# Patient Record
Sex: FEMALE | Race: WHITE
[De-identification: names, ages, dates, MRNs, and addresses within clinical notes are randomized per-mention and may not be internally consistent; named-entity substitution may affect disease eponyms.]

---

## 2017-01-03 ENCOUNTER — HOSPITAL ENCOUNTER (OUTPATIENT)
Dept: HOSPITAL 75 - RAD | Age: 57
End: 2017-01-03
Attending: NURSE PRACTITIONER
Payer: MEDICARE

## 2017-01-03 DIAGNOSIS — K74.60: Primary | ICD-10-CM

## 2017-01-03 PROCEDURE — 76700 US EXAM ABDOM COMPLETE: CPT

## 2017-01-03 NOTE — XMS REPORT
Continuity of Care Document

 Created on: 2017



Wendy Dunaway

External Reference #: [id number]

: 1960

Sex: Female



Demographics







 Address  [address] [place]  [address]

 

 Home Phone  [phone]

 

 Preferred Language  English

 

 Marital Status  Single

 

 Jewish Affiliation  NON

 

 Race  White or 

 

 Ethnic Group  Not  or 





Author







 Author  [facility]

 

 Organization  [facility]

 

 Address  Unknown

 

 Phone  Unavailable







Support







 Name  Relationship  Address  Phone

 

 , Kelly Siddiqui  ECON  [address]

[place]  [address]  [phone]

 

 , Razia Dunaway  EC[employer]  Unknown  [phone]







Care Team Providers







 Care Team Member Name  Role  Phone

 

 Rah Castro  PCP  [phone]







Source Comments

Some departments are not documenting in the electronic medical record.  If you 
do not see the information that you expected, contact Release of Information in 
the Health Information Management department at [phone] for further 
assistance in locating additional records.[facility]



Active Allergies and Adverse Reactions







    



  Allergen   Noted Date   Severity   Reactions   Comments

 

    



  Celebrex   2013    HIVES, SHORTNESS OF   Throat swells.



     BREATH, SEE COMMENTS 

 

    



  Sulfa (Sulfonamide   2012    HIVES 



  Antibiotics)    







Current Medications







      



  Prescription   Sig.   Disp.   Refills   Start   End Date   Status



      Date  

 

      



  gabapentin (NEURONTIN)   Take 1/2 tab every     20    Active



  600 mg tablet   morning, 1 tab every     16  



   evening and 1/2 tab at     



   bedtime.     

 

      



  QUEtiapine (SEROQUEL) 100   Take 1/2 tab every     06/15/20    Active



  mg tablet   morning, 1 tab at 1 pm, 1     16  



   tab at 5 pm and 1/2 tab     



   at bedtime.     

 

      



  dasatinib (SPRYCEL) 20 mg   Take 2 Tabs by mouth   60 Tab   2   10/05/20    
Active



  tablet   daily.     16  







Active Problems







 



  Problem   Noted Date

 

 



  History of hepatitis C   2016

 

 



  Pleural effusion   2015













  Overview:



  Traumatic left hemothorax.



  Confirmed by diagnostic / therapeutic thoracentesis





  Last Assessment & Plan:



  Evaluated by  cardiothoracic surgery, no interventions, deferred to



  pulmonary.









 



  Colon polyps   2015













  Overview:



  Tubular adenomas









 



  Cirrhosis (HCC)   2014

 

 



  Total bilirubin, elevated   2012













  Overview:



  Likely related to imatinib.





  L



  ast Assessment & Plan:



  Likely related to imatinib as it increased with the increased dosing. We



  will decrease imatinib back to 400 mg daily and have her labs checked with



  Dr. Petersen next week. Should her jaundice worsen over the weekend or she



  has diffuse itching she should present to her local ER.









 



  CML (chronic myelocytic leukemia) (HCC)   2012













  Overview:



  Diagnosis: CM chronic phase



  Date of Diagnosis: 2012



  Treatment:



  Imatinib 400-800 mg PO daily  to 2013: Best response not available,



  patient progressed 2013 ( Cytogenetic progression/ relapse)



  Nilotinib 200 MG PO BID: unable to tolerate, exacerbated neuropathy



  Dasatinib 100 mg PO daily: started March 15, 2013



  Response: CCR, CMR



  Dasatinib reduced to 70 mg PO daily: Oct , 2013



  7/30/15-Continue Dasatinib 70 mg po daily.



  May 2016: Dasatinib 50 mg po daily.



  Oct   2016: Dasatinib 40 mg po daily.





  Ms. Dunaway is a 53-year-old woman with a history of hep C infection and



  unexplained neuropathy.  She also has significant dysphagia and odynophagia



  and has significant difficulty in observing oral medication tablets.





  She was diagnosed with chronic phase CML in 2012.  She has been under



  the care of Dr. Petersen.  There has not been any outside records available



  for us to review today and most of the information available has been



  provided by the patient verbally.  Her treatment hostory is as above.





  Repeat the bone marrow biopsy did not show clonal evolution of her CML.



  Remains in CP-CML. No Kinase-domain mutations were detected.





  L



  ast Assessment & Plan:



  Mrs. Dunaway is doing very well with dasatinib.



  She has recurent pleural effusions, but no other significant side effects



  Achieved CMR





  Plan:



  -Reduce dasatinib to 40mg PO due to recurrent pleural effusions



  -Re-check BCR-ABL at next visit in 3 months



  -patient is up to date on mammogram and pap



  -She is due for colonoscopy in 2017











 



  Iron deficiency   2012













  Overview:



  Absent iron stains on bone marrow biopsy.





  L



  ast Assessment & Plan:



  Ferritin is > 500 , rules out iron def.









 



  Hiatal hernia   2012













  Overview:



  Per pt, limits her ability to take po pills.





  L



  ast Assessment & Plan:



  Pt reports that her trouble swallowing is secondary to GERD symptoms and



  that her GERD has resolved. She denies any current trouble with dysphagia



  and is able to eat and drink.









 



  Hepatitis C   2012













  Overview:



  Due to prior IV drug use, untreated





  L



  ast Assessment & Plan:



  Completed HCV therapy.



  Patient has an appointment with Hepatology today.











 



  Normocytic anemia   2012













  Overview:



  Last transfusion 





  L



  ast Assessment & Plan:



  Remains well above transfusion needs. Labs will hopefully continue to



  improve with treatment. She should have a work up for iron deficiency in



  the future however.







Resolved Problems







  



  Problem   Noted Date   Resolved Date

 

  



  Thrombocythemia (HCC)   2012













  Overview:



  CHANEL-2 negative.





  L



  ast Assessment & Plan:



  Improved. Now WNL.  She has stopped hydrea.







Most Recent Encounters







    



  Date   Type   Specialty   Providers   Description

 

    



  2016   Documentation   Oncology   Anam Ribeiro, PHARMD 

 

    



  10/17/2016   Orders Only   Hepatology   Yadira Vilchis   Chronic hepatitis C



      without hepatic coma



      (HCC); Cirrhosis of liver



      without ascites,



      unspecified hepatic



      cirrhosis type (HCC); CML



      (chronic myelocytic



      leukemia) (HCC)

 

    



  10/05/2016   Office Visit   Oncology   Gillian Chand MD   CML (chronic 
myelocytic



      leukemia) (HCC) (Primary



      Dx)

 

    



  10/05/2016   Refill   Oncology   Gillian Chand MD 

 

    



  10/04/2016   Orders Only   Oncology   Vicky Rodriguez, PHARMD 

 

    



  10/04/2016   Orders Only   Hepatology   Olga Polk APRN   Chronic hepatitis 
C



      without hepatic coma



      (HCC) (Primary Dx);



      Cirrhosis of liver



      without ascites,



      unspecified hepatic



      cirrhosis type (HCC); CML



      (chronic myelocytic



      leukemia) (HCC)

 

    



  10/03/2016   Orders Only   Hepatology   Olga Polk APRN   Chronic hepatitis 
C



      without hepatic coma



      (HCC) (Primary Dx);



      Cirrhosis of liver



      without ascites,



      unspecified hepatic



      cirrhosis type (HCC)







Immunizations







  



  Name   Dates Previously Given   Next Due

 

  



  HEP A/HEP B Combined   2015, 2015, 2015 



  Vaccine  







Social History







    



  Tobacco Use   Types   Packs/Day   Years Used   Date

 

    



  Former Smoker   Cigarettes   1   10 









   



  Smokeless Tobacco: Former     Quit:



  User     2003









   



  Alcohol Use   Drinks/Week   oz/Week   Comments

 

   



  No   0 Standard   0.0 



   drinks or  



   equivalent  







Last Filed Vital Signs







  



  Vital Sign   Reading   Time Taken

 

  



  Blood Pressure   123/82   10/05/2016  9:25 AM CDT

 

  



  Pulse   64   10/05/2016  9:25 AM CDT

 

  



  Temperature   36.5   C (97.7   F)   10/05/2016  9:25 AM CDT

 

  



  Respiratory Rate   18   10/05/2016  9:25 AM CDT

 

  



  Height   1.575 m (5' 2")   10/05/2016  9:25 AM CDT

 

  



  Weight   70.035 kg (154 lb 6.4 oz)   10/05/2016  9:25 AM CDT

 

  



  Body Mass Index   28.23   10/05/2016  9:25 AM CDT

 

  



  Oxygen Saturation   100%   10/05/2016  9:25 AM CDT







Plan of Care







    



  Date   Type   Specialty   Providers   Description

 

    



  2017   Appointment   Oncology  

 

    



  2017   Appointment   Oncology   Gillian Chand MD 



     [address] 



     [address] 



     [place] [address] 



     [phone] 



     [phone] (Fax) 

 

    



  2017   Appointment   Hepatology   Olga Polk APRN 



     [address] 



     [address] 



     [place] [address] 



     [phone] 



     [phone] (Fax) 









   



  Health Maintenance   Due Date   Last Done   Comments

 

   



  Physical (Comprehensive)   1966  



  Exam   

 

   



  Pertussis Vaccine   1970  

 

   



  Tetanus Vaccine   1976  

 

   



  Cervical Cancer Screening   1980  

 

   



  Influenza Vaccine   2016  

 

   



  Breast Cancer Screening   2018 

 

   



  Colorectal Cancer   10/21/2024   10/21/2014 



  Screening   







Results from Last 3 Months

CBC AND DIFF (10/05/2016  8:56 AM)





  



  Component   Value   Range

 

  



  White Blood Cells   3.5 (L)   4.5-11.0 K/UL

 

  



  RBC   4.60   4.0-5.0 M/UL

 

  



  Hemoglobin   14.5   12.0-15.0 GM/DL

 

  



  Hematocrit   42.9   36-45 %

 

  



  MCV   93.3    FL

 

  



  MCH   31.4   26-34 PG

 

  



  MCHC   33.7   32.0-36.0 G/DL

 

  



  RDW   15.7 (H)   11-15 %

 

  



  Platelet Count   238   150-400 K/UL

 

  



  MPV   7.7   7-11 FL

 

  



  Neutrophils   52   41-77 %

 

  



  Lymphocytes   32   24-44 %

 

  



  Monocytes   11   4-12 %

 

  



  Eosinophils   4   0-5 %

 

  



  Basophils   1   0-2 %

 

  



  Absolute Neutrophil Count   1.80   1.8-7.0 K/UL

 

  



  Absolute Lymph Count   1.10   1.0-4.8 K/UL

 

  



  Absolute Monocyte Count   0.40   0-0.80 K/UL

 

  



  Absolute Eosinophil Count   0.10   0-0.45 K/UL

 

  



  Absolute Basophil Count   0.00   0-0.20 K/UL









 Specimen

 

 Blood



BCR  PCR BLOOD OR BONE MARROW (10/05/2016  8:56 AM)





  



  Component   Value   Range

 

  



  Specimen   BLOOD 

 

  



  Final Diagnosis,BCR   Patient Name: Wendy Dunaway 



   Physician(s):    Dr. Gillian Chand 



   Ordering Facility: [facility] 



   Medical Record #:    [id number] 



   Date of Birth, Sex:    1960, F 



   Collection Date:    10/5/2016 



   Received Date:    10/5/2016 



   Report Date:    10/17/2016 



   [facility] Reference #:    [id number] 



   Accession #:    [id number] 



   Test Performed: 



   BCR-ABL p210 fusion gene transcript analysis 



   Specimen Type 



   Blood 



   Analytical Results 



   The BCR-ABL Mbcr b2a2 and/or b3a2 fusion gene 



   transcripts were NOT 



   DETECTED in this specimen. 



   Interpretation 



   The BCR-ABL Mbcr b2a2 and b3a2 fusion gene 



   transcripts were NOT 



   DETECTED in this specimen. The limit of detection 



   for this assay is 



   an 



   Mbcr copy number of at least 10 and an NCN of 



   0.001%. Analysis of 



   BCR-ABL transcript levels during and/or after 



   therapy reflects the 



   level of 



   disease suppression in patients with CML and some 



   patients with AML, 



   and is effective for monitoring treatment 



   efficiency or, in some 



   cases, 



   the need to reassess therapy. These results should 



   be interpreted 



   only in the context of total clinical information. 



   Therapeutic action 



   should not 



   be taken based solely on these results. 



   Methodology 



   Total RNA from peripheral blood or bone marrow 



   samples was isolated 



   and reverse transcribed with random primers, and 



   the    cDNA products 



   were quantitated by real time quantitative PCR 



   (RQ-PCR). Primers and 



   probes for bcr exons b2 and b3 and abl exon 2 are 



   used in the RQ- 



   PCR to detect the Mbcr transcripts b2a2 and b3a2. 



   A set of plasmid 



   standards containing 10^1 to 10^6 copies each of 



   BCR-ABL and ABL were 



   used to generate standard curves for the 



   quantitation of BCR-ABL and 



   ABL. ABL copy numbers are used as control for the 



   quality and 



   quantity 



   of sample RNA and to normalize the BCR-ABL copy 



   numbers. The test 



   result is expressed as BCR-ABL/ABL ratio, then 



   converted to an 



   international scale using the IS-Low Calibrator. 



   Intended Use 



   The BCR-ABL Mbcr RQ-PCR assay detects and 



   quantitates the BCR-ABL 



   Mbcr b2a2 and b3a2 (p210) fusion gene transcripts 



   which present in 



   95% of chronic myelogenous leukemia (CML) patients 



   and approximately 



   35% of Ph-positive adult acute lymphocytic 



   leukemia (ALL) patients. 



   This test does not detect other BCR-ABL fusions, 



   including e1a2 



   (p190), so should not be used in the diagnostic 



   setting. Serial 



   analysis of the 



   Mbcr b2a2 & b3a2 transcripts can be used for 



   monitoring patient 



   response to CML treatment including tyrosine 



   kinase inhibitors 



   imatinib and 



   dasatinib. 



   References 



   1. Leni et al.    Desirable performance 



   characteristics for BCR-ABL 



   measurement on an        international reporting 



   scale to allow 



   consistent 



   interpretation of individual patient response and 



   comparison of 



   response rates between clinical trials.    Blood 



   2008 112:6192-1469. 



   2. Katina Rosa et al. Guidelines for the 



   measurement of BCR-ABL 1 



   transscripts in chronic myeloid leukaemia. [place] 



   Journal of 



   Haematology, 153, 179-190. 



   3. Nichole Cooper et al. Establishment of the 1st 



   World Health 



   Organization International     Genetic Reference 



   Panel for 



   quantification of BCR-ABL 



   Mrna. Blood 2010. 



   4. Daniel.    Molecular Monitoring 



   of BCR-ABL as a guide 



   to        clinical management of chronic myeloid 



   leukaemia.    Blood. 2006: 



   20 29-41. 



   5. Sage et al. Monitoring of CML patients 



   responding to treatment 



   with tyrosine kinase inhibitors: review and 



   recommendations for 



   harmonizing current methodology for detecting 



   BCR-aBL transcripts and 



   kinase domain mutations for expressing results. 



   Blood 2006:108 (1) 



   28-37. 



   6. Syed et al.    Quantitative Monitoring 



   of BCR-ABL 



   Transcript-Suggestion of a Simplified Approach 



   Considering Inaccuracy 



   of 



   Measurement and Calibration. Leukemia & Lymphoma 



   2004:45 (1) 



   3608-8057. 



   Disclaimer: 



   This test was performed by the [facility] 



   [facility]. This test was developed and its 



   performance 



   characteristics 



   determined by the [facility] 



   [facility]. It has not 



   been cleared nor approved by the U.S. Food and 



   Drug Administration. 



   The FDA has determined that such clearance is not 



   necessary. This 



   test is used for clinical purposes. It should not 



   be regarded as 



   investigational or for research. This laboratory 



   is certified under 



   the Clinical Laboratory Improvement Amendments of 



   1988 (CLIA-88) as 



   qualified to perform high complexity clinical 



   laboratory testing. 



   [facility], Department 



   of Pathology and 



   Laboratory Medicine, [facility] 



   [place], [address], [address], 



   [place], [address]. 



   Tel: [phone]; Fax: [phone]. 









 Specimen

 

 Blood



COMPREHENSIVE METABOLIC PANEL (10/05/2016  8:56 AM)





  



  Component   Value   Range

 

  



  Sodium   139   137-147 MMOL/L

 

  



  Potassium   4.0   3.5-5.1 MMOL/L

 

  



  Chloride   108    MMOL/L

 

  



  Glucose   95    MG/DL

 

  



  Blood Urea Nitrogen   17   7-25 MG/DL

 

  



  Creatinine   0.87   0.4-1.00 MG/DL

 

  



  Calcium   9.5   8.5-10.6 MG/DL

 

  



  Total Protein   7.9   6.0-8.0 G/DL

 

  



  Total Bilirubin   0.5   0.3-1.2 MG/DL

 

  



  Albumin   4.2   3.5-5.0 G/DL

 

  



  Alk Phosphatase   67    U/L

 

  



  AST (SGOT)   18   7-40 U/L

 

  



  CO2   27   21-30 MMOL/L

 

  



  ALT (SGPT)   13   7-56 U/L

 

  



  Anion Gap   4   3-12

 

  



  eGFR Non    >60Comment:   >60 mL/min



   The eGFR is not validated for use in drug dosing 



   adjustments.    Continue to use 



   estimated creatinine clearance per dosing 



   reference text.    Please contact the 



   Clinical Pharmacist for questions. 

 

  



  eGFR    >60Comment:   >60 mL/min



   The eGFR is not validated for use in drug dosing 



   adjustments.    Continue to use 



   estimated creatinine clearance per dosing 



   reference text.    Please contact the 



   Clinical Pharmacist for questions. 









 Specimen

 

 Blood



LDH-LACTATE DEHYDROGENASE (10/05/2016  8:56 AM)





  



  Component   Value   Range

 

  



  Lactate Dehydrogenase   219 (H)   100-210 U/L









 Specimen

 

 Blood

## 2017-01-03 NOTE — DIAGNOSTIC IMAGING REPORT
PROCEDURE: US abdomen complete.



TECHNIQUE: Multiple real-time grayscale images were obtained

over the abdomen in various projections.



INDICATION: Cirrhosis.



FINDINGS: The liver is normal in size and without focal lesions.

Gallbladder is surgically absent. There is no biliary ductal

dilatation. Visualized portions of the pancreas are

unremarkable. The spleen is normal in size. There is a small

left pleural effusion. Aorta is nonaneurysmal. IVC is patent.

Both kidneys are unremarkable. There is no ascites.



IMPRESSION: Small left pleural effusion, otherwise unremarkable

abdominal ultrasound status post cholecystectomy.



Dictated by:



Dictated on workstation # [id number]

## 2017-01-14 ENCOUNTER — HOSPITAL ENCOUNTER (EMERGENCY)
Dept: HOSPITAL 75 - ER | Age: 57
Discharge: HOME | End: 2017-01-14
Payer: MEDICARE

## 2017-01-14 VITALS — WEIGHT: 158 LBS | HEIGHT: 68 IN | BODY MASS INDEX: 23.95 KG/M2

## 2017-01-14 VITALS — SYSTOLIC BLOOD PRESSURE: 142 MMHG | DIASTOLIC BLOOD PRESSURE: 96 MMHG

## 2017-01-14 DIAGNOSIS — Z79.899: ICD-10-CM

## 2017-01-14 DIAGNOSIS — G62.9: Primary | ICD-10-CM

## 2017-01-14 PROCEDURE — 93005 ELECTROCARDIOGRAM TRACING: CPT

## 2017-01-14 PROCEDURE — 96372 THER/PROPH/DIAG INJ SC/IM: CPT

## 2017-01-14 PROCEDURE — 84484 ASSAY OF TROPONIN QUANT: CPT

## 2017-01-14 PROCEDURE — 36415 COLL VENOUS BLD VENIPUNCTURE: CPT

## 2017-01-14 NOTE — ED GENERAL
General


Chief Complaint:  General Problems/Pain


Stated Complaint:  SHOULDER BLADE/ARM NERVE BURNING


Nursing Triage Note:  


PT TO ED 8 W/ C/O NERVE BURNING ONSET LAST NOC.  REPORTS SHE HAD A STEROID 


INJECTION A FEW DAYS AGO AT URGENT CARE FOR A SINUS INFECTION.


Nursing Sepsis Screen:  No Definite Risk


Source of Information:  Patient


Exam Limitations:  No Limitations





History of Present Illness


Time Seen by Provider:  16:35


Initial Comments


Here with complaint of nerve pain that is from her left scapula down to her 

left elbow.  She has history of chronic nerve pain that is usually treated very 

well with gabapentin.  She did get an injection of steroids for a sinus 

infection a few days ago and has had increasing nerve pain since.  She has had 

2 hydrocodone and that has helped quite a bit.  She has not tried anything else 

aside from that for the pain.  Denies chest pain, breathing problems, nausea, 

vomiting or sweating.


Timing/Duration:  2-3 Days, Intermittent


Severity:  Moderate


Associated Systoms:  No Chest Pain, No Cough, No Fever/Chills, No Nausea/

Vomiting, No Shortness of Air, No Weakness





Allergies and Home Medications


Allergies


Coded Allergies:  


     sulfamethoxazole (Verified  Allergy, Severe, RASH, 10/2/14)


     trimethoprim (Verified  Allergy, Severe, RASH, 10/2/14)


     Sulfa (Sulfonamide Antibiotics) (Verified  Allergy, Mild, 10/2/14)


     zolpidem (Verified  Allergy, Mild, skin sensation, 10/2/14)


     celecoxib (Verified  Adverse Reaction, Intermediate, hives, 10/2/14)





Home Medications


1 TAB TAB #30 1 TAB PO Q4H PRN PRN PAIN 


   Prescribed by: KAELA AVILES on 11/17/14 0824


Dasatinib 70 Mg Tablet 70 MG PO HS (Reported) 


Docusate Sodium 100 Mg Cap #60 100 MG PO BID 


   Prescribed by: KAELA AVILES on 11/17/14 0825


Gabapentin 600 Mg Tab 600 MG PO HS (Reported) 


Gabapentin 600 Mg Tablet 300 MG PO DAILY (Reported) 


   TAKES 1/2 (600MG) TABLET 


Gabapentin 600 Mg Tab 600 MG PO DAILY @1600 (Reported) 


Hydrocodone/Acetaminophen 1 Each Tablet #10 1 EACH PO Q4H 


   Prescribed by: KARELY SALAS on 10/31/16 1956


Prednisone 20 Mg Tab #6 40 MG PO DAILY 


   Prescribed by: KARELY SALAS on 10/31/16 1956


Quetiapine Fumarate 50 Mg Tablet 50 MG PO AM (Reported) 


Quetiapine Fumarate 50 Mg Tablet 100 MG PO DAILY @1300 (Reported) 


   TAKES 2 (50MG) TABLETS 


Quetiapine Fumarate 50 Mg Tablet 100 MG PO DAILY @1700 (Reported) 


   TAKES 2 (50MG) TABLETS 


Quetiapine Fumarate 50 Mg Tablet 50 MG PO HS (Reported) 





Constitutional:   see HPI


EENTM:   no symptoms reported


Respiratory:   no symptoms reported


Cardiovascular:   no symptoms reported


Gastrointestinal:   no symptoms reportedNo diarrhea, No nausea, No vomiting


Genitourinary:   no symptoms reported


Musculoskeletal:   see HPI muscle pain


Skin:   no symptoms reported


Psychiatric/Neurological:   See HPI Other (nerve pain)


All Other Systems Reviewed


Negative Unless Noted:  Yes





Past Medical-Social-Family Hx


Patient Social History


Alcohol Use:  Denies Use


Recreational Drug Use:  No


Smoking Status:  Never a Smoker


Recent Foreign Travel:  No


Contact w/Someone Who Travel:  No


Recent Infectious Disease Expo:  No


Recent Hopitalizations:  Yes (PLEURAL EFFUSION)


Physical Abuse Screen:  No


Sexual Abuse:  No





Immunizations Up To Date


Tetanus Booster (TDap):  Less than 5yrs


Date of Pneumonia Vaccine:  Jul 19, 2012





Seasonal Allergies


Seasonal Allergies:  Yes





Surgeries


HX Surgeries:  Yes (HERNIA REPAIR, HEMORRHOIDECTOMY, CARPAL TUNNEL SURGERIES)





Respiratory


Hx Respiratory Disorders:  No





Cardiovascular


Hx Cardiac Disorders:  No





Neurological


Hx Neurological Disorders:  Yes


Neurological Disorders:  Neuropathy





Reproductive System


Hx Reproductive Disorders:  No


Sexually Transmitted Disease:  No


HIV/AIDS:  No


GYN History:  Hysterectomy





Genitourinary


Hx Genitourinary Disorders:  No





Gastrointestinal


Hx Gastrointestinal Disorders:  Yes


Gastrointestinal Disorders:  Gastroesophageal Reflux, Chronic Constipation





Musculoskeletal


Hx Musculoskeletal Disorders:  No





Endocrine


Hx Endocrine Disorders:  No





HEENT


HX ENT Disorders:  No





Cancer


Hx Cancer:  Yes (CML)


Cancer:  Leukemia





Psychosocial


Hx Psychiatric Problems:  Yes


Behavioral Health Disorders:  Anxiety





Integumentary


HX Skin/Integumentary Disorder:  No





Blood Transfusions


Hx Blood Disorders:  Yes (CML/ hep c)


Adverse Reaction to a Blood Tr:  No





Reviewed Nursing Assessment


Reviewed/Agree w Nursing PMH:  Yes





Family Medical History


Significant Family History:  Heart Disease, Cancer, Diabetes


Family Medial History:  


Diabetes mellitus


  19 MOTHER


Hypertension


  19 MOTHER


Myocardial infarction


  19 FATHER


Neoplasm


Psychosocial problem


  19 MOTHER


Visual disorder


  19 MOTHER





Physical Exam


Vital Signs





 Vital Sign - Last 12Hours








 1/14/17





 16:37


 


Temp 98.0


 


Pulse 72


 


Resp 20


 


B/P 155/109


 


Pulse Ox 99


 


O2 Delivery Room Air





Capillary Refill : Less Than 3 Seconds


General Appearance:   No Apparent Distress WD/WN


HEENT:   PERRL/EOMI Pharynx Normal


Neck:   Non Tender Supple


Respiratory:   Lungs Clear Normal Breath Sounds


Cardiovascular:   Regular Rate, Rhythm No Murmur


Gastrointestinal:   Non Tender Soft


Extremity:   Non Tender No Calf Tenderness


Neurologic/Psychiatric:   Alert Oriented x3


Skin:   Normal Color Warm/Dry





Progress/Results/Core Measures


Results/Orders


Lab Results








 Laboratory Tests








Test


  1/14/17


16:59 Range/Units


 


 


Troponin I < 0.30  <0.30  NG/ML











My Orders





 Orders-AMADA GORDILLO MD


Ketorolac Injection (Toradol Injection) (1/14/17 16:53)


Ekg Tracing (1/14/17 16:53)


Troponin I (1/14/17 16:58)





Vital Signs/I&O





 Vital Sign - Last 12Hours








 1/14/17





 16:37


 


Temp 98.0


 


Pulse 72


 


Resp 20


 


B/P 155/109


 


Pulse Ox 99


 


O2 Delivery Room Air








Blood Pressure Mean:  124


Progress Note :  


Progress Note


Seen and evaluated.  We will check EKG and troponin given mimic nature of pain.

  Toradol 60 mg IM ordered.  Patient has stated allergy to celecoxib but has 

had Toradol in the past and does take ibuprofen and Aleve at home.  Monitor 

patient.  1810: Pain is relieved.  Patient feels much better.  EKG and troponin 

are negative.  Discharged home with return precautions.  Patient verbalize 

understanding instructions and agreement with plan.  No adverse reaction from 

Toradol noted.





ECG


Initial ECG Impression Date:  Jan 14, 2017


Initial ECG Impression Time:  17:11


Initial ECG Rate:  71


Initial ECG Rhythm:  Normal Sinus


Comment


Sinus rhythm with borderline T-wave abnormalities.  Normal axis improved from 

previous right axis deviation.  Overall morphology very similar to previous EKG 

of 2/19/13.  No evidence of ST elevation MI.  Interpreted by me.





Departure


Impression


Impression:  


 Primary Impression:  


 Neuropathy


Disposition:  01 HOME, SELF-CARE


Condition:  Improved





Departure-Patient Inst.


Decision time for Depature:  18:15


Referrals:  


KATHRYN HOLLAND DO (PCP/Family)


Primary Care Physician


Patient Instructions:  Peripheral Neuropathy (DC)





Add. Discharge Instructions:


All discharge instructions reviewed with patient and/or family. Voiced 

understanding.





You may take ibuprofen 800 mg every 8 hours as needed for pain.  If you are 

having persistent pain after Monday, he should follow-up with Dr. Holland on 

Tuesday for recheck and further evaluation and potentially adjusting your 

gabapentin.  Return for worse pain, fever, vomiting, weakness, breathing 

problems or other concerns as needed.





Copy


Copies To 1:   KATHRYN HOLLAND TIMOTHY D MD Jan 14, 2017 17:10

## 2017-01-14 NOTE — XMS REPORT
Continuity of Care Document

 Created on: 2017



Wendy Dunaway

External Reference #: [id number]

: 1960

Sex: Female



Demographics







 Address  [address] [place]  [address]

 

 Home Phone  [phone]

 

 Preferred Language  English

 

 Marital Status  Single

 

 Mormon Affiliation  NON

 

 Race  White or 

 

 Ethnic Group  Not  or 





Author







 Author  [facility]

 

 Organization  [facility]

 

 Address  Unknown

 

 Phone  Unavailable







Support







 Name  Relationship  Address  Phone

 

 , Kelly Siddiqui  [employer]  [address]

[place]  [address]  [phone]

 

 , Razia Dunaway  [employer]  Unknown  [phone]







Care Team Providers







 Care Team Member Name  Role  Phone

 

 Rah Castro  PCP  [phone]







Source Comments

Some departments are not documenting in the electronic medical record.  If you 
do not see the information that you expected, contact Release of Information in 
the Health Information Management department at [phone] for further 
assistance in locating additional records.[facility]



Active Allergies and Adverse Reactions







    



  Allergen   Noted Date   Severity   Reactions   Comments

 

    



  Celebrex   2013    HIVES, SHORTNESS OF   Throat swells.



     BREATH, SEE COMMENTS 

 

    



  Sulfa (Sulfonamide   2012    HIVES 



  Antibiotics)    







Current Medications







      



  Prescription   Sig.   Disp.   Refills   Start   End Date   Status



      Date  

 

      



  gabapentin (NEURONTIN)   Take 1/2 tab every     20    Active



  600 mg tablet   morning, 1 tab every     16  



   evening and 1/2 tab at     



   bedtime.     

 

      



  QUEtiapine (SEROQUEL) 100   Take 1/2 tab every     06/15/20    Active



  mg tablet   morning, 1 tab at 1 pm, 1     16  



   tab at 5 pm and 1/2 tab     



   at bedtime.     

 

      



  dasatinib (SPRYCEL) 20 mg   Take 2 Tabs by mouth   60 Tab   2   10/05/20    
Active



  tablet   daily.     16  

 

      



  amoxicillin (AMOXIL) 875   Take 875 mg by mouth       Active



  mg tablet   every 12 hours.     







Active Problems







 



  Problem   Noted Date

 

 



  History of hepatitis C   2016

 

 



  Pleural effusion   2015













  Overview:



  Traumatic left hemothorax.



  Confirmed by diagnostic / therapeutic thoracentesis





  Last Assessment & Plan:



  Evaluated by  cardiothoracic surgery, no interventions, deferred to



  pulmonary.









 



  Colon polyps   2015













  Overview:



  Tubular adenomas









 



  Cirrhosis (HCC)   2014

 

 



  Total bilirubin, elevated   2012













  Overview:



  Likely related to imatinib.





  L



  ast Assessment & Plan:



  Likely related to imatinib as it increased with the increased dosing. We



  will decrease imatinib back to 400 mg daily and have her labs checked with



  Dr. Petersen next week. Should her jaundice worsen over the weekend or she



  has diffuse itching she should present to her local ER.









 



  CML (chronic myelocytic leukemia) (HCC)   2012













  Overview:



  Diagnosis: CM chronic phase



  Date of Diagnosis: 2012



  Treatment:



  Imatinib 400-800 mg PO daily  to 2013: Best response not available,



  patient progressed 2013 ( Cytogenetic progression/ relapse)



  Nilotinib 200 MG PO BID: unable to tolerate, exacerbated neuropathy



  Dasatinib 100 mg PO daily: started March 15, 2013



  Response: CCR, CMR



  Dasatinib reduced to 70 mg PO daily: Oct , 2013



  7/30/15-Continue Dasatinib 70 mg po daily.



  May 2016: Dasatinib 50 mg po daily.



  Oct   2016: Dasatinib 40 mg po daily.





  Ms. Dunaway is a 53-year-old woman with a history of hep C infection and



  unexplained neuropathy.  She also has significant dysphagia and odynophagia



  and has significant difficulty in observing oral medication tablets.





  She was diagnosed with chronic phase CML in 2012.  She has been under



  the care of Dr. Petersen.  There has not been any outside records available



  for us to review today and most of the information available has been



  provided by the patient verbally.  Her treatment hostory is as above.





  Repeat the bone marrow biopsy did not show clonal evolution of her CML.



  Remains in CP-CML. No Kinase-domain mutations were detected.





  L



  ast Assessment & Plan:



  Mrs. Dunaway is doing very well with dasatinib.



  She has recurent pleural effusions, but no other significant side effects



  Achieved CMR





  Plan:



  -Reduce dasatinib to 40mg PO due to recurrent pleural effusions



  I have reviewed the CXR results with the patient and counseled the patient



  about the results. No pleural effusion



  -Re-check BCR-ABL at next visit in 3 months



  -patient is up to date on mammogram and pap



  -She is due for colonoscopy in 2017









 



  Iron deficiency   2012













  Overview:



  Absent iron stains on bone marrow biopsy.





  L



  ast Assessment & Plan:



  Ferritin is > 500 , rules out iron def.









 



  Hiatal hernia   2012













  Overview:



  Per pt, limits her ability to take po pills.





  L



  ast Assessment & Plan:



  Pt reports that her trouble swallowing is secondary to GERD symptoms and



  that her GERD has resolved. She denies any current trouble with dysphagia



  and is able to eat and drink.









 



  Hepatitis C   2012













  Overview:



  Due to prior IV drug use, untreated





  L



  ast Assessment & Plan:



  Completed HCV therapy.



  Patient has an appointment with Hepatology today.











 



  Normocytic anemia   2012













  Overview:



  Last transfusion 





  L



  ast Assessment & Plan:



  Remains well above transfusion needs. Labs will hopefully continue to



  improve with treatment. She should have a work up for iron deficiency in



  the future however.







Resolved Problems







  



  Problem   Noted Date   Resolved Date

 

  



  Thrombocythemia (HCC)   2012













  Overview:



  CHANEL-2 negative.





  L



  ast Assessment & Plan:



  Improved. Now WNL.  She has stopped hydrea.







Most Recent Encounters







    



  Date   Type   Specialty   Providers   Description

 

    



  2017   Hospital   Radiology   Gillian Chand MD   Arrived



   Encounter   

 

    



  2017   Office Visit   Oncology   Gillian Chand MD   CML (chronic 
myelocytic



      leukemia) (HCC) (Primary



      Dx)

 

    



  2016   Documentation   Oncology   Anam Ribeiro, PHARMD 

 

    



  10/17/2016   Orders Only   Hepatology   Yadira Vilchis   Chronic hepatitis C



      without hepatic coma



      (HCC); Cirrhosis of liver



      without ascites,



      unspecified hepatic



      cirrhosis type (HCC); CML



      (chronic myelocytic



      leukemia) (HCC)







Immunizations







  



  Name   Dates Previously Given   Next Due

 

  



  HEP A/HEP B Combined   2015, 2015, 2015 



  Vaccine  







Social History







    



  Tobacco Use   Types   Packs/Day   Years Used   Date

 

    



  Former Smoker   Cigarettes   1   10 









   



  Smokeless Tobacco: Former     Quit:



  User     2003









   



  Alcohol Use   Drinks/Week   oz/Week   Comments

 

   



  No   0 Standard   0.0 



   drinks or  



   equivalent  







Last Filed Vital Signs







  



  Vital Sign   Reading   Time Taken

 

  



  Blood Pressure   130/83   2017 11:22 AM CST

 

  



  Pulse   76   2017 11:22 AM CST

 

  



  Temperature   36.7   C (98   F)   2017 11:22 AM CST

 

  



  Respiratory Rate   15   2017 11:22 AM CST

 

  



  Height   1.575 m (5' 2.01")   2017 11:22 AM CST

 

  



  Weight   71.033 kg (156 lb 9.6 oz)   2017 11:22 AM CST

 

  



  Body Mass Index   28.64   2017 11:22 AM CST

 

  



  Oxygen Saturation   100%   2017 11:22 AM CST







Plan of Care







    



  Date   Type   Specialty   Providers   Description

 

    



  2017   Appointment   Hepatology   Jam Olga, APRN 



     [address] 



     [address] 



     [place] [address] 



     [phone] 



     [phone] (Fax) 

 

    



  2017   Appointment   Oncology  

 

    



  2017   Appointment   Oncology   Gillian Chand MD 



     [address] 



     [address] 



     [place] [address] 



     [phone] 



     [phone] (Fax) 









   



  Health Maintenance   Due Date   Last Done   Comments

 

   



  Physical (Comprehensive)   1966  



  Exam   

 

   



  Pertussis Vaccine   1970  

 

   



  Tetanus Vaccine   1976  

 

   



  Cervical Cancer Screening   1980  

 

   



  Influenza Vaccine   2016  

 

   



  Breast Cancer Screening   2017 

 

   



  Colorectal Cancer   10/21/2024   10/21/2014 



  Screening   







Results from Last 3 Months

CHEST 2 VIEWS (2017 11:20 AM)





 Impressions

 

 



 



 



Small left pleural effusion with adjacent atelectasis/scarring.



 



 



 Approved by Vasyl Jaramillo M.D. on 2017 12:08 PM



 



By my electronic signature, I attest that I have personally reviewed the images 
for this examination and formulated the interpretations and opinions expressed 
in this report



 



 



 Finalized by Ovidio Nicholson M.D. on 2017 12:36 PM. Dictated by Vasyl Jaramillo M.D. on 2017 11:24 AM.



 









 Narrative

 

 



CHEST 2 VIEWS



 



Clinical Indication:Female, 56 years old. Chronic myelocytic leukemia. 
Fluid on lungs. 



 



Comparison: Chest x-ray 2015



 



Findings:



 



Heart size and pulmonary vasculature are within normal limits. No pneumothorax. 
Small left pleural effusion with adjacent atelectasis/scarring old fracture 
deformity of left fourth posterior rib.



 









 Procedure Note

 

 



Interface, Radiant Results - Thu 2017 12:39 PM CST



CHEST 2 VIEWS



Clinical Indication:  Female, 56 years old. Chronic myelocytic leukemia. Fluid 
on lungs. 



Comparison: Chest x-ray 2015



Findings:



Heart size and pulmonary vasculature are within normal limits. No pneumothorax. 
Small left pleural effusion with adjacent atelectasis/scarring old fracture 
deformity of left fourth posterior rib.



IMPRESSION





Small left pleural effusion with adjacent atelectasis/scarring.





 Approved by Vasyl Jaramillo M.D. on 2017 12:08 PM



By my electronic signature, I attest that I have personally reviewed the images 
for this examination and formulated the interpretations and opinions expressed 
in this report





 Finalized by Ovidio Nicholson M.D. on 2017 12:36 PM. Dictated by Vasyl Jaramillo M.D. on 2017 11:24 AM.





LDH-LACTATE DEHYDROGENASE (2017 10:50 AM)





  



  Component   Value   Range

 

  



  Lactate Dehydrogenase   188   100-210 U/L









 Specimen

 

 Blood



COMPREHENSIVE METABOLIC PANEL (2017 10:50 AM)





  



  Component   Value   Range

 

  



  Sodium   141   137-147 MMOL/L

 

  



  Potassium   3.4 (L)   3.5-5.1 MMOL/L

 

  



  Chloride   107    MMOL/L

 

  



  Glucose   85    MG/DL

 

  



  Blood Urea Nitrogen   18   7-25 MG/DL

 

  



  Creatinine   0.84   0.4-1.00 MG/DL

 

  



  Calcium   9.5   8.5-10.6 MG/DL

 

  



  Total Protein   7.7   6.0-8.0 G/DL

 

  



  Total Bilirubin   0.4   0.3-1.2 MG/DL

 

  



  Albumin   4.5   3.5-5.0 G/DL

 

  



  Alk Phosphatase   62    U/L

 

  



  AST (SGOT)   16   7-40 U/L

 

  



  CO2   25   21-30 MMOL/L

 

  



  ALT (SGPT)   12   7-56 U/L

 

  



  Anion Gap   9   3-12

 

  



  eGFR Non    >60Comment:   >60 mL/min



   The eGFR is not validated for use in drug dosing 



   adjustments.    Continue to use 



   estimated creatinine clearance per dosing 



   reference text.    Please contact the 



   Clinical Pharmacist for questions. 

 

  



  eGFR    >60Comment:   >60 mL/min



   The eGFR is not validated for use in drug dosing 



   adjustments.    Continue to use 



   estimated creatinine clearance per dosing 



   reference text.    Please contact the 



   Clinical Pharmacist for questions. 









 Specimen

 

 Blood



CBC AND DIFF (2017 10:50 AM)





  



  Component   Value   Range

 

  



  White Blood Cells   4.3 (L)   4.5-11.0 K/UL

 

  



  RBC   4.52   4.0-5.0 M/UL

 

  



  Hemoglobin   14.5   12.0-15.0 GM/DL

 

  



  Hematocrit   42.9   36-45 %

 

  



  MCV   94.8    FL

 

  



  MCH   32.0   26-34 PG

 

  



  MCHC   33.7   32.0-36.0 G/DL

 

  



  RDW   15.2 (H)   11-15 %

 

  



  Platelet Count   191   150-400 K/UL

 

  



  MPV   7.5   7-11 FL

 

  



  Neutrophils   67   41-77 %

 

  



  Lymphocytes   20 (L)   24-44 %

 

  



  Monocytes   10   4-12 %

 

  



  Eosinophils   2   0-5 %

 

  



  Basophils   1   0-2 %

 

  



  Absolute Neutrophil Count   2.90   1.8-7.0 K/UL

 

  



  Absolute Lymph Count   0.90 (L)   1.0-4.8 K/UL

 

  



  Absolute Monocyte Count   0.40   0-0.80 K/UL

 

  



  Absolute Eosinophil Count   0.10   0-0.45 K/UL

 

  



  Absolute Basophil Count   0.00   0-0.20 K/UL









 Specimen

 

 Blood

## 2017-06-22 ENCOUNTER — HOSPITAL ENCOUNTER (OUTPATIENT)
Age: 57
End: 2017-06-22
Payer: MEDICARE

## 2017-06-22 DIAGNOSIS — J90: Primary | ICD-10-CM

## 2017-08-23 ENCOUNTER — HOSPITAL ENCOUNTER (OUTPATIENT)
Dept: HOSPITAL 75 - RAD | Age: 57
End: 2017-08-23
Attending: NURSE PRACTITIONER
Payer: MEDICARE

## 2017-08-23 DIAGNOSIS — K74.60: Primary | ICD-10-CM

## 2017-08-23 DIAGNOSIS — Z90.49: ICD-10-CM

## 2017-08-23 PROCEDURE — 76700 US EXAM ABDOM COMPLETE: CPT

## 2017-08-23 NOTE — DIAGNOSTIC IMAGING REPORT
PROCEDURE: US abdomen complete.



TECHNIQUE: Multiple real-time grayscale images were obtained over

the abdomen in various projections.



INDICATION: Cirrhosis of the liver.



The previous abdominal ultrasound exam performed on 1/3/2017,

failed to show any focal abnormality of the liver. The liver did

not appear to be enlarged either. On this exam the liver appears

stable. The liver is not enlarged measuring 16.7 cm in length.

There is no focal mass involving the liver, and the biliary tree

is not dilated.



By history the gallbladder is surgically absent. The common bile

duct is not dilated measuring 4.8 mm.



The kidneys, the spleen, the aorta, and the inferior vena cava

are unremarkable. The pancreas was obscured by bowel gas. The

left pleural effusion identified on the previous exam is not

visualized on this study.



IMPRESSION:



1. The appearance of the abdomen is stable when compared to the

prior exam. There is no acute abnormality identified.

2. The liver is not enlarged, and there is no focal mass

involving the liver.

3. The small left pleural effusion noted previously has resolved.



Dictated by: 



  Dictated on workstation # [id number]

## 2017-09-14 ENCOUNTER — HOSPITAL ENCOUNTER (INPATIENT)
Dept: HOSPITAL 75 - ER | Age: 57
LOS: 1 days | Discharge: HOME | DRG: 309 | End: 2017-09-15
Attending: FAMILY MEDICINE | Admitting: FAMILY MEDICINE
Payer: MEDICARE

## 2017-09-14 VITALS — SYSTOLIC BLOOD PRESSURE: 111 MMHG | DIASTOLIC BLOOD PRESSURE: 63 MMHG

## 2017-09-14 VITALS — SYSTOLIC BLOOD PRESSURE: 89 MMHG | DIASTOLIC BLOOD PRESSURE: 68 MMHG

## 2017-09-14 VITALS — SYSTOLIC BLOOD PRESSURE: 99 MMHG | DIASTOLIC BLOOD PRESSURE: 61 MMHG

## 2017-09-14 VITALS — SYSTOLIC BLOOD PRESSURE: 84 MMHG | DIASTOLIC BLOOD PRESSURE: 45 MMHG

## 2017-09-14 VITALS — DIASTOLIC BLOOD PRESSURE: 66 MMHG | SYSTOLIC BLOOD PRESSURE: 90 MMHG

## 2017-09-14 VITALS — DIASTOLIC BLOOD PRESSURE: 57 MMHG | SYSTOLIC BLOOD PRESSURE: 77 MMHG

## 2017-09-14 VITALS — WEIGHT: 159.13 LBS | BODY MASS INDEX: 24.12 KG/M2 | HEIGHT: 68 IN

## 2017-09-14 VITALS — DIASTOLIC BLOOD PRESSURE: 76 MMHG | SYSTOLIC BLOOD PRESSURE: 96 MMHG

## 2017-09-14 VITALS — SYSTOLIC BLOOD PRESSURE: 99 MMHG | DIASTOLIC BLOOD PRESSURE: 79 MMHG

## 2017-09-14 VITALS — DIASTOLIC BLOOD PRESSURE: 72 MMHG | SYSTOLIC BLOOD PRESSURE: 109 MMHG

## 2017-09-14 VITALS — DIASTOLIC BLOOD PRESSURE: 63 MMHG | SYSTOLIC BLOOD PRESSURE: 94 MMHG

## 2017-09-14 DIAGNOSIS — E87.6: ICD-10-CM

## 2017-09-14 DIAGNOSIS — K59.09: ICD-10-CM

## 2017-09-14 DIAGNOSIS — I48.0: Primary | ICD-10-CM

## 2017-09-14 DIAGNOSIS — Z86.19: ICD-10-CM

## 2017-09-14 DIAGNOSIS — C92.91: ICD-10-CM

## 2017-09-14 DIAGNOSIS — K21.9: ICD-10-CM

## 2017-09-14 LAB
ALBUMIN SERPL-MCNC: 4.3 GM/DL (ref 3.2–4.5)
ALT SERPL-CCNC: 18 U/L (ref 0–55)
ANION GAP SERPL CALC-SCNC: 11 MMOL/L (ref 5–14)
APTT BLD: 32 SEC (ref 24–35)
AST SERPL-CCNC: 23 U/L (ref 5–34)
BASOPHILS # BLD AUTO: 0.1 10^3/UL (ref 0–0.1)
BASOPHILS NFR BLD AUTO: 1 % (ref 0–10)
BILIRUB SERPL-MCNC: 0.8 MG/DL (ref 0.1–1)
BUN SERPL-MCNC: 19 MG/DL (ref 7–18)
BUN/CREAT SERPL: 19
CALCIUM SERPL-MCNC: 9.1 MG/DL (ref 8.5–10.1)
CHLORIDE SERPL-SCNC: 111 MMOL/L (ref 98–107)
CO2 SERPL-SCNC: 20 MMOL/L (ref 21–32)
CREAT SERPL-MCNC: 0.99 MG/DL (ref 0.6–1.3)
EOSINOPHIL # BLD AUTO: 0.1 10^3/UL (ref 0–0.3)
EOSINOPHIL NFR BLD AUTO: 3 % (ref 0–10)
ERYTHROCYTE [DISTWIDTH] IN BLOOD BY AUTOMATED COUNT: 15.5 % (ref 10–14.5)
GFR SERPLBLD BASED ON 1.73 SQ M-ARVRAT: 58 ML/MIN
GLUCOSE SERPL-MCNC: 113 MG/DL (ref 70–105)
INR PPP: 1 (ref 0.8–1.4)
LYMPHOCYTES # BLD AUTO: 1.7 X 10^3 (ref 1–4)
LYMPHOCYTES NFR BLD AUTO: 44 % (ref 12–44)
MAGNESIUM SERPL-MCNC: 2.1 MG/DL (ref 1.8–2.4)
MCH RBC QN AUTO: 32 PG (ref 25–34)
MCHC RBC AUTO-ENTMCNC: 33 G/DL (ref 32–36)
MCV RBC AUTO: 96 FL (ref 80–99)
MONOCYTES # BLD AUTO: 0.4 X 10^3 (ref 0–1)
MONOCYTES NFR BLD AUTO: 10 % (ref 0–12)
MYOGLOBIN SERPL-MCNC: 63 NG/ML (ref 10–92)
NEUTROPHILS # BLD AUTO: 1.6 X 10^3 (ref 1.8–7.8)
NEUTROPHILS NFR BLD AUTO: 42 % (ref 42–75)
PLATELET # BLD: 200 10^3/UL (ref 130–400)
PMV BLD AUTO: 9.7 FL (ref 7.4–10.4)
POTASSIUM SERPL-SCNC: 3.1 MMOL/L (ref 3.6–5)
PROT SERPL-MCNC: 8 GM/DL (ref 6.4–8.2)
PROTHROMBIN TIME: 12.9 SEC (ref 12.2–14.7)
RBC # BLD AUTO: 4.47 10^6/UL (ref 4.35–5.85)
SODIUM SERPL-SCNC: 142 MMOL/L (ref 135–145)
WBC # BLD AUTO: 3.9 10^3/UL (ref 4.3–11)

## 2017-09-14 PROCEDURE — 93005 ELECTROCARDIOGRAM TRACING: CPT

## 2017-09-14 PROCEDURE — 87081 CULTURE SCREEN ONLY: CPT

## 2017-09-14 PROCEDURE — 85025 COMPLETE CBC W/AUTO DIFF WBC: CPT

## 2017-09-14 PROCEDURE — 84484 ASSAY OF TROPONIN QUANT: CPT

## 2017-09-14 PROCEDURE — 80053 COMPREHEN METABOLIC PANEL: CPT

## 2017-09-14 PROCEDURE — 83880 ASSAY OF NATRIURETIC PEPTIDE: CPT

## 2017-09-14 PROCEDURE — 85730 THROMBOPLASTIN TIME PARTIAL: CPT

## 2017-09-14 PROCEDURE — 71010: CPT

## 2017-09-14 PROCEDURE — 85610 PROTHROMBIN TIME: CPT

## 2017-09-14 PROCEDURE — 96365 THER/PROPH/DIAG IV INF INIT: CPT

## 2017-09-14 PROCEDURE — 96366 THER/PROPH/DIAG IV INF ADDON: CPT

## 2017-09-14 PROCEDURE — 93306 TTE W/DOPPLER COMPLETE: CPT

## 2017-09-14 PROCEDURE — 83874 ASSAY OF MYOGLOBIN: CPT

## 2017-09-14 PROCEDURE — 36415 COLL VENOUS BLD VENIPUNCTURE: CPT

## 2017-09-14 PROCEDURE — 84443 ASSAY THYROID STIM HORMONE: CPT

## 2017-09-14 PROCEDURE — 80061 LIPID PANEL: CPT

## 2017-09-14 PROCEDURE — 84100 ASSAY OF PHOSPHORUS: CPT

## 2017-09-14 PROCEDURE — 96361 HYDRATE IV INFUSION ADD-ON: CPT

## 2017-09-14 PROCEDURE — 93041 RHYTHM ECG TRACING: CPT

## 2017-09-14 PROCEDURE — 83735 ASSAY OF MAGNESIUM: CPT

## 2017-09-14 RX ADMIN — GABAPENTIN SCH MG: 600 TABLET, FILM COATED ORAL at 21:18

## 2017-09-14 RX ADMIN — APIXABAN SCH MG: 5 TABLET, FILM COATED ORAL at 21:43

## 2017-09-14 RX ADMIN — QUETIAPINE FUMARATE SCH MG: 100 TABLET, FILM COATED ORAL at 17:53

## 2017-09-14 RX ADMIN — DILTIAZEM HYDROCHLORIDE SCH MLS/HR: 100 INJECTION, POWDER, LYOPHILIZED, FOR SOLUTION INTRAVENOUS at 15:40

## 2017-09-14 RX ADMIN — DILTIAZEM HYDROCHLORIDE SCH MLS/HR: 100 INJECTION, POWDER, LYOPHILIZED, FOR SOLUTION INTRAVENOUS at 21:18

## 2017-09-14 RX ADMIN — SODIUM CHLORIDE SCH MLS/HR: 900 INJECTION, SOLUTION INTRAVENOUS at 14:39

## 2017-09-14 RX ADMIN — QUETIAPINE FUMARATE SCH MG: 100 TABLET, FILM COATED ORAL at 21:18

## 2017-09-14 NOTE — DIAGNOSTIC IMAGING REPORT
INDICATION: Chest pain and shortness of breath



Frontal chest obtained at 1127 AM. Comparison is made to 6/22/17.



Heart is borderline in size. There is mild central vascular

prominence. Infiltrate and/or atelectasis in the left base again

noted with obscuration of the left hemidiaphragm. There is

unchanged left pleural fluid.



IMPRESSION:



Unchanged left basilar infiltrate versus atelectasis with

unchanged left pleural fluid compared with 6/22/17. Mild central

vascular prominence. No other new finding.



Dictated by: 



  Dictated on workstation # [id number]

## 2017-09-14 NOTE — CONSULTATION-CARDIOLOGY
HPI-Cardiology


Cardiology Consultation:


Date of Consultation


17


Time Seen by Provider:  16:30


Date of Admission





Attending Physician


Rah Castro DO


Admitting Physician


Rah Castro DO


Consulting Physician


CHEMA STILL MD, MA, FACP, FACC, FSCAI, CCDS





HPI:


Chief Complaint:


Palpitations


HPI: 56 yo woman with onset of palpitations a couple of month ago lasting only 

a few min at a time. Had same palpitations this am that did not go away and she 

decided to come to the ER. Has been short of breath, mild to mod, for a year or 

more. Significantly more short of breath today. Was also experiencing chest 

discomfort during fast heart beat earlier. That chest pain lasted approx 1-2 

hours. She thinks that chest pain may just have been her rapid heart rate. 

Denies syncope or leg swelling. Denies fever or chills





Has had diarrhea for the last two days





Has leukemia and has had intermittent R pleural eff for which she has had 

thoracentesis by Dr Zavala on a few occasions





Review of Systems-Cardiology


Review of Systems


Constitutional:  lightheadedness, No weight loss, No weight gain


Eyes:  No vision change


Ears/Nose/Throat:  No ear discharge, No nasal drainage, No recent hearing loss


Respiratory:  As described under HPI


Cardiovascular:  As described under HPI


Gastrointestinal:  No constipation, diarrhea, No nausea, No vomiting


Musculoskeletal:  back pain (chronic low back pain), No joint pain


Skin:  No rash, No ulcerations


Psychiatric/Neurological:  No seizure, No focal weakness, No syncope


Hematologic:  No bleeding abnormalities





PMH-Social-Family Hx


Patient Social History


Alcohol Use:  Denies Use


Recreational Drug Use:  No


Smoking Status:  Never a Smoker


Recent Foreign Travel:  No


Recent Infectious Disease Expo:  No


Physical Abuse Screen:  No


Sexual Abuse:  No





Immunizations Up To Date


Tetanus Booster (TDap):  Less than 5yrs


Date of Pneumonia Vaccine:  2012





Past Medical History


PMH


As described under Assessment.





Family Medical History


Family History:  


Diabetes mellitus


  19 MOTHER


Hypertension


  19 MOTHER


Myocardial infarction


  19 FATHER


Neoplasm (grandmother)


Psychosocial problem


  19 MOTHER


Visual disorder


  19 MOTHER





Allergies and Home Medications


Allergies


Coded Allergies:  


     sulfamethoxazole (Verified  Allergy, Severe, RASH, 10/2/14)


     trimethoprim (Verified  Allergy, Severe, RASH, 10/2/14)


     Sulfa (Sulfonamide Antibiotics) (Verified  Allergy, Mild, 10/2/14)


     zolpidem (Verified  Allergy, Mild, skin sensation, 10/2/14)


     celecoxib (Verified  Adverse Reaction, Intermediate, hives, 10/2/14)





Home Medications


Dasatinib 20 Mg Tablet, 40 MG PO HS, (Reported)


Fluticasone Propionate 9.9 Ml McGehee.susp, 1 SPRAY NS DAILY, (Reported)


Gabapentin 600 Mg Tablet, 600 MG PO 0900, (Reported)


Gabapentin 600 Mg Tablet, 1,200 MG PO 1600,2100, (Reported)


   TAKES 2 (600MG) TABLETS 


Quetiapine Fumarate 100 Mg Tablet, 50 MG PO 0900,2100, (Reported)


   TAKES 1/2 (100MG) TABLET 


Quetiapine Fumarate 100 Mg Tablet, 100 MG PO 1300,1700, (Reported)





Physical Exam-Cardiology


Physical Exam


Vital Signs/I&O





Vital Sign - Last 12Hours








 17





 10:59 11:09 11:30 13:40


 


Temp 97.5   97.9


 


Pulse 159 138 127 106


 


Resp 18  18 18


 


B/P (MAP) 120/91 120/91 119/97 90/66


 


Pulse Ox 97 97 97 95


 


O2 Delivery Room Air   Room Air


 


    





 17   





 15:40   


 


Pulse 110   


 


B/P (MAP) 107/66   














Intake and Output 


 


 9/15/17





 00:00


 


Intake Total 240 ml


 


Balance 240 ml





Capillary Refill : Less Than 3 Seconds


Constitutional:  AAO x 3, well-developed, well-nourished


HEENT:  PERRL, EOMI, hearing is well preserved, No xanthelasmas are seen


Neck:  carotid pulses are 2 + bilaterally, with good upstrokes


Respiratory:  No accessory muscle use, lungs clear to percussion, lungs clear 

to auscultation


Cardiovascular:  irregularly irregular, S1 and S2, systolic murmur (faint WILLIAM )


Gastrointestinal:  No tender, soft, No guarding, No rebound, audible bowel 

sounds


Extremities:  No clubbing, No cyanosis, No significant edema


Neurologic/Psychiatric:  oriented x 3, grossly intact, power is 5/5 both on 

sides


Skin:  No rash on exposed areas, No ulcerations on exposed areas





Data Review


Labs


Laboratory Tests


17 11:05: 


White Blood Count 3.9L, Red Blood Count 4.47, Hemoglobin 14.2, Hematocrit 43, 

Mean Corpuscular Volume 96, Mean Corpuscular Hemoglobin 32, Mean Corpuscular 

Hemoglobin Concent 33, Red Cell Distribution Width 15.5H, Platelet Count 200, 

Mean Platelet Volume 9.7, Neutrophils (%) (Auto) 42, Lymphocytes (%) (Auto) 44, 

Monocytes (%) (Auto) 10, Eosinophils (%) (Auto) 3, Basophils (%) (Auto) 1, 

Neutrophils # (Auto) 1.6L, Lymphocytes # (Auto) 1.7, Monocytes # (Auto) 0.4, 

Eosinophils # (Auto) 0.1, Basophils # (Auto) 0.1, Prothrombin Time 12.9, INR 

Comment 1.0, Activated Partial Thromboplast Time 32, Sodium Level 142, 

Potassium Level 3.1L, Chloride Level 111H, Carbon Dioxide Level 20L, Anion Gap 

11, Blood Urea Nitrogen 19H, Creatinine 0.99, Estimat Glomerular Filtration 

Rate 58, BUN/Creatinine Ratio 19, Glucose Level 113H, Calcium Level 9.1, 

Magnesium Level 2.1, Total Bilirubin 0.8, Aspartate Amino Transf (AST/SGOT) 23, 

Alanine Aminotransferase (ALT/SGPT) 18, Alkaline Phosphatase 70, Myoglobin 63.0

, Troponin I < 0.30, B-Type Natriuretic Peptide 106.8H, Total Protein 8.0, 

Albumin 4.3





Laboratory Tests


17 11:05











A/P-Cardiology


Assessment/Admission Diagnosis





PAF with RVR, first documented on 17





Diarrhea of undetermined etiology





Hypokalemia, likely due to diarrhea





CML, treated with Sprycel and managed by Dr Isabel at [facility]





H/o R-sided pleural effusion, likely related to Sprycel, that have been treated 

with intermittent thoracenteses





Discussion and Recomendations





* Control heart rate with dilt and dig


* Stroke prophylaxis with apixaban


* Echo to eval LVEF and valve function


* Med Svce to manage diarrhea


* Correct lytes


* Monitor labs


* Check TSH


* I had a detailed discussion with her and her fam and explained the 

pathophysiology and possible etiologies of a fib. We explained to her our 

treatment plan (rate control and stroke prophylaxis)





Clinical Quality Measures


AMI/AHF:


ASA po Prior to arrival:  No





DVT/VTE Risk/Contraindication:


Risk Factor Score Per Nursin


RFS Level Per Nursing on Admit:  4+=Very High











CHEMA STILL MD FACP FACC CCDS Sep 14, 2017 16:51

## 2017-09-14 NOTE — ED CARDIAC GENERAL
History of Present Illness


General


Stated Complaint:  CP/SOA


Source:  patient


Exam Limitations:  no limitations





History of Present Illness


Time seen by provider:  11:10


Initial Comments


To ER with chest pain shortness of breath that began 30 minutes prior to 

arrival.  Patient states that she cleaned her house yesterday and today she is 

finishing up odds and ends.  She bent over to pick something up and upon 

standing she felt very short of breath and as though her heart was racing.  She 

has no history of atrial fibrillation or heart disease.  She's never had chest 

pain.  She states that occasionally she does get shortness of breath and 

palpitations but this only lasts for a brief period of time before resolving on 

its own.  She's never sought care for it.  She has a history of hepatic 

cirrhosis secondary to hepatitis C she completed her bony treatment for the 

hepatitis C 3-4 years ago.  She follows a hematologist oncologist Dr. Marquez in 

[place] for chronic myelogenous leukemia and takes Sprycel every day.


Timing/Duration:  1/2 hour


Severity:  moderate


Prior CP/Workup:  no prior chest pain


NTG SL PTA:  No


ASA po PTA:  No


Associated Systoms:  No Nausea/Vomiting





Allergies and Home Medications


Allergies


Coded Allergies:  


     sulfamethoxazole (Verified  Allergy, Severe, RASH, 10/2/14)


     trimethoprim (Verified  Allergy, Severe, RASH, 10/2/14)


     Sulfa (Sulfonamide Antibiotics) (Verified  Allergy, Mild, 10/2/14)


     zolpidem (Verified  Allergy, Mild, skin sensation, 10/2/14)


     celecoxib (Verified  Adverse Reaction, Intermediate, hives, 10/2/14)





Home Medications


Dasatinib 70 Mg Tablet, 70 MG PO HS, (Reported)


Docusate Sodium 100 Mg Cap, 100 MG PO BID, #60


   Prescribed by: KAELA AVILES on 14 0825


Gabapentin 600 Mg Tab, 600 MG PO HS, (Reported)


Gabapentin 600 Mg Tablet, 300 MG PO DAILY, (Reported)


   TAKES 1/2 (600MG) TABLET 


Gabapentin 600 Mg Tab, 600 MG PO DAILY @1600, (Reported)


Hydrocodone/Acetaminophen 1 Each Tablet, 1 EACH PO Q4H, #10


   Prescribed by: KARELY SALAS on 10/31/16 1956


Prednisone 20 Mg Tab, 40 MG PO DAILY, #6


   Prescribed by: KARELY SALAS on 10/31/16 1956


Quetiapine Fumarate 50 Mg Tablet, 50 MG PO AM, (Reported)


Quetiapine Fumarate 50 Mg Tablet, 100 MG PO DAILY @1300, (Reported)


   TAKES 2 (50MG) TABLETS 


Quetiapine Fumarate 50 Mg Tablet, 100 MG PO DAILY @1700, (Reported)


   TAKES 2 (50MG) TABLETS 


Quetiapine Fumarate 50 Mg Tablet, 50 MG PO HS, (Reported)


[Hydrocodone Bit/Acetaminophen] 1 TAB TAB, 1 TAB PO Q4H PRN for PAIN, #30


   Prescribed by: KAELA AVILES on 14 0824





Review of Systems


Constitutional:  see HPI


EENTM:  No Symptoms Reported


Respiratory:  No Symptoms Reported


Cardiovascular:  No Symptoms Reported


Gastrointestinal:  No Symptoms Reported


Genitourinary:  No Symptoms Reported


Musculoskeletal:  no symptoms reported


Skin:  see HPI


Psychiatric/Neurological:  No Symptoms Reported


Endocrine:  No Symptoms Reported


Hematologic/Lymphatic:  No Symptoms Reported





Past Medical-Social-Family Hx


Patient Social History


Recent Foreign Travel:  No


Contact w/Someone Who Travel:  No


Recent Hopitalizations:  Yes (PLEURAL EFFUSION)





Immunizations Up To Date


Tetanus Booster (TDap):  Less than 5yrs


Date of Pneumonia Vaccine:  2012





Seasonal Allergies


Seasonal Allergies:  Yes





Neurological


Neurological Disorders:  Neuropathy





Reproductive System


Hx Reproductive Disorders:  No


Sexually Transmitted Disease:  No


HIV/AIDS:  No


GYN History:  Hysterectomy





Gastrointestinal


Gastrointestinal Disorders:  Gastroesophageal Reflux, Chronic Constipation





Cancer


Cancer:  Leukemia





Psychosocial


Behavioral Health Disorders:  Anxiety





Blood Transfusions


Adverse Reaction to a Blood Tr:  No





Family Medical History


Significant Family History:  Heart Disease, Cancer, Diabetes


Family Medial History:  


Diabetes mellitus


  19 MOTHER


Hypertension


  19 MOTHER


Myocardial infarction


  19 FATHER


Neoplasm (grandmother)


Psychosocial problem


  19 MOTHER


Visual disorder


  19 MOTHER





Physical Exam


Vital Signs





Vital Sign - Last 12Hours








 17





 10:59


 


Temp 97.5


 


Pulse 159


 


Resp 18


 


B/P (MAP) 120/91


 


Pulse Ox 97


 


O2 Delivery Room Air





Capillary Refill :


General Appearance:  No Apparent Distress, WD/WN


HEENT:  PERRL/EOMI, TMs Normal


Neck:  Full Range of Motion, Normal Inspection


Respiratory:  Normal Breath Sounds, No Accessory Muscle Use, No Respiratory 

Distress


Cardiovascular:  Irregularly Irregular, Tachycardia


Gastrointestinal:  Normal Bowel Sounds, Non Tender, Soft


Extremity:  Normal Capillary Refill, Normal Inspection


Neurologic/Psychiatric:  Alert, Oriented x3, No Motor/Sensory Deficits


Other comments


Heart rate 150-170 atrial fibrillation upon arrival to ER.  Blood pressure 120/

76.  Oxygen saturation 99 percent on room air.  IV was started.  10 mg bolus of 

Cardizem given, 10 mg/hr drip, normal saline infusing.  Aspirin given.





Progress/Results/Core Measures


Results/Orders


Lab Results





Laboratory Tests








Test


  17


11:05 Range/Units


 


 


White Blood Count


  3.9 L


  4.3-11.0


10^3/uL


 


Red Blood Count


  4.47 


  4.35-5.85


10^6/uL


 


Hemoglobin 14.2  11.5-16.0  G/DL


 


Hematocrit 43  35-52  %


 


Mean Corpuscular Volume 96  80-99  FL


 


Mean Corpuscular Hemoglobin 32  25-34  PG


 


Mean Corpuscular Hemoglobin


Concent 33 


  32-36  G/DL


 


 


Red Cell Distribution Width 15.5 H 10.0-14.5  %


 


Platelet Count


  200 


  130-400


10^3/uL


 


Mean Platelet Volume 9.7  7.4-10.4  FL


 


Neutrophils (%) (Auto) 42  42-75  %


 


Lymphocytes (%) (Auto) 44  12-44  %


 


Monocytes (%) (Auto) 10  0-12  %


 


Eosinophils (%) (Auto) 3  0-10  %


 


Basophils (%) (Auto) 1  0-10  %


 


Neutrophils # (Auto) 1.6 L 1.8-7.8  X 10^3


 


Lymphocytes # (Auto) 1.7  1.0-4.0  X 10^3


 


Monocytes # (Auto) 0.4  0.0-1.0  X 10^3


 


Eosinophils # (Auto)


  0.1 


  0.0-0.3


10^3/uL


 


Basophils # (Auto)


  0.1 


  0.0-0.1


10^3/uL


 


Prothrombin Time 12.9  12.2-14.7  SEC


 


INR Comment 1.0  0.8-1.4  


 


Activated Partial


Thromboplast Time 32 


  24-35  SEC


 


 


Sodium Level 142  135-145  MMOL/L


 


Potassium Level 3.1 L 3.6-5.0  MMOL/L


 


Chloride Level 111 H   MMOL/L


 


Carbon Dioxide Level 20 L 21-32  MMOL/L


 


Anion Gap 11  5-14  MMOL/L


 


Blood Urea Nitrogen 19 H 7-18  MG/DL


 


Creatinine


  0.99 


  0.60-1.30


MG/DL


 


Estimat Glomerular Filtration


Rate 58 


   


 


 


BUN/Creatinine Ratio 19   


 


Glucose Level 113 H   MG/DL


 


Calcium Level 9.1  8.5-10.1  MG/DL


 


Magnesium Level 2.1  1.8-2.4  MG/DL


 


Total Bilirubin 0.8  0.1-1.0  MG/DL


 


Aspartate Amino Transf


(AST/SGOT) 23 


  5-34  U/L


 


 


Alanine Aminotransferase


(ALT/SGPT) 18 


  0-55  U/L


 


 


Alkaline Phosphatase 70    U/L


 


Myoglobin


  63.0 


  10.0-92.0


NG/ML


 


Troponin I < 0.30  <0.30  NG/ML


 


B-Type Natriuretic Peptide 106.8 H <100.0  PG/ML


 


Total Protein 8.0  6.4-8.2  GM/DL


 


Albumin 4.3  3.2-4.5  GM/DL








My Orders





Orders - KARELY SALAS


Cbc With Automated Diff (17 11:09)


Magnesium (17 11:09)


Chest 1 View, Ap/Pa Only (17 11:09)


Ekg Tracing (17 11:09)


Cardiac Profile 1 (17 11:09)


Comprehensive Metabolic Panel (17 11:09)


Myoglobin Serum (17 11:09)


Protime With Inr (17 11:09)


Partial Thromboplastin Time (17 11:09)


O2 (17 11:09)


Monitor-Rhythm Ecg Trace Only (17 11:09)


Lipid Panel (9/15/17 06:00)


Aspirin Chewable Tablet (Baby Aspirin Ch (17 11:15)


Saline Lock/Iv-Start (17 11:09)


BNP (17 11:09)


Sodium Chloride (Ad... W/Diltiazem Drip (17 11:15)


Diltiazem Injection (Cardizem Injection) (17 11:15)


Ns Iv 1000 Ml (Sodium Chloride 0.9%) (17 11:15)


Diltiazem Injection (Cardizem Injection) (17 11:30)


Apixaban Tablet (Eliquis Tablet) (17 12:15)





Medications Given in ED





Current Medications








 Medications  Dose


 Ordered  Sig/Shakir


 Route  Start Time


 Stop Time Status Last Admin


Dose Admin


 


 Aspirin  324 mg  ONCE  ONCE


 PO  17 11:15


 17 11:16 DC 17 11:06


324 MG


 


 Diltiazem HCl  10 mg  ONCE  ONCE


 IVP  17 11:15


 17 11:16 DC 17 11:06


10 MG


 


 Diltiazem HCl  10 mg  ONCE  ONCE


 IVP  17 11:30


 17 11:31 DC 17 11:30


10 MG








Vital Signs/I&O





Vital Sign - Last 12Hours








 17





 10:59 11:09 11:30


 


Temp 97.5  


 


Pulse 159 138 127


 


Resp 18  18


 


B/P (MAP) 120/91 120/91 119/97


 


Pulse Ox 97 97 97


 


O2 Delivery Room Air  











Diagnostic Imaging





   Diagonstic Imaging:  Xray


   Plain Films/CT/US/NM/MRI:  chest


Comments


NAME:   DARIN BEE


MED REC#:   [id number]


ACCOUNT#:   [id number]


PT STATUS:   REG ER


:   1960


PHYSICIAN:   KARELY SALAS


ADMIT DATE:   17/ER


 ***Signed***


Date of Exam:17





CHEST 1 VIEW, AP/PA ONLY








INDICATION: Chest pain and shortness of breath





Frontal chest obtained at 1127 AM. Comparison is made to 17.





Heart is borderline in size. There is mild central vascular


prominence. Infiltrate and/or atelectasis in the left base again


noted with obscuration of the left hemidiaphragm. There is


unchanged left pleural fluid.





IMPRESSION:





Unchanged left basilar infiltrate versus atelectasis with


unchanged left pleural fluid compared with 17. Mild central


vascular prominence. No other new finding.





Dictated by: 





  Dictated on workstation # [id number]








Dict:   17 1138


Trans:   17 1150


MITZI [id number]





Interpreted by:     ENRIQUE ODELL MD


Electronically signed by: ENRIQUE ODELL MD 17 1150





Departure


Communication (Admissions)


Time/Spoke to Admitting Phy:  12:02


Communication


Discussed with Dr. Kvng Camacho who is on-call for Dr. Dr. Holland.  We'll 

admit the patient, consult cardiology.


Time/Spoke to Consulting Phy:  12:16


Communication/Consulting


Dr. Reeves agrees to consult


Progress Notes


1203-we had really no rate change or rhythm changes with 10 mg bolus or 10 mg 

an hour infusion of Cardizem so we gave another 10 mg bolus and increased the 

drip to 20 mg an hour.  Currently her rate is down from 170 upon arrival to a 

current 110-115 still A. fib.  Blood pressure is 108/76.  Her shortness of 

breath and chest pain are gone at this time.  Patient reports that she does 

have some intermittent shortness of breath and palpitations that goes away 

spontaneously so I would assume this to be a paroxysmal atrial fibrillation 

that was finally caught on EKG.





Impression


Impression:  


 Primary Impression:  


 Atrial fibrillation with rapid ventricular response


Disposition:   ADMITTED AS INPATIENT


Condition:  Improved





Admissions


Decision to Admit Reason:  Admit from ER (General)


Decision to Admit/Date:  Sep 14, 2017


Time/Decision to Admit Time:  12:04





Departure-Patient Inst.


Referrals:  


KATHRYN HOLLAND DO (PCP/Family)


Primary Care Physician











KARELY SALAS Sep 14, 2017 11:13

## 2017-09-15 VITALS — DIASTOLIC BLOOD PRESSURE: 64 MMHG | SYSTOLIC BLOOD PRESSURE: 103 MMHG

## 2017-09-15 VITALS — DIASTOLIC BLOOD PRESSURE: 48 MMHG | SYSTOLIC BLOOD PRESSURE: 77 MMHG

## 2017-09-15 VITALS — DIASTOLIC BLOOD PRESSURE: 75 MMHG | SYSTOLIC BLOOD PRESSURE: 115 MMHG

## 2017-09-15 VITALS — DIASTOLIC BLOOD PRESSURE: 75 MMHG | SYSTOLIC BLOOD PRESSURE: 110 MMHG

## 2017-09-15 VITALS — SYSTOLIC BLOOD PRESSURE: 76 MMHG | DIASTOLIC BLOOD PRESSURE: 63 MMHG

## 2017-09-15 VITALS — SYSTOLIC BLOOD PRESSURE: 117 MMHG | DIASTOLIC BLOOD PRESSURE: 48 MMHG

## 2017-09-15 VITALS — DIASTOLIC BLOOD PRESSURE: 63 MMHG | SYSTOLIC BLOOD PRESSURE: 85 MMHG

## 2017-09-15 VITALS — SYSTOLIC BLOOD PRESSURE: 110 MMHG | DIASTOLIC BLOOD PRESSURE: 75 MMHG

## 2017-09-15 VITALS — DIASTOLIC BLOOD PRESSURE: 64 MMHG | SYSTOLIC BLOOD PRESSURE: 99 MMHG

## 2017-09-15 VITALS — SYSTOLIC BLOOD PRESSURE: 75 MMHG | DIASTOLIC BLOOD PRESSURE: 55 MMHG

## 2017-09-15 VITALS — DIASTOLIC BLOOD PRESSURE: 56 MMHG | SYSTOLIC BLOOD PRESSURE: 90 MMHG

## 2017-09-15 VITALS — DIASTOLIC BLOOD PRESSURE: 62 MMHG | SYSTOLIC BLOOD PRESSURE: 95 MMHG

## 2017-09-15 VITALS — SYSTOLIC BLOOD PRESSURE: 82 MMHG | DIASTOLIC BLOOD PRESSURE: 58 MMHG

## 2017-09-15 VITALS — DIASTOLIC BLOOD PRESSURE: 58 MMHG | SYSTOLIC BLOOD PRESSURE: 83 MMHG

## 2017-09-15 VITALS — DIASTOLIC BLOOD PRESSURE: 71 MMHG | SYSTOLIC BLOOD PRESSURE: 112 MMHG

## 2017-09-15 LAB
ALBUMIN SERPL-MCNC: 3.1 GM/DL (ref 3.2–4.5)
ALT SERPL-CCNC: 11 U/L (ref 0–55)
ANION GAP SERPL CALC-SCNC: 8 MMOL/L (ref 5–14)
AST SERPL-CCNC: 13 U/L (ref 5–34)
BASOPHILS # BLD AUTO: 0 10^3/UL (ref 0–0.1)
BASOPHILS NFR BLD AUTO: 1 % (ref 0–10)
BILIRUB SERPL-MCNC: 0.3 MG/DL (ref 0.1–1)
BUN SERPL-MCNC: 15 MG/DL (ref 7–18)
BUN/CREAT SERPL: 21
CALCIUM SERPL-MCNC: 7.5 MG/DL (ref 8.5–10.1)
CHLORIDE SERPL-SCNC: 120 MMOL/L (ref 98–107)
CHOLEST SERPL-MCNC: 111 MG/DL (ref ?–200)
CO2 SERPL-SCNC: 15 MMOL/L (ref 21–32)
CREAT SERPL-MCNC: 0.72 MG/DL (ref 0.6–1.3)
EOSINOPHIL # BLD AUTO: 0.2 10^3/UL (ref 0–0.3)
EOSINOPHIL NFR BLD AUTO: 3 % (ref 0–10)
ERYTHROCYTE [DISTWIDTH] IN BLOOD BY AUTOMATED COUNT: 15.6 % (ref 10–14.5)
GFR SERPLBLD BASED ON 1.73 SQ M-ARVRAT: > 60 ML/MIN
GLUCOSE SERPL-MCNC: 96 MG/DL (ref 70–105)
LDLC SERPL DIRECT ASSAY-MCNC: 66 MG/DL (ref 1–129)
LYMPHOCYTES # BLD AUTO: 2.5 X 10^3 (ref 1–4)
LYMPHOCYTES NFR BLD AUTO: 48 % (ref 12–44)
MAGNESIUM SERPL-MCNC: 2 MG/DL (ref 1.8–2.4)
MCH RBC QN AUTO: 32 PG (ref 25–34)
MCHC RBC AUTO-ENTMCNC: 33 G/DL (ref 32–36)
MCV RBC AUTO: 98 FL (ref 80–99)
MONOCYTES # BLD AUTO: 0.4 X 10^3 (ref 0–1)
MONOCYTES NFR BLD AUTO: 8 % (ref 0–12)
NEUTROPHILS # BLD AUTO: 2.1 X 10^3 (ref 1.8–7.8)
NEUTROPHILS NFR BLD AUTO: 41 % (ref 42–75)
PHOSPHATE SERPL-MCNC: 2.5 MG/DL (ref 2.3–4.7)
PLATELET # BLD: 138 10^3/UL (ref 130–400)
PMV BLD AUTO: 9.9 FL (ref 7.4–10.4)
POTASSIUM SERPL-SCNC: 3.5 MMOL/L (ref 3.6–5)
PROT SERPL-MCNC: 5.7 GM/DL (ref 6.4–8.2)
RBC # BLD AUTO: 3.99 10^6/UL (ref 4.35–5.85)
SODIUM SERPL-SCNC: 143 MMOL/L (ref 135–145)
TRIGL SERPL-MCNC: 72 MG/DL (ref ?–150)
VLDLC SERPL CALC-MCNC: 14 MG/DL (ref 5–40)
WBC # BLD AUTO: 5.2 10^3/UL (ref 4.3–11)

## 2017-09-15 RX ADMIN — QUETIAPINE FUMARATE SCH MG: 100 TABLET, FILM COATED ORAL at 13:09

## 2017-09-15 RX ADMIN — GABAPENTIN SCH MG: 600 TABLET, FILM COATED ORAL at 15:45

## 2017-09-15 RX ADMIN — APIXABAN SCH MG: 5 TABLET, FILM COATED ORAL at 09:45

## 2017-09-15 RX ADMIN — SODIUM CHLORIDE SCH MLS/HR: 900 INJECTION, SOLUTION INTRAVENOUS at 01:28

## 2017-09-15 RX ADMIN — QUETIAPINE FUMARATE SCH MG: 100 TABLET, FILM COATED ORAL at 09:45

## 2017-09-15 RX ADMIN — SODIUM CHLORIDE SCH MLS/HR: 900 INJECTION, SOLUTION INTRAVENOUS at 10:38

## 2017-09-15 NOTE — DIAGNOSTIC IMAGING REPORT
Portable upright radiograph of the chest.



INDICATION: A. fib.



FINDINGS:

The lungs demonstrate prominent interstitial markings. There is

increased left basilar infiltrate or atelectasis. Slight

atelectasis of the right side also seen in the lung base. There

is bilateral small pleural effusion more on the left. When

compared to 9/14/2017, exam, worsening vascular congestion is

suggested. No pneumothorax.



IMPRESSION: Increasing pulmonary vascular congestion and

bibasilar infiltrate or atelectasis more on the left.



Dictated by: 



  Dictated on workstation # [id number]

## 2017-09-15 NOTE — DISCHARGE SUMMARY
Diagnosis/Chief Complaint


Date of Admission


Sep 14, 2017 at 12:07


Date of Discharge


September 15,2017


Admission Diagnosis


Admission Diagnosis


atrial fibrillation with RVR- 


CML-    


hypokalemia from diarrhea- 


h/o hepatitis C-





Discharge Diagnosis


atrial fibrillation with RVR- resolved


neuropathy-


CML-    


hypokalemia- improved 


diarrhea- improved


h/o hepatitis C- 


h/o pleural effusions





Reason Hospital Visit


56 yo F admitted for Afib with RVR


-She reports today she bent over and when she stood back up she felt dizzy and 

that her heart was beating faster.  She was evaluated in the ER and found to be 

in Afib.  She reports she has had these bouts previously but they only last a 

couple minutes at most.  Pt does have CML and is treated with Sprycel.  She 

will sometimes develop right pleural effusion which Dr. Zavala has drained.  

Current xray shows more fluid on left lung than right. 


Pt reports she has had diarrhea for 2 days, denies sick contacts nor does she 

have a fever.  She usually is constipated and takes miralax but this is not the 

case.  Last loose bowel movement was last night.  She does not feel like she 

has been keeping herself hydrated.\


Denies tobacco or etoh use. 


Pt was given asa and diltiazem in the ER.


Dr. Reeves was consulted for cardiology evaluation.


Pt reports she is feeling much better.





Discharge Summary


Hospital Course


Hospital Course


56 yo F with atrial fibrillation with RVR- converted with diltiazem- she also 

received digoxin. Dr. Reeves was consulted--   She will continue diltiazem 

240mg and eliquis 5mg BID, asa 81mg 


          Her [id number] -  1 (female)      


             -TSH normal, ECHO pending at time time of discharge     


CML- continued sprycel,   Oncologist is :  Dr. You at [facility]-    Patient to 

call and update him.


hypokalemia from diarrhea-  replacing potassium with po KCl-  The diarrhea 

improved.  


h/o hepatitis C-  complete harvoni a few years ago.


Pt was able to get  up and walk with resolution of her dizziness and overall 

feel "off".  This was attributed to rehydration via IVF and conversion to NSR. 

  Pt's blood pressure has remained low -attributed to the iv diltiazem- will 

continue to monitor as an outpatient.   





She will follow up in 1 week with Dr. Reeves and Dr. Castro.


Labs


Laboratory Tests


9/14/17 11:05: 


White Blood Count 3.9L, Red Cell Distribution Width 15.5H, Neutrophils # (Auto) 

1.6L, Potassium Level 3.1L, Chloride Level 111H, Carbon Dioxide Level 20L, 

Blood Urea Nitrogen 19H, Glucose Level 113H, B-Type Natriuretic Peptide 106.8H


17 17:13: 


17 17:15: 


9/15/17 00:45: 


9/15/17 04:13: 


Red Blood Count 3.99L, Red Cell Distribution Width 15.6H, Neutrophils (%) (Auto

) 41L, Lymphocytes (%) (Auto) 48H, Potassium Level 3.5L, Chloride Level 120H, 

Carbon Dioxide Level 15L, Calcium Level 7.5L, Total Protein 5.7L, Albumin 3.1L, 

HDL Cholesterol 34L





Procedures


None.


Consultations


Dr. Reeves





Discharge Physical Examination


Allergies:  


Coded Allergies:  


     sulfamethoxazole (Verified  Allergy, Severe, RASH, 10/2/14)


     trimethoprim (Verified  Allergy, Severe, RASH, 10/2/14)


     Sulfa (Sulfonamide Antibiotics) (Verified  Allergy, Mild, 10/2/14)


     zolpidem (Verified  Allergy, Mild, skin sensation, 10/2/14)


     celecoxib (Verified  Adverse Reaction, Intermediate, hives, 10/2/14)


Vitals & I&Os





Vital Signs








  Date Time  Temp Pulse Resp B/P (MAP) Pulse Ox O2 Delivery O2 Flow Rate FiO2


 


9/15/17 14:26      Room Air  


 


9/15/17 12:00  80 15 115/75 97   


 


9/15/17 11:54 98.6       








General Appearance:  Alert, Oriented X3, Cooperative


HEENT:  Atraumatic


Cardiovascular:  Regular Rate


Abdominal:  Normal Bowel Sounds, Soft


Neuro:  Strength at 5/5 X4 Ext


Psych/Mental Status:  Mental Status NL





Discharge


Home Medications


Reviewed and agree with Discharge Medication list on patient's Discharge 

Instruction sheet


Condition at Discharge


stable


Instructions to Patient/Family


Please see electronic discharge instructions given to patient.





Clinical Quality Measures


AMI/AHF:


ASA po Prior to arrival:  No





DVT/VTE Risk/Contraindication:


Risk Factor Score Per Nursin


RFS Level Per Nursing on Admit:  4+=Very High











DUNIA MORRISON MD Sep 15, 2017 15:44

## 2017-09-15 NOTE — PROGRESS NOTE-CARDIOLOGY
Cardiology SOAP Progress Note


Subjective:


Sitting up in bed eating morning meal.  She states she feels much better this 

morning.  No c/o CP, palpitations, syncope or near syncope.





Objective:


I&O/Vital Signs





Vital Sign - Last 12Hours








 9/15/17 9/15/17 9/15/17 9/15/17





 04:00 04:00 05:00 05:02


 


Pulse  87 81 85


 


Resp  16 18 


 


B/P (MAP)  85/63 75/55 75/55


 


Pulse Ox  94 93 


 


O2 Delivery Room Air Room Air Room Air 





 9/15/17 9/15/17 9/15/17 9/15/17





 06:00 07:00 07:00 08:00


 


Temp    97.8


 


Pulse 107 100 115 


 


Resp 20 12  


 


B/P (MAP) 76/63 117/48  


 


Pulse Ox 95 96  


 


O2 Delivery Room Air Room Air  


 


    





 9/15/17 9/15/17 9/15/17 9/15/17





 08:00 08:00 09:00 10:00


 


Pulse 85  85 84


 


Resp 25  25 14


 


B/P (MAP) 99/64  112/71 103/64


 


Pulse Ox 95  96 97


 


O2 Delivery Room Air Room Air Room Air Room Air





 9/15/17 9/15/17 9/15/17 9/15/17





 11:00 11:54 12:00 12:00


 


Temp  98.6  


 


Pulse 90  80 


 


Resp 15  15 


 


B/P (MAP) 95/62  115/75 


 


Pulse Ox 97  97 


 


O2 Delivery Room Air  Room Air Room Air


 


    





 9/15/17   





 14:26   


 


O2 Delivery Room Air   








Weight (Pounds):  159


Weight (Ounces):  2.0


Weight (Calculated Kilograms):  72.333987


Constitutional:  AAO x 3, well-developed, well-nourished


Respiratory:  No accessory muscle use, lungs clear to percussion, lungs clear 

to auscultation


Cardiovascular:  irregularly irregular, S1 and S2, systolic murmur (faint WILLIAM )


Gastrointestional:  No tender, soft, No guarding, No rebound, audible bowel 

sounds


Extremities:  No clubbing, No cyanosis, No significant edema


Neurologic/Psychiatric:  oriented x 3, grossly intact, power is 5/5 both on 

sides


Skin:  No rash on exposed areas, No ulcerations on exposed areas





Results/Procedures:


Labs


Laboratory Tests


9/14/17 17:13: Troponin I < 0.30


9/14/17 17:15: Thyroid Stimulating Hormone (TSH) 2.32


9/15/17 00:45: Troponin I < 0.30


9/15/17 04:13: 


White Blood Count 5.2, Red Blood Count 3.99L, Hemoglobin 12.9, Hematocrit 39, 

Mean Corpuscular Volume 98, Mean Corpuscular Hemoglobin 32, Mean Corpuscular 

Hemoglobin Concent 33, Red Cell Distribution Width 15.6H, Platelet Count 138, 

Mean Platelet Volume 9.9, Neutrophils (%) (Auto) 41L, Lymphocytes (%) (Auto) 48H

, Monocytes (%) (Auto) 8, Eosinophils (%) (Auto) 3, Basophils (%) (Auto) 1, 

Neutrophils # (Auto) 2.1, Lymphocytes # (Auto) 2.5, Monocytes # (Auto) 0.4, 

Eosinophils # (Auto) 0.2, Basophils # (Auto) 0.0, Sodium Level 143, Potassium 

Level 3.5L, Chloride Level 120H, Carbon Dioxide Level 15L, Anion Gap 8, Blood 

Urea Nitrogen 15, Creatinine 0.72, Estimat Glomerular Filtration Rate > 60, BUN/

Creatinine Ratio 21, Glucose Level 96, Calcium Level 7.5L, Phosphorus Level 2.5

, Magnesium Level 2.0, Total Bilirubin 0.3, Aspartate Amino Transf (AST/SGOT) 13

, Alanine Aminotransferase (ALT/SGPT) 11, Alkaline Phosphatase 54, Total 

Protein 5.7L, Albumin 3.1L, Triglycerides Level 72, Cholesterol Level 111, LDL 

Cholesterol Direct 66, VLDL Cholesterol 14, HDL Cholesterol 34L





Laboratory Tests


9/14/17 11:05








9/15/17 04:13











A/P:


Assessment:





PAF with RVR, first documented on 9/14/17 - currently converted to SR





Hypokalemia, likely due to recent diarrhea. Diarrhea has been managed by Dr Doug PEREIRA, treated with Sprycel and managed by Dr Isabel at [facility]





H/o R-sided pleural effusion, likely related to Sprycel, that have been treated 

with intermittent thoracenteses





TSH 2.32 on lab of 9-15-17


Plan:





* Converted to SR  - will stop IV diltiazem and change out to oral


* Not a good candidate for BB tx d/t somewhat low BP (albeit asymptomatic)


* Stroke prophylaxis with apixaban


* Echo to eval LVEF and valve function - pending


* Med Svce to manage diarrhea


* Correct lytes


* Monitor labs


* Dr. Reeves had a detailed discussion with her and her fam and explained the 

pathophysiology and possible etiologies of a fib. We explained to her our 

treatment plan (rate control and stroke prophylaxis)





Physician Assessment


Physician Assessment


Shortness of breath and palp are better


Lungs: good bilat air entry


Cor: reg


Ext: no c/c/e





A&R


* As documented in our note above that I update (italics) and as noted below


* CCB for rate control


* Apixaban for stroke prophylaxis


* Continue K for now to reduce risk of hypokalemia-related a fib


* Outpatient cardiac f/u is advised





Clinical Quality Measures


AMI/AHF:


ASA po Prior to arrival:  BUCKY Huerta ARNMAKENZIE Sep 15, 2017 09:05


CHEMA REEVES MD FACP FAC CCDS Sep 15, 2017 15:45

## 2017-09-15 NOTE — PROGRESS NOTE (SOAP)
Subjective


Subjective


Date Seen by Provider:  Sep 15, 2017


Time Seen by Provider:  07:30


58 yo F with Afib RVR


pt reports much improved from yesterday- denies chest pain-  no nausea or 

vomiting-   low BP overnight, MAP >60.   


Getting IVF-  


Pt reports she would like to go home today as she feels well enough to be 

discharged.





Review of Systems


General:  No Chills, No Night Sweats


HEENT:  No Head Aches, No Visual Changes


Pulmonary:  No Dyspnea, No Cough


Cardiovascular:  Chest Pain, Palpitations


Gastrointestinal:  No: Nausea, Vomiting, Abdominal Pain, Diarrhea


Genitourinary:  No Dysuria, No Frequency


Musculoskeletal:  No: neck pain, shoulder pain


Neurological:  Weakness





Objective


Exam


Vital Signs








Vital Signs








  Date Time  Temp Pulse Resp B/P (MAP) Pulse Ox O2 Delivery O2 Flow Rate FiO2


 


9/15/17 07:00  115      


 


9/15/17 06:00  107 20 76/63 95 Room Air  


 


9/15/17 05:02  85  75/55    


 


9/15/17 05:00  81 18 75/55 93 Room Air  


 


9/15/17 04:00  87 16 85/63 94 Room Air  


 


9/15/17 04:00      Room Air  


 


9/15/17 03:00  92 18 77/48 92 Room Air  


 


9/15/17 02:20  79  78/56    


 


9/15/17 02:00  78 18 82/58 94 Room Air  


 


9/15/17 01:00  101 18 83/58 93 Room Air  


 


9/15/17 01:00  101      


 


9/15/17 00:00      Room Air  


 


9/15/17 00:00  99 18 90/56 92 Room Air  


 


17 23:00  79 12 94/63 95 Room Air  


 


17 22:56  80  77/51    


 


17 22:00  88 5 77/57 94 Room Air  


 


17 21:44  96  87/54    


 


17 21:18  106  98/68    


 


17 21:00  101 9 84/45 96 Room Air  


 


17 20:00      Room Air  


 


17 20:00 96.8 85 15 89/68 96 Room Air  


 


17 19:00  112 11 99/61 97 Room Air  


 


17 19:00  112      


 


17 18:00  104 7 109/72 97 Room Air  


 


17 17:00  109 9 96/76 98 Room Air  


 


17 16:00      Room Air  


 


17 16:00  125 12 99/79 97 Room Air  


 


17 15:40  110  107/66    


 


17 15:00  129 12 111/63 96 Room Air  


 


17 13:40 97.9 106 18 90/66 95 Room Air  


 


17 13:17  110 20  98   


 


17 11:30  127 18 119/97 97   


 


17 11:09  138  120/91 97   


 


17 10:59 97.5 159 18 120/91 97 Room Air  








General Appearance:  No Apparent Distress, WD/WN


HEENT:  PERRL/EOMI, Other (diagonal lobular crease,  anterior tragal crease)


Neck:  Full Range of Motion, Normal Inspection, Non Tender, Supple


Respiratory:  Chest Non Tender, Lungs Clear, Normal Breath Sounds, No Accessory 

Muscle Use, No Respiratory Distress


Cardiovascular:  Irregularly Irregular (pulse 90s)


Gastrointestinal:  Normal Bowel Sounds, Non Tender, Soft


Rectal:  Deferred


Back:  Normal Inspection, No CVA Tenderness, No Vertebral Tenderness


Extremity:  Normal Capillary Refill, Non Tender, No Calf Tenderness, No Pedal 

Edema


Neurologic/Psychiatric:  Alert, Oriented x3, No Motor/Sensory Deficits, Normal 

Mood/Affect


Skin:  Normal Color, Warm/Dry





Results


Lab


Laboratory Tests


17 11:05: 


White Blood Count 3.9L, Red Blood Count 4.47, Hemoglobin 14.2, Hematocrit 43, 

Mean Corpuscular Volume 96, Mean Corpuscular Hemoglobin 32, Mean Corpuscular 

Hemoglobin Concent 33, Red Cell Distribution Width 15.5H, Platelet Count 200, 

Mean Platelet Volume 9.7, Neutrophils (%) (Auto) 42, Lymphocytes (%) (Auto) 44, 

Monocytes (%) (Auto) 10, Eosinophils (%) (Auto) 3, Basophils (%) (Auto) 1, 

Neutrophils # (Auto) 1.6L, Lymphocytes # (Auto) 1.7, Monocytes # (Auto) 0.4, 

Eosinophils # (Auto) 0.1, Basophils # (Auto) 0.1, Prothrombin Time 12.9, INR 

Comment 1.0, Activated Partial Thromboplast Time 32, Sodium Level 142, 

Potassium Level 3.1L, Chloride Level 111H, Carbon Dioxide Level 20L, Anion Gap 

11, Blood Urea Nitrogen 19H, Creatinine 0.99, Estimat Glomerular Filtration 

Rate 58, BUN/Creatinine Ratio 19, Glucose Level 113H, Calcium Level 9.1, 

Magnesium Level 2.1, Total Bilirubin 0.8, Aspartate Amino Transf (AST/SGOT) 23, 

Alanine Aminotransferase (ALT/SGPT) 18, Alkaline Phosphatase 70, Myoglobin 63.0

, Troponin I < 0.30, B-Type Natriuretic Peptide 106.8H, Total Protein 8.0, 

Albumin 4.3


17 17:13: Troponin I < 0.30


17 17:15: Thyroid Stimulating Hormone (TSH) 2.32


9/15/17 00:45: Troponin I < 0.30


9/15/17 04:13: 


White Blood Count 5.2, Red Blood Count 3.99L, Hemoglobin 12.9, Hematocrit 39, 

Mean Corpuscular Volume 98, Mean Corpuscular Hemoglobin 32, Mean Corpuscular 

Hemoglobin Concent 33, Red Cell Distribution Width 15.6H, Platelet Count 138, 

Mean Platelet Volume 9.9, Neutrophils (%) (Auto) 41L, Lymphocytes (%) (Auto) 48H

, Monocytes (%) (Auto) 8, Eosinophils (%) (Auto) 3, Basophils (%) (Auto) 1, 

Neutrophils # (Auto) 2.1, Lymphocytes # (Auto) 2.5, Monocytes # (Auto) 0.4, 

Eosinophils # (Auto) 0.2, Basophils # (Auto) 0.0, Sodium Level 143, Potassium 

Level 3.5L, Chloride Level 120H, Carbon Dioxide Level 15L, Anion Gap 8, Blood 

Urea Nitrogen 15, Creatinine 0.72, Estimat Glomerular Filtration Rate > 60, BUN/

Creatinine Ratio 21, Glucose Level 96, Calcium Level 7.5L, Phosphorus Level 2.5

, Magnesium Level 2.0, Total Bilirubin 0.3, Aspartate Amino Transf (AST/SGOT) 13

, Alanine Aminotransferase (ALT/SGPT) 11, Alkaline Phosphatase 54, Total 

Protein 5.7L, Albumin 3.1L, Triglycerides Level 72, Cholesterol Level 111, LDL 

Cholesterol Direct 66, VLDL Cholesterol 14, HDL Cholesterol 34L





Assessment/Plan


58 yo F





atrial fibrillation with RVR- continue diltiazem-   Dr. Reeves consulted--   

eliquis 5mg BID, asa 81mg 


             [id number] -  1 (female)       


             -TSH normal, ECHO pending     


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 You at [facility]-  


hypokalemia from diarrhea-  replacing.


h/o hepatitis C-  complete harvoni a few years ago.





Dispo: Dr. Reeves to see patient today-  ECHO pending.   Discharge to home today

- with new medications Eliquis, diltiazem, asa


Problems:  





Clinical Quality Measures


AMI/AHF:


ASA po Prior to arrival:  No





DVT/VTE Risk/Contraindication:


Risk Factor Score Per Nursin


RFS Level Per Nursing on Admit:  4+=Very High











DUNIA MORRISON MD Sep 15, 2017 07:41

## 2017-09-18 ENCOUNTER — HOSPITAL ENCOUNTER (OUTPATIENT)
Dept: HOSPITAL 75 - LAB | Age: 57
End: 2017-09-18
Attending: FAMILY MEDICINE
Payer: MEDICARE

## 2017-09-18 DIAGNOSIS — I48.2: Primary | ICD-10-CM

## 2017-09-18 LAB
INR PPP: 1.2 (ref 0.8–1.4)
PROTHROMBIN TIME: 14.9 SEC (ref 12.2–14.7)

## 2017-09-18 PROCEDURE — 36415 COLL VENOUS BLD VENIPUNCTURE: CPT

## 2017-09-18 PROCEDURE — 85610 PROTHROMBIN TIME: CPT

## 2017-09-21 LAB
INR PPP: 1.6 (ref 0.8–1.4)
PROTHROMBIN TIME: 19 SEC (ref 12.2–14.7)

## 2017-09-26 ENCOUNTER — HOSPITAL ENCOUNTER (OUTPATIENT)
Dept: HOSPITAL 75 - LAB | Age: 57
LOS: 4 days | Discharge: HOME | End: 2017-09-30
Attending: FAMILY MEDICINE
Payer: MEDICARE

## 2017-09-26 DIAGNOSIS — I48.0: Primary | ICD-10-CM

## 2017-09-26 LAB
INR PPP: 2 (ref 0.8–1.4)
PROTHROMBIN TIME: 22.7 SEC (ref 12.2–14.7)

## 2017-09-26 PROCEDURE — 85610 PROTHROMBIN TIME: CPT

## 2017-09-26 PROCEDURE — 36415 COLL VENOUS BLD VENIPUNCTURE: CPT

## 2017-10-04 ENCOUNTER — HOSPITAL ENCOUNTER (OUTPATIENT)
Dept: HOSPITAL 75 - LAB | Age: 57
End: 2017-10-04
Attending: INTERNAL MEDICINE
Payer: MEDICARE

## 2017-10-04 LAB
ALBUMIN SERPL-MCNC: 4 GM/DL (ref 3.2–4.5)
ALT SERPL-CCNC: 23 U/L (ref 0–55)
ANION GAP SERPL CALC-SCNC: 9 MMOL/L (ref 5–14)
AST SERPL-CCNC: 19 U/L (ref 5–34)
BILIRUB SERPL-MCNC: 0.6 MG/DL (ref 0.1–1)
BUN SERPL-MCNC: 17 MG/DL (ref 7–18)
BUN/CREAT SERPL: 19
CALCIUM SERPL-MCNC: 8.9 MG/DL (ref 8.5–10.1)
CHLORIDE SERPL-SCNC: 106 MMOL/L (ref 98–107)
CO2 SERPL-SCNC: 23 MMOL/L (ref 21–32)
CREAT SERPL-MCNC: 0.9 MG/DL (ref 0.6–1.3)
GFR SERPLBLD BASED ON 1.73 SQ M-ARVRAT: > 60 ML/MIN
GLUCOSE SERPL-MCNC: 85 MG/DL (ref 70–105)
INR PPP: 2.3 (ref 0.8–1.4)
MAGNESIUM SERPL-MCNC: 2.2 MG/DL (ref 1.8–2.4)
POTASSIUM SERPL-SCNC: 3.6 MMOL/L (ref 3.6–5)
PROT SERPL-MCNC: 7.6 GM/DL (ref 6.4–8.2)
PROTHROMBIN TIME: 24.9 SEC (ref 12.2–14.7)
SODIUM SERPL-SCNC: 138 MMOL/L (ref 135–145)

## 2017-10-04 PROCEDURE — 85610 PROTHROMBIN TIME: CPT

## 2017-10-04 PROCEDURE — 36415 COLL VENOUS BLD VENIPUNCTURE: CPT

## 2017-10-04 PROCEDURE — 80053 COMPREHEN METABOLIC PANEL: CPT

## 2017-10-04 PROCEDURE — 83735 ASSAY OF MAGNESIUM: CPT

## 2017-10-09 ENCOUNTER — HOSPITAL ENCOUNTER (OUTPATIENT)
Dept: HOSPITAL 75 - RAD | Age: 57
End: 2017-10-09
Attending: FAMILY MEDICINE
Payer: MEDICARE

## 2017-10-09 DIAGNOSIS — J90: Primary | ICD-10-CM

## 2017-10-09 DIAGNOSIS — J98.11: ICD-10-CM

## 2017-10-09 PROCEDURE — 71020: CPT

## 2017-10-09 NOTE — DIAGNOSTIC IMAGING REPORT
INDICATION: Left-sided pleural effusion. Short of breath.



FINDINGS: Two views of the chest show normal heart size and

vascularity. There is left basilar atelectasis with left-sided

effusion. There is more opacification of left lung base compared

to the prior study from 09/15/2017. The left upper lobe and the

right lung are clear with minimal fluid in the right costophrenic

angle.



IMPRESSION: Increasing left basilar atelectasis and effusion

since the prior study.



Dictated by: 



  Dictated on workstation # [id number]

## 2017-10-17 LAB
INR PPP: 2.9 (ref 0.8–1.4)
PROTHROMBIN TIME: 30.2 SEC (ref 12.2–14.7)

## 2017-10-23 LAB
INR PPP: 1.1 (ref 0.8–1.4)
PROTHROMBIN TIME: 14.2 SEC (ref 12.2–14.7)

## 2017-10-24 ENCOUNTER — HOSPITAL ENCOUNTER (OUTPATIENT)
Dept: HOSPITAL 75 - RAD | Age: 57
End: 2017-10-24
Attending: NURSE PRACTITIONER
Payer: MEDICARE

## 2017-10-24 VITALS — SYSTOLIC BLOOD PRESSURE: 130 MMHG | DIASTOLIC BLOOD PRESSURE: 81 MMHG

## 2017-10-24 VITALS — BODY MASS INDEX: 24.1 KG/M2 | WEIGHT: 159 LBS | HEIGHT: 68 IN

## 2017-10-24 VITALS — SYSTOLIC BLOOD PRESSURE: 131 MMHG | DIASTOLIC BLOOD PRESSURE: 80 MMHG

## 2017-10-24 DIAGNOSIS — Z79.01: ICD-10-CM

## 2017-10-24 DIAGNOSIS — B19.20: ICD-10-CM

## 2017-10-24 DIAGNOSIS — J90: Primary | ICD-10-CM

## 2017-10-24 DIAGNOSIS — C95.90: ICD-10-CM

## 2017-10-24 PROCEDURE — 32555 ASPIRATE PLEURA W/ IMAGING: CPT

## 2017-10-24 NOTE — DIAGNOSTIC IMAGING REPORT
EXAMINATION: Ultrasound guided thoracentesis.



INDICATION: Pleural effusion.



CONSENT: Informed consent was obtained from the patient. The

risks, benefits, potential complications and alternatives were

reviewed and all questions answered to the patient's

satisfaction.



FINDINGS: Large left pleural effusion with simple appearance by

ultrasound.



PROCEDURE: After sterile preparation and draping, 1% lidocaine

was utilized for local anesthesia.



An appropriate intercostal approach is selected based on

preliminary scanning with ultrasound. Under live visualization

with ultrasound, 6.5  Georgian drainage catheter is introduced with

trocar technique into the left pleural space. Image of proper

location of the needle is documented. The needle is removed and

the sheath is left in the pleural space. A total of one L of

serous maritza-colored fluid is drained. The sheath is removed at

the end of the drainage procedure. The fluid is sent to the lab

for analysis. 

The patient tolerated the procedure well with no immediate

complications. 



IMPRESSION: Successful ultrasound-guided left thoracentesis. 



Dictated by: 



  Dictated on workstation # [id number]

## 2017-10-31 LAB
INR PPP: 1.5 (ref 0.8–1.4)
PROTHROMBIN TIME: 17.9 SEC (ref 12.2–14.7)

## 2017-11-07 LAB
INR PPP: 1.9 (ref 0.8–1.4)
PROTHROMBIN TIME: 21.8 SEC (ref 12.2–14.7)

## 2017-11-27 ENCOUNTER — HOSPITAL ENCOUNTER (OUTPATIENT)
Dept: HOSPITAL 75 - LAB | Age: 57
LOS: 49 days | Discharge: HOME | End: 2018-01-15
Attending: INTERNAL MEDICINE
Payer: MEDICARE

## 2017-11-27 DIAGNOSIS — I48.0: Primary | ICD-10-CM

## 2017-11-27 LAB
INR PPP: 2.3 (ref 0.8–1.4)
PROTHROMBIN TIME: 24.9 SEC (ref 12.2–14.7)

## 2017-11-27 PROCEDURE — 85610 PROTHROMBIN TIME: CPT

## 2017-11-27 PROCEDURE — 36415 COLL VENOUS BLD VENIPUNCTURE: CPT

## 2017-12-04 ENCOUNTER — HOSPITAL ENCOUNTER (EMERGENCY)
Dept: HOSPITAL 75 - ER | Age: 57
Discharge: HOME | End: 2017-12-04
Payer: MEDICARE

## 2017-12-04 VITALS — HEIGHT: 68 IN | BODY MASS INDEX: 24.1 KG/M2 | WEIGHT: 159 LBS

## 2017-12-04 VITALS — DIASTOLIC BLOOD PRESSURE: 73 MMHG | SYSTOLIC BLOOD PRESSURE: 116 MMHG

## 2017-12-04 DIAGNOSIS — Z85.6: ICD-10-CM

## 2017-12-04 DIAGNOSIS — R00.2: Primary | ICD-10-CM

## 2017-12-04 DIAGNOSIS — I48.0: ICD-10-CM

## 2017-12-04 DIAGNOSIS — Z92.21: ICD-10-CM

## 2017-12-04 DIAGNOSIS — F41.9: ICD-10-CM

## 2017-12-04 DIAGNOSIS — Z80.9: ICD-10-CM

## 2017-12-04 DIAGNOSIS — Z79.01: ICD-10-CM

## 2017-12-04 DIAGNOSIS — B19.20: ICD-10-CM

## 2017-12-04 DIAGNOSIS — Z82.49: ICD-10-CM

## 2017-12-04 DIAGNOSIS — Z79.82: ICD-10-CM

## 2017-12-04 DIAGNOSIS — G62.9: ICD-10-CM

## 2017-12-04 DIAGNOSIS — R19.7: ICD-10-CM

## 2017-12-04 LAB
ALBUMIN SERPL-MCNC: 4.2 GM/DL (ref 3.2–4.5)
ALT SERPL-CCNC: 13 U/L (ref 0–55)
ANION GAP SERPL CALC-SCNC: 12 MMOL/L (ref 5–14)
APTT BLD: 47 SEC (ref 24–35)
AST SERPL-CCNC: 19 U/L (ref 5–34)
BASOPHILS # BLD AUTO: 0 10^3/UL (ref 0–0.1)
BASOPHILS NFR BLD AUTO: 0 % (ref 0–10)
BILIRUB SERPL-MCNC: 0.6 MG/DL (ref 0.1–1)
BUN SERPL-MCNC: 18 MG/DL (ref 7–18)
BUN/CREAT SERPL: 19
CALCIUM SERPL-MCNC: 9.3 MG/DL (ref 8.5–10.1)
CHLORIDE SERPL-SCNC: 111 MMOL/L (ref 98–107)
CO2 SERPL-SCNC: 20 MMOL/L (ref 21–32)
CREAT SERPL-MCNC: 0.96 MG/DL (ref 0.6–1.3)
EOSINOPHIL # BLD AUTO: 0.1 10^3/UL (ref 0–0.3)
EOSINOPHIL NFR BLD AUTO: 3 % (ref 0–10)
ERYTHROCYTE [DISTWIDTH] IN BLOOD BY AUTOMATED COUNT: 15.2 % (ref 10–14.5)
GFR SERPLBLD BASED ON 1.73 SQ M-ARVRAT: 60 ML/MIN
GLUCOSE SERPL-MCNC: 130 MG/DL (ref 70–105)
INR PPP: 1.9 (ref 0.8–1.4)
LYMPHOCYTES # BLD AUTO: 1.3 X 10^3 (ref 1–4)
LYMPHOCYTES NFR BLD AUTO: 51 % (ref 12–44)
MAGNESIUM SERPL-MCNC: 2.1 MG/DL (ref 1.8–2.4)
MCH RBC QN AUTO: 32 PG (ref 25–34)
MCHC RBC AUTO-ENTMCNC: 34 G/DL (ref 32–36)
MCV RBC AUTO: 94 FL (ref 80–99)
MONOCYTES # BLD AUTO: 0.2 X 10^3 (ref 0–1)
MONOCYTES NFR BLD AUTO: 9 % (ref 0–12)
MYOGLOBIN SERPL-MCNC: 36.4 NG/ML (ref 10–92)
NEUTROPHILS # BLD AUTO: 1 X 10^3 (ref 1.8–7.8)
NEUTROPHILS NFR BLD AUTO: 37 % (ref 42–75)
PLATELET # BLD: 186 10^3/UL (ref 130–400)
PMV BLD AUTO: 9.4 FL (ref 7.4–10.4)
POTASSIUM SERPL-SCNC: 3.4 MMOL/L (ref 3.6–5)
PROT SERPL-MCNC: 7.9 GM/DL (ref 6.4–8.2)
PROTHROMBIN TIME: 21.8 SEC (ref 12.2–14.7)
RBC # BLD AUTO: 4.38 10^6/UL (ref 4.35–5.85)
SODIUM SERPL-SCNC: 143 MMOL/L (ref 135–145)
WBC # BLD AUTO: 2.6 10^3/UL (ref 4.3–11)

## 2017-12-04 PROCEDURE — 83874 ASSAY OF MYOGLOBIN: CPT

## 2017-12-04 PROCEDURE — 93041 RHYTHM ECG TRACING: CPT

## 2017-12-04 PROCEDURE — 85025 COMPLETE CBC W/AUTO DIFF WBC: CPT

## 2017-12-04 PROCEDURE — 84484 ASSAY OF TROPONIN QUANT: CPT

## 2017-12-04 PROCEDURE — 71010: CPT

## 2017-12-04 PROCEDURE — 36415 COLL VENOUS BLD VENIPUNCTURE: CPT

## 2017-12-04 PROCEDURE — 85730 THROMBOPLASTIN TIME PARTIAL: CPT

## 2017-12-04 PROCEDURE — 83735 ASSAY OF MAGNESIUM: CPT

## 2017-12-04 PROCEDURE — 85610 PROTHROMBIN TIME: CPT

## 2017-12-04 PROCEDURE — 93005 ELECTROCARDIOGRAM TRACING: CPT

## 2017-12-04 PROCEDURE — 80053 COMPREHEN METABOLIC PANEL: CPT

## 2017-12-04 NOTE — XMS REPORT
Encounter Summary

 Created on: 2017



GumeFinay ALE

External Reference #: [id number]

: 1960

Sex: Female



Demographics







 Address  [address] [place]  [address]

 

 Home Phone  [phone]

 

 Preferred Language  English

 

 Marital Status  Unknown

 

 Orthodoxy Affiliation  NON

 

 Race  White

 

 Ethnic Group  Not  or 





Author







 Author  [facility]

 

 Organization  [facility]

 

 Address  Unknown

 

 Phone  Unavailable







Support







 Name  Relationship  Address  Phone

 

 , Kelly Siddiqui  [employer]  [address]

[place]  [address]  [phone]

 

 , Razia Dunaway  [employer]  Unknown  [phone]







Care Team Providers







 Care Team Member Name  Role  Phone

 

  PCP  Unavailable







Reason for Visit

* 





 



  Reason   Comments

 

 



  Other   call to NC









Encounter Details







    



  Date   Type   Department   Care Team   Description

 

    



  10/13/2017   Telephone   Center for   Olga Polk APRN   Other (call to NC)



    Transplantation-Hepatolog   [address] [facility]   [address] 



    [address] [place] [address] 



    [facility]   [phone] 



    TRANSPLANTATION   [phone] (Fax) 



    [place]  



    [address] [phone]  







Social History







    



  Tobacco Use   Types   Packs/Day   Years Used   Date

 

    



  Former Smoker   Cigarettes   1   10 

 

    



  Smokeless Tobacco: Former     Quit:



  User     2003









   



  Alcohol Use   Drinks/Week   oz/Week   Comments

 

   



  No   0 Standard   0.0 



   drinks or  



   equivalent  









 



  Sex Assigned at Birth   Date Recorded

 

 



  Not on file 



as of this encounter



Functional Status







  



  Functional Status   Response   Date of Assessment

 

  



  Does the patient have a hearing impairment:   No   2017

 

  



  Does the patient have a visual impairment:   Yes   2017

 

  



  Does the patient have impaired ambulation:   No   2017

 

  



  Does the patient have an activity of daily living   No   2017



  (ADL) impairment:  

 

  



  Does the patient have an instrumental activity of   No   2017



  daily living (IADL) impairment:  









  



  Cognitive Status   Response   Date of Assessment

 

  



  Does the patient have a cognitive impairment:   No   2017



as of this encounter



Miscellaneous Notes

* Telephone Encounter - Joanne Whitfield LPN - 10/16/2017  9:34 AM CDT



Returned pts phone call. Placed order for EGD/Colonoscopy and tasked to Maura 
to schedule. Placed order for colonoscopy prep to local pharmacy. Call 
concluded.





in this encounter



Plan of Treatment





Not on fileas of this encounter



Visit Diagnoses

Not on filein this encounter

## 2017-12-04 NOTE — DIAGNOSTIC IMAGING REPORT
INDICATION: Tachycardia and chest pain.



COMPARISON: Comparison made with prior examination from

10/09/2017.



FINDINGS: There is a persistent left basilar infiltrate and left

pleural effusion. The heart size is stable. The right lung is

clear. There is no pneumothorax. Mediastinum is unremarkable.



IMPRESSION: Unchanged left basilar infiltrate and left pleural

effusion.



Dictated by: 



  Dictated on workstation # [id number]

## 2017-12-04 NOTE — XMS REPORT
Encounter Summary

 Created on: 2017



Gume Wendy AGUILERA

External Reference #: [id number]

: 1960

Sex: Female



Demographics







 Address  [address] [place]  [address]

 

 Home Phone  [phone]

 

 Preferred Language  English

 

 Marital Status  Unknown

 

 Buddhism Affiliation  NON

 

 Race  White

 

 Ethnic Group  Not  or 





Author







 Author  [facility]

 

 Organization  [facility]

 

 Address  Unknown

 

 Phone  Unavailable







Support







 Name  Relationship  Address  Phone

 

 , Kelly Siddiqui  [employer]  [address]

[place]  [address]  [phone]

 

 , Razia Dunaway  [employer]  Unknown  [phone]







Care Team Providers







 Care Team Member Name  Role  Phone

 

  PCP  Unavailable







Encounter Details







    



  Date   Type   Department   Care Team   Description

 

    



  10/02/2017   Orders Only   Center for   Olga Polk APRN 



    Transplantation-Hepatolog   [address] [facility]   [address] 



    [address] [place] [address] 



    [facility]   [phone] 



    TRANSPLANTATION   [phone] (Fax) 



    [place], [place]  



    [address] [phone]  







Social History







    



  Tobacco Use   Types   Packs/Day   Years Used   Date

 

    



  Former Smoker   Cigarettes   1   10 

 

    



  Smokeless Tobacco: Former     Quit:



  User     2003









   



  Alcohol Use   Drinks/Week   oz/Week   Comments

 

   



  No   0 Standard   0.0 



   drinks or  



   equivalent  









 



  Sex Assigned at Birth   Date Recorded

 

 



  Not on file 



as of this encounter



Functional Status







  



  Functional Status   Response   Date of Assessment

 

  



  Does the patient have a hearing impairment:   No   2017

 

  



  Does the patient have a visual impairment:   Yes   2017

 

  



  Does the patient have impaired ambulation:   No   2017

 

  



  Does the patient have an activity of daily living   No   2017



  (ADL) impairment:  

 

  



  Does the patient have an instrumental activity of   No   2017



  daily living (IADL) impairment:  









  



  Cognitive Status   Response   Date of Assessment

 

  



  Does the patient have a cognitive impairment:   No   2017



as of this encounter



Plan of Treatment





Not on fileas of this encounter



Visit Diagnoses

Not on filein this encounter

## 2017-12-04 NOTE — XMS REPORT
Clinical Summary

 Created on: 2017



GumeWendy

External Reference #: [id number]

: 1960

Sex: Female



Demographics







 Address  [address] [place]  [address]

 

 Home Phone  [phone]

 

 Preferred Language  English

 

 Marital Status  Unknown

 

 Jainism Affiliation  NON

 

 Race  White

 

 Ethnic Group  Not  or 





Author







 Author  [facility]

 

 Organization  [facility]

 

 Address  Unknown

 

 Phone  Unavailable







Support







 Name  Relationship  Address  Phone

 

 , Kelly Siddiqui  ECON  [address]

[place]  [address]  [phone]

 

 , Razia Dunaway  ECON  Unknown  [phone]







Care Team Providers







 Care Team Member Name  Role  Phone

 

  PCP  Unavailable







Source Comments

Some departments are not documenting in the electronic medical record.  If you 
do not see the information that you expected, contact Release of Information in 
the Health Information Management department at [phone] for further 
assistance in locating additional records.[facility]



Allergies







    



  Active Allergy   Reactions   Severity   Noted Date   Comments

 

    



  Celecoxib   HIVES, SHORTNESS OF   Medium   2013   Throat swells.



   BREATH   

 

    



  Sulfa (Sulfonamide   HIVES   Medium   2012 



  Antibiotics)    







Current Medications







      



  Prescription   Sig.   Disp.   Refills   Start   End Date   Status



      Date  

 

      



  gabapentin (NEURONTIN)   Take 1 tab every morning,     20    Active



  600 mg tablet   2 tabs in the afternoon,     16  



   2 tabs in the evening,     



   and 1 tab at bedtime     

 

      



  QUEtiapine (SEROQUEL) 100   Take 1/2 tab every     06/15/20    Active



  mg tablet   morning, 1 tab at 1 pm, 1     16  



   tab at 5 pm and 1/2 tab     



   at bedtime.     

 

      



  dasatinib(+) (SPRYCEL) 20   Take 2 tablets by mouth   60 tablet   3   10/02/
20    Active



  mg tabletIndications: CML   daily.     17  



  (chronic myelocytic      



  leukemia) (HCC)      

 

      



  PEG 3350-Electrolytes   Mix with water as   4000 mL   0   10/16/20    Active



  (GOLYTELY) oral solution   directed on package.     17  



   Drink 240ml (8oz) every     



   10 minutes until gone.     

 

      



  CARTIA  mg capsule   TK 1 C PO D    1   09/15/20    Active



      17  

 

      



  omeprazole DR(+)   TK 1 C PO QAM 30 MINUTES    0   10/16/20    Active



  (PRILOSEC) 40 mg capsule   BEFORE BREAKFAST     17  

 

      



  potassium chloride   TK 1 T PO D    6   10/12/20    Active



  (K-DUR) 10 mEq tablet      17  

 

      



  triamcinolone acetonide   JARRED TID TO LEGS    0   10/09/20    Active



  (KENALOG) 0.1 % topical      17  



  cream      

 

      



  warfarin (COUMADIN) 1 mg   TK 1 T PO  ONCE D    2   10/18/20    Active



  tablet      17  

 

      



  warfarin (COUMADIN) 5 mg   TK 1 T PO D    6   10/12/20    Active



  tablet      17  







Active Problems







 



  Problem   Noted Date

 

 



  History of hepatitis C   2016

 

 



  Pleural effusion   2015

 

 



  Overview:



  Traumatic left hemothorax.



  Confirmed by diagnostic / therapeutic thoracentesis





  Last Assessment & Plan:



  Evaluated by  cardiothoracic surgery, no interventions, deferred to



  pulmonary.

 

 



  Colon polyps   2015

 

 



  Overview:



  Tubular adenomas

 

 



  Cirrhosis (HCC)   2014

 

 



  Total bilirubin, elevated   2012

 

 



  Overview:



  Likely related to imatinib.





  L



  ast Assessment & Plan:



  Likely related to imatinib as it increased with the increased dosing. We



  will decrease imatinib back to 400 mg daily and have her labs checked with



  Dr. Petersen next week. Should her jaundice worsen over the weekend or she



  has diffuse itching she should present to her local ER.

 

 



  CML (chronic myelocytic leukemia) (HCC)   2012

 

 



  Overview:



  Diagnosis: CM chronic phase



  Date of Diagnosis: 2012



  Treatment:



  Imatinib 400-800 mg PO daily  to 2013: Best response not available,



  patient progressed 2013 ( Cytogenetic progression/ relapse)



  Nilotinib 200 MG PO BID: unable to tolerate, exacerbated neuropathy



  Dasatinib 100 mg PO daily: started March 15, 2013



  Response: CCR, CMR



  Dasatinib reduced to 70 mg PO daily: Oct , 2013



  7/30/15-Continue Dasatinib 70 mg po daily.



  May 2016: Dasatinib 50 mg po daily.



  Oct   2016: Dasatinib 40 mg po daily. CMR



  Oct   2017: Dasatinib 20 mg po daily.  Reduction due to recurrent pleural



  effusions





  Ms. Dunaway is a 57-year-old woman with a history of hep C infection and



  unexplained neuropathy.  She also has significant dysphagia and odynophagia



  and has significant difficulty in observing oral medication tablets.





  She was diagnosed with chronic phase CML in 2012.  She has been under



  the care of Dr. Petersen.  There has not been any outside records available



  for us to review today and most of the information available has been



  provided by the patient verbally.  Her treatment hostory is as above.



  Repeat the bone marrow biopsy did not show clonal evolution of her CML.



  Remains in CP-CML. No Kinase-domain mutations were detected.





  Since diagnoses her hep C therapy will resulted in complete eradication of



  hepatitis C virus infection with sustained molecular remission.  Her



  dysphagia and odynophagia has resolved and she is able to take oral pills.





  





  L



  ast Assessment & Plan:





  Mrs. Dunaway is here today for follow up for CML.She has been on long term



  therapy with Dasatinib.She reports compliance with medication and



  tolerating treatment well.She has had complete molecular response to



  treatment.





  Unfortunately she has recurrent pleural effusion which result in recurrent



  thoracenteses.  She has had several dose reductions but continues to have



  recurrent effusions.





  She will undergo thoracentesis locally, and she is getting cardiac and



  pulmonary care locally.



  We will dose reduce her dasatinib to 20 mg by mouth once a day especially



  in light of her complete remission.  Will continue follow-up every 3 months



  otherwise.





  If she has recurrent pleural effusions despite their major dose reductions



  and dasatinib will consider switching her to bosutinib.





  Health care maintenance-She is up to date on Mammogram and Pap Smear.She



  is due for colonoscopy in .Continue to follow up with PCP for



  health care maintenance.





  Continue to follow up with Hepatology for history of Cirrhosis.





  Patient to call if she develops unexplained fatigue, fevers,



  chills,SOB,chest pain or any other concerns.She verbalized understanding



  and agreed with plan.





  RTC in 3 months for follow up with Dr Chand.



 

 



  Iron deficiency   2012

 

 



  Overview:



  Absent iron stains on bone marrow biopsy.





  L



  ast Assessment & Plan:



  Ferritin is > 500 , rules out iron def.

 

 



  Hiatal hernia   2012

 

 



  Overview:



  Per pt, limits her ability to take po pills.





  L



  ast Assessment & Plan:



  Pt reports that her trouble swallowing is secondary to GERD symptoms and



  that her GERD has resolved. She denies any current trouble with dysphagia



  and is able to eat and drink.

 

 



  Normocytic anemia   2012

 

 



  Overview:



  Last transfusion 





  L



  ast Assessment & Plan:



  Remains well above transfusion needs. Labs will hopefully continue to



  improve with treatment. She should have a work up for iron deficiency in



  the future however.







Resolved Problems







  



  Problem   Noted Date   Resolved Date

 

  



  Thrombocythemia (HCC)   2012

 

 



  Overview:



  CHANEL-2 negative.





  L



  ast Assessment & Plan:



  Improved. Now WNL.  She has stopped hydrea.

 

  



  Hepatitis C   2012

 

 



  Overview:



  Due to prior IV drug use, untreated





  L



  ast Assessment & Plan:



  Completed HCV therapy.



  Patient has an appointment with Hepatology today.









Encounters







    



  Date   Type   Specialty   Care Team   Description

 

    



  2017   Orders Only   Hepatology   Dane Platt   Cirrhosis of liver



      without ascites,



      unspecified hepatic



      cirrhosis type (HCC)

 

    



  10/31/2017   Orders Only   Hepatology   Olga Polk APRN   Cirrhosis of liver



      without ascites,



      unspecified hepatic



      cirrhosis type (HCC)



      (Primary Dx)

 

    



  10/27/2017   Orders Only   Hepatology   Dane Platt   Cirrhosis of liver



      without ascites,



      unspecified hepatic



      cirrhosis type (HCC)

 

    



  10/19/2017   Office Visit   Oncology   Gillian Chand MD   CML (chronic 
myelocytic



      leukemia) (HCC);CML



      (chronic myelocytic



      leukemia) (HCC)

 

    



  10/19/2017   Nurse Only   Oncology   Gillian Chand MD   CML (chronic 
myelocytic



      leukemia)



      (HCC);Hypothyroidism,



      unspecified type;Other



      fatigue ;Healthcare



      maintenance

 

    



  10/16/2017   Prep for Case   Hepatology   Olga Polk APRN 

 

    



  10/13/2017   Telephone   Hepatology   Olga Polk APRN   Other (call to NC)

 

    



  10/02/2017   Orders Only   Oncology   Gillian Chand MD   CML (chronic 
myelocytic



      leukemia) (HCC) (Primary



      Dx);Hypothyroidism,



      unspecified type

 

    



  10/02/2017   Orders Only   Oncology   Lashon Méndez APRN-NP   CML (
chronic myelocytic



      leukemia) (HCC) (Primary



      Dx)

 

    



  10/02/2017   Orders Only   Hepatology   Olga Polk APRN 

 

    



  10/02/2017   Prep for Case   Hepatology   Olga Polk APRN 



from Last 3 Months



Immunizations







  



  Name   Dates Previously Given   Next Due

 

  



  HEP A/HEP B Combined   2015, 2015, 2015 



  Vaccine  







Family History







   



  Medical History   Relation   Name   Comments

 

   



  Coronary Artery Disease   Father    MI

 

   



  Cancer   Mother    Gallbladder cancer

 

   



  Liver Disease   Sister  









   



  Relation   Name   Status   Comments

 

   



  Brother    Alive 

 

   



  Father     

 

   



  Mother     

 

   



  Sister     

 

   



  Sister   







Social History







    



  Tobacco Use   Types   Packs/Day   Years Used   Date

 

    



  Former Smoker   Cigarettes   1   10 

 

    



  Smokeless Tobacco: Former     Quit:



  User     2003









   



  Alcohol Use   Drinks/Week   oz/Week   Comments

 

   



  No   0 Standard   0.0 



   drinks or  



   equivalent  









 



  Sex Assigned at Birth   Date Recorded

 

 



  Not on file 







Last Filed Vital Signs







  



  Vital Sign   Reading   Time Taken

 

  



  Blood Pressure   127/89   10/19/2017 10:51 AM CDT

 

  



  Pulse   78   10/19/2017 10:51 AM CDT

 

  



  Temperature   36.8   C (98.2   F)   10/19/2017 10:51 AM CDT

 

  



  Respiratory Rate   12   10/19/2017 10:51 AM CDT

 

  



  Oxygen Saturation   100%   10/19/2017 10:51 AM CDT

 

  



  Inhaled Oxygen   -   -



  Concentration  

 

  



  Weight   71 kg (156 lb 9.6 oz)   10/19/2017 10:51 AM CDT

 

  



  Height   174 cm (5' 8.5")   10/19/2017 10:51 AM CDT

 

  



  Body Mass Index   23.46   10/19/2017 10:51 AM CDT







Plan of Treatment







   



  Health Maintenance   Due Date   Last Done   Comments

 

   



  PHYSICAL (COMPREHENSIVE)   1966  



  EXAM   

 

   



  PERTUSSIS VACCINE   1970  

 

   



  TETANUS VACCINE   1976  

 

   



  CERVICAL CANCER SCREENING   1989  

 

   



  BREAST CANCER SCREENING   2017 

 

   



  INFLUENZA VACCINE   2017  

 

   



  COLORECTAL CANCER   10/21/2024   10/21/2014 



  SCREENING   







Results

* ALPHA FETO PROTEIN (AFP) (10/23/2017 10:13 AM)





  



  Component   Value   Ref Range

 

  



  Alpha Feto Protein   1.9   0.0 - 8.7 ng/ml









 



  Specimen   Performing Laboratory

 

 



  Blood   LABDE INTERFACE









 Narrative

 

 



Outside Lab Verified by [facility] on 10/31/2017.





* PROTIME INR (PT) (10/23/2017 10:13 AM)





  



  Component   Value   Ref Range

 

  



  INR   1.2 (H)   0.9 - 1.1

 

  



  Protime   14.9 (H)   11.3 - 14.1 sec









 



  Specimen   Performing Laboratory

 

 



  Blood   LABDE INTERFACE









 Narrative

 

 



Outside Lab Verified by [facility] on 10/27/2017.





* CBC AND DIFF (10/23/2017 10:13 AM)



Only the most recent of 2 results within the time period is included.





  



  Component   Value   Ref Range

 

  



  RBC   4.03   3.60 - 5.00 M/ul

 

  



  Hematocrit   38.4   36.0 - 46.0 %

 

  



  MCV   95.3   80.0 - 97.0 fl

 

  



  MCH   32 (H)   27.0 - 31.0 pg

 

  



  MCHC   33.6   32.0 - 36.0 pg

 

  



  Platelet Count   218   150 - 4001

 

  



  Lymphocytes   37.9   10.0 - 50.0 %

 

  



  Eosinophil   3.9   0.0 - 7.0 %

 

  



  Basophil   1.3   0.0 - 2.5 %

 

  



  Absolute Neutrophil Count   1.01 (L)   2.00 - 6.90 K/uL

 

  



  Absolute Lymph Count   0.88   0.60 - 3.40

 

  



  Absolute Monocyte Count   0.3   0.0 - 0.9 K/ul

 

  



  Absolute Basophil Count   0.0   0.0 - 0.2 K/ul









 



  Specimen   Performing Laboratory

 

 



  Blood   LABDE INTERFACE









 Narrative

 

 



Outside Lab Verified by [facility] on 10/27/2017.





* COMPREHENSIVE METABOLIC PANEL (10/23/2017 10:13 AM)



Only the most recent of 2 results within the time period is included.





  



  Component   Value   Ref Range

 

  



  Sodium   137   132 - 145 mEq/L

 

  



  Potassium   3.7   3.5 - 5.5 meq/l

 

  



  Chloride   105   95 - 110 mEq/L

 

  



  CO2   24.0   24.0 - 34.0 meq/l

 

  



  Anion Gap   12   6 - 14

 

  



  Glucose   71   60 - 125 mg/dL

 

  



  Creatinine   0.9   0.6 - 1.5 mg/dL

 

  



  eGFR Non African American   71   >59 ml/min/1.73m2

 

  



  Blood Urea Nitrogen   16   5 - 25 mg/dL

 

  



  Calcium   9.1   8.5 - 10.8 mg/dL

 

  



  Alk Phosphatase   64   30 - 115 U/L

 

  



  AST (SGOT)   19   0 - 40 U/L

 

  



  ALT (SGPT)   19   0 - 50 U/L

 

  



  Albumin   4.2   3.5 - 5.5 g/dL

 

  



  Total Protein   6.9   6.0 - 8.0 g/dl









 



  Specimen   Performing Laboratory

 

 



  Blood   LABDE INTERFACE









 Narrative

 

 



Outside Lab Verified by [facility] on 10/27/2017.





* THYROID STIMULATING HORMONE-TSH (10/19/2017 10:28 AM)





  



  Component   Value   Ref Range

 

  



  TSH   1.804   0.35 - 5.00 MCU/ML









 



  Specimen   Performing Laboratory

 

 



  Blood   [facility]



   [address] [place] [address]





* BCR  PCR BLOOD OR BONE MARROW (10/19/2017 10:26 AM)





  



  Component   Value   Ref Range

 

  



  Specimen   BLOOD 

 

  



  Final Diagnosis,BCR   Patient Name: Wendy Dunaway 



   Physician(s):    BRANDO Smith 



   Ordering Facility: [facility] 



   Medical Record #:    [id number] 



   Date of Birth, Sex:    1960, F 



   Collection Date:    10/19/2017 



   Received Date:    10/19/2017 



   Report Date:    10/27/2017 



   CMOL Reference #:    [id number] 



   Accession #:    [id number] 



   Test Performed 



   BCR-ABL p210 fusion gene transcript analysis 



   Specimen Type 



   Blood 



   Analytical Results 



   The BCR-ABL Mbcr b2a2 and/or b3a2 fusion gene 



   transcripts were NOT 



   DETECTED in this specimen. 



   Interpretation 



   The BCR-ABL Mbcr b2a2 and b3a2 fusion gene 



   transcripts were NOT 



   DETECTED in this specimen. The limit of detection 



   for this assay is 



   an Mbcr copy number of at least 10 and an NCN of 



   0.001%. Analysis of 



   BCR-ABL transcript levels during and/or after 



   therapy reflects the 



   level of disease suppression in patients with CML 



   and some patients 



   with AML, and is effective for monitoring 



   treatment efficiency or, in 



   some cases, the need to reassess therapy. These 



   results should be 



   interpreted only in the context of total clinical 



   information. 



   Therapeutic action should not be taken based 



   solely on these results. 



   Methodology 



   Total RNA from peripheral blood or bone marrow 



   samples was isolated 



   and reverse transcribed with random primers, and 



   the    cDNA products 



   were quantitated by real time quantitative PCR 



   (RQ-PCR). Primers and 



   probes for bcr exons b2 and b3 and abl exon 2 are 



   used in the RQ-PCR 



   to detect the Mbcr transcripts b2a2 and b3a2. A 



   set of plasmid 



   standards containing 10^1 to 10^6 copies each of 



   BCR-ABL and ABL were 



   used to generate standard curves for the 



   quantitation of BCR-ABL and 



   ABL. ABL copy numbers are used as control for the 



   quality and 



   quantity of sample RNA and to normalize the 



   BCR-ABL copy numbers. The 



   test result is expressed as BCR-ABL/ABL ratio, 



   then converted to an 



   international scale using the IS-Low Calibrator. 



   Intended Use 



   The BCR-ABL Mbcr RQ-PCR assay detects and 



   quantitates the BCR-ABL 



   Mbcr b2a2 and b3a2 (p210) fusion gene transcripts 



   which present in 



   95% of chronic myelogenous leukemia (CML) patients 



   and approximately 



   35% of Ph-positive adult acute lymphocytic 



   leukemia (ALL) patients. 



   This test does not detect other BCR-ABL fusions, 



   including e1a2 



   (p190), so should not be used in the diagnostic 



   setting. Serial 



   analysis of the Mbcr b2a2 & b3a2 transcripts can 



   be used for 



   monitoring patient response to CML treatment 



   including tyrosine 



   kinase inhibitors imatinib and dasatinib. 



   References 



   1. Leni et al.    Desirable performance 



   characteristics for BCR-ABL 



   measurement on an        international reporting 



   scale to allow 



   consistent interpretation of individual patient 



   response and 



   comparison of response rates between clinical 



   trials.    Blood 2008 



   112:8986-4132. 



   2. Katina Rosa et al. Guidelines for the 



   measurement of BCR-ABL 1 



   transscripts in chronic myeloid leukaemia. [place] 



   Journal of 



   Haematology, 153, 179-190. 



   3. Nichole Cooper et al. Establishment of the 1st 



   World Health 



   Organization International     Genetic Reference 



   Panel for 



   quantification of BCR-ABL Mrna. Blood 2010. 



   4. Sage and Lizzette.    Molecular Monitoring 



   of BCR-ABL as a guide 



   to        clinical management of chronic myeloid 



   leukaemia.    Blood. 2006: 



   20 29-41. 



   5. Sage et al. Monitoring of CML patients 



   responding to treatment 



   with tyrosine kinase inhibitors: review and 



   recommendations for 



   harmonizing current methodology for detecting 



   BCR-aBL transcripts and 



   kinase domain mutations for expressing results. 



   Blood 2006:108 (1) 



   28-37. 



   6. Syed et al.    Quantitative Monitoring 



   of BCR-ABL 



   Transcript-Suggestion of a Simplified Approach 



   Considering Inaccuracy 



   of Measurement and Calibration. Leukemia & 



   Lymphoma 2004:45 6 



   8713-6953. 



   Disclaimer 



   This test was performed by the [facility] 



   [facility]. This test was developed and its 



   performance 



   characteristics determined by the [facility] 



   [facility]. It has not been cleared nor approved 



   by the U.S. Food and 



   Drug Administration. The FDA has determined that 



   such clearance is 



   not necessary. This test is used for clinical 



   purposes. It should not 



   be regarded as investigational or for research. 



   This laboratory is 



   certified under the Clinical Laboratory 



   Improvement Amendments of 



   1988 (CLIA-88) as qualified to perform high 



   complexity clinical 



   laboratory testing. [facility] 



   [facility], 



   Department of Pathology and Laboratory Medicine, 



   [facility], [address], [address] 



   [address] [place], [address]. Tel: [phone]; 



   Fax: 



   [phone]. 









 



  Specimen   Performing Laboratory

 

 



  Blood   REFERENCE LAB





from Last 3 Months

## 2017-12-04 NOTE — XMS REPORT
Encounter Summary

 Created on: 2017



Wendy Dunaway

External Reference #: [id number]

: 1960

Sex: Female



Demographics







 Address  [address] [place]  [address]

 

 Home Phone  [phone]

 

 Preferred Language  English

 

 Marital Status  Unknown

 

 Nondenominational Affiliation  NON

 

 Race  White

 

 Ethnic Group  Not  or 





Author







 Author  [facility]

 

 Organization  [facility]

 

 Address  Unknown

 

 Phone  Unavailable







Support







 Name  Relationship  Address  Phone

 

 , Kelly Siddiqui  ECON  [address]

[place]  [address]  [phone]

 

 , Razia Dunaway  ECON  Unknown  [phone]







Care Team Providers







 Care Team Member Name  Role  Phone

 

  PCP  Unavailable







Encounter Details







    



  Date   Type   Department   Care Team   Description

 

    



  2017   Orders Only   Dane Aleman   Cirrhosis of liver



    Transplantation-Hepatolog    without ascites,



    y Clinic    unspecified hepatic



    [address]    cirrhosis type (HCC)



    [facility]  



    [place]  



    [address] [phone]  







Social History







    



  Tobacco Use   Types   Packs/Day   Years Used   Date

 

    



  Former Smoker   Cigarettes   1   10 

 

    



  Smokeless Tobacco: Former     Quit:



  User     2003









   



  Alcohol Use   Drinks/Week   oz/Week   Comments

 

   



  No   0 Standard   0.0 



   drinks or  



   equivalent  









 



  Sex Assigned at Birth   Date Recorded

 

 



  Not on file 



as of this encounter



Functional Status







  



  Functional Status   Response   Date of Assessment

 

  



  Does the patient have a hearing impairment:   No   2017

 

  



  Does the patient have a visual impairment:   Yes   2017

 

  



  Does the patient have impaired ambulation:   No   2017

 

  



  Does the patient have an activity of daily living   No   2017



  (ADL) impairment:  

 

  



  Does the patient have an instrumental activity of   No   2017



  daily living (IADL) impairment:  









  



  Cognitive Status   Response   Date of Assessment

 

  



  Does the patient have a cognitive impairment:   No   2017



as of this encounter



Plan of Treatment





Not on fileas of this encounter



Results

* ALPHA FETO PROTEIN (AFP) (10/23/2017 10:13 AM)





  



  Component   Value   Ref Range

 

  



  Alpha Feto Protein   1.9   0.0 - 8.7 ng/ml









 



  Specimen   Performing Laboratory

 

 



  Blood   LABDE INTERFACE









 Narrative

 

 



Outside Lab Verified by Dane Platt on 10/31/2017.





in this encounter



Visit Diagnoses











  Diagnosis

 





  Cirrhosis of liver without ascites, unspecified hepatic cirrhosis type (HCC)



in this encounter

## 2017-12-04 NOTE — XMS REPORT
Encounter Summary

 Created on: 2017



Wendy Dunaway

External Reference #: [id number]

: 1960

Sex: Female



Demographics







 Address  [address] [place]  [address]

 

 Home Phone  [phone]

 

 Preferred Language  English

 

 Marital Status  Unknown

 

 Christian Affiliation  NON

 

 Race  White

 

 Ethnic Group  Not  or 





Author







 Author  [facility]

 

 Organization  [facility]

 

 Address  Unknown

 

 Phone  Unavailable







Support







 Name  Relationship  Address  Phone

 

 , Kelly Siddiqui  ECON  [address] [place] [address]  [address]  [phone]

 

 , Razia Dunaway  ECON  Unknown  [phone]







Care Team Providers







 Care Team Member Name  Role  Phone

 

  PCP  Unavailable







Reason for Visit

* 





 



  Reason   Comments

 

 



  Heme/Onc Care 









Encounter Details







    



  Date   Type   Department   Care Team   Description

 

    



  10/19/2017   Office Visit   [facility]   Gillian Chand MD   CML (chronic myelocytic



    [facility]   [address]   [address]ukemia) ([facility]);CML



    [address]   [address]   (chronic [place] [address]   [place] [address]   leukemia) (Formerly [facility])



    [phone] (Fax) 







Social History







    



  Tobacco Use   Types   Packs/Day   Years Used   Date

 

    



  Former Smoker   Cigarettes   1   10 

 

    



  Smokeless Tobacco: Former     Quit:



  User     2003









   



  Alcohol Use   Drinks/Week   oz/Week   Comments

 

   



  No   0 Standard   0.0 



   drinks or  



   equivalent  









 



  Sex Assigned at Birth   Date Recorded

 

 



  Not on file 



as of this encounter



Last Filed Vital Signs







  



  Vital Sign   Reading   Time Taken

 

  



  Blood Pressure   127/89   10/19/2017 10:51 AM CDT

 

  



  Pulse   78   10/19/2017 10:51 AM CDT

 

  



  Temperature   36.8   C (98.2   F)   10/19/2017 10:51 AM CDT

 

  



  Respiratory Rate   12   10/19/2017 10:51 AM CDT

 

  



  Oxygen Saturation   100%   10/19/2017 10:51 AM CDT

 

  



  Inhaled Oxygen   -   -



  Concentration  

 

  



  Weight   71 kg (156 lb 9.6 oz)   10/19/2017 10:51 AM CDT

 

  



  Height   174 cm (5' 8.5")   10/19/2017 10:51 AM CDT

 

  



  Body Mass Index   23.46   10/19/2017 10:51 AM CDT



in this encounter



Functional Status







  



  Functional Status   Response   Date of Assessment

 

  



  Does the patient have a hearing impairment:   No   2017

 

  



  Does the patient have a visual impairment:   Yes   2017

 

  



  Does the patient have impaired ambulation:   No   2017

 

  



  Does the patient have an activity of daily living   No   2017



  (ADL) impairment:  

 

  



  Does the patient have an instrumental activity of   No   2017



  daily living (IADL) impairment:  









  



  Cognitive Status   Response   Date of Assessment

 

  



  Does the patient have a cognitive impairment:   No   2017



as of this encounter



Progress Notes

* Gillian Chand MD - 10/19/2017 11:20 AM CDT



Formatting of this note may be different from the original.

Date of Service: 10/19/2017



Subjective:        

 

Reason for Visit:

Heme/Onc Care



Wendy Dunaway is a 57 y.o. female.



History of Present Illness



Ms. Dunaway is a 57-year-old woman with history of hepatitis C virus infection 
and peripheral neuropathy. She was diagnosed with chronic phase CML in 2012 
after presenting with leucocytosis and extrema thrombocytosis. Molecular 
testing was positive for p210 BCR-ABL translocation. She has been started on 
Gleevec. She has been on a dose between 400 and 800 over the past several 
months due to liver disease and dosing changes were made based on her response 
as well as her abnormal liver function Gleevec was discontinued in mid 2013 
as reported by the patient that her CML has progressed. Her oncologist, Dr. Stewart discontinued Gleevec and started the patient on nilotinib. As per the 
patient, the patient took nilotinib for 1 day and had severe exacerbation for 
her neuropathy, where she had to stop therapy. She has been off any TKI for the 
several weeks. She was started on Dasatinib and fist dose administered on March 
15. 



She has completed CMR on dasatinib. 



Interval History:



Patient  is here today for follow up of CML.

She is on dasatinib 40 mg by mouth once a day and has been compliant with with 
little side effects.  She unfortunately has had recurrent pleural effusions and 
more recently had likely secondary to atrial fibrillation for which she was 
started on anticoagulation and on rhythm control.  His most recent chest x-ray 
at her local hospital showed relapsed pleural effusion of the left side and she 
will be undergoing thoracentesis locally.

She has no other side effects to dasatinib therapy.



 

Review of Systems 

Constitutional: Negative for activity change, appetite change, chills, 
diaphoresis, fatigue, fever and unexpected weight change. 

HENT: Negative for congestion, facial swelling and sinus pressure.  

Respiratory: Positive for shortness of breath. Negative for cough, chest 
tightness and wheezing.  

Cardiovascular: Positive for palpitations. Negative for chest pain and leg 
swelling. 

Gastrointestinal: Negative for abdominal distention, abdominal pain, anal 
bleeding, blood in stool, constipation, diarrhea, nausea and vomiting. 

Genitourinary: Negative for hematuria. 

Musculoskeletal: Negative for arthralgias and myalgias. 

Skin: Negative for rash. 

Neurological: Positive for numbness (chronic,stable). 

Hematological: Negative for adenopathy. Does not bruise/bleed easily. 

Psychiatric/Behavioral: The patient is not nervous/anxious.  



Objective:       

 CARTIA  mg capsule TK 1 C PO D 

 dasatinib(+) (SPRYCEL) 20 mg tablet Take 2 tablets by mouth daily. 

 gabapentin (NEURONTIN) 600 mg tablet Take 1 tab every morning, 2 tabs in 
the afternoon, 2 tabs in the evening, and 1 tab at bedtime 

 omeprazole DR(+) (PRILOSEC) 40 mg capsule TK 1 C PO QAM 30 MINUTES BEFORE 
BREAKFAST 

 PEG 3350-Electrolytes (GOLYTELY) oral solution Mix with water as directed 
on package. Drink 240ml (8oz) every 10 minutes until gone. 

 potassium chloride (K-DUR) 10 mEq tablet TK 1 T PO D 

 QUEtiapine (SEROQUEL) 100 mg tablet Take 1/2 tab every morning, 1 tab at 1 
pm, 1 tab at 5 pm and 1/2 tab at bedtime. 

 triamcinolone acetonide (KENALOG) 0.1 % topical cream JARRED TID TO LEGS 

 warfarin (COUMADIN) 1 mg tablet TK 1 T PO  ONCE D 

 warfarin (COUMADIN) 5 mg tablet TK 1 T PO D 



Vitals: 

 10/19/17 1051 

BP: 127/89 

Pulse: 78 

Resp: 12 

Temp: 36.8 C (98.2 F) 

TempSrc: Oral 

SpO2: 100% 

Weight: 71 kg (156 lb 9.6 oz) 

Height: 174 cm (68.5") 



Body mass index is 23.46 kg/(m^2). 



Pain Score: Zero

 



Pain Addressed:  N/A



Patient Evaluated for a Clinical Trial: Patient not eligible for a treatment 
trial (including not needing treatment, needs palliative care, in remission). 



[employer] Oncology Group performance status is 0, Fully active, able 
to carry on all pre-disease performance without restriction..



 Physical Exam 

Constitutional: She is oriented to person, place, and time. She appears well-
developed and well-nourished. 

HENT: 

Head: Normocephalic and atraumatic. 

Mouth/Throat: Oropharynx is clear and moist. 

Eyes: Conjunctivae and EOM are normal. 

Neck: Normal range of motion. Neck supple. 

Cardiovascular: Normal rate, regular rhythm and normal heart sounds.  

Pulmonary/Chest: Breath sounds normal. No respiratory distress. 

Decreased air entry, left chest.  

Abdominal: Soft. She exhibits no distension. There is no tenderness. There is 
no guarding. 

Musculoskeletal: Normal range of motion. She exhibits no edema. 

Lymphadenopathy: 

  She has no cervical adenopathy. 

Neurological: She is alert and oriented to person, place, and time. 

Skin: Skin is warm and dry. No rash noted. No erythema. No pallor. 

Psychiatric: She has a normal mood and affect. Her behavior is normal. Judgment 
and thought content normal. 

Vitals reviewed.



 



 

Assessment and Plan:



Problem 

Cml (Chronic Myelocytic Leukemia) ([facility]) 

 Diagnosis: CM chronic phase

Date of Diagnosis: 2012

Treatment:

Imatinib 400-800 mg PO daily  to 2013: Best response not available, 
patient progressed 2013 ( Cytogenetic progression/ relapse) 

Nilotinib 200 MG PO BID: unable to tolerate, exacerbated neuropathy

Dasatinib 100 mg PO daily: started March 15, 2013

Response: CCR, CMR

Dasatinib reduced to 70 mg PO daily: Oct , 2013

7/30/15-Continue Dasatinib 70 mg po daily.

May 2016: Dasatinib 50 mg po daily.

Oct   2016: Dasatinib 40 mg po daily. CMR

Oct   2017: Dasatinib 20 mg po daily.  Reduction due to recurrent pleural 
effusions 



Ms. Dunaway is a 57-year-old woman with a history of hep C infection and 
unexplained neuropathy.  She also has significant dysphagia and odynophagia and 
has significant difficulty in observing oral medication tablets.  



She was diagnosed with chronic phase CML in 2012.  She has been under the 
care of Dr. Petersen.  There has not been any outside records available for us 
to review today and most of the information available has been provided by the 
patient verbally.  Her treatment hostory is as above. 

Repeat the bone marrow biopsy did not show clonal evolution of her CML. Remains 
in CP-CML. No Kinase-domain mutations were detected. 



Since diagnoses her hep C therapy will resulted in complete eradication of 
hepatitis C virus infection with sustained molecular remission.  Her dysphagia 
and odynophagia has resolved and she is able to take oral pills.



 



Current Assessment and Plan:



CML (chronic myelocytic leukemia) (HCC)



Mrs. Dunaway is here today for follow up for CML.She has been on long term 
therapy with Dasatinib.She reports compliance with medication and tolerating 
treatment well.She has had complete molecular response to treatment.



Unfortunately she has recurrent pleural effusion which result in recurrent 
thoracenteses.  She has had several dose reductions but continues to have 
recurrent effusions.



She will undergo thoracentesis locally, and she is getting cardiac and 
pulmonary care locally.

We will dose reduce her dasatinib to 20 mg by mouth once a day especially in 
light of her complete remission.  Will continue follow-up every 3 months 
otherwise.



If she has recurrent pleural effusions despite their major dose reductions and 
dasatinib will consider switching her to bosutinib.



Health care maintenance-She is up to date on Mammogram and Pap Smear.She is due 
for colonoscopy in .Continue to follow up with PCP for health 
care maintenance.



Continue to follow up with Hepatology for history of Cirrhosis.



Patient to call if she develops unexplained fatigue, fevers, chills,SOB,chest 
pain or any other concerns.She verbalized understanding and agreed with plan.



RTC in 3 months for follow up with Dr Chand.



 



in this encounter



Miscellaneous Notes

* Assessment & Plan Note - Gillian Chand MD - 10/19/2017 12:11 PM CDT



Associated Problem(s): CML (chronic myelocytic leukemia) (HCC)





Mrs. Dunaway is here today for follow up for CML.She has been on long term 
therapy with Dasatinib.She reports compliance with medication and tolerating 
treatment well.She has had complete molecular response to treatment.



Unfortunately she has recurrent pleural effusion which result in recurrent 
thoracenteses.  She has had several dose reductions but continues to have 
recurrent effusions.



She will undergo thoracentesis locally, and she is getting cardiac and 
pulmonary care locally.

We will dose reduce her dasatinib to 20 mg by mouth once a day especially in 
light of her complete remission.  Will continue follow-up every 3 months 
otherwise.



If she has recurrent pleural effusions despite their major dose reductions and 
dasatinib will consider switching her to bosutinib.



Health care maintenance-She is up to date on Mammogram and Pap Smear.She is due 
for colonoscopy in .Continue to follow up with PCP for health 
care maintenance.



Continue to follow up with Hepatology for history of Cirrhosis.



Patient to call if she develops unexplained fatigue, fevers, chills,SOB,chest 
pain or any other concerns.She verbalized understanding and agreed with plan.



RTC in 3 months for follow up with Dr Chand.





in this encounter



Plan of Treatment







   



  Name   Priority   Associated Diagnoses   Order Schedule

 

   



  CBC AND DIFF   Routine   CML (chronic myelocytic   Expected: 2018



    leukemia) ([facility])   (Approximate), Expires:



     10/19/2018

 

   



  BCR  PCR BLOOD OR BONE MARROW   Routine   CML (chronic myelocytic   
Expected: 2018



    leukemia) (HCC)   (Approximate), Expires:



     10/19/2018

 

   



  COMPREHENSIVE METABOLIC PANEL   Routine   CML (chronic myelocytic   Expected: 
2018



    leukemia) ([facility])   (Approximate), Expires:



     10/19/2018



as of this encounter



Visit Diagnoses











  Diagnosis

 





  CML (chronic myelocytic leukemia) (HCC)

 





  Chronic myeloid leukemia, without mention of having achieved remission



in this encounter

## 2017-12-04 NOTE — ED GENERAL
General


Chief Complaint:  Cardiac/General Problems


Stated Complaint:  HEART RACING/CP


Nursing Triage Note:  


C/O RAPID HEART RATE X30 MIN AFTER EATING. HX AFIB


Nursing Sepsis Screen:  No Definite Risk


Source of Information:  Patient


Exam Limitations:  No Limitations





History of Present Illness


Time Seen by Provider:  21:10


Initial Comments


This 49-year-old woman presents to the emergency room with complaints of sudden 

onset of diarrhea, shaking, racing heart, and abdominal pain after eating some 

leftover chicken.  She is feeling better at the time of my assessment.  She 

takes Coumadin for prophylaxis with paroxysmal atrial fibrillation.  She denies 

any chest pain.  She is in sinus rhythm upon assessment.





Allergies and Home Medications


Allergies


Coded Allergies:  


     sulfamethoxazole (Verified  Allergy, Severe, RASH, 10/2/14)


     trimethoprim (Verified  Allergy, Severe, RASH, 10/2/14)


     Sulfa (Sulfonamide Antibiotics) (Verified  Allergy, Mild, 10/2/14)


     zolpidem (Verified  Allergy, Mild, skin sensation, 10/2/14)


     celecoxib (Verified  Adverse Reaction, Intermediate, hives, 10/2/14)





Home Medications


Apixaban 5 Mg Tablet, 5 MG PO BID for 30 Days, #60 Ref 1


   Prescribed by: DUNIA MORRISON on 9/15/17 1534


Aspirin 81 Mg Tab.chew, 81 MG PO DAILY@0900, #30


   Prescribed by: DUNIA MORRISON on 9/15/17 1534


Dasatinib 20 Mg Tablet, 40 MG PO HS, (Reported)


Diltiazem HCl 240 Mg Cap.er.24h, 240 MG PO DAILY, #30 Ref 1


   Prescribed by: DUNIA MORRISON on 9/15/17 1534


Diltiazem HCl 240 Mg Cap.er.24h, (Reported)


Fluticasone Propionate 9.9 Ml [place].susp, 1 SPRAY NS DAILY, (Reported)


Furosemide 40 Mg Tablet, (Reported)


Gabapentin 600 Mg Tablet, 600 MG PO 0900, (Reported)


Gabapentin 600 Mg Tablet, 1,200 MG PO 1600,2100, (Reported)


   TAKES 2 (600MG) TABLETS 


Omeprazole 40 Mg Capsule.dr, (Reported)


Potassium Chloride 10 Meq Capsule.er, 10 MEQ PO DAILY for 30 Days, #30


   Prescribed by: LOAN GRANADOS on 9/15/17 1549


Quetiapine Fumarate 100 Mg Tablet, 50 MG PO 0900,2100, (Reported)


   TAKES 1/2 (100MG) TABLET 


Quetiapine Fumarate 100 Mg Tablet, 100 MG PO 1300,1700, (Reported)


Warfarin Sodium 5 Mg Tablet, (Reported)





Constitutional:  see HPI


EENTM:  no symptoms reported


Respiratory:  no symptoms reported


Cardiovascular:  see HPI


Gastrointestinal:  see HPI


Genitourinary:  no symptoms reported


Pregnant:  No


Musculoskeletal:  no symptoms reported


Skin:  no symptoms reported


Psychiatric/Neurological:  No Symptoms Reported


Hematologic/Lymphatic:  No Symptoms Reported





Past Medical-Social-Family Hx


Patient Social History


Alcohol Use:  Occasionally Uses


Number of Drinks Today:  0


Alcohol Beverage of Choice:  Wine


Recreational Drug Use:  No


Smoking Status:  Never a Smoker


2nd Hand Smoke Exposure:  No


Recent Foreign Travel:  No


Contact w/Someone Who Travel:  No


Recent Infectious Disease Expo:  No


Recent Hopitalizations:  No





Immunizations Up To Date


Tetanus Booster (TDap):  Less than 5yrs


PED Vaccines UTD:  Yes


Date of Pneumonia Vaccine:  2012





Seasonal Allergies


Seasonal Allergies:  Yes





Surgeries


History of Surgeries:  Yes (HEMORRHOID, chest tube, thoracentesis)


Surgeries:  Gallbladder





Respiratory


History of Respiratory Disorde:  Yes (pleural effusions)





Cardiovascular


History of Cardiac Disorders:  Yes


Cardiac Disorders:  Atrial Fibrillation (paroxysmal)





Neurological


History of Neurological Disord:  Yes


Neurological Disorders:  Neuropathy





Reproductive System


Pregnant:  No


Hx Reproductive Disorders:  No


Sexually Transmitted Disease:  No


HIV/AIDS:  No


GYN History:  Hysterectomy





Genitourinary


History of Genitourinary Disor:  No





Gastrointestinal


History of Gastrointestinal Di:  Yes


Gastrointestinal Disorders:  Hepatitis (hepatitis C status post treatment with 

Harvony), Cirrhosis





Musculoskeletal


History of Musculoskeletal Dis:  Yes


Musculoskeletal Disorders:  Fibromyalgia





Endocrine


History of Endocrine Disorders:  No





HEENT


History of HEENT Disorders:  No





Cancer


History of Cancer:  Yes (CML- IN REMISSION)


Cancer:  Leukemia


Did You Recieve Any Treatments:  Yes


Type of Tx Receive:  Chemotherapy





Psychosocial


History of Psychiatric Problem:  Yes


Behavioral Health Disorders:  Anxiety





Integumentary


History of Skin or Integumenta:  No





Blood Transfusions


History of Blood Disorders:  No


Adverse Reaction to a Blood Tr:  No





Family Medical History


Significant Family History:  Heart Disease, Cancer, Diabetes


Family Medial History:  


Diabetes mellitus


  19 MOTHER


Hypertension


  19 MOTHER


Myocardial infarction


  19 FATHER


Neoplasm (grandmother)


Psychosocial problem


  19 MOTHER


Visual disorder


  19 MOTHER





Physical Exam


Vital Signs





Vital Sign - Last 12Hours




















Capillary Refill : Less Than 3 Seconds


General Appearance:  No Apparent Distress, WD/WN


HEENT:  PERRL/EOMI, Normal ENT Inspection, Other (oropharynx somewhat dry)


Neck:  Normal Inspection


Respiratory:  Lungs Clear, Normal Breath Sounds, No Accessory Muscle Use, No 

Respiratory Distress


Cardiovascular:  Regular Rate, Rhythm, No Edema, No Murmur


Gastrointestinal:  Normal Bowel Sounds, Non Tender, Soft


Extremity:  Normal Inspection, No Pedal Edema


Neurologic/Psychiatric:  Alert, Oriented x3, No Motor/Sensory Deficits, Normal 

Mood/Affect, CNs II-XII Norm as Tested


Skin:  Normal Color, Warm/Dry





Progress/Results/Core Measures


Suspected Sepsis


Recent Fever Within 48 Hours:  No


Infection Criteria Present:  None


New/Unexplained  Altered Menta:  No


Sepsis Screen:  No Definite Risk


Sepsis Diagnosis:  


SIRS


Temperature:97.8 


Pulse: 100 


Respiratory Rate: 18


 


Laboratory Tests


17 21:15: White Blood Count 2.6L


Blood Pressure 141 /89 


Mean: 106


 


Laboratory Tests


17 21:15: 


Creatinine 0.96, INR Comment 1.9H, Platelet Count 186, Total Bilirubin 0.6








Results/Orders


Lab Results





Laboratory Tests








Test


  17


21:15 Range/Units


 


 


White Blood Count


  2.6 L


  4.3-11.0


10^3/uL


 


Red Blood Count


  4.38 


  4.35-5.85


10^6/uL


 


Hemoglobin 13.8  11.5-16.0  G/DL


 


Hematocrit 41  35-52  %


 


Mean Corpuscular Volume 94  80-99  FL


 


Mean Corpuscular Hemoglobin 32  25-34  PG


 


Mean Corpuscular Hemoglobin


Concent 34 


  32-36  G/DL


 


 


Red Cell Distribution Width 15.2 H 10.0-14.5  %


 


Platelet Count


  186 


  130-400


10^3/uL


 


Mean Platelet Volume 9.4  7.4-10.4  FL


 


Neutrophils (%) (Auto) 37 L 42-75  %


 


Lymphocytes (%) (Auto) 51 H 12-44  %


 


Monocytes (%) (Auto) 9  0-12  %


 


Eosinophils (%) (Auto) 3  0-10  %


 


Basophils (%) (Auto) 0  0-10  %


 


Neutrophils # (Auto) 1.0 L 1.8-7.8  X 10^3


 


Lymphocytes # (Auto) 1.3  1.0-4.0  X 10^3


 


Monocytes # (Auto) 0.2  0.0-1.0  X 10^3


 


Eosinophils # (Auto)


  0.1 


  0.0-0.3


10^3/uL


 


Basophils # (Auto)


  0.0 


  0.0-0.1


10^3/uL


 


Prothrombin Time 21.8 H 12.2-14.7  SEC


 


INR Comment 1.9 H 0.8-1.4  


 


Activated Partial


Thromboplast Time 47 H


  24-35  SEC


 


 


Sodium Level 143  135-145  MMOL/L


 


Potassium Level 3.4 L 3.6-5.0  MMOL/L


 


Chloride Level 111 H   MMOL/L


 


Carbon Dioxide Level 20 L 21-32  MMOL/L


 


Anion Gap 12  5-14  MMOL/L


 


Blood Urea Nitrogen 18  7-18  MG/DL


 


Creatinine


  0.96 


  0.60-1.30


MG/DL


 


Estimat Glomerular Filtration


Rate 60 


   


 


 


BUN/Creatinine Ratio 19   


 


Glucose Level 130 H   MG/DL


 


Calcium Level 9.3  8.5-10.1  MG/DL


 


Magnesium Level 2.1  1.8-2.4  MG/DL


 


Total Bilirubin 0.6  0.1-1.0  MG/DL


 


Aspartate Amino Transf


(AST/SGOT) 19 


  5-34  U/L


 


 


Alanine Aminotransferase


(ALT/SGPT) 13 


  0-55  U/L


 


 


Alkaline Phosphatase 62    U/L


 


Myoglobin


  36.4 


  10.0-92.0


NG/ML


 


Troponin I < 0.30  <0.30  NG/ML


 


Total Protein 7.9  6.4-8.2  GM/DL


 


Albumin 4.2  3.2-4.5  GM/DL








My Orders





Orders - LUIS LAM MD


Cbc With Automated Diff (17 21:19)


Magnesium (17 21:19)


Chest 1 View, Ap/Pa Only (17 21:19)


Ekg Tracing (17 21:19)


Cardiac Profile 1 (17 21:19)


Comprehensive Metabolic Panel (17 21:19)


Myoglobin Serum (17 21:19)


Protime With Inr (17 21:19)


Partial Thromboplastin Time (17 21:19)


O2 (17 21:19)


Monitor-Rhythm Ecg Trace Only (17 21:19)


Saline Lock/Iv-Start (17 21:19)


Ns Iv 1000 Ml (Sodium Chloride 0.9%) (17 21:51)





Medications Given in ED





Current Medications








 Medications  Dose


 Ordered  Sig/Shakir


 Route  Start Time


 Stop Time Status Last Admin


Dose Admin


 


 Sodium Chloride  1,000 ml @ 


 0 mls/hr  Q0M ONCE


 IV  17 21:51


 17 21:52 DC 17 21:59


0 MLS/HR








Vital Signs/I&O





Vital Sign - Last 12Hours








 17





 21:15 21:15 22:51


 


Temp 97.8  97.8


 


Pulse 100  63


 


Resp 18  15


 


B/P (MAP) 141/89 (106)  


 


Pulse Ox 99 99 97


 


O2 Delivery Room Air Room Air Room Air














Intake and Output 


 


 17





 00:00


 


Intake Total 900 ml


 


Balance 900 ml





Capillary Refill : Less Than 3 Seconds








Blood Pressure Mean:  106








Progress Note :  


Progress Note


Workup was relatively unremarkable aside from a mild leukopenia and a 

lymphocytic predominance to the light count differential.  This suggested viral 

illness.  She received a liter of IV fluids and was feeling much better at the 

time of dismissal.  Left-sided pleural effusion is a chronic problem.  She has 

had the pleural fluid aspirated and sent for pathology previously.  This report 

was reviewed.





ECG


Initial ECG Impression Date:  Dec 4, 2017


Initial ECG Impression Time:  21:10


Initial ECG Rate:  101


Initial ECG Rhythm:  S.Tach


Comment


Sinus tachycardia with no ST elevation or depression.  No abnormal intervals or 

axis deviation.





Diagnostic Imaging





   Diagonstic Imaging:  Xray


   Plain Films/CT/US/NM/MRI:  chest


Comments


Chest x-ray viewed by me and compared with prior.  No acute changes evident





NAME:      DARIN BEE


MED REC#:   [id number]


ACCOUNT#:   [id number]


PT STATUS:   REG ER


:      1960


PHYSICIAN:    LUIS LAM MD


ADMIT DATE:   17/ER


 ***Signed***


Date of Exam:   17





CHEST 1 VIEW, AP/PA ONLY


 





INDICATION: Tachycardia and chest pain.





COMPARISON: Comparison made with prior examination from


10/09/2017.





FINDINGS: There is a persistent left basilar infiltrate and left


pleural effusion. The heart size is stable. The right lung is


clear. There is no pneumothorax. Mediastinum is unremarkable.





IMPRESSION: Unchanged left basilar infiltrate and left pleural


effusion.





Dictated by: 





  Dictated on workstation # [id number]





[id number]





Dict:      17


Trans:      17





Interpreted by:         MEGA SHETTY MD


Electronically signed by:   MEGA SHETTY MD 17





Departure


Impression


Impression:  


 Primary Impression:  


 Diarrhea


 Qualified Codes:  R19.7 - Diarrhea, unspecified


 Additional Impression:  


 Palpitations


Disposition:  01 HOME, SELF-CARE


Condition:  Improved





Departure-Patient Inst.


Decision time for Depature:  22:32


Referrals:  


KATHRYN HOLLAND DO (PCP/Family)


Primary Care Physician


Patient Instructions:  Diarrhea in Adolescents and Adults, Palpitations (DC)





Add. Discharge Instructions:  


Drink plenty of clear liquids.  Gradually advance your diet with small 

quantities of bland food as tolerated.  You may take Imodium (loperamide) over-

the-counter for diarrhea.  Return to care if symptoms worsen.  Continue with 

your previously prescribed medications.











All discharge instructions reviewed with patient and/or family. Voiced 

understanding.





Copy


Copies To 1:   KATHRYN HOLLAND JOSHUA T MD Dec 4, 2017 22:34

## 2017-12-04 NOTE — XMS REPORT
Continuity of Care Document

 Created on: 2017



DARIN BEE

External Reference #: [id number]

: 1960

Sex: Female



Demographics







 Address  [address] [place]  [address]

 

 Home Phone  [phone]

 

 Preferred Language  Unknown

 

 Marital Status  Unknown

 

 Tenriism Affiliation  Unknown

 

 Race  Unknown

 

 Ethnic Group  Unknown





Author







 Author  [facility]

 

 Organization  [facility]

 

 Address  Unknown

 

 Phone  Unavailable







Care Team Providers







 Care Team Member Name  Role  Phone

 

 [facility]  Unavailable  Unavailable



                                    



Problems

                                                                



Medications

                                                                



Allergies, Adverse Reactions, Alerts

                                                        



Immunizations

                                                                



Results

                                                                



Vital Signs

                                                                                



Encounters

                                                        



Procedures

                                                                



Plan of Care

                                                                



Social History

                                                                        



Assessment and Plan

                                                                



Family History

                    





 Value                          Date                          Source           
         



                                                        



Advance Directives

                    





 Order Name                          Results                          Value    
                      Date                          Source

## 2017-12-04 NOTE — XMS REPORT
Encounter Summary

 Created on: 2017



Wnedy Dunaway

External Reference #: [id number]

: 1960

Sex: Female



Demographics







 Address  [address] [place]  [address]

 

 Home Phone  [phone]

 

 Preferred Language  English

 

 Marital Status  Unknown

 

 Worship Affiliation  NON

 

 Race  White

 

 Ethnic Group  Not  or 





Author







 Author  [facility]

 

 Organization  [facility]

 

 Address  Unknown

 

 Phone  Unavailable







Support







 Name  Relationship  Address  Phone

 

 , Kelly Siddiqui  [employer]  [address]

[place]  [address]  [phone]

 

 , Razia Dunaway  [employer]  Unknown  [phone]







Care Team Providers







 Care Team Member Name  Role  Phone

 

  PCP  Unavailable







Encounter Details







    



  Date   Type   Department   Care Team   Description

 

    



  10/02/2017   Orders Only   [facility]   Lashon Méndez, APRN-
NP   CML (chronic myelocytic



    [facility]   [address] [facility]   leukemia) (HCC) (
Primary



    [address] [place] [address]   Dx)



    [place], [place]    [phone] (Fax) 







Social History







    



  Tobacco Use   Types   Packs/Day   Years Used   Date

 

    



  Former Smoker   Cigarettes   1   10 

 

    



  Smokeless Tobacco: Former     Quit:



  User     2003









   



  Alcohol Use   Drinks/Week   oz/Week   Comments

 

   



  No   0 Standard   0.0 



   drinks or  



   equivalent  









 



  Sex Assigned at Birth   Date Recorded

 

 



  Not on file 



as of this encounter



Functional Status







  



  Functional Status   Response   Date of Assessment

 

  



  Does the patient have a hearing impairment:   No   2017

 

  



  Does the patient have a visual impairment:   Yes   2017

 

  



  Does the patient have impaired ambulation:   No   2017

 

  



  Does the patient have an activity of daily living   No   2017



  (ADL) impairment:  

 

  



  Does the patient have an instrumental activity of   No   2017



  daily living (IADL) impairment:  









  



  Cognitive Status   Response   Date of Assessment

 

  



  Does the patient have a cognitive impairment:   No   2017



as of this encounter



Plan of Treatment





Not on fileas of this encounter



Visit Diagnoses











  Diagnosis

 





  CML (chronic myelocytic leukemia) (HCC) - Primary

 





  Chronic myeloid leukemia, without mention of having achieved remission



in this encounter

## 2017-12-04 NOTE — XMS REPORT
Encounter Summary

 Created on: 2017



Wendy Dunaway

External Reference #: [id number]

: 1960

Sex: Female



Demographics







 Address  [address] [place]  [address]

 

 Home Phone  [phone]

 

 Preferred Language  English

 

 Marital Status  Unknown

 

 [employer] Affiliation  NON

 

 Race  White

 

 Ethnic Group  Not  or 





Author







 Author  [facility]

 

 Organization  [facility]

 

 Address  Unknown

 

 Phone  Unavailable







Support







 Name  Relationship  Address  Phone

 

 , Kelly Siddiqui  [employer]  [address]

[place]  [address]  [phone]

 

 , Razia Dunaway  [employer]  Unknown  [phone]







Care Team Providers







 Care Team Member Name  Role  Phone

 

  PCP  Unavailable







Encounter Details







    



  Date   Type   Department   Care Team   Description

 

    



  10/02/2017   Orders Only   [facility]   Gillian Chand MD
   CML (chronic myelocytic



    [facility]   [address]   leukemia) (HCC) (
Primary



    [address]   [address]   Dx);Hypothyroidism,



    [place] [address]   [place] [address]   unspecified type



    [phone] (Fax) 







Social History







    



  Tobacco Use   Types   Packs/Day   Years Used   Date

 

    



  Former Smoker   Cigarettes   1   10 

 

    



  Smokeless Tobacco: Former     Quit:



  User     2003









   



  Alcohol Use   Drinks/Week   oz/Week   Comments

 

   



  No   0 Standard   0.0 



   drinks or  



   equivalent  









 



  Sex Assigned at Birth   Date Recorded

 

 



  Not on file 



as of this encounter



Functional Status







  



  Functional Status   Response   Date of Assessment

 

  



  Does the patient have a hearing impairment:   No   2017

 

  



  Does the patient have a visual impairment:   Yes   2017

 

  



  Does the patient have impaired ambulation:   No   2017

 

  



  Does the patient have an activity of daily living   No   2017



  (ADL) impairment:  

 

  



  Does the patient have an instrumental activity of   No   2017



  daily living (IADL) impairment:  









  



  Cognitive Status   Response   Date of Assessment

 

  



  Does the patient have a cognitive impairment:   No   2017



as of this encounter



Plan of Treatment





Not on fileas of this encounter



Results

* THYROID STIMULATING HORMONE-TSH (10/19/2017 10:28 AM)





  



  Component   Value   Ref Range

 

  



  TSH   1.804   0.35 - 5.00 MCU/ML









 



  Specimen   Performing Laboratory

 

 



  Blood   [facility]



   [address]



   [place] [address]





in this encounter



Visit Diagnoses











  Diagnosis

 





  CML (chronic myelocytic leukemia) (HCC) - Primary

 





  Chronic myeloid leukemia, without mention of having achieved remission

 





  Hypothyroidism, unspecified type



in this encounter

## 2017-12-04 NOTE — XMS REPORT
Encounter Summary

 Created on: 2017



Wendy Dunaway

External Reference #: [id number]

: 1960

Sex: Female



Demographics







 Address  [address] [place]  [address]

 

 Home Phone  [phone]

 

 Preferred Language  English

 

 Marital Status  Unknown

 

 Jew Affiliation  NON

 

 Race  White

 

 Ethnic Group  Not  or 





Author







 Author  [facility]

 

 Organization  [facility]

 

 Address  Unknown

 

 Phone  Unavailable







Support







 Name  Relationship  Address  Phone

 

 , Kelly Siddiqui  [employer]  [address]

[place]  [address]  [phone]

 

 , Razia Dunaway  [employer]  Unknown  [phone]







Care Team Providers







 Care Team Member Name  Role  Phone

 

  PCP  Unavailable







Encounter Details







    



  Date   Type   Department   Care Team   Description

 

    



  10/27/2017   Orders Only   [facility]   Cirrhosis of liver



    [facility]    without ascites,



    [facility]    unspecified hepatic



    [address]    cirrhosis type (HCC)



    [facility]  



    [place]  



    [address] [phone]  







Social History







    



  Tobacco Use   Types   Packs/Day   Years Used   Date

 

    



  Former Smoker   Cigarettes   1   10 

 

    



  Smokeless Tobacco: Former     Quit:



  User     2003









   



  Alcohol Use   Drinks/Week   oz/Week   Comments

 

   



  No   0 Standard   0.0 



   drinks or  



   equivalent  









 



  Sex Assigned at Birth   Date Recorded

 

 



  Not on file 



as of this encounter



Functional Status







  



  Functional Status   Response   Date of Assessment

 

  



  Does the patient have a hearing impairment:   No   2017

 

  



  Does the patient have a visual impairment:   Yes   2017

 

  



  Does the patient have impaired ambulation:   No   2017

 

  



  Does the patient have an activity of daily living   No   2017



  (ADL) impairment:  

 

  



  Does the patient have an instrumental activity of   No   2017



  daily living (IADL) impairment:  









  



  Cognitive Status   Response   Date of Assessment

 

  



  Does the patient have a cognitive impairment:   No   2017



as of this encounter



Plan of Treatment





Not on fileas of this encounter



Results

* PROTIME INR (PT) (10/23/2017 10:13 AM)





  



  Component   Value   Ref Range

 

  



  INR   1.2 (H)   0.9 - 1.1

 

  



  Protime   14.9 (H)   11.3 - 14.1 sec









 



  Specimen   Performing Laboratory

 

 



  Blood   LABDE INTERFACE









 Narrative

 

 



Outside Lab Verified by Dane Platt on 10/27/2017.





* COMPREHENSIVE METABOLIC PANEL (10/23/2017 10:13 AM)





  



  Component   Value   Ref Range

 

  



  Sodium   137   132 - 145 mEq/L

 

  



  Potassium   3.7   3.5 - 5.5 meq/l

 

  



  Chloride   105   95 - 110 mEq/L

 

  



  CO2   24.0   24.0 - 34.0 meq/l

 

  



  Anion Gap   12   6 - 14

 

  



  Glucose   71   60 - 125 mg/dL

 

  



  Creatinine   0.9   0.6 - 1.5 mg/dL

 

  



  eGFR Non African American   71   >59 ml/min/1.73m2

 

  



  Blood Urea Nitrogen   16   5 - 25 mg/dL

 

  



  Calcium   9.1   8.5 - 10.8 mg/dL

 

  



  Alk Phosphatase   64   30 - 115 U/L

 

  



  AST (SGOT)   19   0 - 40 U/L

 

  



  ALT (SGPT)   19   0 - 50 U/L

 

  



  Albumin   4.2   3.5 - 5.5 g/dL

 

  



  Total Protein   6.9   6.0 - 8.0 g/dl









 



  Specimen   Performing Laboratory

 

 



  Blood   LABDE INTERFACE









 Narrative

 

 



Outside Lab Verified by [employer] [facility] on 10/27/2017.





* CBC AND DIFF (10/23/2017 10:13 AM)





  



  Component   Value   Ref Range

 

  



  RBC   4.03   3.60 - 5.00 M/ul

 

  



  Hematocrit   38.4   36.0 - 46.0 %

 

  



  MCV   95.3   80.0 - 97.0 fl

 

  



  MCH   32 (H)   27.0 - 31.0 pg

 

  



  MCHC   33.6   32.0 - 36.0 pg

 

  



  Platelet Count   218   150 - 4001

 

  



  Lymphocytes   37.9   10.0 - 50.0 %

 

  



  Eosinophil   3.9   0.0 - 7.0 %

 

  



  Basophil   1.3   0.0 - 2.5 %

 

  



  Absolute Neutrophil Count   1.01 (L)   2.00 - 6.90 K/uL

 

  



  Absolute Lymph Count   0.88   0.60 - 3.40

 

  



  Absolute Monocyte Count   0.3   0.0 - 0.9 K/ul

 

  



  Absolute Basophil Count   0.0   0.0 - 0.2 K/ul









 



  Specimen   Performing Laboratory

 

 



  Blood   LABDE INTERFACE









 Narrative

 

 



Outside Lab Verified by [employer] [facility] on 10/27/2017.





in this encounter



Visit Diagnoses











  Diagnosis

 





  Cirrhosis of liver without ascites, unspecified hepatic cirrhosis type (HCC)



in this encounter

## 2017-12-04 NOTE — XMS REPORT
Encounter Summary

 Created on: 2017



Wendy Dunaway

External Reference #: [id number]

: 1960

Sex: Female



Demographics







 Address  [address] [place]  [address]

 

 Home Phone  [phone]

 

 Preferred Language  English

 

 Marital Status  Unknown

 

 Sabianism Affiliation  NON

 

 Race  White

 

 Ethnic Group  Not  or 





Author







 Author  [facility]

 

 Organization  [facility]

 

 Address  Unknown

 

 Phone  Unavailable







Support







 Name  Relationship  Address  Phone

 

 , Kelly Siddiqui  EC[employer]  [address]

[place]  [address]  [phone]

 

 , Razia Dunaway  EC[facility]  Unknown  [phone]







Care Team Providers







 Care Team Member Name  Role  Phone

 

  PCP  Unavailable







Encounter Details







    



  Date   Type   Department   Care Team   Description

 

    



  10/19/2017   Nurse Only   [facility]   Gillian Chand MD 
  CML (chronic myelocytic



    [facility]   [address]   leukemia)



    [address]   [address]   (HCC);Hypothyroidism,



    [place] [address]   [place] [address]   unspecified type;Other



    [phone]   fatigue ;Healthcare



     [phone] (Fax)   maintenance







Social History







    



  Tobacco Use   Types   Packs/Day   Years Used   Date

 

    



  Former Smoker   Cigarettes   1   10 

 

    



  Smokeless Tobacco: Former     Quit:



  User     2003









   



  Alcohol Use   Drinks/Week   oz/Week   Comments

 

   



  No   0 Standard   0.0 



   drinks or  



   equivalent  









 



  Sex Assigned at Birth   Date Recorded

 

 



  Not on file 



as of this encounter



Functional Status







  



  Functional Status   Response   Date of Assessment

 

  



  Does the patient have a hearing impairment:   No   2017

 

  



  Does the patient have a visual impairment:   Yes   2017

 

  



  Does the patient have impaired ambulation:   No   2017

 

  



  Does the patient have an activity of daily living   No   2017



  (ADL) impairment:  

 

  



  Does the patient have an instrumental activity of   No   2017



  daily living (IADL) impairment:  









  



  Cognitive Status   Response   Date of Assessment

 

  



  Does the patient have a cognitive impairment:   No   2017



as of this encounter



Plan of Treatment





Not on fileas of this encounter



Results

* THYROID STIMULATING HORMONE-TSH (10/19/2017 10:28 AM)





  



  Component   Value   Ref Range

 

  



  TSH   1.804   0.35 - 5.00 MCU/ML









 



  Specimen   Performing Laboratory

 

 



  Blood   [facility]



   [address] [place] [address]





* COMPREHENSIVE METABOLIC PANEL (10/19/2017 10:28 AM)





  



  Component   Value   Ref Range

 

  



  Sodium   134 (L)   137 - 147 MMOL/L

 

  



  Potassium   3.5   3.5 - 5.1 MMOL/L

 

  



  Chloride   101   98 - 110 MMOL/L

 

  



  Glucose   98   70 - 100 MG/DL

 

  



  Blood Urea Nitrogen   17   7 - 25 MG/DL

 

  



  Creatinine   0.97   0.4 - 1.00 MG/DL

 

  



  Calcium   9.3   8.5 - 10.6 MG/DL

 

  



  Total Protein   8.0   6.0 - 8.0 G/DL

 

  



  Total Bilirubin   0.5   0.3 - 1.2 MG/DL

 

  



  Albumin   4.5   3.5 - 5.0 G/DL

 

  



  Alk Phosphatase   70   25 - 110 U/L

 

  



  AST (SGOT)   26   7 - 40 U/L

 

  



  CO2   29   21 - 30 MMOL/L

 

  



  ALT (SGPT)   23   7 - 56 U/L

 

  



  Anion Gap   4   3 - 12

 

  



  eGFR Non    59 (L)   >60 mL/min



   Comment: 



   The eGFR is not validated for use in drug dosing 



   adjustments.    Continue to use 



   estimated creatinine clearance per dosing 



   reference text.    Please contact the 



   Clinical Pharmacist for questions. 

 

  



  eGFR    >60   >60 mL/min



   Comment: 



   The eGFR is not validated for use in drug dosing 



   adjustments.    Continue to use 



   estimated creatinine clearance per dosing 



   reference text.    Please contact the 



   Clinical Pharmacist for questions. 









 



  Specimen   Performing Laboratory

 

 



  Blood   [facility]



   [address] [place] [address]





* CBC AND DIFF (10/19/2017 10:28 AM)





  



  Component   Value   Ref Range

 

  



  White Blood Cells   5.2   4.5 - 11.0 K/UL

 

  



  RBC   4.56   4.0 - 5.0 M/UL

 

  



  Hemoglobin   14.4   12.0 - 15.0 GM/DL

 

  



  Hematocrit   42.0   36 - 45 %

 

  



  MCV   92.1   80 - 100 FL

 

  



  MCH   31.5   26 - 34 PG

 

  



  MCHC   34.3   32.0 - 36.0 G/DL

 

  



  RDW   16.2 (H)   11 - 15 %

 

  



  Platelet Count   202   150 - 400 K/UL

 

  



  MPV   7.4   7 - 11 FL

 

  



  Neutrophils   53   41 - 77 %

 

  



  Lymphocytes   35   24 - 44 %

 

  



  Monocytes   9   4 - 12 %

 

  



  Eosinophils   2   0 - 5 %

 

  



  Basophils   1   0 - 2 %

 

  



  Absolute Neutrophil Count   2.80   1.8 - 7.0 K/UL

 

  



  Absolute Lymph Count   1.90   1.0 - 4.8 K/UL

 

  



  Absolute Monocyte Count   0.50   0 - 0.80 K/UL

 

  



  Absolute Eosinophil Count   0.10   0 - 0.45 K/UL

 

  



  Absolute Basophil Count   0.00   0 - 0.20 K/UL









 



  Specimen   Performing Laboratory

 

 



  Blood   [facility]



   [address] [place] [address]





* BCR  PCR BLOOD OR BONE MARROW (10/19/2017 10:26 AM)





  



  Component   Value   Ref Range

 

  



  Specimen   BLOOD 

 

  



  Final Diagnosis,BCR   Patient Name: Wendy Dunaway 



   Physician(s):    BRANDO Smith 



   Ordering Facility: [facility] 



   Medical Record #:    [id number] 



   Date of Birth, Sex:    1960, F 



   Collection Date:    10/19/2017 



   Received Date:    10/19/2017 



   Report Date:    10/27/2017 



   CM Reference #:    [id number] 



   Accession #:    [id number] 



   Test Performed 



   BCR-ABL p210 fusion gene transcript analysis 



   Specimen Type 



   Blood 



   Analytical Results 



   The BCR-ABL Mbcr b2a2 and/or b3a2 fusion gene 



   transcripts were NOT 



   DETECTED in this specimen. 



   Interpretation 



   The BCR-ABL Mbcr b2a2 and b3a2 fusion gene 



   transcripts were NOT 



   DETECTED in this specimen. The limit of detection 



   for this assay is 



   an Mbcr copy number of at least 10 and an NCN of 



   0.001%. Analysis of 



   BCR-ABL transcript levels during and/or after 



   therapy reflects the 



   level of disease suppression in patients with CML 



   and some patients 



   with AML, and is effective for monitoring 



   treatment efficiency or, in 



   some cases, the need to reassess therapy. These 



   results should be 



   interpreted only in the context of total clinical 



   information. 



   Therapeutic action should not be taken based 



   solely on these results. 



   Methodology 



   Total RNA from peripheral blood or bone marrow 



   samples was isolated 



   and reverse transcribed with random primers, and 



   the    cDNA products 



   were quantitated by real time quantitative PCR 



   (RQ-PCR). Primers and 



   probes for bcr exons b2 and b3 and abl exon 2 are 



   used in the RQ-PCR 



   to detect the Mbcr transcripts b2a2 and b3a2. A 



   set of plasmid 



   standards containing 10^1 to 10^6 copies each of 



   BCR-ABL and ABL were 



   used to generate standard curves for the 



   quantitation of BCR-ABL and 



   ABL. ABL copy numbers are used as control for the 



   quality and 



   quantity of sample RNA and to normalize the 



   BCR-ABL copy numbers. The 



   test result is expressed as BCR-ABL/ABL ratio, 



   then converted to an 



   international scale using the IS-Low Calibrator. 



   Intended Use 



   The BCR-ABL Mbcr RQ-PCR assay detects and 



   quantitates the BCR-ABL 



   Mbcr b2a2 and b3a2 (p210) fusion gene transcripts 



   which present in 



   95% of chronic myelogenous leukemia (CML) patients 



   and approximately 



   35% of Ph-positive adult acute lymphocytic 



   leukemia (ALL) patients. 



   This test does not detect other BCR-ABL fusions, 



   including e1a2 



   (p190), so should not be used in the diagnostic 



   setting. Serial 



   analysis of the Mbcr b2a2 & b3a2 transcripts can 



   be used for 



   monitoring patient response to CML treatment 



   including tyrosine 



   kinase inhibitors imatinib and dasatinib. 



   References 



   1. Leni et al.    Desirable performance 



   characteristics for BCR-ABL 



   measurement on an        international reporting 



   scale to allow 



   consistent interpretation of individual patient 



   response and 



   comparison of response rates between clinical 



   trials.    Blood 2008 



   112:7246-2165. 



   2. Katina Rosa et al. Guidelines for the 



   measurement of BCR-ABL 1 



   transscripts in chronic myeloid leukaemia. [place] 



   Journal of 



   Haematology, 153, 179-190. 



   3. Nichole Cooper et al. Establishment of the 1st 



   World Health 



   Organization International     Genetic Reference 



   Panel for 



   quantification of BCR-ABL Mrna. Blood 2010. 



   4. Daniel.    Molecular Monitoring 



   of BCR-ABL as a guide 



   to        clinical management of chronic myeloid 



   leukaemia.    Blood. 2006: 



   20 29-41. 



   5. Sage et al. Monitoring of CML patients 



   responding to treatment 



   with tyrosine kinase inhibitors: review and 



   recommendations for 



   harmonizing current methodology for detecting 



   BCR-aBL transcripts and 



   kinase domain mutations for expressing results. 



   Blood 2006:108 (1) 



   28-37. 



   6. Syed et al.    Quantitative Monitoring 



   of BCR-ABL 



   Transcript-Suggestion of a Simplified Approach 



   Considering Inaccuracy 



   of Measurement and Calibration. Leukemia & 



   Lymphoma 2004:45 6 



   2492-8123. 



   Disclaimer 



   This test was performed by the [facility] 



   [facility]. This test was developed and its 



   performance 



   characteristics determined by the [facility] 



   [facility]. It has not been cleared nor approved 



   by the U.S. Food and 



   Drug Administration. The FDA has determined that 



   such clearance is 



   not necessary. This test is used for clinical 



   purposes. It should not 



   be regarded as investigational or for research. 



   This laboratory is 



   certified under the Clinical Laboratory 



   Improvement Amendments of 



   1988 (CLIA-88) as qualified to perform high 



   complexity clinical 



   laboratory testing. [facility] 



   Laboratory, 



   Department of Pathology and Laboratory Medicine, 



   [facility] 



   [facility], [address] 



   [address], [address] [place], [address]. Tel: [phone]; 



   Fax: 



   [phone]. 









 



  Specimen   Performing Laboratory

 

 



  Blood   [facility]





in this encounter



Visit Diagnoses











  Diagnosis

 





  CML (chronic myelocytic leukemia) (HCC)

 





  Chronic myeloid leukemia, without mention of having achieved remission

 





  Hypothyroidism, unspecified type

 





  Other fatigue

 





  Healthcare maintenance

 





  Routine general medical examination at a health care facility



in this encounter

## 2017-12-04 NOTE — XMS REPORT
Encounter Summary

 Created on: 2017



Wendy Dunaway

External Reference #: [id number]

: 1960

Sex: Female



Demographics







 Address  [address] [place]  [address]

 

 Home Phone  [phone]

 

 Preferred Language  English

 

 Marital Status  Unknown

 

 Christian Affiliation  NON

 

 Race  White

 

 Ethnic Group  Not  or 





Author







 Author  [facility]

 

 Organization  [facility]

 

 Address  Unknown

 

 Phone  Unavailable







Support







 Name  Relationship  Address  Phone

 

 , Kelly Siddiqui  [facility]  [address]

[place]  [address]  [phone]

 

 , Razia Dunaway  [facility]  Unknown  [phone]







Care Team Providers







 Care Team Member Name  Role  Phone

 

  PCP  Unavailable







Encounter Details







    



  Date   Type   Department   Care Team   Description

 

    



  10/02/2017   Prep for Case   [facility]   Olag Polk APRN 



    Transplantation-Hepatolog   [address]   [address] 



    [address] [place] [address] 



    [facility]   [phone] 



    TRANSPLANTATION   [phone] (Fax) 



    [place]  



    [address] [phone]  







Social History







    



  Tobacco Use   Types   Packs/Day   Years Used   Date

 

    



  Former Smoker   Cigarettes   1   10 

 

    



  Smokeless Tobacco: Former     Quit:



  User     2003









   



  Alcohol Use   Drinks/Week   oz/Week   Comments

 

   



  No   0 Standard   0.0 



   drinks or  



   equivalent  









 



  Sex Assigned at Birth   Date Recorded

 

 



  Not on file 



as of this encounter



Functional Status







  



  Functional Status   Response   Date of Assessment

 

  



  Does the patient have a hearing impairment:   No   2017

 

  



  Does the patient have a visual impairment:   Yes   2017

 

  



  Does the patient have impaired ambulation:   No   2017

 

  



  Does the patient have an activity of daily living   No   2017



  (ADL) impairment:  

 

  



  Does the patient have an instrumental activity of   No   2017



  daily living (IADL) impairment:  









  



  Cognitive Status   Response   Date of Assessment

 

  



  Does the patient have a cognitive impairment:   No   2017



as of this encounter



Plan of Treatment





Not on fileas of this encounter



Visit Diagnoses

Not on filein this encounter

## 2017-12-18 ENCOUNTER — HOSPITAL ENCOUNTER (EMERGENCY)
Dept: HOSPITAL 75 - ER | Age: 57
Discharge: HOME | End: 2017-12-18
Payer: MEDICARE

## 2017-12-18 VITALS — SYSTOLIC BLOOD PRESSURE: 113 MMHG | DIASTOLIC BLOOD PRESSURE: 7 MMHG

## 2017-12-18 VITALS — HEIGHT: 68 IN | WEIGHT: 159 LBS | BODY MASS INDEX: 24.1 KG/M2

## 2017-12-18 DIAGNOSIS — Z79.01: ICD-10-CM

## 2017-12-18 DIAGNOSIS — I48.91: ICD-10-CM

## 2017-12-18 DIAGNOSIS — Z79.82: ICD-10-CM

## 2017-12-18 DIAGNOSIS — Z90.710: ICD-10-CM

## 2017-12-18 DIAGNOSIS — Z92.21: ICD-10-CM

## 2017-12-18 DIAGNOSIS — C92.11: ICD-10-CM

## 2017-12-18 DIAGNOSIS — K52.9: Primary | ICD-10-CM

## 2017-12-18 DIAGNOSIS — Z87.19: ICD-10-CM

## 2017-12-18 DIAGNOSIS — G62.9: ICD-10-CM

## 2017-12-18 DIAGNOSIS — F41.9: ICD-10-CM

## 2017-12-18 LAB
ALBUMIN SERPL-MCNC: 4.4 GM/DL (ref 3.2–4.5)
ALT SERPL-CCNC: 15 U/L (ref 0–55)
AMYLASE SERPL-CCNC: 150 U/L (ref 25–125)
ANION GAP SERPL CALC-SCNC: 11 MMOL/L (ref 5–14)
AST SERPL-CCNC: 24 U/L (ref 5–34)
BASOPHILS # BLD AUTO: 0 10^3/UL (ref 0–0.1)
BASOPHILS NFR BLD AUTO: 0 % (ref 0–10)
BILIRUB SERPL-MCNC: 0.6 MG/DL (ref 0.1–1)
BILIRUB UR QL STRIP: NEGATIVE
BUN SERPL-MCNC: 16 MG/DL (ref 7–18)
BUN/CREAT SERPL: 19
CALCIUM SERPL-MCNC: 8.8 MG/DL (ref 8.5–10.1)
CHLORIDE SERPL-SCNC: 108 MMOL/L (ref 98–107)
CO2 SERPL-SCNC: 21 MMOL/L (ref 21–32)
CREAT SERPL-MCNC: 0.86 MG/DL (ref 0.6–1.3)
EOSINOPHIL # BLD AUTO: 0 10^3/UL (ref 0–0.3)
EOSINOPHIL NFR BLD AUTO: 1 % (ref 0–10)
ERYTHROCYTE [DISTWIDTH] IN BLOOD BY AUTOMATED COUNT: 15.7 % (ref 10–14.5)
GFR SERPLBLD BASED ON 1.73 SQ M-ARVRAT: > 60 ML/MIN
GLUCOSE SERPL-MCNC: 116 MG/DL (ref 70–105)
KETONES UR QL STRIP: NEGATIVE
LEUKOCYTE ESTERASE UR QL STRIP: (no result)
LIPASE SERPL-CCNC: 25 U/L (ref 8–78)
LYMPHOCYTES # BLD AUTO: 0.6 X 10^3 (ref 1–4)
LYMPHOCYTES NFR BLD AUTO: 12 % (ref 12–44)
MCH RBC QN AUTO: 32 PG (ref 25–34)
MCHC RBC AUTO-ENTMCNC: 34 G/DL (ref 32–36)
MCV RBC AUTO: 92 FL (ref 80–99)
MONOCYTES # BLD AUTO: 0.4 X 10^3 (ref 0–1)
MONOCYTES NFR BLD AUTO: 8 % (ref 0–12)
NEUTROPHILS # BLD AUTO: 3.8 X 10^3 (ref 1.8–7.8)
NEUTROPHILS NFR BLD AUTO: 79 % (ref 42–75)
NITRITE UR QL STRIP: NEGATIVE
PH UR STRIP: 5 [PH] (ref 5–9)
PLATELET # BLD: 196 10^3/UL (ref 130–400)
PMV BLD AUTO: 9.4 FL (ref 7.4–10.4)
POTASSIUM SERPL-SCNC: 3.7 MMOL/L (ref 3.6–5)
PROT SERPL-MCNC: 8.3 GM/DL (ref 6.4–8.2)
PROT UR QL STRIP: (no result)
RBC # BLD AUTO: 5 10^6/UL (ref 4.35–5.85)
SODIUM SERPL-SCNC: 140 MMOL/L (ref 135–145)
SP GR UR STRIP: 1.02 (ref 1.02–1.02)
SQUAMOUS #/AREA URNS HPF: (no result) /HPF
UROBILINOGEN UR-MCNC: NORMAL MG/DL
WBC # BLD AUTO: 4.8 10^3/UL (ref 4.3–11)
WBC #/AREA URNS HPF: (no result) /HPF

## 2017-12-18 PROCEDURE — 96374 THER/PROPH/DIAG INJ IV PUSH: CPT

## 2017-12-18 PROCEDURE — 81000 URINALYSIS NONAUTO W/SCOPE: CPT

## 2017-12-18 PROCEDURE — 96375 TX/PRO/DX INJ NEW DRUG ADDON: CPT

## 2017-12-18 PROCEDURE — 80053 COMPREHEN METABOLIC PANEL: CPT

## 2017-12-18 PROCEDURE — 83690 ASSAY OF LIPASE: CPT

## 2017-12-18 PROCEDURE — 96361 HYDRATE IV INFUSION ADD-ON: CPT

## 2017-12-18 PROCEDURE — 85025 COMPLETE CBC W/AUTO DIFF WBC: CPT

## 2017-12-18 PROCEDURE — 36415 COLL VENOUS BLD VENIPUNCTURE: CPT

## 2017-12-18 PROCEDURE — 82150 ASSAY OF AMYLASE: CPT

## 2017-12-18 NOTE — XMS REPORT
Continuity of Care Document

 Created on: 2017



DARIN BEE

External Reference #: [id number]

: 1960

Sex: Female



Demographics







 Address  [address] [place]  [address]

 

 Home Phone  [phone]

 

 Preferred Language  Unknown

 

 Marital Status  Unknown

 

 Yarsani Affiliation  Unknown

 

 Race  Unknown

 

 Ethnic Group  Unknown





Author







 Author  [facility]

 

 Organization  [facility]

 

 Address  Unknown

 

 Phone  Unavailable







Care Team Providers







 Care Team Member Name  Role  Phone

 

 [facility]  Unavailable  Unavailable



                                    



Problems

                                                                



Medications

                                                                



Allergies, Adverse Reactions, Alerts

                                                        



Immunizations

                                                                



Results

                                                                



Vital Signs

                                                                                



Encounters

                                                        



Procedures

                                                                



Plan of Care

                                                                



Social History

                                                                        



Assessment and Plan

                                                                



Family History

                    





 Value                          Date                          Source           
         



                                                        



Advance Directives

                    





 Order Name                          Results                          Value    
                      Date                          Source

## 2017-12-18 NOTE — XMS REPORT
Encounter Summary

 Created on: 2017



GumeFinay ALE

External Reference #: [id number]

: 1960

Sex: Female



Demographics







 Address  [address] [place]  [address]

 

 Home Phone  [phone]

 

 Preferred Language  English

 

 Marital Status  Unknown

 

 Christianity Affiliation  NON

 

 Race  White

 

 Ethnic Group  Not  or 





Author







 Author  [facility]

 

 Organization  [facility]

 

 Address  Unknown

 

 Phone  Unavailable







Support







 Name  Relationship  Address  Phone

 

 , Kelly Siddiqui  [facility]  [address]

[place]  [address]  [phone]

 

 , Razia Dunaway  [facility]  Unknown  [phone]







Care Team Providers







 Care Team Member Name  Role  Phone

 

  PCP  Unavailable







Encounter Details







    



  Date   Type   Department   Care Team   Description

 

    



  10/02/2017   Orders Only   [facility]   Gillian Chand MD
   CML (chronic myelocytic



    [facility]   [address]   leukemia) (HCC) (
Primary



    [address]   [address]   Dx);Hypothyroidism,



    [place] [address]   [place] [address]   unspecified type



    [phone] (Fax) 







Social History







    



  Tobacco Use   Types   Packs/Day   Years Used   Date

 

    



  Former Smoker   Cigarettes   1   10 

 

    



  Smokeless Tobacco: Former     Quit:



  User     2003









   



  Alcohol Use   Drinks/Week   oz/Week   Comments

 

   



  No   0 Standard   0.0 



   drinks or  



   equivalent  









 



  Sex Assigned at Birth   Date Recorded

 

 



  Not on file 



as of this encounter



Functional Status







  



  Functional Status   Response   Date of Assessment

 

  



  Does the patient have a hearing impairment:   No   2017

 

  



  Does the patient have a visual impairment:   Yes   2017

 

  



  Does the patient have impaired ambulation:   No   2017

 

  



  Does the patient have an activity of daily living   No   2017



  (ADL) impairment:  

 

  



  Does the patient have an instrumental activity of   No   2017



  daily living (IADL) impairment:  









  



  Cognitive Status   Response   Date of Assessment

 

  



  Does the patient have a cognitive impairment:   No   2017



as of this encounter



Plan of Treatment





Not on fileas of this encounter



Results

* THYROID STIMULATING HORMONE-TSH (10/19/2017 10:28 AM)





  



  Component   Value   Ref Range

 

  



  TSH   1.804   0.35 - 5.00 MCU/ML









 



  Specimen   Performing Laboratory

 

 



  Blood   [facility]



   [address]



   [place] [address]





in this encounter



Visit Diagnoses











  Diagnosis

 





  CML (chronic myelocytic leukemia) (HCC) - Primary

 





  Chronic myeloid leukemia, without mention of having achieved remission

 





  Hypothyroidism, unspecified type



in this encounter

## 2017-12-18 NOTE — XMS REPORT
Encounter Summary

 Created on: 2017



GumeFinaani AGUILERA

External Reference #: [id number]

: 1960

Sex: Female



Demographics







 Address  [address] [place]  [address]

 

 Home Phone  [phone]

 

 Preferred Language  English

 

 Marital Status  Unknown

 

 Moravian Affiliation  NON

 

 Race  White

 

 Ethnic Group  Not  or 





Author







 Author  [facility]

 

 Organization  [facility]

 

 Address  Unknown

 

 Phone  Unavailable







Support







 Name  Relationship  Address  Phone

 

 , Kelly Siddiqui  ECON  [address]

[place]  [address]  [phone]

 

 , Razia Dunaway  ECON  Unknown  [phone]







Care Team Providers







 Care Team Member Name  Role  Phone

 

  PCP  Unavailable







Encounter Details







    



  Date   Type   Department   Care Team   Description

 

    



  10/19/2017   Nurse Only   [facility]   Gillian Chand MD 
  CML (chronic myelocytic



    [facility]   [address]   leukemia)



    [address]   [address]   (HCC);Hypothyroidism,



    [place] [address]   [place] [address]   unspecified type;Other



    [phone]   fatigue ;Healthcare



     [phone] (Fax)   maintenance







Social History







    



  Tobacco Use   Types   Packs/Day   Years Used   Date

 

    



  Former Smoker   Cigarettes   1   10 

 

    



  Smokeless Tobacco: Former     Quit:



  User     2003









   



  Alcohol Use   Drinks/Week   oz/Week   Comments

 

   



  No   0 Standard   0.0 



   drinks or  



   equivalent  









 



  Sex Assigned at Birth   Date Recorded

 

 



  Not on file 



as of this encounter



Functional Status







  



  Functional Status   Response   Date of Assessment

 

  



  Does the patient have a hearing impairment:   No   2017

 

  



  Does the patient have a visual impairment:   Yes   2017

 

  



  Does the patient have impaired ambulation:   No   2017

 

  



  Does the patient have an activity of daily living   No   2017



  (ADL) impairment:  

 

  



  Does the patient have an instrumental activity of   No   2017



  daily living (IADL) impairment:  









  



  Cognitive Status   Response   Date of Assessment

 

  



  Does the patient have a cognitive impairment:   No   2017



as of this encounter



Plan of Treatment





Not on fileas of this encounter



Results

* THYROID STIMULATING HORMONE-TSH (10/19/2017 10:28 AM)





  



  Component   Value   Ref Range

 

  



  TSH   1.804   0.35 - 5.00 MCU/ML









 



  Specimen   Performing Laboratory

 

 



  Blood   [facility]



   [address] [place] [address]





* COMPREHENSIVE METABOLIC PANEL (10/19/2017 10:28 AM)





  



  Component   Value   Ref Range

 

  



  Sodium   134 (L)   137 - 147 MMOL/L

 

  



  Potassium   3.5   3.5 - 5.1 MMOL/L

 

  



  Chloride   101   98 - 110 MMOL/L

 

  



  Glucose   98   70 - 100 MG/DL

 

  



  Blood Urea Nitrogen   17   7 - 25 MG/DL

 

  



  Creatinine   0.97   0.4 - 1.00 MG/DL

 

  



  Calcium   9.3   8.5 - 10.6 MG/DL

 

  



  Total Protein   8.0   6.0 - 8.0 G/DL

 

  



  Total Bilirubin   0.5   0.3 - 1.2 MG/DL

 

  



  Albumin   4.5   3.5 - 5.0 G/DL

 

  



  Alk Phosphatase   70   25 - 110 U/L

 

  



  AST (SGOT)   26   7 - 40 U/L

 

  



  CO2   29   21 - 30 MMOL/L

 

  



  ALT (SGPT)   23   7 - 56 U/L

 

  



  Anion Gap   4   3 - 12

 

  



  eGFR Non    59 (L)   >60 mL/min



   Comment: 



   The eGFR is not validated for use in drug dosing 



   adjustments.    Continue to use 



   estimated creatinine clearance per dosing 



   reference text.    Please contact the 



   Clinical Pharmacist for questions. 

 

  



  eGFR    >60   >60 mL/min



   Comment: 



   The eGFR is not validated for use in drug dosing 



   adjustments.    Continue to use 



   estimated creatinine clearance per dosing 



   reference text.    Please contact the 



   Clinical Pharmacist for questions. 









 



  Specimen   Performing Laboratory

 

 



  Blood   [facility]



   [address] [place] [address]





* CBC AND DIFF (10/19/2017 10:28 AM)





  



  Component   Value   Ref Range

 

  



  White Blood Cells   5.2   4.5 - 11.0 K/UL

 

  



  RBC   4.56   4.0 - 5.0 M/UL

 

  



  Hemoglobin   14.4   12.0 - 15.0 GM/DL

 

  



  Hematocrit   42.0   36 - 45 %

 

  



  MCV   92.1   80 - 100 FL

 

  



  MCH   31.5   26 - 34 PG

 

  



  MCHC   34.3   32.0 - 36.0 G/DL

 

  



  RDW   16.2 (H)   11 - 15 %

 

  



  Platelet Count   202   150 - 400 K/UL

 

  



  MPV   7.4   7 - 11 FL

 

  



  Neutrophils   53   41 - 77 %

 

  



  Lymphocytes   35   24 - 44 %

 

  



  Monocytes   9   4 - 12 %

 

  



  Eosinophils   2   0 - 5 %

 

  



  Basophils   1   0 - 2 %

 

  



  Absolute Neutrophil Count   2.80   1.8 - 7.0 K/UL

 

  



  Absolute Lymph Count   1.90   1.0 - 4.8 K/UL

 

  



  Absolute Monocyte Count   0.50   0 - 0.80 K/UL

 

  



  Absolute Eosinophil Count   0.10   0 - 0.45 K/UL

 

  



  Absolute Basophil Count   0.00   0 - 0.20 K/UL









 



  Specimen   Performing Laboratory

 

 



  Blood   [facility]



   [address] [place] [address]





* BCR  PCR BLOOD OR BONE MARROW (10/19/2017 10:26 AM)





  



  Component   Value   Ref Range

 

  



  Specimen   BLOOD 

 

  



  Final Diagnosis,BCR   Patient Name: Wendy Dunaway 



   Physician(s):    BRANDO Smith 



   Ordering Facility: [facility] 



   Medical Record #:    [id number] 



   Date of Birth, Sex:    1960, F 



   Collection Date:    10/19/2017 



   Received Date:    10/19/2017 



   Report Date:    10/27/2017 



   CM Reference #:    [id number] 



   Accession #:    [id number] 



   Test Performed 



   BCR-ABL p210 fusion gene transcript analysis 



   Specimen Type 



   Blood 



   Analytical Results 



   The BCR-ABL Mbcr b2a2 and/or b3a2 fusion gene 



   transcripts were NOT 



   DETECTED in this specimen. 



   Interpretation 



   The BCR-ABL Mbcr b2a2 and b3a2 fusion gene 



   transcripts were NOT 



   DETECTED in this specimen. The limit of detection 



   for this assay is 



   an Mbcr copy number of at least 10 and an NCN of 



   0.001%. Analysis of 



   BCR-ABL transcript levels during and/or after 



   therapy reflects the 



   level of disease suppression in patients with CML 



   and some patients 



   with AML, and is effective for monitoring 



   treatment efficiency or, in 



   some cases, the need to reassess therapy. These 



   results should be 



   interpreted only in the context of total clinical 



   information. 



   Therapeutic action should not be taken based 



   solely on these results. 



   Methodology 



   Total RNA from peripheral blood or bone marrow 



   samples was isolated 



   and reverse transcribed with random primers, and 



   the    cDNA products 



   were quantitated by real time quantitative PCR 



   (RQ-PCR). Primers and 



   probes for bcr exons b2 and b3 and abl exon 2 are 



   used in the RQ-PCR 



   to detect the Mbcr transcripts b2a2 and b3a2. A 



   set of plasmid 



   standards containing 10^1 to 10^6 copies each of 



   BCR-ABL and ABL were 



   used to generate standard curves for the 



   quantitation of BCR-ABL and 



   ABL. ABL copy numbers are used as control for the 



   quality and 



   quantity of sample RNA and to normalize the 



   BCR-ABL copy numbers. The 



   test result is expressed as BCR-ABL/ABL ratio, 



   then converted to an 



   international scale using the IS-Low Calibrator. 



   Intended Use 



   The BCR-ABL Mbcr RQ-PCR assay detects and 



   quantitates the BCR-ABL 



   Mbcr b2a2 and b3a2 (p210) fusion gene transcripts 



   which present in 



   95% of chronic myelogenous leukemia (CML) patients 



   and approximately 



   35% of Ph-positive adult acute lymphocytic 



   leukemia (ALL) patients. 



   This test does not detect other BCR-ABL fusions, 



   including e1a2 



   (p190), so should not be used in the diagnostic 



   setting. Serial 



   analysis of the Mbcr b2a2 & b3a2 transcripts can 



   be used for 



   monitoring patient response to CML treatment 



   including tyrosine 



   kinase inhibitors imatinib and dasatinib. 



   References 



   1. Leni et al.    Desirable performance 



   characteristics for BCR-ABL 



   measurement on an        international reporting 



   scale to allow 



   consistent interpretation of individual patient 



   response and 



   comparison of response rates between clinical 



   trials.    Blood 2008 



   112:8969-2911. 



   2. Katina Rosa et al. Guidelines for the 



   measurement of BCR-ABL 1 



   transscripts in chronic myeloid leukaemia. [place] 



   Journal of 



   Haematology, 153, 179-190. 



   3. Nichole Cooper et al. Establishment of the 1st 



   World Health 



   Organization International     Genetic Reference 



   Panel for 



   quantification of BCR-ABL Mrna. Blood 2010. 



   4. Daniel.    Molecular Monitoring 



   of BCR-ABL as a guide 



   to        clinical management of chronic myeloid 



   leukaemia.    Blood. 2006: 



   20 29-41. 



   5. Sage et al. Monitoring of CML patients 



   responding to treatment 



   with tyrosine kinase inhibitors: review and 



   recommendations for 



   harmonizing current methodology for detecting 



   BCR-aBL transcripts and 



   kinase domain mutations for expressing results. 



   Blood 2006:108 (1) 



   28-37. 



   6. Syed et al.    Quantitative Monitoring 



   of BCR-ABL 



   Transcript-Suggestion of a Simplified Approach 



   Considering Inaccuracy 



   of Measurement and Calibration. Leukemia & 



   Lymphoma 2004:45 6 



   1739-3232. 



   Disclaimer 



   This test was performed by the [facility] 



   [facility]. This test was developed and its 



   performance 



   characteristics determined by the [facility] 



   [facility]. It has not been cleared nor approved 



   by the U.S. Food and 



   Drug Administration. The FDA has determined that 



   such clearance is 



   not necessary. This test is used for clinical 



   purposes. It should not 



   be regarded as investigational or for research. 



   This laboratory is 



   certified under the Clinical Laboratory 



   Improvement Amendments of 



   1988 (CLIA-88) as qualified to perform high 



   complexity clinical 



   laboratory testing. [facility] 



   Laboratory, 



   Department of Pathology and Laboratory Medicine, 



   [facility] 



   Cancer Center, [address] 



   [address] [place], [address]. Tel: [phone]; 



   Fax: 



   [phone]. 









 



  Specimen   Performing Laboratory

 

 



  Blood   [facility]





in this encounter



Visit Diagnoses











  Diagnosis

 





  CML (chronic myelocytic leukemia) (HCC)

 





  Chronic myeloid leukemia, without mention of having achieved remission

 





  Hypothyroidism, unspecified type

 





  Other fatigue

 





  Healthcare maintenance

 





  Routine general medical examination at a health care facility



in this encounter

## 2017-12-18 NOTE — XMS REPORT
Encounter Summary

 Created on: 2017



Gume Wendy AGUILERA

External Reference #: [id number]

: 1960

Sex: Female



Demographics







 Address  [address] [place]  [address]

 

 Home Phone  [phone]

 

 Preferred Language  English

 

 Marital Status  Unknown

 

 Amish Affiliation  NON

 

 Race  White

 

 Ethnic Group  Not  or 





Author







 Author  [facility]

 

 Organization  [facility]

 

 Address  Unknown

 

 Phone  Unavailable







Support







 Name  Relationship  Address  Phone

 

 , Kelly Siddiqui  ECON  [address]

[place]  [address]  [phone]

 

 , Razia Dunaway  ECON  Unknown  [phone]







Care Team Providers







 Care Team Member Name  Role  Phone

 

  PCP  Unavailable







Encounter Details







    



  Date   Type   Department   Care Team   Description

 

    



  10/16/2017   Prep for Case   [facility] for   Olga Polk APRN 



    Transplantation-Hepatolog   [address]   [address] 



    [address] [place] [address] 



    [facility]   [phone] 



    TRANSPLANTATION   [phone] (Fax) 



    [place], [place]  



    [address] [phone]  







Social History







    



  Tobacco Use   Types   Packs/Day   Years Used   Date

 

    



  Former Smoker   Cigarettes   1   10 

 

    



  Smokeless Tobacco: Former     Quit:



  User     2003









   



  Alcohol Use   Drinks/Week   oz/Week   Comments

 

   



  No   0 Standard   0.0 



   drinks or  



   equivalent  









 



  Sex Assigned at Birth   Date Recorded

 

 



  Not on file 



as of this encounter



Functional Status







  



  Functional Status   Response   Date of Assessment

 

  



  Does the patient have a hearing impairment:   No   2017

 

  



  Does the patient have a visual impairment:   Yes   2017

 

  



  Does the patient have impaired ambulation:   No   2017

 

  



  Does the patient have an activity of daily living   No   2017



  (ADL) impairment:  

 

  



  Does the patient have an instrumental activity of   No   2017



  daily living (IADL) impairment:  









  



  Cognitive Status   Response   Date of Assessment

 

  



  Does the patient have a cognitive impairment:   No   2017



as of this encounter



Plan of Treatment





Not on fileas of this encounter



Visit Diagnoses

Not on filein this encounter

## 2017-12-18 NOTE — XMS REPORT
Encounter Summary

 Created on: 2017



Gume Wendy AGUILERA

External Reference #: [id number]

: 1960

Sex: Female



Demographics







 Address  [address] [place]  [address]

 

 Home Phone  [phone]

 

 Preferred Language  English

 

 Marital Status  Unknown

 

 Zoroastrian Affiliation  NON

 

 Race  White

 

 Ethnic Group  Not  or 





Author







 Author  [facility]

 

 Organization  [facility]

 

 Address  Unknown

 

 Phone  Unavailable







Support







 Name  Relationship  Address  Phone

 

 , Kelly Siddiqui  ECON  [address]

[place]  [address]  [phone]

 

 , Razia Dunaway  ECON  Unknown  [phone]







Care Team Providers







 Care Team Member Name  Role  Phone

 

  PCP  Unavailable







Encounter Details







    



  Date   Type   Department   Care Team   Description

 

    



  10/02/2017   Orders Only   [facility]   Lashon Méndez, APRN-
NP   CML (chronic myelocytic



    Cancer Center [facility]   [address]   leukemia) (HCC) (
Primary



    [address] [place] [address]   Dx)



    [place], [place]    [phone] (Fax) 







Social History







    



  Tobacco Use   Types   Packs/Day   Years Used   Date

 

    



  Former Smoker   Cigarettes   1   10 

 

    



  Smokeless Tobacco: Former     Quit:



  User     2003









   



  Alcohol Use   Drinks/Week   oz/Week   Comments

 

   



  No   0 Standard   0.0 



   drinks or  



   equivalent  









 



  Sex Assigned at Birth   Date Recorded

 

 



  Not on file 



as of this encounter



Functional Status







  



  Functional Status   Response   Date of Assessment

 

  



  Does the patient have a hearing impairment:   No   2017

 

  



  Does the patient have a visual impairment:   Yes   2017

 

  



  Does the patient have impaired ambulation:   No   2017

 

  



  Does the patient have an activity of daily living   No   2017



  (ADL) impairment:  

 

  



  Does the patient have an instrumental activity of   No   2017



  daily living (IADL) impairment:  









  



  Cognitive Status   Response   Date of Assessment

 

  



  Does the patient have a cognitive impairment:   No   2017



as of this encounter



Plan of Treatment





Not on fileas of this encounter



Visit Diagnoses











  Diagnosis

 





  CML (chronic myelocytic leukemia) (HCC) - Primary

 





  Chronic myeloid leukemia, without mention of having achieved remission



in this encounter

## 2017-12-18 NOTE — XMS REPORT
Encounter Summary

 Created on: 2017



GumeFinay ALE

External Reference #: [id number]

: 1960

Sex: Female



Demographics







 Address  [address] [place]  [address]

 

 Home Phone  [phone]

 

 Preferred Language  English

 

 Marital Status  Unknown

 

 Jewish Affiliation  NON

 

 Race  White

 

 Ethnic Group  Not  or 





Author







 Author  [facility]

 

 Organization  [facility]

 

 Address  Unknown

 

 Phone  Unavailable







Support







 Name  Relationship  Address  Phone

 

 , Kelly Siddiqui  ECON  [address]

[place]  [address]  [phone]

 

 , Razia Dunaway  ECON  Unknown  [phone]







Care Team Providers







 Care Team Member Name  Role  Phone

 

  PCP  Unavailable







Encounter Details







    



  Date   Type   Department   Care Team   Description

 

    



  10/02/2017   Orders Only   [facility]   Olga Polk APRN 



    Transplantation-Hepatolog   [address] [facility] [address]   [address] 



    [address] [place] [address] 



    [facility]   [phone] 



    TRANSPLANTATION   [phone] (Fax) 



    [place], [place]  



    [address] [phone]  







Social History







    



  Tobacco Use   Types   Packs/Day   Years Used   Date

 

    



  Former Smoker   Cigarettes   1   10 

 

    



  Smokeless Tobacco: Former     Quit:



  User     2003









   



  Alcohol Use   Drinks/Week   oz/Week   Comments

 

   



  No   0 Standard   0.0 



   drinks or  



   equivalent  









 



  Sex Assigned at Birth   Date Recorded

 

 



  Not on file 



as of this encounter



Functional Status







  



  Functional Status   Response   Date of Assessment

 

  



  Does the patient have a hearing impairment:   No   2017

 

  



  Does the patient have a visual impairment:   Yes   2017

 

  



  Does the patient have impaired ambulation:   No   2017

 

  



  Does the patient have an activity of daily living   No   2017



  (ADL) impairment:  

 

  



  Does the patient have an instrumental activity of   No   2017



  daily living (IADL) impairment:  









  



  Cognitive Status   Response   Date of Assessment

 

  



  Does the patient have a cognitive impairment:   No   2017



as of this encounter



Plan of Treatment





Not on fileas of this encounter



Visit Diagnoses

Not on filein this encounter

## 2017-12-18 NOTE — XMS REPORT
Encounter Summary

 Created on: 2017



GumeFinay ALE

External Reference #: [id number]

: 1960

Sex: Female



Demographics







 Address  [address] [place]  [address]

 

 Home Phone  [phone]

 

 Preferred Language  English

 

 Marital Status  Unknown

 

 Baptist Affiliation  NON

 

 Race  White

 

 Ethnic Group  Not  or 





Author







 Author  [facility]

 

 Organization  [facility]

 

 Address  Unknown

 

 Phone  Unavailable







Support







 Name  Relationship  Address  Phone

 

 , Kelly Siddiqui  ECON  [address]

[place]  [address]  [phone]

 

 , Razia Dunaway  ECON  Unknown  [phone]







Care Team Providers







 Care Team Member Name  Role  Phone

 

  PCP  Unavailable







Reason for Visit

* 





 



  Reason   Comments

 

 



  Other   call to NC









Encounter Details







    



  Date   Type   Department   Care Team   Description

 

    



  10/13/2017   Telephone   [facility]   Olga Polk APRN   Other (call to NC)



    Transplantation-Hepatolog   [address] [facility]   [address] 



    [address] [place] [address] 



    [facility]   [phone] 



    TRANSPLANTATION   [phone] (Fax) 



    [place], [place]  



    [address] [phone]  







Social History







    



  Tobacco Use   Types   Packs/Day   Years Used   Date

 

    



  Former Smoker   Cigarettes   1   10 

 

    



  Smokeless Tobacco: Former     Quit:



  User     2003









   



  Alcohol Use   Drinks/Week   oz/Week   Comments

 

   



  No   0 Standard   0.0 



   drinks or  



   equivalent  









 



  Sex Assigned at Birth   Date Recorded

 

 



  Not on file 



as of this encounter



Functional Status







  



  Functional Status   Response   Date of Assessment

 

  



  Does the patient have a hearing impairment:   No   2017

 

  



  Does the patient have a visual impairment:   Yes   2017

 

  



  Does the patient have impaired ambulation:   No   2017

 

  



  Does the patient have an activity of daily living   No   2017



  (ADL) impairment:  

 

  



  Does the patient have an instrumental activity of   No   2017



  daily living (IADL) impairment:  









  



  Cognitive Status   Response   Date of Assessment

 

  



  Does the patient have a cognitive impairment:   No   2017



as of this encounter



Miscellaneous Notes

* Telephone Encounter - Joanne Whitfield LPN - 10/16/2017  9:34 AM CDT



Returned pts phone call. Placed order for EGD/Colonoscopy and tasked to Maura 
to schedule. Placed order for colonoscopy prep to local pharmacy. Call 
concluded.





in this encounter



Plan of Treatment





Not on fileas of this encounter



Visit Diagnoses

Not on filein this encounter

## 2017-12-18 NOTE — XMS REPORT
Encounter Summary

 Created on: 2017



Wendy Dunaway

External Reference #: [id number]

: 1960

Sex: Female



Demographics







 Address  [address] [place]  [address]

 

 Home Phone  [phone]

 

 Preferred Language  English

 

 Marital Status  Unknown

 

 Oriental orthodox Affiliation  NON

 

 Race  White

 

 Ethnic Group  Not  or 





Author







 Author  [facility]

 

 Organization  [facility]

 

 Address  Unknown

 

 Phone  Unavailable







Support







 Name  Relationship  Address  Phone

 

 , Kelly Siddiqui  ECON  [address] [place] [address]  [address]  [phone]

 

 , Razia Dunaway  EC[employer]  Unknown  [phone]







Care Team Providers







 Care Team Member Name  Role  Phone

 

  PCP  Unavailable







Reason for Visit

* 





 



  Reason   Comments

 

 



  Heme/Onc Care 









Encounter Details







    



  Date   Type   Department   Care Team   Description

 

    



  10/19/2017   Office Visit   [facility]   Gillian Chand MD   CML (chronic myelocytic



    [facility] [place] [facility]   [address]   [address]ukemia) ([facility]);CML



    [address]   [address]   (chronic myelocytic



    [place] [address]   [place] [address]   leukemia) ([facility])



    [phone] (Fax) 







Social History







    



  Tobacco Use   Types   Packs/Day   Years Used   Date

 

    



  Former Smoker   Cigarettes   1   10 

 

    



  Smokeless Tobacco: Former     Quit:



  User     2003









   



  Alcohol Use   Drinks/Week   oz/Week   Comments

 

   



  No   0 Standard   0.0 



   drinks or  



   equivalent  









 



  Sex Assigned at Birth   Date Recorded

 

 



  Not on file 



as of this encounter



Last Filed Vital Signs







  



  Vital Sign   Reading   Time Taken

 

  



  Blood Pressure   127/89   10/19/2017 10:51 AM CDT

 

  



  Pulse   78   10/19/2017 10:51 AM CDT

 

  



  Temperature   36.8   C (98.2   F)   10/19/2017 10:51 AM CDT

 

  



  Respiratory Rate   12   10/19/2017 10:51 AM CDT

 

  



  Oxygen Saturation   100%   10/19/2017 10:51 AM CDT

 

  



  Inhaled Oxygen   -   -



  Concentration  

 

  



  Weight   71 kg (156 lb 9.6 oz)   10/19/2017 10:51 AM CDT

 

  



  Height   174 cm (5' 8.5")   10/19/2017 10:51 AM CDT

 

  



  Body Mass Index   23.46   10/19/2017 10:51 AM CDT



in this encounter



Functional Status







  



  Functional Status   Response   Date of Assessment

 

  



  Does the patient have a hearing impairment:   No   2017

 

  



  Does the patient have a visual impairment:   Yes   2017

 

  



  Does the patient have impaired ambulation:   No   2017

 

  



  Does the patient have an activity of daily living   No   2017



  (ADL) impairment:  

 

  



  Does the patient have an instrumental activity of   No   2017



  daily living (IADL) impairment:  









  



  Cognitive Status   Response   Date of Assessment

 

  



  Does the patient have a cognitive impairment:   No   2017



as of this encounter



Progress Notes

* Gillian Chand MD - 10/19/2017 11:20 AM CDT



Formatting of this note may be different from the original.

Date of Service: 10/19/2017



Subjective:        

 

Reason for Visit:

Heme/Onc Care



Wendy Dunaway is a 57 y.o. female.



History of Present Illness



Ms. Dunaway is a 57-year-old woman with history of hepatitis C virus infection 
and peripheral neuropathy. She was diagnosed with chronic phase CML in 2012 
after presenting with leucocytosis and extrema thrombocytosis. Molecular 
testing was positive for p210 BCR-ABL translocation. She has been started on 
Gleevec. She has been on a dose between 400 and 800 over the past several 
months due to liver disease and dosing changes were made based on her response 
as well as her abnormal liver function Gleevec was discontinued in mid 2013 
as reported by the patient that her CML has progressed. Her oncologist, Dr. Stewart discontinued Gleevec and started the patient on nilotinib. As per the 
patient, the patient took nilotinib for 1 day and had severe exacerbation for 
her neuropathy, where she had to stop therapy. She has been off any TKI for the 
several weeks. She was started on Dasatinib and fist dose administered on March 
15. 



She has completed CMR on dasatinib. 



Interval History:



Patient  is here today for follow up of CML.

She is on dasatinib 40 mg by mouth once a day and has been compliant with with 
little side effects.  She unfortunately has had recurrent pleural effusions and 
more recently had likely secondary to atrial fibrillation for which she was 
started on anticoagulation and on rhythm control.  His most recent chest x-ray 
at her local hospital showed relapsed pleural effusion of the left side and she 
will be undergoing thoracentesis locally.

She has no other side effects to dasatinib therapy.



 

Review of Systems 

Constitutional: Negative for activity change, appetite change, chills, 
diaphoresis, fatigue, fever and unexpected weight change. 

HENT: Negative for congestion, facial swelling and sinus pressure.  

Respiratory: Positive for shortness of breath. Negative for cough, chest 
tightness and wheezing.  

Cardiovascular: Positive for palpitations. Negative for chest pain and leg 
swelling. 

Gastrointestinal: Negative for abdominal distention, abdominal pain, anal 
bleeding, blood in stool, constipation, diarrhea, nausea and vomiting. 

Genitourinary: Negative for hematuria. 

Musculoskeletal: Negative for arthralgias and myalgias. 

Skin: Negative for rash. 

Neurological: Positive for numbness (chronic,stable). 

Hematological: Negative for adenopathy. Does not bruise/bleed easily. 

Psychiatric/Behavioral: The patient is not nervous/anxious.  



Objective:       

 CARTIA  mg capsule TK 1 C PO D 

 dasatinib(+) (SPRYCEL) 20 mg tablet Take 2 tablets by mouth daily. 

 gabapentin (NEURONTIN) 600 mg tablet Take 1 tab every morning, 2 tabs in 
the afternoon, 2 tabs in the evening, and 1 tab at bedtime 

 omeprazole DR(+) (PRILOSEC) 40 mg capsule TK 1 C PO QAM 30 MINUTES BEFORE 
BREAKFAST 

 PEG 3350-Electrolytes (GOLYTELY) oral solution Mix with water as directed 
on package. Drink 240ml (8oz) every 10 minutes until gone. 

 potassium chloride (K-DUR) 10 mEq tablet TK 1 T PO D 

 QUEtiapine (SEROQUEL) 100 mg tablet Take 1/2 tab every morning, 1 tab at 1 
pm, 1 tab at 5 pm and 1/2 tab at bedtime. 

 triamcinolone acetonide (KENALOG) 0.1 % topical cream JARRED TID TO LEGS 

 warfarin (COUMADIN) 1 mg tablet TK 1 T PO  ONCE D 

 warfarin (COUMADIN) 5 mg tablet TK 1 T PO D 



Vitals: 

 10/19/17 1051 

BP: 127/89 

Pulse: 78 

Resp: 12 

Temp: 36.8 C (98.2 F) 

TempSrc: Oral 

SpO2: 100% 

Weight: 71 kg (156 lb 9.6 oz) 

Height: 174 cm (68.5") 



Body mass index is 23.46 kg/(m^2). 



Pain Score: Zero

 



Pain Addressed:  N/A



Patient Evaluated for a Clinical Trial: Patient not eligible for a treatment 
trial (including not needing treatment, needs palliative care, in remission). 



E[employer] Cooperative Oncology Group performance status is 0, Fully active, able 
to carry on all pre-disease performance without restriction..



 Physical Exam 

Constitutional: She is oriented to person, place, and time. She appears well-
developed and well-nourished. 

HENT: 

Head: Normocephalic and atraumatic. 

Mouth/Throat: Oropharynx is clear and moist. 

Eyes: Conjunctivae and EOM are normal. 

Neck: Normal range of motion. Neck supple. 

Cardiovascular: Normal rate, regular rhythm and normal heart sounds.  

Pulmonary/Chest: Breath sounds normal. No respiratory distress. 

Decreased air entry, left chest.  

Abdominal: Soft. She exhibits no distension. There is no tenderness. There is 
no guarding. 

Musculoskeletal: Normal range of motion. She exhibits no edema. 

Lymphadenopathy: 

  She has no cervical adenopathy. 

Neurological: She is alert and oriented to person, place, and time. 

Skin: Skin is warm and dry. No rash noted. No erythema. No pallor. 

Psychiatric: She has a normal mood and affect. Her behavior is normal. Judgment 
and thought content normal. 

Vitals reviewed.



 



 

Assessment and Plan:



Problem 

Cml (Chronic Myelocytic Leukemia) ([facility]) 

 Diagnosis: CM chronic phase

Date of Diagnosis: 2012

Treatment:

Imatinib 400-800 mg PO daily  to 2013: Best response not available, 
patient progressed 2013 ( Cytogenetic progression/ relapse) 

Nilotinib 200 MG PO BID: unable to tolerate, exacerbated neuropathy

Dasatinib 100 mg PO daily: started March 15, 2013

Response: CCR, CMR

Dasatinib reduced to 70 mg PO daily: Oct , 2013

7/30/15-Continue Dasatinib 70 mg po daily.

May 2016: Dasatinib 50 mg po daily.

Oct   2016: Dasatinib 40 mg po daily. CMR

Oct   2017: Dasatinib 20 mg po daily.  Reduction due to recurrent pleural 
effusions 



Ms. Dunaway is a 57-year-old woman with a history of hep C infection and 
unexplained neuropathy.  She also has significant dysphagia and odynophagia and 
has significant difficulty in observing oral medication tablets.  



She was diagnosed with chronic phase CML in 2012.  She has been under the 
care of Dr. Petersen.  There has not been any outside records available for us 
to review today and most of the information available has been provided by the 
patient verbally.  Her treatment hostory is as above. 

Repeat the bone marrow biopsy did not show clonal evolution of her CML. Remains 
in CP-CML. No Kinase-domain mutations were detected. 



Since diagnoses her hep C therapy will resulted in complete eradication of 
hepatitis C virus infection with sustained molecular remission.  Her dysphagia 
and odynophagia has resolved and she is able to take oral pills.



 



Current Assessment and Plan:



CML (chronic myelocytic leukemia) (HCC)



Mrs. Dunaway is here today for follow up for CML.She has been on long term 
therapy with Dasatinib.She reports compliance with medication and tolerating 
treatment well.She has had complete molecular response to treatment.



Unfortunately she has recurrent pleural effusion which result in recurrent 
thoracenteses.  She has had several dose reductions but continues to have 
recurrent effusions.



She will undergo thoracentesis locally, and she is getting cardiac and 
pulmonary care locally.

We will dose reduce her dasatinib to 20 mg by mouth once a day especially in 
light of her complete remission.  Will continue follow-up every 3 months 
otherwise.



If she has recurrent pleural effusions despite their major dose reductions and 
dasatinib will consider switching her to bosutinib.



Health care maintenance-She is up to date on Mammogram and Pap Smear.She is due 
for colonoscopy in .Continue to follow up with PCP for health 
care maintenance.



Continue to follow up with Hepatology for history of Cirrhosis.



Patient to call if she develops unexplained fatigue, fevers, chills,SOB,chest 
pain or any other concerns.She verbalized understanding and agreed with plan.



RTC in 3 months for follow up with Dr Chand.



 



in this encounter



Miscellaneous Notes

* Assessment & Plan Note - Gillian Chand MD - 10/19/2017 12:11 PM CDT



Associated Problem(s): CML (chronic myelocytic leukemia) (HCC)





Mrs. Dunaway is here today for follow up for CML.She has been on long term 
therapy with Dasatinib.She reports compliance with medication and tolerating 
treatment well.She has had complete molecular response to treatment.



Unfortunately she has recurrent pleural effusion which result in recurrent 
thoracenteses.  She has had several dose reductions but continues to have 
recurrent effusions.



She will undergo thoracentesis locally, and she is getting cardiac and 
pulmonary care locally.

We will dose reduce her dasatinib to 20 mg by mouth once a day especially in 
light of her complete remission.  Will continue follow-up every 3 months 
otherwise.



If she has recurrent pleural effusions despite their major dose reductions and 
dasatinib will consider switching her to bosutinib.



Health care maintenance-She is up to date on Mammogram and Pap Smear.She is due 
for colonoscopy in .Continue to follow up with PCP for health 
care maintenance.



Continue to follow up with Hepatology for history of Cirrhosis.



Patient to call if she develops unexplained fatigue, fevers, chills,SOB,chest 
pain or any other concerns.She verbalized understanding and agreed with plan.



RTC in 3 months for follow up with Dr Chand.





in this encounter



Plan of Treatment







   



  Name   Priority   Associated Diagnoses   Order Schedule

 

   



  CBC AND DIFF   Routine   CML (chronic myelocytic   Expected: 2018



    leukemia) ([facility])   (Approximate), Expires:



     10/19/2018

 

   



  BCR  PCR BLOOD OR BONE MARROW   Routine   CML (chronic myelocytic   
Expected: 2018



    leukemia) (HCC)   (Approximate), Expires:



     10/19/2018

 

   



  COMPREHENSIVE METABOLIC PANEL   Routine   CML (chronic myelocytic   Expected: 
2018



    leukemia) ([facility])   (Approximate), Expires:



     10/19/2018



as of this encounter



Visit Diagnoses











  Diagnosis

 





  CML (chronic myelocytic leukemia) (HCC)

 





  Chronic myeloid leukemia, without mention of having achieved remission



in this encounter

## 2017-12-18 NOTE — ED GI
General


Chief Complaint:  Abdominal/GI Problems


Stated Complaint:  N/V/D


Nursing Triage Note:  


n/v/d since 1300 12/17/17.


Sepsis Screen:  No Definite Risk





Allergies and Home Medications


Allergies


Coded Allergies:  


     sulfamethoxazole (Verified  Allergy, Severe, RASH, 10/2/14)


     trimethoprim (Verified  Allergy, Severe, RASH, 10/2/14)


     Sulfa (Sulfonamide Antibiotics) (Verified  Allergy, Mild, 10/2/14)


     zolpidem (Verified  Allergy, Mild, skin sensation, 10/2/14)


     celecoxib (Verified  Adverse Reaction, Intermediate, hives, 10/2/14)





Home Medications


Apixaban 5 Mg Tablet, 5 MG PO BID for 30 Days, #60 Ref 1


   Prescribed by: DUNIA MORRISON on 9/15/17 1534


Aspirin 81 Mg Tab.chew, 81 MG PO DAILY@0900, #30


   Prescribed by: DUNIA MORRISON on 9/15/17 1534


Dasatinib 20 Mg Tablet, 40 MG PO HS, (Reported)


Diltiazem HCl 240 Mg Cap.er.24h, 240 MG PO DAILY, #30 Ref 1


   Prescribed by: DUNIA MORRISON on 9/15/17 1534


Fluticasone Propionate 9.9 Ml [place].susp, 1 SPRAY NS DAILY, (Reported)


Furosemide 40 Mg Tablet, (Reported)


Gabapentin 600 Mg Tablet, 600 MG PO 0900, (Reported)


Omeprazole 40 Mg Capsule.dr, (Reported)


Potassium Chloride 10 Meq Capsule.er, 10 MEQ PO DAILY for 30 Days, #30


   Prescribed by: LOAN GRANADOS on 9/15/17 1549


Quetiapine Fumarate 100 Mg Tablet, 50 MG PO 0900,2100, (Reported)


   TAKES 1/2 (100MG) TABLET 


Warfarin Sodium 5 Mg Tablet, (Reported)





Past Medical-Social-Family Hx


Patient Social History


Alcohol Use:  Occasionally Uses


Number of Drinks Today:  HH


Alcohol Beverage of Choice:  Beer, Wine


Recreational Drug Use:  No (social drinker)


Smoking Status:  Never a Smoker


2nd Hand Smoke Exposure:  No


Recent Foreign Travel:  No


Contact w/Someone Who Travel:  No


Recent Infectious Disease Expo:  No


Recent Hopitalizations:  No





Immunizations Up To Date


Tetanus Booster (TDap):  Less than 5yrs


PED Vaccines UTD:  Yes


Date of Pneumonia Vaccine:  Jul 19, 2012





Seasonal Allergies


Seasonal Allergies:  Yes





Surgeries


History of Surgeries:  Yes (HEMORRHOID, chest tube, thoracentesis)


Surgeries:  Gallbladder





Respiratory


History of Respiratory Disorde:  Yes (pleural effusions)





Cardiovascular


History of Cardiac Disorders:  Yes


Cardiac Disorders:  Atrial Fibrillation





Neurological


History of Neurological Disord:  Yes


Neurological Disorders:  Neuropathy





Reproductive System


Pregnant:  No


Hx Reproductive Disorders:  No


Sexually Transmitted Disease:  No


HIV/AIDS:  No


GYN History:  Hysterectomy





Genitourinary


History of Genitourinary Disor:  No





Gastrointestinal


History of Gastrointestinal Di:  Yes


Gastrointestinal Disorders:  Hepatitis, Cirrhosis





Musculoskeletal


History of Musculoskeletal Dis:  Yes


Musculoskeletal Disorders:  Fibromyalgia





Endocrine


History of Endocrine Disorders:  No





HEENT


History of HEENT Disorders:  No





Cancer


History of Cancer:  Yes (CML- IN REMISSION)


Cancer:  Leukemia


Did You Recieve Any Treatments:  Yes


Type of Tx Receive:  Chemotherapy





Psychosocial


History of Psychiatric Problem:  Yes


Behavioral Health Disorders:  Anxiety





Integumentary


History of Skin or Integumenta:  No





Blood Transfusions


History of Blood Disorders:  No


Adverse Reaction to a Blood Tr:  No





Family Medical History


Significant Family History:  Heart Disease, Cancer, Diabetes


Family Medial History:  


Diabetes mellitus


  19 MOTHER


Hypertension


  19 MOTHER


Myocardial infarction


  19 FATHER


Neoplasm (grandmother)


Psychosocial problem


  19 MOTHER


Visual disorder


  19 MOTHER





Physical Exam


Vital Signs





 VS - Last 72 Hours, by Label








 12/18/17





 04:33


 


Temp 99.0


 


Pulse 103


 


Resp 16


 


B/P (MAP) 124/85 (98)


 


Pulse Ox 99


 


O2 Delivery Room Air





Capillary Refill : Less Than 3 Seconds





Progress/Results/Core Measures


Results/Orders


Lab Results





Laboratory Tests








Test


  12/18/17


05:33 12/18/17


06:24 Range/Units


 


 


White Blood Count


  4.8 


  


  4.3-11.0


10^3/uL


 


Red Blood Count


  5.00 


  


  4.35-5.85


10^6/uL


 


Hemoglobin 15.8   11.5-16.0  G/DL


 


Hematocrit 46   35-52  %


 


Mean Corpuscular Volume 92   80-99  FL


 


Mean Corpuscular Hemoglobin 32   25-34  PG


 


Mean Corpuscular Hemoglobin


Concent 34 


  


  32-36  G/DL


 


 


Red Cell Distribution Width 15.7 H  10.0-14.5  %


 


Platelet Count


  196 


  


  130-400


10^3/uL


 


Mean Platelet Volume 9.4   7.4-10.4  FL


 


Neutrophils (%) (Auto) 79 H  42-75  %


 


Lymphocytes (%) (Auto) 12   12-44  %


 


Monocytes (%) (Auto) 8   0-12  %


 


Eosinophils (%) (Auto) 1   0-10  %


 


Basophils (%) (Auto) 0   0-10  %


 


Neutrophils # (Auto) 3.8   1.8-7.8  X 10^3


 


Lymphocytes # (Auto) 0.6 L  1.0-4.0  X 10^3


 


Monocytes # (Auto) 0.4   0.0-1.0  X 10^3


 


Eosinophils # (Auto)


  0.0 


  


  0.0-0.3


10^3/uL


 


Basophils # (Auto)


  0.0 


  


  0.0-0.1


10^3/uL


 


Sodium Level 140   135-145  MMOL/L


 


Potassium Level 3.7   3.6-5.0  MMOL/L


 


Chloride Level 108 H    MMOL/L


 


Carbon Dioxide Level 21   21-32  MMOL/L


 


Anion Gap 11   5-14  MMOL/L


 


Blood Urea Nitrogen 16   7-18  MG/DL


 


Creatinine


  0.86 


  


  0.60-1.30


MG/DL


 


Estimat Glomerular Filtration


Rate > 60 


  


   


 


 


BUN/Creatinine Ratio 19    


 


Glucose Level 116 H    MG/DL


 


Calcium Level 8.8   8.5-10.1  MG/DL


 


Total Bilirubin 0.6   0.1-1.0  MG/DL


 


Aspartate Amino Transf


(AST/SGOT) 24 


  


  5-34  U/L


 


 


Alanine Aminotransferase


(ALT/SGPT) 15 


  


  0-55  U/L


 


 


Alkaline Phosphatase 58     U/L


 


Total Protein 8.3 H  6.4-8.2  GM/DL


 


Albumin 4.4   3.2-4.5  GM/DL


 


Amylase Level 150 H    U/L


 


Lipase 25   8-78  U/L


 


Urine Color  YELLOW   


 


Urine Clarity  CLEAR   


 


Urine pH  5  5-9  


 


Urine Specific Gravity  1.025 H 1.016-1.022  


 


Urine Protein  2+ H NEGATIVE  


 


Urine Glucose (UA)  NEGATIVE  NEGATIVE  


 


Urine Ketones  NEGATIVE  NEGATIVE  


 


Urine Nitrite  NEGATIVE  NEGATIVE  


 


Urine Bilirubin  NEGATIVE  NEGATIVE  


 


Urine Urobilinogen  NORMAL  NORMAL  MG/DL


 


Urine Leukocyte Esterase  2+ H NEGATIVE  


 


Urine RBC (Auto)  4+ H NEGATIVE  


 


Urine RBC  10-25 H  /HPF


 


Urine WBC  0-2   /HPF


 


Urine Squamous Epithelial


Cells 


  2-5 


   /HPF


 


 


Urine Crystals  NONE   /LPF


 


Urine Bacteria  TRACE   /HPF


 


Urine Casts  NONE   /LPF


 


Urine Mucus  MODERATE H  /LPF


 


Urine Culture Indicated  NO   








My Orders





Orders - NICKI ALMANZAR DO


Saline Lock/Iv-Start (12/18/17 04:48)


Amylase (12/18/17 04:48)


Cbc With Automated Diff (12/18/17 04:48)


Comprehensive Metabolic Panel (12/18/17 04:48)


Lipase (12/18/17 04:48)


Ua Culture If Indicated (12/18/17 04:48)


Ondansetron Injection (Zofran Injectio (12/18/17 05:00)


Famotidine Injection (Pepcid Injection) (12/18/17 04:48)


Saline Lock/Iv-Start (12/18/17 04:48)


Lactated Ringers (Lr 1000 Ml Iv Solution (12/18/17 04:48)


Hyoscyamine Sl Tablet (Levsin Sl Tablet) (12/18/17 05:30)





Medications Given in ED





Current Medications








 Medications  Dose


 Ordered  Sig/Shakir


 Route  Start Time


 Stop Time Status Last Admin


Dose Admin


 


 Hyoscyamine


 Sulfate  0.25 mg  ONCE  ONCE


 PO  12/18/17 05:30


 12/18/17 05:31 DC 12/18/17 05:31


0.25 MG


 


 Lactated Ringer's  1,000 ml @ 


 0 mls/hr  Q0M ONCE


 IV  12/18/17 04:48


 12/18/17 04:51 DC 12/18/17 05:15


0 MLS/HR


 


 Ondansetron HCl  4 mg  ONCE  ONCE


 IVP  12/18/17 05:00


 12/18/17 05:01 DC 12/18/17 05:15


4 MG








Vital Signs/I&O





Vital Sign - Last 12Hours








 12/18/17





 04:33


 


Temp 99.0


 


Pulse 103


 


Resp 16


 


B/P (MAP) 124/85 (98)


 


Pulse Ox 99


 


O2 Delivery Room Air














Blood Pressure Mean:  98











Departure


Impression


Impression:  


 Primary Impression:  


 Gastroenteritis


Disposition:  01 HOME, SELF-CARE


Condition:  Improved





Departure-Patient Inst.


Referrals:  


KATHRYN HOLLAND DO (PCP/Family)


Primary Care Physician


Patient Instructions:  GASTROENTERITIS-6Y-ADULT, Viral Gastroenteritis, Adult (

DC)





Add. Discharge Instructions:  


CLEAR LIQUIDS--WATER, BROTH, JELLO, GATORADE





TOMORROW IF YOU ARE BETTER, ADD BRATS DIET TO CLEAR LIQUIDS--BANANAS, RICE, 

APPLESAUCE, TOAST, SALTINES





FOLLOW UP WITH YOUR DR IN 2-3 DAYS IF NO BETTER





All discharge instructions reviewed with patient and/or family. Voiced 

understanding.


Scripts


Famotidine (Pepcid) 40 Mg Tablet


40 MG PO DAILY, #10 TAB


   Prov: NICKI ALMANZAR DO         12/18/17 


Hyoscyamine Sulfate (Levsin-Sl) 0.125 Mg Tab.subl


1-2 TAB SL Q4H for Abdominal Pain, #10 TAB


   Prov: NICKI ALMANZAR DO         12/18/17 


Ondansetron (Zofran Odt) 4 Mg Tab.rapdis


4 MG PO Q4H for Nausea/Vomiting, #10 TAB


   Prov: NICKI ALMANZAR DO         12/18/17











NICKI ALMANZAR DO Dec 18, 2017 07:00

## 2017-12-18 NOTE — XMS REPORT
Encounter Summary

 Created on: 2017



GumeFinay ALE

External Reference #: [id number]

: 1960

Sex: Female



Demographics







 Address  [address] [place]  [address]

 

 Home Phone  [phone]

 

 Preferred Language  English

 

 Marital Status  Unknown

 

 Yazidi Affiliation  NON

 

 Race  White

 

 Ethnic Group  Not  or 





Author







 Author  [facility]

 

 Organization  [facility]

 

 Address  Unknown

 

 Phone  Unavailable







Support







 Name  Relationship  Address  Phone

 

 , Kelly Siddiqui  EC[employer]  [address]

[place]  [address]  [phone]

 

 , Razia Dunaway  [employer]  Unknown  [phone]







Care Team Providers







 Care Team Member Name  Role  Phone

 

  PCP  Unavailable







Encounter Details







    



  Date   Type   Department   Care Team   Description

 

    



  10/31/2017   Orders Only   [facility]   Olga Polk APRN   Cirrhosis of liver



    Transplantation[facility]Hepatolog   [address]   without ascites,



    [facility]   [address]   unspecified hepatic



    [address] [place] [address]   cirrhosis type (HCC)



    [facility]   [phone]   (Primary Dx)



    TRANSPL[facility]ATION   [phone] (Fax) 



    [place]  



    [address] [phone]  







Social History







    



  Tobacco Use   Types   Packs/Day   Years Used   Date

 

    



  Former Smoker   Cigarettes   1   10 

 

    



  Smokeless Tobacco: Former     Quit:



  User     2003









   



  Alcohol Use   Drinks/Week   oz/Week   Comments

 

   



  No   0 Standard   0.0 



   drinks or  



   equivalent  









 



  Sex Assigned at Birth   Date Recorded

 

 



  Not on file 



as of this encounter



Functional Status







  



  Functional Status   Response   Date of Assessment

 

  



  Does the patient have a hearing impairment:   No   2017

 

  



  Does the patient have a visual impairment:   Yes   2017

 

  



  Does the patient have impaired ambulation:   No   2017

 

  



  Does the patient have an activity of daily living   No   2017



  (ADL) impairment:  

 

  



  Does the patient have an instrumental activity of   No   2017



  daily living (IADL) impairment:  









  



  Cognitive Status   Response   Date of Assessment

 

  



  Does the patient have a cognitive impairment:   No   2017



as of this encounter



Plan of Treatment





Not on fileas of this encounter



Results

* ALPHA FETO PROTEIN (AFP) (10/23/2017 10:13 AM)





  



  Component   Value   Ref Range

 

  



  Alpha Feto Protein   1.9   0.0 - 8.7 ng/ml









 



  Specimen   Performing Laboratory

 

 



  Blood   LABDE INTERFACE









 Narrative

 

 



Outside Lab Verified by Dane Platt on 10/31/2017.





in this encounter



Visit Diagnoses











  Diagnosis

 





  Cirrhosis of liver without ascites, unspecified hepatic cirrhosis type (HCC) 
- Primary



in this encounter

## 2017-12-18 NOTE — XMS REPORT
Encounter Summary

 Created on: 2017



Wendy Dunaway

External Reference #: [id number]

: 1960

Sex: Female



Demographics







 Address  [address] [place]  [address]

 

 Home Phone  [phone]

 

 Preferred Language  English

 

 Marital Status  Unknown

 

 Protestant Affiliation  NON

 

 Race  White

 

 Ethnic Group  Not  or 





Author







 Author  [facility]

 

 Organization  [facility]

 

 Address  Unknown

 

 Phone  Unavailable







Support







 Name  Relationship  Address  Phone

 

 , Kelly Siddiqui  [employer]  [address]

[place]  [address]  [phone]

 

 , Razia Dunaway  [employer]  Unknown  [phone]







Care Team Providers







 Care Team Member Name  Role  Phone

 

  PCP  Unavailable







Encounter Details







    



  Date   Type   Department   Care Team   Description

 

    



  10/27/2017   Orders Only   [facility]   Cirrhosis of liver



    Transplantation[facility]Hepatolog    without ascites,



    [facility] Clinic    unspecified hepatic



    [address]    cirrhosis type (HCC)



    [facility]  



    [facility]  



    [place]  



    [address] [phone]  







Social History







    



  Tobacco Use   Types   Packs/Day   Years Used   Date

 

    



  Former Smoker   Cigarettes   1   10 

 

    



  Smokeless Tobacco: Former     Quit:



  User     2003









   



  Alcohol Use   Drinks/Week   oz/Week   Comments

 

   



  No   0 Standard   0.0 



   drinks or  



   equivalent  









 



  Sex Assigned at Birth   Date Recorded

 

 



  Not on file 



as of this encounter



Functional Status







  



  Functional Status   Response   Date of Assessment

 

  



  Does the patient have a hearing impairment:   No   2017

 

  



  Does the patient have a visual impairment:   Yes   2017

 

  



  Does the patient have impaired ambulation:   No   2017

 

  



  Does the patient have an activity of daily living   No   2017



  (ADL) impairment:  

 

  



  Does the patient have an instrumental activity of   No   2017



  daily living (IADL) impairment:  









  



  Cognitive Status   Response   Date of Assessment

 

  



  Does the patient have a cognitive impairment:   No   2017



as of this encounter



Plan of Treatment





Not on fileas of this encounter



Results

* PROTIME INR (PT) (10/23/2017 10:13 AM)





  



  Component   Value   Ref Range

 

  



  INR   1.2 (H)   0.9 - 1.1

 

  



  Protime   14.9 (H)   11.3 - 14.1 sec









 



  Specimen   Performing Laboratory

 

 



  Blood   LABDE INTERFACE









 Narrative

 

 



Outside Lab Verified by Dane Platt on 10/27/2017.





* COMPREHENSIVE METABOLIC PANEL (10/23/2017 10:13 AM)





  



  Component   Value   Ref Range

 

  



  Sodium   137   132 - 145 mEq/L

 

  



  Potassium   3.7   3.5 - 5.5 meq/l

 

  



  Chloride   105   95 - 110 mEq/L

 

  



  CO2   24.0   24.0 - 34.0 meq/l

 

  



  Anion Gap   12   6 - 14

 

  



  Glucose   71   60 - 125 mg/dL

 

  



  Creatinine   0.9   0.6 - 1.5 mg/dL

 

  



  eGFR Non African American   71   >59 ml/min/1.73m2

 

  



  Blood Urea Nitrogen   16   5 - 25 mg/dL

 

  



  Calcium   9.1   8.5 - 10.8 mg/dL

 

  



  Alk Phosphatase   64   30 - 115 U/L

 

  



  AST (SGOT)   19   0 - 40 U/L

 

  



  ALT (SGPT)   19   0 - 50 U/L

 

  



  Albumin   4.2   3.5 - 5.5 g/dL

 

  



  Total Protein   6.9   6.0 - 8.0 g/dl









 



  Specimen   Performing Laboratory

 

 



  Blood   LABDE INTERFACE









 Narrative

 

 



Outside Lab Verified by Smash Bucket Colgus on 10/27/2017.





* CBC AND DIFF (10/23/2017 10:13 AM)





  



  Component   Value   Ref Range

 

  



  RBC   4.03   3.60 - 5.00 M/ul

 

  



  Hematocrit   38.4   36.0 - 46.0 %

 

  



  MCV   95.3   80.0 - 97.0 fl

 

  



  MCH   32 (H)   27.0 - 31.0 pg

 

  



  MCHC   33.6   32.0 - 36.0 pg

 

  



  Platelet Count   218   150 - 4001

 

  



  Lymphocytes   37.9   10.0 - 50.0 %

 

  



  Eosinophil   3.9   0.0 - 7.0 %

 

  



  Basophil   1.3   0.0 - 2.5 %

 

  



  Absolute Neutrophil Count   1.01 (L)   2.00 - 6.90 K/uL

 

  



  Absolute Lymph Count   0.88   0.60 - 3.40

 

  



  Absolute Monocyte Count   0.3   0.0 - 0.9 K/ul

 

  



  Absolute Basophil Count   0.0   0.0 - 0.2 K/ul









 



  Specimen   Performing Laboratory

 

 



  Blood   LABDE INTERFACE









 Narrative

 

 



Outside Lab Verified by Smash Bucket Colvard on 10/27/2017.





in this encounter



Visit Diagnoses











  Diagnosis

 





  Cirrhosis of liver without ascites, unspecified hepatic cirrhosis type (HCC)



in this encounter

## 2017-12-18 NOTE — XMS REPORT
Encounter Summary

 Created on: 2017



Wendy Dunaway

External Reference #: [id number]

: 1960

Sex: Female



Demographics







 Address  [address] [place]  [address]

 

 Home Phone  [phone]

 

 Preferred Language  English

 

 Marital Status  Unknown

 

 Lutheran Affiliation  NON

 

 Race  White

 

 Ethnic Group  Not  or 





Author







 Author  [facility]

 

 Organization  [facility]

 

 Address  Unknown

 

 Phone  Unavailable







Support







 Name  Relationship  Address  Phone

 

 , Kelly Siddiqui  [employer]  [address]

[place]  [address]  [phone]

 

 , Razia Dunaway  [employer]  Unknown  [phone]







Care Team Providers







 Care Team Member Name  Role  Phone

 

  PCP  Unavailable







Encounter Details







    



  Date   Type   Department   Care Team   Description

 

    



  2017   Orders Only   [facility]   Cirrhosis of liver



    Transplantation[facility]Hepatolog    without ascites,



    [facility] Clinic    unspecified hepatic



    [address]    cirrhosis type (HCC)



    [facility]  



    [facility]  



    [place]  



    [address] [phone]  







Social History







    



  Tobacco Use   Types   Packs/Day   Years Used   Date

 

    



  Former Smoker   Cigarettes   1   10 

 

    



  Smokeless Tobacco: Former     Quit:



  User     2003









   



  Alcohol Use   Drinks/Week   oz/Week   Comments

 

   



  No   0 Standard   0.0 



   drinks or  



   equivalent  









 



  Sex Assigned at Birth   Date Recorded

 

 



  Not on file 



as of this encounter



Functional Status







  



  Functional Status   Response   Date of Assessment

 

  



  Does the patient have a hearing impairment:   No   2017

 

  



  Does the patient have a visual impairment:   Yes   2017

 

  



  Does the patient have impaired ambulation:   No   2017

 

  



  Does the patient have an activity of daily living   No   2017



  (ADL) impairment:  

 

  



  Does the patient have an instrumental activity of   No   2017



  daily living (IADL) impairment:  









  



  Cognitive Status   Response   Date of Assessment

 

  



  Does the patient have a cognitive impairment:   No   2017



as of this encounter



Plan of Treatment





Not on fileas of this encounter



Results

* ALPHA FETO PROTEIN (AFP) (10/23/2017 10:13 AM)





  



  Component   Value   Ref Range

 

  



  Alpha Feto Protein   1.9   0.0 - 8.7 ng/ml









 



  Specimen   Performing Laboratory

 

 



  Blood   LABDE INTERFACE









 Narrative

 

 



Outside Lab Verified by Dane Platt on 10/31/2017.





in this encounter



Visit Diagnoses











  Diagnosis

 





  Cirrhosis of liver without ascites, unspecified hepatic cirrhosis type (HCC)



in this encounter

## 2017-12-18 NOTE — XMS REPORT
Clinical Summary

 Created on: 2017



Wendy Dunaway

External Reference #: [id number]

: 1960

Sex: Female



Demographics







 Address  [address] [place]  [address]

 

 Home Phone  [phone]

 

 Preferred Language  English

 

 Marital Status  Unknown

 

 Yazidi Affiliation  NON

 

 Race  White

 

 Ethnic Group  Not  or 





Author







 Author  [facility]

 

 Organization  [facility]

 

 Address  Unknown

 

 Phone  Unavailable







Support







 Name  Relationship  Address  Phone

 

 , Kelly Siddiqui  ECON  [address]

[place]  [address]  [phone]

 

 , Razia Dunaway  ECON  Unknown  [phone]







Care Team Providers







 Care Team Member Name  Role  Phone

 

  PCP  Unavailable







Source Comments

Some departments are not documenting in the electronic medical record.  If you 
do not see the information that you expected, contact Release of Information in 
the Health Information Management department at [phone] for further 
assistance in locating additional records.[facility]



Allergies







    



  Active Allergy   Reactions   Severity   Noted Date   Comments

 

    



  Celecoxib   HIVES, SHORTNESS OF   Medium   2013   Throat swells.



   BREATH   

 

    



  Sulfa (Sulfonamide   HIVES   Medium   2012 



  Antibiotics)    







Current Medications







      



  Prescription   Sig.   Disp.   Refills   Start   End Date   Status



      Date  

 

      



  gabapentin (NEURONTIN)   Take 1 tab every morning,     20    Active



  600 mg tablet   2 tabs in the afternoon,     16  



   2 tabs in the evening,     



   and 1 tab at bedtime     

 

      



  QUEtiapine (SEROQUEL) 100   Take 1/2 tab every     06/15/20    Active



  mg tablet   morning, 1 tab at 1 pm, 1     16  



   tab at 5 pm and 1/2 tab     



   at bedtime.     

 

      



  dasatinib(+) (SPRYCEL) 20   Take 2 tablets by mouth   60 tablet   3   10/02/
20    Active



  mg tabletIndications: CML   daily.     17  



  (chronic myelocytic      



  leukemia) (HCC)      

 

      



  PEG 3350-Electrolytes   Mix with water as   4000 mL   0   10/16/20    Active



  (GOLYTELY) oral solution   directed on package.     17  



   Drink 240ml (8oz) every     



   10 minutes until gone.     

 

      



  CARTIA  mg capsule   TK 1 C PO D    1   09/15/20    Active



      17  

 

      



  omeprazole DR(+)   TK 1 C PO QAM 30 MINUTES    0   10/16/20    Active



  (PRILOSEC) 40 mg capsule   BEFORE BREAKFAST     17  

 

      



  potassium chloride   TK 1 T PO D    6   10/12/20    Active



  (K-DUR) 10 mEq tablet      17  

 

      



  triamcinolone acetonide   JARRED TID TO LEGS    0   10/09/20    Active



  (KENALOG) 0.1 % topical      17  



  cream      

 

      



  warfarin (COUMADIN) 1 mg   TK 1 T PO  ONCE D    2   10/18/20    Active



  tablet      17  

 

      



  warfarin (COUMADIN) 5 mg   TK 1 T PO D    6   10/12/20    Active



  tablet      17  







Active Problems







 



  Problem   Noted Date

 

 



  History of hepatitis C   2016

 

 



  Pleural effusion   2015

 

 



  Overview:



  Traumatic left hemothorax.



  Confirmed by diagnostic / therapeutic thoracentesis





  Last Assessment & Plan:



  Evaluated by  cardiothoracic surgery, no interventions, deferred to



  pulmonary.

 

 



  Colon polyps   2015

 

 



  Overview:



  Tubular adenomas

 

 



  Cirrhosis (HCC)   2014

 

 



  Total bilirubin, elevated   2012

 

 



  Overview:



  Likely related to imatinib.





  L



  ast Assessment & Plan:



  Likely related to imatinib as it increased with the increased dosing. We



  will decrease imatinib back to 400 mg daily and have her labs checked with



  Dr. Petersen next week. Should her jaundice worsen over the weekend or she



  has diffuse itching she should present to her local ER.

 

 



  CML (chronic myelocytic leukemia) (HCC)   2012

 

 



  Overview:



  Diagnosis: CM chronic phase



  Date of Diagnosis: 2012



  Treatment:



  Imatinib 400-800 mg PO daily  to 2013: Best response not available,



  patient progressed 2013 ( Cytogenetic progression/ relapse)



  Nilotinib 200 MG PO BID: unable to tolerate, exacerbated neuropathy



  Dasatinib 100 mg PO daily: started March 15, 2013



  Response: CCR, CMR



  Dasatinib reduced to 70 mg PO daily: Oct , 2013



  7/30/15-Continue Dasatinib 70 mg po daily.



  May 2016: Dasatinib 50 mg po daily.



  Oct   2016: Dasatinib 40 mg po daily. CMR



  Oct   2017: Dasatinib 20 mg po daily.  Reduction due to recurrent pleural



  effusions





  Ms. Dunaway is a 57-year-old woman with a history of hep C infection and



  unexplained neuropathy.  She also has significant dysphagia and odynophagia



  and has significant difficulty in observing oral medication tablets.





  She was diagnosed with chronic phase CML in 2012.  She has been under



  the care of Dr. Petersen.  There has not been any outside records available



  for us to review today and most of the information available has been



  provided by the patient verbally.  Her treatment hostory is as above.



  Repeat the bone marrow biopsy did not show clonal evolution of her CML.



  Remains in CP-CML. No Kinase-domain mutations were detected.





  Since diagnoses her hep C therapy will resulted in complete eradication of



  hepatitis C virus infection with sustained molecular remission.  Her



  dysphagia and odynophagia has resolved and she is able to take oral pills.





  





  L



  ast Assessment & Plan:





  Mrs. Dunaway is here today for follow up for CML.She has been on long term



  therapy with Dasatinib.She reports compliance with medication and



  tolerating treatment well.She has had complete molecular response to



  treatment.





  Unfortunately she has recurrent pleural effusion which result in recurrent



  thoracenteses.  She has had several dose reductions but continues to have



  recurrent effusions.





  She will undergo thoracentesis locally, and she is getting cardiac and



  pulmonary care locally.



  We will dose reduce her dasatinib to 20 mg by mouth once a day especially



  in light of her complete remission.  Will continue follow-up every 3 months



  otherwise.





  If she has recurrent pleural effusions despite their major dose reductions



  and dasatinib will consider switching her to bosutinib.





  Health care maintenance-She is up to date on Mammogram and Pap Smear.She



  is due for colonoscopy in .Continue to follow up with PCP for



  health care maintenance.





  Continue to follow up with Hepatology for history of Cirrhosis.





  Patient to call if she develops unexplained fatigue, fevers,



  chills,SOB,chest pain or any other concerns.She verbalized understanding



  and agreed with plan.





  RTC in 3 months for follow up with Dr Chand.



 

 



  Iron deficiency   2012

 

 



  Overview:



  Absent iron stains on bone marrow biopsy.





  L



  ast Assessment & Plan:



  Ferritin is > 500 , rules out iron def.

 

 



  Hiatal hernia   2012

 

 



  Overview:



  Per pt, limits her ability to take po pills.





  L



  ast Assessment & Plan:



  Pt reports that her trouble swallowing is secondary to GERD symptoms and



  that her GERD has resolved. She denies any current trouble with dysphagia



  and is able to eat and drink.

 

 



  Normocytic anemia   2012

 

 



  Overview:



  Last transfusion 





  L



  ast Assessment & Plan:



  Remains well above transfusion needs. Labs will hopefully continue to



  improve with treatment. She should have a work up for iron deficiency in



  the future however.







Resolved Problems







  



  Problem   Noted Date   Resolved Date

 

  



  Thrombocythemia (HCC)   2012

 

 



  Overview:



  CHANEL-2 negative.





  L



  ast Assessment & Plan:



  Improved. Now WNL.  She has stopped hydrea.

 

  



  Hepatitis C   2012

 

 



  Overview:



  Due to prior IV drug use, untreated





  L



  ast Assessment & Plan:



  Completed HCV therapy.



  Patient has an appointment with Hepatology today.









Encounters







    



  Date   Type   Specialty   Care Team   Description

 

    



  2017   Orders Only   Hepatology   Dane Platt   Cirrhosis of liver



      without ascites,



      unspecified hepatic



      cirrhosis type (HCC)

 

    



  10/31/2017   Orders Only   Hepatology   Olga Polk APRN   Cirrhosis of liver



      without ascites,



      unspecified hepatic



      cirrhosis type (HCC)



      (Primary Dx)

 

    



  10/27/2017   Orders Only   Hepatology   Dane Platt   Cirrhosis of liver



      without ascites,



      unspecified hepatic



      cirrhosis type (HCC)

 

    



  10/19/2017   Office Visit   Oncology   Gillian Chand MD   CML (chronic 
myelocytic



      leukemia) (HCC);CML



      (chronic myelocytic



      leukemia) (HCC)

 

    



  10/19/2017   Nurse Only   Oncology   Gillian Chand MD   CML (chronic 
myelocytic



      leukemia)



      (HCC);Hypothyroidism,



      unspecified type;Other



      fatigue ;Healthcare



      maintenance

 

    



  10/16/2017   Prep for Case   Hepatology   Olga Polk APRN 

 

    



  10/13/2017   Telephone   Hepatology   Olga Polk APRN   Other (call to NC)

 

    



  10/02/2017   Orders Only   Oncology   Gillian Chand MD   CML (chronic 
myelocytic



      leukemia) (HCC) (Primary



      Dx);Hypothyroidism,



      unspecified type

 

    



  10/02/2017   Orders Only   Oncology   Lashon Méndez APRN-NP   CML (
chronic myelocytic



      leukemia) (HCC) (Primary



      Dx)

 

    



  10/02/2017   Orders Only   Hepatology   Olga Polk APRN 

 

    



  10/02/2017   Prep for Case   Hepatology   Olga Polk APRN 



from Last 3 Months



Immunizations







  



  Name   Dates Previously Given   Next Due

 

  



  HEP A/HEP B Combined   2015, 2015, 2015 



  Vaccine  







Family History







   



  Medical History   Relation   Name   Comments

 

   



  Coronary Artery Disease   Father    MI

 

   



  Cancer   Mother    Gallbladder cancer

 

   



  Liver Disease   Sister  









   



  Relation   Name   Status   Comments

 

   



  Brother    Alive 

 

   



  Father     

 

   



  Mother     

 

   



  Sister     

 

   



  Sister   







Social History







    



  Tobacco Use   Types   Packs/Day   Years Used   Date

 

    



  Former Smoker   Cigarettes   1   10 

 

    



  Smokeless Tobacco: Former     Quit:



  User     2003









   



  Alcohol Use   Drinks/Week   oz/Week   Comments

 

   



  No   0 Standard   0.0 



   drinks or  



   equivalent  









 



  Sex Assigned at Birth   Date Recorded

 

 



  Not on file 







Last Filed Vital Signs







  



  Vital Sign   Reading   Time Taken

 

  



  Blood Pressure   127/89   10/19/2017 10:51 AM CDT

 

  



  Pulse   78   10/19/2017 10:51 AM CDT

 

  



  Temperature   36.8   C (98.2   F)   10/19/2017 10:51 AM CDT

 

  



  Respiratory Rate   12   10/19/2017 10:51 AM CDT

 

  



  Oxygen Saturation   100%   10/19/2017 10:51 AM CDT

 

  



  Inhaled Oxygen   -   -



  Concentration  

 

  



  Weight   71 kg (156 lb 9.6 oz)   10/19/2017 10:51 AM CDT

 

  



  Height   174 cm (5' 8.5")   10/19/2017 10:51 AM CDT

 

  



  Body Mass Index   23.46   10/19/2017 10:51 AM CDT







Plan of Treatment







   



  Health Maintenance   Due Date   Last Done   Comments

 

   



  PHYSICAL (COMPREHENSIVE)   1966  



  EXAM   

 

   



  PERTUSSIS VACCINE   1970  

 

   



  TETANUS VACCINE   1976  

 

   



  CERVICAL CANCER SCREENING   1989  

 

   



  BREAST CANCER SCREENING   2017 

 

   



  INFLUENZA VACCINE   2017  

 

   



  COLORECTAL CANCER   10/21/2024   10/21/2014 



  SCREENING   







Results

* ALPHA FETO PROTEIN (AFP) (10/23/2017 10:13 AM)





  



  Component   Value   Ref Range

 

  



  Alpha Feto Protein   1.9   0.0 - 8.7 ng/ml









 



  Specimen   Performing Laboratory

 

 



  Blood   LABDE INTERFACE









 Narrative

 

 



Outside Lab Verified by [employer] [facility] on 10/31/2017.





* PROTIME INR (PT) (10/23/2017 10:13 AM)





  



  Component   Value   Ref Range

 

  



  INR   1.2 (H)   0.9 - 1.1

 

  



  Protime   14.9 (H)   11.3 - 14.1 sec









 



  Specimen   Performing Laboratory

 

 



  Blood   LABDE INTERFACE









 Narrative

 

 



Outside Lab Verified by [employer] [facility] on 10/27/2017.





* CBC AND DIFF (10/23/2017 10:13 AM)



Only the most recent of 2 results within the time period is included.





  



  Component   Value   Ref Range

 

  



  RBC   4.03   3.60 - 5.00 M/ul

 

  



  Hematocrit   38.4   36.0 - 46.0 %

 

  



  MCV   95.3   80.0 - 97.0 fl

 

  



  MCH   32 (H)   27.0 - 31.0 pg

 

  



  MCHC   33.6   32.0 - 36.0 pg

 

  



  Platelet Count   218   150 - 4001

 

  



  Lymphocytes   37.9   10.0 - 50.0 %

 

  



  Eosinophil   3.9   0.0 - 7.0 %

 

  



  Basophil   1.3   0.0 - 2.5 %

 

  



  Absolute Neutrophil Count   1.01 (L)   2.00 - 6.90 K/uL

 

  



  Absolute Lymph Count   0.88   0.60 - 3.40

 

  



  Absolute Monocyte Count   0.3   0.0 - 0.9 K/ul

 

  



  Absolute Basophil Count   0.0   0.0 - 0.2 K/ul









 



  Specimen   Performing Laboratory

 

 



  Blood   LABDE INTERFACE









 Narrative

 

 



Outside Lab Verified by [facility] on 10/27/2017.





* COMPREHENSIVE METABOLIC PANEL (10/23/2017 10:13 AM)



Only the most recent of 2 results within the time period is included.





  



  Component   Value   Ref Range

 

  



  Sodium   137   132 - 145 mEq/L

 

  



  Potassium   3.7   3.5 - 5.5 meq/l

 

  



  Chloride   105   95 - 110 mEq/L

 

  



  CO2   24.0   24.0 - 34.0 meq/l

 

  



  Anion Gap   12   6 - 14

 

  



  Glucose   71   60 - 125 mg/dL

 

  



  Creatinine   0.9   0.6 - 1.5 mg/dL

 

  



  eGFR Non African American   71   >59 ml/min/1.73m2

 

  



  Blood Urea Nitrogen   16   5 - 25 mg/dL

 

  



  Calcium   9.1   8.5 - 10.8 mg/dL

 

  



  Alk Phosphatase   64   30 - 115 U/L

 

  



  AST (SGOT)   19   0 - 40 U/L

 

  



  ALT (SGPT)   19   0 - 50 U/L

 

  



  Albumin   4.2   3.5 - 5.5 g/dL

 

  



  Total Protein   6.9   6.0 - 8.0 g/dl









 



  Specimen   Performing Laboratory

 

 



  Blood   LABDE INTERFACE









 Narrative

 

 



Outside Lab Verified by [facility] on 10/27/2017.





* THYROID STIMULATING HORMONE-TSH (10/19/2017 10:28 AM)





  



  Component   Value   Ref Range

 

  



  TSH   1.804   0.35 - 5.00 MCU/ML









 



  Specimen   Performing Laboratory

 

 



  Blood   [facility]



   [address] [place] [address]





* BCR  PCR BLOOD OR BONE MARROW (10/19/2017 10:26 AM)





  



  Component   Value   Ref Range

 

  



  Specimen   BLOOD 

 

  



  Final Diagnosis,BCR   Patient Name: Wendy Dunaway 



   Physician(s):    BRANDO Smith 



   Ordering Facility: [facility] 



   Medical Record #:    [id number] 



   Date of Birth, Sex:    1960, F 



   Collection Date:    10/19/2017 



   Received Date:    10/19/2017 



   Report Date:    10/27/2017 



   CMOL Reference #:    [id number] 



   Accession #:    [id number] 



   Test Performed 



   BCR-ABL p210 fusion gene transcript analysis 



   Specimen Type 



   Blood 



   Analytical Results 



   The BCR-ABL Mbcr b2a2 and/or b3a2 fusion gene 



   transcripts were NOT 



   DETECTED in this specimen. 



   Interpretation 



   The BCR-ABL Mbcr b2a2 and b3a2 fusion gene 



   transcripts were NOT 



   DETECTED in this specimen. The limit of detection 



   for this assay is 



   an Mbcr copy number of at least 10 and an NCN of 



   0.001%. Analysis of 



   BCR-ABL transcript levels during and/or after 



   therapy reflects the 



   level of disease suppression in patients with CML 



   and some patients 



   with AML, and is effective for monitoring 



   treatment efficiency or, in 



   some cases, the need to reassess therapy. These 



   results should be 



   interpreted only in the context of total clinical 



   information. 



   Therapeutic action should not be taken based 



   solely on these results. 



   Methodology 



   Total RNA from peripheral blood or bone marrow 



   samples was isolated 



   and reverse transcribed with random primers, and 



   the    cDNA products 



   were quantitated by real time quantitative PCR 



   (RQ-PCR). Primers and 



   probes for bcr exons b2 and b3 and abl exon 2 are 



   used in the RQ-PCR 



   to detect the Mbcr transcripts b2a2 and b3a2. A 



   set of plasmid 



   standards containing 10^1 to 10^6 copies each of 



   BCR-ABL and ABL were 



   used to generate standard curves for the 



   quantitation of BCR-ABL and 



   ABL. ABL copy numbers are used as control for the 



   quality and 



   quantity of sample RNA and to normalize the 



   BCR-ABL copy numbers. The 



   test result is expressed as BCR-ABL/ABL ratio, 



   then converted to an 



   international scale using the IS-Low Calibrator. 



   Intended Use 



   The BCR-ABL Mbcr RQ-PCR assay detects and 



   quantitates the BCR-ABL 



   Mbcr b2a2 and b3a2 (p210) fusion gene transcripts 



   which present in 



   95% of chronic myelogenous leukemia (CML) patients 



   and approximately 



   35% of Ph-positive adult acute lymphocytic 



   leukemia (ALL) patients. 



   This test does not detect other BCR-ABL fusions, 



   including e1a2 



   (p190), so should not be used in the diagnostic 



   setting. Serial 



   analysis of the Mbcr b2a2 & b3a2 transcripts can 



   be used for 



   monitoring patient response to CML treatment 



   including tyrosine 



   kinase inhibitors imatinib and dasatinib. 



   References 



   1. Leni et al.    Desirable performance 



   characteristics for BCR-ABL 



   measurement on an        international reporting 



   scale to allow 



   consistent interpretation of individual patient 



   response and 



   comparison of response rates between clinical 



   trials.    Blood 2008 



   112:3067-2753. 



   2. Katina Rosa et al. Guidelines for the 



   measurement of BCR-ABL 1 



   transscripts in chronic myeloid leukaemia. [place] 



   Journal of 



   Haematology, 153, 179-190. 



   3. Nichole Cooper et al. Establishment of the 1st 



   World Health 



   Organization International     Genetic Reference 



   Panel for 



   quantification of BCR-ABL Mrna. Blood 2010. 



   4. Sage and Lizzette.    Molecular Monitoring 



   of BCR-ABL as a guide 



   to        clinical management of chronic myeloid 



   leukaemia.    Blood. 2006: 



   20 29-41. 



   5. Sage et al. Monitoring of CML patients 



   responding to treatment 



   with tyrosine kinase inhibitors: review and 



   recommendations for 



   harmonizing current methodology for detecting 



   BCR-aBL transcripts and 



   kinase domain mutations for expressing results. 



   Blood 2006:108 (1) 



   28-37. 



   6. Syed et al.    Quantitative Monitoring 



   of BCR-ABL 



   Transcript-Suggestion of a Simplified Approach 



   Considering Inaccuracy 



   of Measurement and Calibration. Leukemia & 



   Lymphoma 2004:45 6 



   2384-0356. 



   Disclaimer 



   This test was performed by the [facility] 



   [facility]. This test was developed and its 



   performance 



   characteristics determined by the [facility] 



   [facility]. It has not been cleared nor approved 



   by the U.S. Food and 



   Drug Administration. The FDA has determined that 



   such clearance is 



   not necessary. This test is used for clinical 



   purposes. It should not 



   be regarded as investigational or for research. 



   This laboratory is 



   certified under the Clinical Laboratory 



   Improvement Amendments of 



   1988 (CLIA-88) as qualified to perform high 



   complexity clinical 



   laboratory testing. [facility] 



   [facility]atory, 



   Department of Pathology and Laboratory Medicine, 



   [facility], [address], [address] 



   [address], [address] [place], [address]. Tel: [phone]; 



   Fax: 



   [phone]. 









 



  Specimen   Performing Laboratory

 

 



  Blood   REFERENCE LAB





from Last 3 Months

## 2017-12-18 NOTE — XMS REPORT
Encounter Summary

 Created on: 2017



Gume Wendy AGUILERA

External Reference #: [id number]

: 1960

Sex: Female



Demographics







 Address  [address] [place]  [address]

 

 Home Phone  [phone]

 

 Preferred Language  English

 

 Marital Status  Unknown

 

 Buddhism Affiliation  NON

 

 Race  White

 

 Ethnic Group  Not  or 





Author







 Author  [facility]

 

 Organization  [facility]

 

 Address  Unknown

 

 Phone  Unavailable







Support







 Name  Relationship  Address  Phone

 

 , Kelly Siddiqui  ECON  [address]

[place]  [address]  [phone]

 

 , Razia Dunaway  ECON  Unknown  [phone]







Care Team Providers







 Care Team Member Name  Role  Phone

 

  PCP  Unavailable







Encounter Details







    



  Date   Type   Department   Care Team   Description

 

    



  10/02/2017   Prep for Case   Center for   Olga Polk APRN 



    Transplantation-Hepatolog   [address] [facility]   [address] 



    [address] [place] [address] 



    [facility]   [phone] 



    TRANSPLANTATION   [phone] (Fax) 



    [place], [place]  



    [address] [phone]  







Social History







    



  Tobacco Use   Types   Packs/Day   Years Used   Date

 

    



  Former Smoker   Cigarettes   1   10 

 

    



  Smokeless Tobacco: Former     Quit:



  User     2003









   



  Alcohol Use   Drinks/Week   oz/Week   Comments

 

   



  No   0 Standard   0.0 



   drinks or  



   equivalent  









 



  Sex Assigned at Birth   Date Recorded

 

 



  Not on file 



as of this encounter



Functional Status







  



  Functional Status   Response   Date of Assessment

 

  



  Does the patient have a hearing impairment:   No   2017

 

  



  Does the patient have a visual impairment:   Yes   2017

 

  



  Does the patient have impaired ambulation:   No   2017

 

  



  Does the patient have an activity of daily living   No   2017



  (ADL) impairment:  

 

  



  Does the patient have an instrumental activity of   No   2017



  daily living (IADL) impairment:  









  



  Cognitive Status   Response   Date of Assessment

 

  



  Does the patient have a cognitive impairment:   No   2017



as of this encounter



Plan of Treatment





Not on fileas of this encounter



Visit Diagnoses

Not on filein this encounter

## 2017-12-28 ENCOUNTER — HOSPITAL ENCOUNTER (OUTPATIENT)
Dept: HOSPITAL 75 - RAD | Age: 57
End: 2017-12-28
Attending: FAMILY MEDICINE
Payer: MEDICARE

## 2017-12-28 DIAGNOSIS — J90: Primary | ICD-10-CM

## 2017-12-28 PROCEDURE — 71020: CPT

## 2017-12-28 NOTE — DIAGNOSTIC IMAGING REPORT
INDICATION: History of effusion.



COMPARISON: 12/04/2017.



FINDINGS: Frontal and lateral radiographic views of the chest

were obtained and demonstrate moderate left basilar effusion and

trace right effusion. There may be some minimal associated

bibasilar airspace disease. There is no pneumothorax on either

side. Cardiac silhouette and pulmonary vasculature are within

normal limits. Bony structures show no gross acute abnormalities.



IMPRESSION:

1. Bilateral pleural effusions, left greater than right.



Dictated by: 



  Dictated on workstation # [id number]

## 2018-01-02 ENCOUNTER — HOSPITAL ENCOUNTER (OUTPATIENT)
Dept: HOSPITAL 75 - RAD | Age: 58
End: 2018-01-02
Attending: FAMILY MEDICINE
Payer: MEDICARE

## 2018-01-02 DIAGNOSIS — J90: Primary | ICD-10-CM

## 2018-01-02 PROCEDURE — 76604 US EXAM CHEST: CPT

## 2018-01-02 NOTE — DIAGNOSTIC IMAGING REPORT
INDICATION: 

Pleural effusion, shortness of breath.



FINDINGS:

Limited views of both lungs were performed. There are moderately

large bilateral pleural effusions which appear nonloculated. 



IMPRESSION: 

Moderately large nonloculated bilateral pleural effusions.



Dictated by: 



  Dictated on workstation # [id number]

## 2018-01-04 ENCOUNTER — HOSPITAL ENCOUNTER (OUTPATIENT)
Dept: HOSPITAL 75 - PREOP | Age: 58
End: 2018-01-04
Attending: SURGERY
Payer: MEDICARE

## 2018-01-04 VITALS — WEIGHT: 159 LBS | HEIGHT: 68 IN | BODY MASS INDEX: 24.1 KG/M2

## 2018-01-04 DIAGNOSIS — Z86.010: ICD-10-CM

## 2018-01-04 DIAGNOSIS — Z01.818: Primary | ICD-10-CM

## 2018-01-05 ENCOUNTER — HOSPITAL ENCOUNTER (OUTPATIENT)
Dept: HOSPITAL 75 - RAD | Age: 58
End: 2018-01-05
Attending: FAMILY MEDICINE
Payer: MEDICARE

## 2018-01-05 DIAGNOSIS — J90: Primary | ICD-10-CM

## 2018-01-05 LAB
INR PPP: 1.2 (ref 0.8–1.4)
PROTHROMBIN TIME: 15.1 SEC (ref 12.2–14.7)

## 2018-01-05 PROCEDURE — 36415 COLL VENOUS BLD VENIPUNCTURE: CPT

## 2018-01-05 PROCEDURE — 85610 PROTHROMBIN TIME: CPT

## 2018-01-05 PROCEDURE — 32555 ASPIRATE PLEURA W/ IMAGING: CPT

## 2018-01-05 NOTE — DIAGNOSTIC IMAGING REPORT
EXAM: Ultrasound-guided thoracentesis



FINDINGS:  

The recent ultrasound examination of the chest performed on

01/02/2018 noted a moderate left basilar effusion and a trace

right effusion. Following aseptic preparation of the skin and

administration of local anesthesia a 6.5 Armenian catheter was

inserted into the left thorax using sonographic guidance.

Approximately 950 cc of clear straw-colored fluid was removed.

The patient tolerated the procedure well and was dismissed in

good condition. 



IMPRESSION:

1. There has been a successful thoracentesis on the left.

2. These results were discussed with Dr. Rah Castro by Dr. Philip at the time of dictation.



Dictated by: 



  Dictated on workstation # [id number]

## 2018-01-09 ENCOUNTER — HOSPITAL ENCOUNTER (OUTPATIENT)
Dept: HOSPITAL 75 - ENDO | Age: 58
Discharge: HOME | End: 2018-01-09
Attending: SURGERY
Payer: MEDICARE

## 2018-01-09 VITALS — HEIGHT: 68 IN | BODY MASS INDEX: 24.1 KG/M2 | WEIGHT: 159 LBS

## 2018-01-09 VITALS — SYSTOLIC BLOOD PRESSURE: 132 MMHG | DIASTOLIC BLOOD PRESSURE: 65 MMHG

## 2018-01-09 DIAGNOSIS — I48.91: ICD-10-CM

## 2018-01-09 DIAGNOSIS — D12.2: ICD-10-CM

## 2018-01-09 DIAGNOSIS — G62.9: ICD-10-CM

## 2018-01-09 DIAGNOSIS — M79.7: ICD-10-CM

## 2018-01-09 DIAGNOSIS — Z79.01: ICD-10-CM

## 2018-01-09 DIAGNOSIS — C95.90: ICD-10-CM

## 2018-01-09 DIAGNOSIS — K57.30: ICD-10-CM

## 2018-01-09 DIAGNOSIS — Z79.899: ICD-10-CM

## 2018-01-09 DIAGNOSIS — Z12.11: Primary | ICD-10-CM

## 2018-01-09 NOTE — DISCHARGE INST-SIMPLE/STANDARD
Discharge Inst-Standard


Patient Instructions/Follow Up


Plan of Care/Instructions/FU:  


2 weeks Sally


Activity as Tolerated:  Yes


Discharge Diet:  Regular Diet (high fiber)











KAELA AVILES DO Jan 9, 2018 12:00

## 2018-01-09 NOTE — PROGRESS NOTE-POST OPERATIVE
Post-Operative Progess Note


Surgeon (s)/Assistant (s)


Surgeon


KAELA AVILES DO


Assistant:  na





Pre-Operative Diagnosis


history polyps





Post-Operative Diagnosis





ascending colon polyp, diverticulosis





Procedure & Operative Findings


Date of Procedure


1/9/18


Procedure Performed/Findings


colonoscopy c hot bx polypectomy


Anesthesia Type


per mda





Estimated Blood Loss


Estimated blood loss (mL):  none





Specimens/Packing


Specimens Removed


polyp ascending











KAELA AVILES DO Jan 9, 2018 11:59

## 2018-01-09 NOTE — PROGRESS NOTE-PRE OPERATIVE
Pre-Operative Progress Note


H&P Reviewed


The H&P was reviewed, patient examined and no changes noted.


Date Seen by Provider:  Jan 9, 2018


Time Seen by Provider:  11:04


Date H&P Reviewed:  Jan 9, 2018


Time H&P Reviewed:  11:04


Pre-Operative Diagnosis:  history polyps











KAELA AVILES DO Jan 9, 2018 11:04

## 2018-01-10 NOTE — OPERATIVE REPORT
DATE OF SERVICE:  01/09/2018



PREOPERATIVE DIAGNOSIS:

History of polyps.



POSTOPERATIVE DIAGNOSES:

Ascending colon polyp, diverticulosis.



PROCEDURE:

Colonoscopy with hot biopsy polypectomy.



SURGEON:

Kaela Zavala DO.



ANESTHESIA:

Per MDA.



ESTIMATED BLOOD LOSS:

None.



COMPLICATIONS:

None.



INDICATIONS:

The patient is a 57-year-old female with history of colon polyps.  She

understands risks and benefits of procedure and wished to proceed with

procedure.  Consent was signed and in the chart.



DESCRIPTION OF PROCEDURE:

The patient was taken to the endoscopy suite, placed in left lateral recumbent

position.  Timeout was performed.  Digital rectal exam was performed.  There

were no palpable polyps, mass or ulcerations.  The scope was inserted in the

rectum and advanced all the way to the cecum with minimal difficulty.  Prep was

adequate.  Scope was then slowly retracted back.  There were no polyps, masses

or ulcerations in the cecum.  Within the ascending colon, a small polyp which

hot biopsy polypectomy was performed.  Scope was continued slowly retracted

back.  There were no polyps, masses or ulcerations within the remainder of the

ascending colon, transverse colon, descending colon and sigmoid colon.  Within

the sigmoid colon, there was a minimal amount of diverticulosis present.  Scope

was then slowly retracted back until in the rectum, where it was also

retroflexed noting no other pathology.  Scope was returned to its normal

position, slowly withdrawn until completely removed.  The patient tolerated

procedure well without any complications.  She was taken to recovery room in

stable condition.



RECOMMENDATIONS:

The patient will follow up in the office in 2 weeks to discuss pathology

results.  The patient will need repeat colonoscopy in 5 years.  If she has any

problems prior to that, she should be reevaluated at that time.





Job ID: [id number]

DocumentID: [id number]

Dictated Date:  01/09/2018 12:02:39

Transcription Date: 01/10/2018 00:36:41

Dictated By: KAELA ZAVALA DO

## 2018-01-10 NOTE — XMS REPORT
Encounter Summary

 Created on: 01/10/2018



Wendy Dunaway

External Reference #: [id number]

: 1960

Sex: Female



Demographics







 Address  [address] [place]  [address]

 

 Home Phone  [phone]

 

 Preferred Language  English

 

 Marital Status  Unknown

 

 Gnosticist Affiliation  NON

 

 Race  White

 

 Ethnic Group  Not  or 





Author







 Author  [facility]

 

 Organization  [facility]

 

 Address  Unknown

 

 Phone  Unavailable







Support







 Name  Relationship  Address  Phone

 

 , Kelly Siddiqui  [employer]  [address]

[place]  [address]  [phone]

 

 , Razia Dunaway  [employer]  Unknown  [phone]







Care Team Providers







 Care Team Member Name  Role  Phone

 

  PCP  Unavailable







Encounter Details







    



  Date   Type   Department   Care Team   Description

 

    



  2018   Documentation   [facility]   Gabino Garcia, 
PHARMD 



    [facility]   



    Pharmacy  



    [address] [place] [address]  







Social History







    



  Tobacco Use   Types   Packs/Day   Years Used   Date

 

    



  Former Smoker   Cigarettes   1   10 

 

    



  Smokeless Tobacco: Former     Quit:



  User     2003









   



  Alcohol Use   Drinks/Week   oz/Week   Comments

 

   



  No   0 Standard   0.0 



   drinks or  



   equivalent  









 



  Sex Assigned at Birth   Date Recorded

 

 



  Not on file 



as of this encounter



Functional Status







  



  Functional Status   Response   Date of Assessment

 

  



  Does the patient have a hearing impairment:   No   2017

 

  



  Does the patient have a visual impairment:   Yes   2017

 

  



  Does the patient have impaired ambulation:   No   2017

 

  



  Does the patient have an activity of daily living   No   2017



  (ADL) impairment:  

 

  



  Does the patient have an instrumental activity of   No   2017



  daily living (IADL) impairment:  









  



  Cognitive Status   Response   Date of Assessment

 

  



  Does the patient have a cognitive impairment:   No   2017



as of this encounter



Plan of Treatment





Not on fileas of this encounter



Visit Diagnoses

Not on filein this encounter

## 2018-01-10 NOTE — XMS REPORT
Clinical Summary

 Created on: 01/10/2018



Wendy Dunaway

External Reference #: [id number]

: 1960

Sex: Female



Demographics







 Address  [address] [place]  [address]

 

 Home Phone  [phone]

 

 Preferred Language  English

 

 Marital Status  Unknown

 

 Holiness Affiliation  NON

 

 Race  White

 

 Ethnic Group  Not  or 





Author







 Author  [facility]

 

 Organization  [facility]

 

 Address  Unknown

 

 Phone  Unavailable







Support







 Name  Relationship  Address  Phone

 

 , Kelly Siddiqui  ECON  [address]

[place]  [address]  [phone]

 

 , Razia Dunaway  ECON  Unknown  [phone]







Care Team Providers







 Care Team Member Name  Role  Phone

 

  PCP  Unavailable







Source Comments

Some departments are not documenting in the electronic medical record.  If you 
do not see the information that you expected, contact Release of Information in 
the Health Information Management department at [phone] for further 
assistance in locating additional records.[facility]



Allergies







    



  Active Allergy   Reactions   Severity   Noted Date   Comments

 

    



  Celecoxib   HIVES, SHORTNESS OF   Medium   2013   Throat swells.



   BREATH   

 

    



  Sulfa (Sulfonamide   HIVES   Medium   2012 



  Antibiotics)    







Current Medications







      



  Prescription   Sig.   Disp.   Refills   Start   End Date   Status



      Date  

 

      



  gabapentin (NEURONTIN)   Take 1 tab every morning,     20    Active



  600 mg tablet   2 tabs in the afternoon,     16  



   2 tabs in the evening,     



   and 1 tab at bedtime     

 

      



  QUEtiapine (SEROQUEL) 100   Take 1/2 tab every     06/15/20    Active



  mg tablet   morning, 1 tab at 1 pm, 1     16  



   tab at 5 pm and 1/2 tab     



   at bedtime.     

 

      



  PEG 3350-Electrolytes   Mix with water as   4000 mL   0   10/16/20    Active



  (GOLYTELY) oral solution   directed on package.     17  



   Drink 240ml (8oz) every     



   10 minutes until gone.     

 

      



  CARTIA  mg capsule   TK 1 C PO D    1   09/15/20    Active



      17  

 

      



  omeprazole DR(+)   TK 1 C PO QAM 30 MINUTES    0   10/16/20    Active



  (PRILOSEC) 40 mg capsule   BEFORE BREAKFAST     17  

 

      



  potassium chloride   TK 1 T PO D    6   10/12/20    Active



  (K-DUR) 10 mEq tablet      17  

 

      



  triamcinolone acetonide   JARRED TID TO LEGS    0   10/09/20    Active



  (KENALOG) 0.1 % topical      17  



  cream      

 

      



  warfarin (COUMADIN) 1 mg   TK 1 T PO  ONCE D    2   10/18/20    Active



  tablet      17  

 

      



  warfarin (COUMADIN) 5 mg   TK 1 T PO D    6   10/12/20    Active



  tablet      17  

 

      



  dasatinib(+) (SPRYCEL) 20   Take 1 tablet by mouth   30 tablet   3   20
    Active



  mg tabletIndications: CML   daily.     18  



  (chronic myelocytic      



  leukemia) (HCC)      

 

      



  dasatinib(+) (SPRYCEL) 20   Take 2 tablets by mouth   60 tablet   3   10/02/
20   01/08/20   Discontin



  mg tabletIndications: CML   daily.     17   18   ued



  (chronic myelocytic      



  leukemia) (HCC)      







Active Problems







 



  Problem   Noted Date

 

 



  History of hepatitis C   2016

 

 



  Pleural effusion   2015

 

 



  Overview:



  Traumatic left hemothorax.



  Confirmed by diagnostic / therapeutic thoracentesis





  Last Assessment & Plan:



  Evaluated by  cardiothoracic surgery, no interventions, deferred to



  pulmonary.

 

 



  Colon polyps   2015

 

 



  Overview:



  Tubular adenomas

 

 



  Cirrhosis (HCC)   2014

 

 



  Total bilirubin, elevated   2012

 

 



  Overview:



  Likely related to imatinib.





  L



  ast Assessment & Plan:



  Likely related to imatinib as it increased with the increased dosing. We



  will decrease imatinib back to 400 mg daily and have her labs checked with



  Dr. Petersen next week. Should her jaundice worsen over the weekend or she



  has diffuse itching she should present to her local ER.

 

 



  CML (chronic myelocytic leukemia) (HCC)   2012

 

 



  Overview:



  Diagnosis: CM chronic phase



  Date of Diagnosis: 2012



  Treatment:



  Imatinib 400-800 mg PO daily  to 2013: Best response not available,



  patient progressed 2013 ( Cytogenetic progression/ relapse)



  Nilotinib 200 MG PO BID: unable to tolerate, exacerbated neuropathy



  Dasatinib 100 mg PO daily: started March 15, 2013



  Response: CCR, CMR



  Dasatinib reduced to 70 mg PO daily: Oct , 2013



  7/30/15-Continue Dasatinib 70 mg po daily.



  May 2016: Dasatinib 50 mg po daily.



  Oct   2016: Dasatinib 40 mg po daily. CMR



  Oct   2017: Dasatinib 20 mg po daily.  Reduction due to recurrent pleural



  effusions





  Ms. Dunaway is a 57-year-old woman with a history of hep C infection and



  unexplained neuropathy.  She also has significant dysphagia and odynophagia



  and has significant difficulty in observing oral medication tablets.





  She was diagnosed with chronic phase CML in 2012.  She has been under



  the care of Dr. Petersen.  There has not been any outside records available



  for us to review today and most of the information available has been



  provided by the patient verbally.  Her treatment hostory is as above.



  Repeat the bone marrow biopsy did not show clonal evolution of her CML.



  Remains in CP-CML. No Kinase-domain mutations were detected.





  Since diagnoses her hep C therapy will resulted in complete eradication of



  hepatitis C virus infection with sustained molecular remission.  Her



  dysphagia and odynophagia has resolved and she is able to take oral pills.





  





  L



  ast Assessment & Plan:





  Mrs. Dunaway is here today for follow up for CML.She has been on long term



  therapy with Dasatinib.She reports compliance with medication and



  tolerating treatment well.She has had complete molecular response to



  treatment.





  Unfortunately she has recurrent pleural effusion which result in recurrent



  thoracenteses.  She has had several dose reductions but continues to have



  recurrent effusions.





  She will undergo thoracentesis locally, and she is getting cardiac and



  pulmonary care locally.



  We will dose reduce her dasatinib to 20 mg by mouth once a day especially



  in light of her complete remission.  Will continue follow-up every 3 months



  otherwise.





  If she has recurrent pleural effusions despite their major dose reductions



  and dasatinib will consider switching her to bosutinib.





  Health care maintenance-She is up to date on Mammogram and Pap Smear.She



  is due for colonoscopy in .Continue to follow up with PCP for



  health care maintenance.





  Continue to follow up with Hepatology for history of Cirrhosis.





  Patient to call if she develops unexplained fatigue, fevers,



  chills,SOB,chest pain or any other concerns.She verbalized understanding



  and agreed with plan.





  RTC in 3 months for follow up with Dr Chand.



 

 



  Iron deficiency   2012

 

 



  Overview:



  Absent iron stains on bone marrow biopsy.





  L



  ast Assessment & Plan:



  Ferritin is > 500 , rules out iron def.

 

 



  Hiatal hernia   2012

 

 



  Overview:



  Per pt, limits her ability to take po pills.





  L



  ast Assessment & Plan:



  Pt reports that her trouble swallowing is secondary to GERD symptoms and



  that her GERD has resolved. She denies any current trouble with dysphagia



  and is able to eat and drink.

 

 



  Normocytic anemia   2012

 

 



  Overview:



  Last transfusion 





  L



  ast Assessment & Plan:



  Remains well above transfusion needs. Labs will hopefully continue to



  improve with treatment. She should have a work up for iron deficiency in



  the future however.







Resolved Problems







  



  Problem   Noted Date   Resolved Date

 

  



  Thrombocythemia (HCC)   2012

 

 



  Overview:



  CHANEL-2 negative.





  L



  ast Assessment & Plan:



  Improved. Now WNL.  She has stopped hydrea.

 

  



  Hepatitis C   2012

 

 



  Overview:



  Due to prior IV drug use, untreated





  L



  ast Assessment & Plan:



  Completed HCV therapy.



  Patient has an appointment with Hepatology today.









Encounters







    



  Date   Type   Specialty   Care Team   Description

 

    



  2018   Documentation   Oncology   Gabino Garcia, PHARMD 

 

    



  2018   Telephone   Oncology   Gillian Chand MD   Medication 
Follow-up



      (Left VM with MsRuby Gume



      that the [facility] refill



      was sent to [facility]



      Specialty pharmacy. )

 

    



  2018   Orders Only   Oncology   Lashon Méndez APRN-NP   CML (
chronic myelocytic



      leukemia) (HCC) (Primary



      Dx)

 

    



  2017   Orders Only   Hepatology   Dane Platt   Cirrhosis of liver



      without ascites,



      unspecified hepatic



      cirrhosis type (HCC)

 

    



  10/31/2017   Orders Only   Hepatology   Olga Polk APRN   Cirrhosis of liver



      without ascites,



      unspecified hepatic



      cirrhosis type (HCC)



      (Primary Dx)

 

    



  10/27/2017   Orders Only   Hepatology   Dane Platt   Cirrhosis of liver



      without ascites,



      unspecified hepatic



      cirrhosis type (HCC)

 

    



  10/19/2017   Office Visit   Oncology   Gillian Chand MD   CML (chronic 
myelocytic



      leukemia) (HCC);CML



      (chronic myelocytic



      leukemia) (HCC)

 

    



  10/19/2017   Nurse Only   Oncology   Gillian Chand MD   CML (chronic 
myelocytic



      leukemia)



      (HCC);Hypothyroidism,



      unspecified type;Other



      fatigue ;Healthcare



      maintenance

 

    



  10/16/2017   Prep for Case   Hepatology   Olga Polk APRN 

 

    



  10/13/2017   Telephone   Hepatology   Olga Polk APRN   Other (call to NC)



from Last 3 Months



Immunizations







  



  Name   Dates Previously Given   Next Due

 

  



  HEP A/HEP B Combined   2015, 2015, 2015 



  Vaccine  







Family History







   



  Medical History   Relation   Name   Comments

 

   



  Coronary Artery Disease   Father    MI

 

   



  Cancer   Mother    Gallbladder cancer

 

   



  Liver Disease   Sister  









   



  Relation   Name   Status   Comments

 

   



  Brother    Alive 

 

   



  Father     

 

   



  Mother     

 

   



  Sister     

 

   



  Sister   







Social History







    



  Tobacco Use   Types   Packs/Day   Years Used   Date

 

    



  Former Smoker   Cigarettes   1   10 

 

    



  Smokeless Tobacco: Former     Quit:



  User     2003









   



  Alcohol Use   Drinks/Week   oz/Week   Comments

 

   



  No   0 Standard   0.0 



   drinks or  



   equivalent  









 



  Sex Assigned at Birth   Date Recorded

 

 



  Not on file 







Last Filed Vital Signs







  



  Vital Sign   Reading   Time Taken

 

  



  Blood Pressure   127/89   10/19/2017 10:51 AM CDT

 

  



  Pulse   78   10/19/2017 10:51 AM CDT

 

  



  Temperature   36.8   C (98.2   F)   10/19/2017 10:51 AM CDT

 

  



  Respiratory Rate   12   10/19/2017 10:51 AM CDT

 

  



  Oxygen Saturation   100%   10/19/2017 10:51 AM CDT

 

  



  Inhaled Oxygen   -   -



  Concentration  

 

  



  Weight   71 kg (156 lb 9.6 oz)   10/19/2017 10:51 AM CDT

 

  



  Height   174 cm (5' 8.5")   10/19/2017 10:51 AM CDT

 

  



  Body Mass Index   23.46   10/19/2017 10:51 AM CDT







Plan of Treatment







   



  Health Maintenance   Due Date   Last Done   Comments

 

   



  PHYSICAL (COMPREHENSIVE)   1966  



  EXAM   

 

   



  PERTUSSIS VACCINE   1970  

 

   



  TETANUS VACCINE   1976  

 

   



  CERVICAL CANCER SCREENING   1989  

 

   



  BREAST CANCER SCREENING   2017 

 

   



  INFLUENZA VACCINE   2017  

 

   



  COLORECTAL CANCER   10/21/2024   10/21/2014 



  SCREENING   







Results

* ALPHA FETO PROTEIN (AFP) (10/23/2017 10:13 AM)





  



  Component   Value   Ref Range

 

  



  Alpha Feto Protein   1.9   0.0 - 8.7 ng/ml









 



  Specimen   Performing Laboratory

 

 



  Blood   LABDE INTERFACE









 Narrative

 

 



Outside Lab Verified by [employer] [facility] on 10/31/2017.





* PROTIME INR (PT) (10/23/2017 10:13 AM)





  



  Component   Value   Ref Range

 

  



  INR   1.2 (H)   0.9 - 1.1

 

  



  Protime   14.9 (H)   11.3 - 14.1 sec









 



  Specimen   Performing Laboratory

 

 



  Blood   LABDE INTERFACE









 Narrative

 

 



Outside Lab Verified by [employer] [facility] on 10/27/2017.





* CBC AND DIFF (10/23/2017 10:13 AM)



Only the most recent of 2 results within the time period is included.





  



  Component   Value   Ref Range

 

  



  RBC   4.03   3.60 - 5.00 M/ul

 

  



  Hematocrit   38.4   36.0 - 46.0 %

 

  



  MCV   95.3   80.0 - 97.0 fl

 

  



  MCH   32 (H)   27.0 - 31.0 pg

 

  



  MCHC   33.6   32.0 - 36.0 pg

 

  



  Platelet Count   218   150 - 4001

 

  



  Lymphocytes   37.9   10.0 - 50.0 %

 

  



  Eosinophil   3.9   0.0 - 7.0 %

 

  



  Basophil   1.3   0.0 - 2.5 %

 

  



  Absolute Neutrophil Count   1.01 (L)   2.00 - 6.90 K/uL

 

  



  Absolute Lymph Count   0.88   0.60 - 3.40

 

  



  Absolute Monocyte Count   0.3   0.0 - 0.9 K/ul

 

  



  Absolute Basophil Count   0.0   0.0 - 0.2 K/ul









 



  Specimen   Performing Laboratory

 

 



  Blood   LABDE INTERFACE









 Narrative

 

 



Outside Lab Verified by [employer] [facility] on 10/27/2017.





* COMPREHENSIVE METABOLIC PANEL (10/23/2017 10:13 AM)



Only the most recent of 2 results within the time period is included.





  



  Component   Value   Ref Range

 

  



  Sodium   137   132 - 145 mEq/L

 

  



  Potassium   3.7   3.5 - 5.5 meq/l

 

  



  Chloride   105   95 - 110 mEq/L

 

  



  CO2   24.0   24.0 - 34.0 meq/l

 

  



  Anion Gap   12   6 - 14

 

  



  Glucose   71   60 - 125 mg/dL

 

  



  Creatinine   0.9   0.6 - 1.5 mg/dL

 

  



  eGFR Non African American   71   >59 ml/min/1.73m2

 

  



  Blood Urea Nitrogen   16   5 - 25 mg/dL

 

  



  Calcium   9.1   8.5 - 10.8 mg/dL

 

  



  Alk Phosphatase   64   30 - 115 U/L

 

  



  AST (SGOT)   19   0 - 40 U/L

 

  



  ALT (SGPT)   19   0 - 50 U/L

 

  



  Albumin   4.2   3.5 - 5.5 g/dL

 

  



  Total Protein   6.9   6.0 - 8.0 g/dl









 



  Specimen   Performing Laboratory

 

 



  Blood   LABDE INTERFACE









 Narrative

 

 



Outside Lab Verified by [employer] [facility] on 10/27/2017.





* THYROID STIMULATING HORMONE-TSH (10/19/2017 10:28 AM)





  



  Component   Value   Ref Range

 

  



  TSH   1.804   0.35 - 5.00 MCU/ML









 



  Specimen   Performing Laboratory

 

 



  Blood   [facility]



   [address] [place] [address]





* BCR  PCR BLOOD OR BONE MARROW (10/19/2017 10:26 AM)





  



  Component   Value   Ref Range

 

  



  Specimen   BLOOD 

 

  



  Final Diagnosis,BCR   Patient Name: Wendy Dunaway 



   Physician(s):    BRANDO Smith 



   Ordering Facility: [facility] 



   Medical Record #:    [id number] 



   Date of Birth, Sex:    1960, F 



   Collection Date:    10/19/2017 



   Received Date:    10/19/2017 



   Report Date:    10/27/2017 



   [facility] Reference #:    [id number] 



   Accession #:    [id number] 



   Test Performed 



   BCR-ABL p210 fusion gene transcript analysis 



   Specimen Type 



   Blood 



   Analytical Results 



   The BCR-ABL Mbcr b2a2 and/or b3a2 fusion gene 



   transcripts were NOT 



   DETECTED in this specimen. 



   Interpretation 



   The BCR-ABL Mbcr b2a2 and b3a2 fusion gene 



   transcripts were NOT 



   DETECTED in this specimen. The limit of detection 



   for this assay is 



   an Mbcr copy number of at least 10 and an NCN of 



   0.001%. Analysis of 



   BCR-ABL transcript levels during and/or after 



   therapy reflects the 



   level of disease suppression in patients with CML 



   and some patients 



   with AML, and is effective for monitoring 



   treatment efficiency or, in 



   some cases, the need to reassess therapy. These 



   results should be 



   interpreted only in the context of total clinical 



   information. 



   Therapeutic action should not be taken based 



   solely on these results. 



   Methodology 



   Total RNA from peripheral blood or bone marrow 



   samples was isolated 



   and reverse transcribed with random primers, and 



   the    cDNA products 



   were quantitated by real time quantitative PCR 



   (RQ-PCR). Primers and 



   probes for bcr exons b2 and b3 and abl exon 2 are 



   used in the RQ-PCR 



   to detect the Mbcr transcripts b2a2 and b3a2. A 



   set of plasmid 



   standards containing 10^1 to 10^6 copies each of 



   BCR-ABL and ABL were 



   used to generate standard curves for the 



   quantitation of BCR-ABL and 



   ABL. ABL copy numbers are used as control for the 



   quality and 



   quantity of sample RNA and to normalize the 



   BCR-ABL copy numbers. The 



   test result is expressed as BCR-ABL/ABL ratio, 



   then converted to an 



   international scale using the IS-Low Calibrator. 



   Intended Use 



   The BCR-ABL Mbcr RQ-PCR assay detects and 



   quantitates the BCR-ABL 



   Mbcr b2a2 and b3a2 (p210) fusion gene transcripts 



   which present in 



   95% of chronic myelogenous leukemia (CML) patients 



   and approximately 



   35% of Ph-positive adult acute lymphocytic 



   leukemia (ALL) patients. 



   This test does not detect other BCR-ABL fusions, 



   including e1a2 



   (p190), so should not be used in the diagnostic 



   setting. Serial 



   analysis of the Mbcr b2a2 & b3a2 transcripts can 



   be used for 



   monitoring patient response to CML treatment 



   including tyrosine 



   kinase inhibitors imatinib and dasatinib. 



   References 



   1. Leni et al.    Desirable performance 



   characteristics for BCR-ABL 



   measurement on an        international reporting 



   scale to allow 



   consistent interpretation of individual patient 



   response and 



   comparison of response rates between clinical 



   trials.    Blood 2008 



   112:7715-7926. 



   2. Katina Rosa et al. Guidelines for the 



   measurement of BCR-ABL 1 



   transscripts in chronic myeloid leukaemia. [place] 



   Journal of 



   Haematology, 153, 179-190. 



   3. Nichole Cooper et al. Establishment of the 1st 



   World Health 



   Organization International     Genetic Reference 



   Panel for 



   quantification of BCR-ABL Mrna. Blood 2010. 



   4. Sage and Lizzette.    Molecular Monitoring 



   of BCR-ABL as a guide 



   to        clinical management of chronic myeloid 



   leukaemia.    Blood. 2006: 



   20 29-41. 



   5. Sage et al. Monitoring of CML patients 



   responding to treatment 



   with tyrosine kinase inhibitors: review and 



   recommendations for 



   harmonizing current methodology for detecting 



   BCR-aBL transcripts and 



   kinase domain mutations for expressing results. 



   Blood 2006:108 (2) 



   28-37. 



   6. Syed et al.    Quantitative Monitoring 



   of BCR-ABL 



   Transcript-Suggestion of a Simplified Approach 



   Considering Inaccuracy 



   of Measurement and Calibration. Leukemia & 



   Lymphoma 2004:45 (6 



   1349-0611. 



   Disclaimer 



   This test was performed by the [facility] 



   [facility]. This test was developed and its 



   performance 



   characteristics determined by the [facility] 



   [facility]. It has not been cleared nor approved 



   by the U.S. Food and 



   Drug Administration. The FDA has determined that 



   such clearance is 



   not necessary. This test is used for clinical 



   purposes. It should not 



   be regarded as investigational or for research. 



   This laboratory is 



   certified under the Clinical Laboratory 



   Improvement Amendments of 



   1988 (CLIA-88) as qualified to perform high 



   complexity clinical 



   laboratory testing. [facility] 



   Laboratory, 



   Department of Pathology and Laboratory Medicine, 



   [facility] 



   [facility], [address], [address] 



   [address] [place], [address]. Tel: [phone]; 



   Fax: 



   [phone]. 









 



  Specimen   Performing Laboratory

 

 



  Blood   REFERENCE LAB





from Last 3 Months

## 2018-01-10 NOTE — XMS REPORT
Encounter Summary

 Created on: 01/10/2018



Gume Wendy AGUILERA

External Reference #: [id number]

: 1960

Sex: Female



Demographics







 Address  [address] [place]  [address]

 

 Home Phone  [phone]

 

 Preferred Language  English

 

 Marital Status  Unknown

 

 Zoroastrian Affiliation  NON

 

 Race  White

 

 Ethnic Group  Not  or 





Author







 Author  [facility]

 

 Organization  [facility]

 

 Address  Unknown

 

 Phone  Unavailable







Support







 Name  Relationship  Address  Phone

 

 , Kelly Siddiqui  ECON  [address]

[place]  [address]  [phone]

 

 , Razia Dunaway  ECON  Unknown  [phone]







Care Team Providers







 Care Team Member Name  Role  Phone

 

  PCP  Unavailable







Encounter Details







    



  Date   Type   Department   Care Team   Description

 

    



  2018   Orders Only   [facility]   Lashon Méndez, APRN-
NP   CML (chronic myelocytic



    [facility] Ex[facility]   [address] [facility]   leukemia) (HCC) (
Primary



    [address] [place] [address]   Dx)



    [place], [place]    [phone] (Fax) 







Social History







    



  Tobacco Use   Types   Packs/Day   Years Used   Date

 

    



  Former Smoker   Cigarettes   1   10 

 

    



  Smokeless Tobacco: Former     Quit:



  User     2003









   



  Alcohol Use   Drinks/Week   oz/Week   Comments

 

   



  No   0 Standard   0.0 



   drinks or  



   equivalent  









 



  Sex Assigned at Birth   Date Recorded

 

 



  Not on file 



as of this encounter



Functional Status







  



  Functional Status   Response   Date of Assessment

 

  



  Does the patient have a hearing impairment:   No   2017

 

  



  Does the patient have a visual impairment:   Yes   2017

 

  



  Does the patient have impaired ambulation:   No   2017

 

  



  Does the patient have an activity of daily living   No   2017



  (ADL) impairment:  

 

  



  Does the patient have an instrumental activity of   No   2017



  daily living (IADL) impairment:  









  



  Cognitive Status   Response   Date of Assessment

 

  



  Does the patient have a cognitive impairment:   No   2017



as of this encounter



Plan of Treatment





Not on fileas of this encounter



Visit Diagnoses











  Diagnosis

 





  CML (chronic myelocytic leukemia) (HCC) - Primary

 





  Chronic myeloid leukemia, without mention of having achieved remission



in this encounter

## 2018-01-10 NOTE — XMS REPORT
Encounter Summary

 Created on: 01/10/2018



GumeWendy

External Reference #: [id number]

: 1960

Sex: Female



Demographics







 Address  [address] [place]  [address]

 

 Home Phone  [phone]

 

 Preferred Language  English

 

 Marital Status  Unknown

 

 Cheondoism Affiliation  NON

 

 Race  White

 

 Ethnic Group  Not  or 





Author







 Author  [facility]

 

 Organization  [facility]

 

 Address  Unknown

 

 Phone  Unavailable







Support







 Name  Relationship  Address  Phone

 

 , Kelly Siddiqui  ECON  [address]

[place]  [address]  [phone]

 

 , Razia Dunaway  ECON  Unknown  [phone]







Care Team Providers







 Care Team Member Name  Role  Phone

 

  PCP  Unavailable







Encounter Details







    



  Date   Type   Department   Care Team   Description

 

    



  2017   Orders Only   Dane Aleman   Cirrhosis of liver



    Trans[facility]at[facility]    without ascites,



    [facility]    unspecified hepatic



    [address]    cirrhosis type (HCC)



    [facility]  



    [place]  



    [address] [phone]  







Social History







    



  Tobacco Use   Types   Packs/Day   Years Used   Date

 

    



  Former Smoker   Cigarettes   1   10 

 

    



  Smokeless Tobacco: Former     Quit:



  User     2003









   



  Alcohol Use   Drinks/Week   oz/Week   Comments

 

   



  No   0 Standard   0.0 



   drinks or  



   equivalent  









 



  Sex Assigned at Birth   Date Recorded

 

 



  Not on file 



as of this encounter



Functional Status







  



  Functional Status   Response   Date of Assessment

 

  



  Does the patient have a hearing impairment:   No   2017

 

  



  Does the patient have a visual impairment:   Yes   2017

 

  



  Does the patient have impaired ambulation:   No   2017

 

  



  Does the patient have an activity of daily living   No   2017



  (ADL) impairment:  

 

  



  Does the patient have an instrumental activity of   No   2017



  daily living (IADL) impairment:  









  



  Cognitive Status   Response   Date of Assessment

 

  



  Does the patient have a cognitive impairment:   No   2017



as of this encounter



Plan of Treatment





Not on fileas of this encounter



Results

* ALPHA FETO PROTEIN (AFP) (10/23/2017 10:13 AM)





  



  Component   Value   Ref Range

 

  



  Alpha Feto Protein   1.9   0.0 - 8.7 ng/ml









 



  Specimen   Performing Laboratory

 

 



  Blood   LABDE INTERFACE









 Narrative

 

 



Outside Lab Verified by Dane Platt on 10/31/2017.





in this encounter



Visit Diagnoses











  Diagnosis

 





  Cirrhosis of liver without ascites, unspecified hepatic cirrhosis type (HCC)



in this encounter

## 2018-01-10 NOTE — XMS REPORT
Encounter Summary

 Created on: 01/10/2018



Wendy Dunaway

External Reference #: [id number]

: 1960

Sex: Female



Demographics







 Address  [address] [place]  [address]

 

 Home Phone  [phone]

 

 Preferred Language  English

 

 Marital Status  Unknown

 

 Evangelical Affiliation  NON

 

 Race  White

 

 Ethnic Group  Not  or 





Author







 Author  [facility]

 

 Organization  [facility]

 

 Address  Unknown

 

 Phone  Unavailable







Support







 Name  Relationship  Address  Phone

 

 , Kelly Siddiqui  [employer]  [address]

[place]  [address]  [phone]

 

 , Razia Dunaway  [employer]  Unknown  [phone]







Care Team Providers







 Care Team Member Name  Role  Phone

 

  PCP  Unavailable







Reason for Visit

* 





 



  Reason   Comments

 

 



  Medication Follow-up   Left VM with Ms. Dunaway that the [facility] refill was 
sent to [facility]



   Specialty pharmacy.









Encounter Details







    



  Date   Type   Department   Care Team   Description

 

    



  2018   Telephone   [facility]   Gillian Chand MD   
Medication Follow-up



    [facility]   [address]   (Left VM with Ms. Dunaway



    [address]   [address]   that the [facility] [place]ill



    [place] [address]   [place] [address]   was sent to [facility]



    [phone]   Specialty pharmacy. )



     [phone] (Fax) 







Social History







    



  Tobacco Use   Types   Packs/Day   Years Used   Date

 

    



  Former Smoker   Cigarettes   1   10 

 

    



  Smokeless Tobacco: Former     Quit:



  User     2003









   



  Alcohol Use   Drinks/Week   oz/Week   Comments

 

   



  No   0 Standard   0.0 



   drinks or  



   equivalent  









 



  Sex Assigned at Birth   Date Recorded

 

 



  Not on file 



as of this encounter



Functional Status







  



  Functional Status   Response   Date of Assessment

 

  



  Does the patient have a hearing impairment:   No   2017

 

  



  Does the patient have a visual impairment:   Yes   2017

 

  



  Does the patient have impaired ambulation:   No   2017

 

  



  Does the patient have an activity of daily living   No   2017



  (ADL) impairment:  

 

  



  Does the patient have an instrumental activity of   No   2017



  daily living (IADL) impairment:  









  



  Cognitive Status   Response   Date of Assessment

 

  



  Does the patient have a cognitive impairment:   No   2017



as of this encounter



Plan of Treatment





Not on fileas of this encounter



Visit Diagnoses

Not on filein this encounter

## 2018-01-10 NOTE — XMS REPORT
Continuity of Care Document

 Created on: 01/10/2018



DARIN BEE

External Reference #: [id number]

: 1960

Sex: Female



Demographics







 Address  [address] [place]  [address]

 

 Home Phone  [phone]

 

 Preferred Language  Unknown

 

 Marital Status  Unknown

 

 Yarsanism Affiliation  Unknown

 

 Race  Unknown

 

 Ethnic Group  Unknown





Author







 Author  [facility]

 

 Organization  [facility]

 

 Address  Unknown

 

 Phone  Unavailable







Care Team Providers







 Care Team Member Name  Role  Phone

 

 [facility]  Unavailable  Unavailable



                                    



Problems

                                                                



Medications

                                                                



Allergies, Adverse Reactions, Alerts

                                                        



Immunizations

                                                                



Results

                                                                



Vital Signs

                                                                                



Encounters

                    





 Location                          Location Details                          
Encounter Type                          Encounter Number                        
  Reason For Visit                          Attending Provider                 
         ADM Date                          DC Date                          
Status                          Source                    

 

                                                     [place] [facility]                 
         [id number]                                                    
MAGUI MCALLISTER                           2017                          Active                          [facility] 
[facility]                    

 

                                                     OUTPATIENT                
          [id number]                                                    DEIDRE QUEZADA                           2017  
                        Active                          [facility]                    

 

                                                     OUTPATIENT                
          [id number]                                                    DEIDRE QUEZADA                           2017  
                        Active                          [facility]                    

 

                                                     [place] SERIES                 
         [id number]                                                    
MAGUI MCALLISTER                           10/19/2017                        
  10/19/2017                          Active                          [facility] 
[facility]                    

 

                                                     O                         
                                                                               
                                                      Active                   
       [facility]                    



                                                                               
                                         



Procedures

                                                                



Plan of Care

                                                                



Social History

                                                                        



Assessment and Plan

                                                                



Family History

                                                                



Advance Directives

                                                                



Functional Status

## 2018-01-10 NOTE — XMS REPORT
Encounter Summary

 Created on: 01/10/2018



Wendy Dunaway

External Reference #: [id number]

: 1960

Sex: Female



Demographics







 Address  [address] [place]  [address]

 

 Home Phone  [phone]

 

 Preferred Language  English

 

 Marital Status  Unknown

 

 Taoism Affiliation  NON

 

 Race  White

 

 Ethnic Group  Not  or 





Author







 Author  [facility]

 

 Organization  [facility]

 

 Address  Unknown

 

 Phone  Unavailable







Support







 Name  Relationship  Address  Phone

 

 , Kelly Siddiqui  [employer]  [address]

[place]  [address]  [phone]

 

 , Razia Dunaway  [employer]  Unknown  [phone]







Care Team Providers







 Care Team Member Name  Role  Phone

 

  PCP  Unavailable







Encounter Details







    



  Date   Type   Department   Care Team   Description

 

    



  10/19/2017   Nurse Only   [facility]   Gillian Chand MD 
  CML (chronic myelocytic



    [facility]   [address]   leukemia)



    [address]   [address]   (HCC);Hypothyroidism,



    [place] [address]   [place] [address]   unspecified type;Other



    [phone]   fatigue ;Healthcare



     [phone] (Fax)   maintenance







Social History







    



  Tobacco Use   Types   Packs/Day   Years Used   Date

 

    



  Former Smoker   Cigarettes   1   10 

 

    



  Smokeless Tobacco: Former     Quit:



  User     2003









   



  Alcohol Use   Drinks/Week   oz/Week   Comments

 

   



  No   0 Standard   0.0 



   drinks or  



   equivalent  









 



  Sex Assigned at Birth   Date Recorded

 

 



  Not on file 



as of this encounter



Functional Status







  



  Functional Status   Response   Date of Assessment

 

  



  Does the patient have a hearing impairment:   No   2017

 

  



  Does the patient have a visual impairment:   Yes   2017

 

  



  Does the patient have impaired ambulation:   No   2017

 

  



  Does the patient have an activity of daily living   No   2017



  (ADL) impairment:  

 

  



  Does the patient have an instrumental activity of   No   2017



  daily living (IADL) impairment:  









  



  Cognitive Status   Response   Date of Assessment

 

  



  Does the patient have a cognitive impairment:   No   2017



as of this encounter



Plan of Treatment





Not on fileas of this encounter



Results

* THYROID STIMULATING HORMONE-TSH (10/19/2017 10:28 AM)





  



  Component   Value   Ref Range

 

  



  TSH   1.804   0.35 - 5.00 MCU/ML









 



  Specimen   Performing Laboratory

 

 



  Blood   [facility]



   [address] [place]



   [place] [address]





* COMPREHENSIVE METABOLIC PANEL (10/19/2017 10:28 AM)





  



  Component   Value   Ref Range

 

  



  Sodium   134 (L)   137 - 147 MMOL/L

 

  



  Potassium   3.5   3.5 - 5.1 MMOL/L

 

  



  Chloride   101   98 - 110 MMOL/L

 

  



  Glucose   98   70 - 100 MG/DL

 

  



  Blood Urea Nitrogen   17   7 - 25 MG/DL

 

  



  Creatinine   0.97   0.4 - 1.00 MG/DL

 

  



  Calcium   9.3   8.5 - 10.6 MG/DL

 

  



  Total Protein   8.0   6.0 - 8.0 G/DL

 

  



  Total Bilirubin   0.5   0.3 - 1.2 MG/DL

 

  



  Albumin   4.5   3.5 - 5.0 G/DL

 

  



  Alk Phosphatase   70   25 - 110 U/L

 

  



  AST (SGOT)   26   7 - 40 U/L

 

  



  CO2   29   21 - 30 MMOL/L

 

  



  ALT (SGPT)   23   7 - 56 U/L

 

  



  Anion Gap   4   3 - 12

 

  



  eGFR Non    59 (L)   >60 mL/min



   Comment: 



   The eGFR is not validated for use in drug dosing 



   adjustments.    Continue to use 



   estimated creatinine clearance per dosing 



   reference text.    Please contact the 



   Clinical Pharmacist for questions. 

 

  



  eGFR    >60   >60 mL/min



   Comment: 



   The eGFR is not validated for use in drug dosing 



   adjustments.    Continue to use 



   estimated creatinine clearance per dosing 



   reference text.    Please contact the 



   Clinical Pharmacist for questions. 









 



  Specimen   Performing Laboratory

 

 



  Blood   [facility]



   [address] [place] [address]





* CBC AND DIFF (10/19/2017 10:28 AM)





  



  Component   Value   Ref Range

 

  



  White Blood Cells   5.2   4.5 - 11.0 K/UL

 

  



  RBC   4.56   4.0 - 5.0 M/UL

 

  



  Hemoglobin   14.4   12.0 - 15.0 GM/DL

 

  



  Hematocrit   42.0   36 - 45 %

 

  



  MCV   92.1   80 - 100 FL

 

  



  MCH   31.5   26 - 34 PG

 

  



  MCHC   34.3   32.0 - 36.0 G/DL

 

  



  RDW   16.2 (H)   11 - 15 %

 

  



  Platelet Count   202   150 - 400 K/UL

 

  



  MPV   7.4   7 - 11 FL

 

  



  Neutrophils   53   41 - 77 %

 

  



  Lymphocytes   35   24 - 44 %

 

  



  Monocytes   9   4 - 12 %

 

  



  Eosinophils   2   0 - 5 %

 

  



  Basophils   1   0 - 2 %

 

  



  Absolute Neutrophil Count   2.80   1.8 - 7.0 K/UL

 

  



  Absolute Lymph Count   1.90   1.0 - 4.8 K/UL

 

  



  Absolute Monocyte Count   0.50   0 - 0.80 K/UL

 

  



  Absolute Eosinophil Count   0.10   0 - 0.45 K/UL

 

  



  Absolute Basophil Count   0.00   0 - 0.20 K/UL









 



  Specimen   Performing Laboratory

 

 



  Blood   [facility]



   [address] [place] [address]





* BCR  PCR BLOOD OR BONE MARROW (10/19/2017 10:26 AM)





  



  Component   Value   Ref Range

 

  



  Specimen   BLOOD 

 

  



  Final Diagnosis,BCR   Patient Name: Wendy Dunaway 



   Physician(s):    BRANDO Smith 



   Ordering Facility: [facility] 



   Medical Record #:    [id number] 



   Date of Birth, Sex:    1960, F 



   Collection Date:    10/19/2017 



   Received Date:    10/19/2017 



   Report Date:    10/27/2017 



   CM Reference #:    [id number] 



   Accession #:    [id number] 



   Test Performed 



   BCR-ABL p210 fusion gene transcript analysis 



   Specimen Type 



   Blood 



   Analytical Results 



   The BCR-ABL Mbcr b2a2 and/or b3a2 fusion gene 



   transcripts were NOT 



   DETECTED in this specimen. 



   Interpretation 



   The BCR-ABL Mbcr b2a2 and b3a2 fusion gene 



   transcripts were NOT 



   DETECTED in this specimen. The limit of detection 



   for this assay is 



   an Mbcr copy number of at least 10 and an NCN of 



   0.001%. Analysis of 



   BCR-ABL transcript levels during and/or after 



   therapy reflects the 



   level of disease suppression in patients with CML 



   and some patients 



   with AML, and is effective for monitoring 



   treatment efficiency or, in 



   some cases, the need to reassess therapy. These 



   results should be 



   interpreted only in the context of total clinical 



   information. 



   Therapeutic action should not be taken based 



   solely on these results. 



   Methodology 



   Total RNA from peripheral blood or bone marrow 



   samples was isolated 



   and reverse transcribed with random primers, and 



   the    cDNA products 



   were quantitated by real time quantitative PCR 



   (RQ-PCR). Primers and 



   probes for bcr exons b2 and b3 and abl exon 2 are 



   used in the RQ-PCR 



   to detect the Mbcr transcripts b2a2 and b3a2. A 



   set of plasmid 



   standards containing 10^1 to 10^6 copies each of 



   BCR-ABL and ABL were 



   used to generate standard curves for the 



   quantitation of BCR-ABL and 



   ABL. ABL copy numbers are used as control for the 



   quality and 



   quantity of sample RNA and to normalize the 



   BCR-ABL copy numbers. The 



   test result is expressed as BCR-ABL/ABL ratio, 



   then converted to an 



   international scale using the IS-Low Calibrator. 



   Intended Use 



   The BCR-ABL Mbcr RQ-PCR assay detects and 



   quantitates the BCR-ABL 



   Mbcr b2a2 and b3a2 (p210) fusion gene transcripts 



   which present in 



   95% of chronic myelogenous leukemia (CML) patients 



   and approximately 



   35% of Ph-positive adult acute lymphocytic 



   leukemia (ALL) patients. 



   This test does not detect other BCR-ABL fusions, 



   including e1a2 



   (p190), so should not be used in the diagnostic 



   setting. Serial 



   analysis of the Mbcr b2a2 & b3a2 transcripts can 



   be used for 



   monitoring patient response to CML treatment 



   including tyrosine 



   kinase inhibitors imatinib and dasatinib. 



   References 



   1. Leni et al.    Desirable performance 



   characteristics for BCR-ABL 



   measurement on an        international reporting 



   scale to allow 



   consistent interpretation of individual patient 



   response and 



   comparison of response rates between clinical 



   trials.    Blood 2008 



   112:1226-0832. 



   2. Katina Rosa et al. Guidelines for the 



   measurement of BCR-ABL 1 



   transscripts in chronic myeloid leukaemia. [place] 



   Journal of 



   Haematology, 153, 179-190. 



   3. Nichole Cooper et al. Establishment of the 1st 



   World Health 



   Organization International     Genetic Reference 



   Panel for 



   quantification of BCR-ABL Mrna. Blood 2010. 



   4. Daniel.    Molecular Monitoring 



   of BCR-ABL as a guide 



   to        clinical management of chronic myeloid 



   leukaemia.    Blood. 2006: 



   20 29-41. 



   5. Sage et al. Monitoring of CML patients 



   responding to treatment 



   with tyrosine kinase inhibitors: review and 



   recommendations for 



   harmonizing current methodology for detecting 



   BCR-aBL transcripts and 



   kinase domain mutations for expressing results. 



   Blood 2006:108 (1) 



   28-37. 



   6. Syed et al.    Quantitative Monitoring 



   of BCR-ABL 



   Transcript-Suggestion of a Simplified Approach 



   Considering Inaccuracy 



   of Measurement and Calibration. Leukemia & 



   Lymphoma 2004:45 6 



   8326-2615. 



   Disclaimer 



   This test was performed by the [facility] 



   [facility]. This test was developed and its 



   performance 



   characteristics determined by the [facility] 



   [facility]. It has not been cleared nor approved 



   by the U.S. Food and 



   Drug Administration. The FDA has determined that 



   such clearance is 



   not necessary. This test is used for clinical 



   purposes. It should not 



   be regarded as investigational or for research. 



   This laboratory is 



   certified under the Clinical Laboratory 



   Improvement Amendments of 



   1988 (CLIA-88) as qualified to perform high 



   complexity clinical 



   laboratory testing. [facility] 



   Laboratory, 



   Department of Pathology and Laboratory Medicine, 



   [facility] 



   [facility], [address] 



   [address], [address] [place], [address]. Tel: [phone]; 



   Fax: 



   [phone]. 









 



  Specimen   Performing Laboratory

 

 



  Blood   [facility]





in this encounter



Visit Diagnoses











  Diagnosis

 





  CML (chronic myelocytic leukemia) (HCC)

 





  Chronic myeloid leukemia, without mention of having achieved remission

 





  Hypothyroidism, unspecified type

 





  Other fatigue

 





  Healthcare maintenance

 





  Routine general medical examination at a health care facility



in this encounter

## 2018-01-10 NOTE — XMS REPORT
Encounter Summary

 Created on: 01/10/2018



GumeWendy

External Reference #: [id number]

: 1960

Sex: Female



Demographics







 Address  [address] [place]  [address]

 

 Home Phone  [phone]

 

 Preferred Language  English

 

 Marital Status  Unknown

 

 Sikhism Affiliation  NON

 

 Race  White

 

 Ethnic Group  Not  or 





Author







 Author  [facility]

 

 Organization  [facility]

 

 Address  Unknown

 

 Phone  Unavailable







Support







 Name  Relationship  Address  Phone

 

 , Kelly Siddiqui  [facility]  [address]

[place]  [address]  [phone]

 

 , Razia Dunaway  [facility]  Unknown  [phone]







Care Team Providers







 Care Team Member Name  Role  Phone

 

  PCP  Unavailable







Encounter Details







    



  Date   Type   Department   Care Team   Description

 

    



  10/27/2017   Orders Only   [facility]   Cirrhosis of liver



    [facility]at[facility]    without ascites,



    [facility]    unspecified hepatic



    [address]    cirrhosis type (HCC)



    [facility]  



    [place]  



    [address] [phone]  







Social History







    



  Tobacco Use   Types   Packs/Day   Years Used   Date

 

    



  Former Smoker   Cigarettes   1   10 

 

    



  Smokeless Tobacco: Former     Quit:



  User     2003









   



  Alcohol Use   Drinks/Week   oz/Week   Comments

 

   



  No   0 Standard   0.0 



   drinks or  



   equivalent  









 



  Sex Assigned at Birth   Date Recorded

 

 



  Not on file 



as of this encounter



Functional Status







  



  Functional Status   Response   Date of Assessment

 

  



  Does the patient have a hearing impairment:   No   2017

 

  



  Does the patient have a visual impairment:   Yes   2017

 

  



  Does the patient have impaired ambulation:   No   2017

 

  



  Does the patient have an activity of daily living   No   2017



  (ADL) impairment:  

 

  



  Does the patient have an instrumental activity of   No   2017



  daily living (IADL) impairment:  









  



  Cognitive Status   Response   Date of Assessment

 

  



  Does the patient have a cognitive impairment:   No   2017



as of this encounter



Plan of Treatment





Not on fileas of this encounter



Results

* PROTIME INR (PT) (10/23/2017 10:13 AM)





  



  Component   Value   Ref Range

 

  



  INR   1.2 (H)   0.9 - 1.1

 

  



  Protime   14.9 (H)   11.3 - 14.1 sec









 



  Specimen   Performing Laboratory

 

 



  Blood   LABDE INTERFACE









 Narrative

 

 



Outside Lab Verified by Dane Platt on 10/27/2017.





* COMPREHENSIVE METABOLIC PANEL (10/23/2017 10:13 AM)





  



  Component   Value   Ref Range

 

  



  Sodium   137   132 - 145 mEq/L

 

  



  Potassium   3.7   3.5 - 5.5 meq/l

 

  



  Chloride   105   95 - 110 mEq/L

 

  



  CO2   24.0   24.0 - 34.0 meq/l

 

  



  Anion Gap   12   6 - 14

 

  



  Glucose   71   60 - 125 mg/dL

 

  



  Creatinine   0.9   0.6 - 1.5 mg/dL

 

  



  eGFR Non African American   71   >59 ml/min/1.73m2

 

  



  Blood Urea Nitrogen   16   5 - 25 mg/dL

 

  



  Calcium   9.1   8.5 - 10.8 mg/dL

 

  



  Alk Phosphatase   64   30 - 115 U/L

 

  



  AST (SGOT)   19   0 - 40 U/L

 

  



  ALT (SGPT)   19   0 - 50 U/L

 

  



  Albumin   4.2   3.5 - 5.5 g/dL

 

  



  Total Protein   6.9   6.0 - 8.0 g/dl









 



  Specimen   Performing Laboratory

 

 



  Blood   LABDE INTERFACE









 Narrative

 

 



Outside Lab Verified by [facility] on 10/27/2017.





* CBC AND DIFF (10/23/2017 10:13 AM)





  



  Component   Value   Ref Range

 

  



  RBC   4.03   3.60 - 5.00 M/ul

 

  



  Hematocrit   38.4   36.0 - 46.0 %

 

  



  MCV   95.3   80.0 - 97.0 fl

 

  



  MCH   32 (H)   27.0 - 31.0 pg

 

  



  MCHC   33.6   32.0 - 36.0 pg

 

  



  Platelet Count   218   150 - 4001

 

  



  Lymphocytes   37.9   10.0 - 50.0 %

 

  



  Eosinophil   3.9   0.0 - 7.0 %

 

  



  Basophil   1.3   0.0 - 2.5 %

 

  



  Absolute Neutrophil Count   1.01 (L)   2.00 - 6.90 K/uL

 

  



  Absolute Lymph Count   0.88   0.60 - 3.40

 

  



  Absolute Monocyte Count   0.3   0.0 - 0.9 K/ul

 

  



  Absolute Basophil Count   0.0   0.0 - 0.2 K/ul









 



  Specimen   Performing Laboratory

 

 



  Blood   LABDE INTERFACE









 Narrative

 

 



Outside Lab Verified by [facility] on 10/27/2017.





in this encounter



Visit Diagnoses











  Diagnosis

 





  Cirrhosis of liver without ascites, unspecified hepatic cirrhosis type (HCC)



in this encounter

## 2018-01-10 NOTE — XMS REPORT
Continuity of Care Document

 Created on: 01/10/2018



DARIN BEE

External Reference #: [id number]

: 1960

Sex: Female



Demographics







 Address  [address] [place]  [address]

 

 Home Phone  [phone] x

 

 Preferred Language  Unknown

 

 Marital Status  Unknown

 

 Jainism Affiliation  Unknown

 

 Race  Unknown

 

 Ethnic Group  Unknown





Author







 Author  [facility]

 

 Organization  [facility]

 

 Address  Unknown

 

 Phone  Unavailable



              



Allergies

      





 Active            Description            Code            Type            
Severity            Reaction            Onset            Reported/Identified   
         Relationship to Patient            Clinical Status        

 

 Yes            sulfamethoxazole            [id number]            Drug Allergy 
           Severe            RASH                         2018           
                       

 

 Yes            trimethoprim            [id number]            Drug Allergy     
       Severe            RASH                         2018               
                   

 

 Yes            celecoxib            [id number]            Drug Allergy        
    Moderate            hives                         2018               
                   

 

 Yes            Sulfa (Sulfonamide Antibiotics)            [id number]          
  Drug Allergy            Mild            N/A                         2018                                  

 

 Yes            zolpidem            [id number]            Drug Allergy         
   Mild            skin sensation                         2018           
                       



                          



Medications

      



There is no data.                  



Problems

      





 Date Dx Coded            Attending            Type            Code            
Diagnosis            Diagnosed By        

 

 2012                         Ot            724.2            LUMBAGO     
                

 

 2012                         Ot            041.49            OTHER AND 
UNSPECIFIED ESCHERICHIA COLI [                     

 

 2012                         Ot            054.9            HERPES 
SIMPLEX NOS                     

 

 2012                         Ot            070.70            UNSPECIFIED 
VIRAL HEPATITIS C WITHOUT HE                     

 

 2012                         Ot            205.10            CHRONIC 
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                     

 

 2012                         Ot            238.79            OTHER 
LYMPHATIC AND HEMATOPOIETIC TISSUE                     

 

 2012                         Ot            280.9            IRON DEFIC 
ANEMIA NOS                     

 

 2012                         Ot            300.00            ANXIETY 
STATE NOS                     

 

 2012                         Ot            311            DEPRESSIVE 
DISORDER NEC                     

 

 2012                         Ot            536.8            STOMACH 
FUNCTION DIS NEC                     

 

 2012                         Ot            571.8            CHRONIC 
LIVER DIS NEC                     

 

 2012                         Ot            599.0            URIN TRACT 
INFECTION NOS                     

 

 2012                         Ot            784.99            OTHER 
SYMPTOMS INVOLVING HEAD AND NECK                     

 

 10/25/2012                         Ot            285.9            ANEMIA NOS  
                   

 

 10/25/2012                         Ot            V58.69            OTH MED,LT,
CURRENT USE                     

 

 2013                         Ot            070.70            UNSPECIFIED 
VIRAL HEPATITIS C WITHOUT HE                     

 

 2013                         Ot            205.10            CHRONIC 
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                     

 

 2013                         Ot            238.71            ESSENTIAL 
THROMBOCYTHEMIA                     

 

 2013                         Ot            285.9            ANEMIA NOS  
                   

 

 2013                         Ot            288.00            NEUTROPENIA
, UNSPECIFIED                     

 

 2013                         Ot            530.81            ESOPHAGEAL 
REFLUX                     

 

 2013                         Ot            553.3            
DIAPHRAGMATIC HERNIA                     

 

 2013                         Ot            V58.69            OTH MED,LT,
CURRENT USE                     

 

 2013                         Ot            205.10            CHRONIC 
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                     

 

 2013                         Ot            461.9            ACUTE 
SINUSITIS NOS                     

 

 2013                         Ot            784.0            HEADACHE    
                 

 

 2013                         Ot            070.70            UNSPECIFIED 
VIRAL HEPATITIS C WITHOUT HE                     

 

 2013                         Ot            205.10            CHRONIC 
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                     

 

 2013                         Ot            238.71            ESSENTIAL 
THROMBOCYTHEMIA                     

 

 2013                         Ot            288.00            NEUTROPENIA
, UNSPECIFIED                     

 

 2013                         Ot            530.81            ESOPHAGEAL 
REFLUX                     

 

 2013                         Ot            553.3            
DIAPHRAGMATIC HERNIA                     

 

 2013                         Ot            V58.69            OTH MED,LT,
CURRENT USE                     

 

 2013                         Ot            382.9            OTITIS MEDIA 
NOS                     

 

 2013                         Ot            723.1            CERVICALGIA 
                    

 

 2013                         Ot            784.1            THROAT PAIN 
                    

 

 2013                         Ot            205.10            CHRONIC 
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                     

 

 2014            KATHRYN HOLLAND DO            Ot            205.10 
           CHRONIC MYELOID LEUKEMIA, W/O MENTION AC                     

 

 2014            KATHRYN HOLLAND DO            Ot            300.00 
           ANXIETY STATE NOS                     

 

 2014            KATHRYN HOLLAND DO            Ot            477.9  
          ALLERGIC RHINITIS NOS                     

 

 2014            KATHRYN HOLLAND DO            Ot            511.9  
          PLEURAL EFFUSION NOS                     

 

 2014            KATHRYN HOLLAND DO            Ot            530.81 
           ESOPHAGEAL REFLUX                     

 

 2014            KATHRYN HOLLAND DO            Ot            558.9  
          NONINF GASTROENTERIT NEC                     

 

 2014            KATHRYN HOLLAND DO            Ot            577.0  
          ACUTE PANCREATITIS                     

 

 2014            KATHRYN HOLLAND DO            Ot            V12.09 
           PERSONAL HISTORY OTH SPEC INFECT   MAYRA                     

 

 2014            KATHRYN HOLLAND DO            Ot            V15.82 
           HISTORY OF TOBACCO USE                     

 

 2014            KATHRYN HOLLAND DO            Ot            V17.3  
          FAM HX-ISCHEM HEART DIS                     

 

 2014            LUKE MAHAN DO            Ot            511.9       
     PLEURAL EFFUSION NOS                     

 

 10/03/2014            KATHRYN HOLLAND DO            Ot            205.10 
           CHRONIC MYELOID LEUKEMIA, W/O MENTION AC                     

 

 10/03/2014            KATHRYN HOLLAND DO            Ot            300.00 
           ANXIETY STATE NOS                     

 

 10/03/2014            KATHRYN HOLLAND DO            Ot            511.9  
          PLEURAL EFFUSION NOS                     

 

 10/03/2014            KATHRYN HOLLAND DO            Ot            530.81 
           ESOPHAGEAL REFLUX                     

 

 10/03/2014            KATHRYN HOLLAND DO            Ot            564.00 
           UNSPEC CONSTIPATION                     

 

 10/03/2014            KATHRYN HOLLAND DO            Ot            569.9  
          INTESTINAL DISORDER NOS                     

 

 10/03/2014            KATHRYN HOLLAND DO            Ot            789.00 
           ABDOMINAL PAIN, UNSPECIFIED SITE                     

 

 10/03/2014            KATHRYN HOLLAND DO            Ot            V15.82 
           HISTORY OF TOBACCO USE                     

 

 2014            KAELA AVILES DO            Ot            511.9       
     PLEURAL EFFUSION NOS                     

 

 2014            KAELA AVILES DO            Ot            807.05      
      FRACTURE FIVE RIBS-CLOSE                     

 

 2014            KAELA AVILES DO            Ot            860.0       
     TRAUM PNEUMOTHORAX-CLOSE                     

 

 2014            KAELA AVILES DO            Ot            861.21      
      LUNG CONTUSION-CLOSED                     

 

 2014            KAELA AVILSE DO            Ot            873.49      
      OPEN WOUND OF FACE NEC                     

 

 2014            KAELA AVILES DO            Ot            E000.8      
      OTHER EXTERNAL CAUSE STATUS                     

 

 2014            KAELA AVILES DO            Ot            E812.1      
      MV COLLISION NOS-PASNGR                     

 

 2015            DEIDRE QUEZADA ARN            Ot            070.70      
                            

 

 2015            DEIDRE QUEZADA ARN            Ot            571.5       
                           

 

 2015            DEIDRE QUEZADA ARNP            Ot            070.70      
                            

 

 2015            DEIDRE QUEZADA ARNP            Ot            571.5       
                           

 

 2015            KATHRYN HOLLAND DO            Ot            729.5  
                                

 

 2015            KATHRYN HOLLAND DO            Ot            729.81 
                                 

 

 2015            KATHRYN HOLLAND DO            Ot            729.5  
                                

 

 2015            KATHRYN HOLLAND DO            Ot            729.81 
                                 

 

 2016                         Ot            070.70            UNSPECIFIED 
VIRAL HEPATITIS C WITHOUT HE                     

 

 2016                         Ot            780.60            FEVER, 
UNSPECIFIED                     

 

 2016                         Ot            780.79            OTH MALAISE 
FATIGUE                     

 

 2016                         Ot            782.4            JAUNDICE NOS
                     

 

 2016                         Ot            791.9            ABN URINE 
FINDINGS NEC                     

 

 2016                         Ot            070.70            UNSPECIFIED 
VIRAL HEPATITIS C WITHOUT HE                     

 

 2016                         Ot            205.10            CHRONIC 
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                     

 

 2016                         Ot            238.71            ESSENTIAL 
THROMBOCYTHEMIA                     

 

 2016                         Ot            285.9            ANEMIA NOS  
                   

 

 2016                         Ot            288.00            NEUTROPENIA
, UNSPECIFIED                     

 

 2016                         Ot            530.81            ESOPHAGEAL 
REFLUX                     

 

 2016                         Ot            553.3            
DIAPHRAGMATIC HERNIA                     

 

 2016                         Ot            V58.69            OTH MED,LT,
CURRENT USE                     

 

 2016                         Ot            070.70            UNSPECIFIED 
VIRAL HEPATITIS C WITHOUT HE                     

 

 2016                         Ot            205.10            CHRONIC 
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                     

 

 2016                         Ot            238.71            ESSENTIAL 
THROMBOCYTHEMIA                     

 

 2016                         Ot            285.9            ANEMIA NOS  
                   

 

 2016                         Ot            288.00            NEUTROPENIA
, UNSPECIFIED                     

 

 2016                         Ot            530.81            ESOPHAGEAL 
REFLUX                     

 

 2016                         Ot            553.3            
DIAPHRAGMATIC HERNIA                     

 

 2016                         Ot            V58.69            OTH MED,LT,
CURRENT USE                     

 

 2016                         Ot            070.70            UNSPECIFIED 
VIRAL HEPATITIS C WITHOUT HE                     

 

 2016                         Ot            205.10            CHRONIC 
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                     

 

 2016                         Ot            790.6            ABN BLOOD 
CHEMISTRY NEC                     

 

 2016                         Ot            V58.69            OTH MED,LT,
CURRENT USE                     

 

 2016                         Ot            070.70            UNSPECIFIED 
VIRAL HEPATITIS C WITHOUT HE                     

 

 2016                         Ot            205.10            CHRONIC 
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                     

 

 2016                         Ot            238.71            ESSENTIAL 
THROMBOCYTHEMIA                     

 

 2016                         Ot            288.00            NEUTROPENIA
, UNSPECIFIED                     

 

 2016                         Ot            530.81            ESOPHAGEAL 
REFLUX                     

 

 2016                         Ot            553.3            
DIAPHRAGMATIC HERNIA                     

 

 2016                         Ot            V58.69            OTH MED,LT,
CURRENT USE                     

 

 2016            GELLENDER DOKATHRYN            Ot            V76.12 
           OTH SCREEN MAMMO-MALIGN NEOPLASM OF MISTY                     

 

 2016                         Ot            205.10            CHRONIC 
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                     

 

 2016            GELLENDER KATHRYN HARE            Ot            070.70 
           UNSPECIFIED VIRAL HEPATITIS C WITHOUT HE                     

 

 2016            KATHRYN HOLLAND DO            Ot            511.9  
          PLEURAL EFFUSION NOS                     

 

 2016            GELLENDER KATHRYN HARE A            Ot            511.9  
          PLEURAL EFFUSION NOS                     

 

 2016                         Ot            070.70            UNSPECIFIED 
VIRAL HEPATITIS C WITHOUT HE                     

 

 2016                         Ot            208.90            UNSPECIFIED 
LEUKEMIA, W/O MENTION OF HAV                     

 

 2016                         Ot            511.9            PLEURAL 
EFFUSION NOS                     

 

 2016                         Ot            786.2            COUGH       
              

 

 2016                         Ot            V72.84            EXAM PRE-
OPERATIVE NOS                     

 

 2016            LUKE MAHAN DO            Ot            511.9       
     PLEURAL EFFUSION NOS                     

 

 2016            LUKE MAHAN DO            Ot            786.2       
     COUGH                     

 

 2016            DEIDRE QUEZADA ARN            Ot            070.70      
      UNSPECIFIED VIRAL HEPATITIS C WITHOUT HE                     

 

 2016            DEIDRE QUEZADA [facility]            Ot            571.5       
     CIRRHOSIS OF LIVER NOS                     

 

 2016            GELLENDER DO, KATHRYN GIBBONS            Ot            729.5  
          PAIN IN LIMB                     

 

 2016            GELLENDER DO, KATHRYN GIBBONS            Ot            729.81 
           SWELLING OF LIMB                     

 

 2016            GELLENDER DO, KATHRYN GIBBONS            Ot            511.9  
          PLEURAL EFFUSION NOS                     

 

 2016            GELLENDER DO, KATHRYN GIBBONS            Ot            518.0  
          PULMONARY COLLAPSE                     

 

 2016            GELLENDER DO, KATHRYN GIBBONS            Ot            786.05 
           SHORTNESS OF BREATH                     

 

 2016            GELLENDER DO, KATHRYN GIBBONS            Ot            R06.02 
           SHORTNESS OF BREATH                     

 

 2016            GELLENDER DO, KATHRYN GIBBONS            Ot            N19    
        UNSPECIFIED KIDNEY FAILURE                     

 

 2016            GELLENDER DO, KATHRYN GIBBONS            Ot            R06.02 
           SHORTNESS OF BREATH                     

 

 2016            GELLENDER DO, KATHRYN GIBBONS            Ot            N19    
        UNSPECIFIED KIDNEY FAILURE                     

 

 2016            GELLENDER DO, KATHRYN GIBBONS            Ot            R06.02 
           SHORTNESS OF BREATH                     

 

 2016                         Ot            070.70            UNSPECIFIED 
VIRAL HEPATITIS C WITHOUT HE                     

 

 2016                         Ot            205.10            CHRONIC 
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                     

 

 2016                         Ot            238.71            ESSENTIAL 
THROMBOCYTHEMIA                     

 

 2016                         Ot            285.9            ANEMIA NOS  
                   

 

 2016                         Ot            288.00            NEUTROPENIA
, UNSPECIFIED                     

 

 2016                         Ot            530.81            ESOPHAGEAL 
REFLUX                     

 

 2016                         Ot            553.3            
DIAPHRAGMATIC HERNIA                     

 

 2016                         Ot            V58.69            OTH MED,LT,
CURRENT USE                     

 

 2016                         Ot            070.70            UNSPECIFIED 
VIRAL HEPATITIS C WITHOUT HE                     

 

 2016                         Ot            205.10            CHRONIC 
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                     

 

 2016                         Ot            238.71            ESSENTIAL 
THROMBOCYTHEMIA                     

 

 2016                         Ot            285.9            ANEMIA NOS  
                   

 

 2016                         Ot            288.00            NEUTROPENIA
, UNSPECIFIED                     

 

 2016                         Ot            530.81            ESOPHAGEAL 
REFLUX                     

 

 2016                         Ot            553.3            
DIAPHRAGMATIC HERNIA                     

 

 2016                         Ot            V58.69            OTH MED,LT,
CURRENT USE                     

 

 2016                         Ot            070.70            UNSPECIFIED 
VIRAL HEPATITIS C WITHOUT HE                     

 

 2016                         Ot            205.10            CHRONIC 
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                     

 

 2016                         Ot            790.6            ABN BLOOD 
CHEMISTRY NEC                     

 

 2016                         Ot            V58.69            OTH MED,LT,
CURRENT USE                     

 

 2016                         Ot            070.70            UNSPECIFIED 
VIRAL HEPATITIS C WITHOUT HE                     

 

 2016                         Ot            205.10            CHRONIC 
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                     

 

 2016                         Ot            238.71            ESSENTIAL 
THROMBOCYTHEMIA                     

 

 2016                         Ot            288.00            NEUTROPENIA
, UNSPECIFIED                     

 

 2016                         Ot            530.81            ESOPHAGEAL 
REFLUX                     

 

 2016                         Ot            553.3            
DIAPHRAGMATIC HERNIA                     

 

 2016                         Ot            V58.69            OTH MED,LT,
CURRENT USE                     

 

 2016            KATHRYN HOLLAND DO            Ot            V76.12 
           OTH SCREEN MAMMO-MALIGN NEOPLASM OF MISTY                     

 

 2016                         Ot            205.10            CHRONIC 
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                     

 

 2016            KATHRYN HOLLAND DO            Ot            070.70 
           UNSPECIFIED VIRAL HEPATITIS C WITHOUT HE                     

 

 2016            KATHRYN HOLLAND DO            Ot            511.9  
          PLEURAL EFFUSION NOS                     

 

 2016            KATHRYN HOLLAND DO            Ot            511.9  
          PLEURAL EFFUSION NOS                     

 

 2016                         Ot            070.70            UNSPECIFIED 
VIRAL HEPATITIS C WITHOUT HE                     

 

 2016                         Ot            208.90            UNSPECIFIED 
LEUKEMIA, W/O MENTION OF HAV                     

 

 2016                         Ot            511.9            PLEURAL 
EFFUSION NOS                     

 

 2016                         Ot            786.2            COUGH       
              

 

 2016                         Ot            V72.84            EXAM PRE-
OPERATIVE NOS                     

 

 2016            LUKE MAHAN DO            Ot            511.9       
     PLEURAL EFFUSION NOS                     

 

 2016            LUKE MAHAN DO            Ot            786.2       
     COUGH                     

 

 2016            DEIDRE QUEZADA            Ot            070.70      
      UNSPECIFIED VIRAL HEPATITIS C WITHOUT HE                     

 

 2016            DEIDRE QUEZADA            Ot            571.5       
     CIRRHOSIS OF LIVER NOS                     

 

 2016            GELLENDER DO, KATHRYN GIBBONS            Ot            729.5  
          PAIN IN LIMB                     

 

 2016            GELLENDER , KATHRYN GIBBONS            Ot            729.81 
           SWELLING OF LIMB                     

 

 2016            LIANGLENDER DO, KATHRYN GIBBONS            Ot            511.9  
          PLEURAL EFFUSION NOS                     

 

 2016            GELLENDER DO, KATHRYN GIBBONS            Ot            518.0  
          PULMONARY COLLAPSE                     

 

 2016            YAZMINDER DO, KATHRYN GIBBONS            Ot            786.05 
           SHORTNESS OF BREATH                     

 

 2016            GELLENDER DO, KATHRYN GIBBONS            Ot            R06.02 
           SHORTNESS OF BREATH                     

 

 2016            GELLENDER DO, KATHRYN GIBBONS            Ot            R06.02 
           SHORTNESS OF BREATH                     

 

 2016            GELLENDER DO, KATHRYN GIBBONS            Ot            R91.8  
          OTHER NONSPECIFIC ABNORMAL FINDING OF SAMUEL                     

 

 2016            KATHRYN HOLLAND DO            Ot            N19    
        UNSPECIFIED KIDNEY FAILURE                     

 

 2016            YAZMINDER DO, KATHRYN GIBBONS            Ot            R06.02 
           SHORTNESS OF BREATH                     

 

 2016                         Ot            070.70            UNSPECIFIED 
VIRAL HEPATITIS C WITHOUT HE                     

 

 2016                         Ot            780.60            FEVER, 
UNSPECIFIED                     

 

 2016                         Ot            780.79            OTH MALAISE 
FATIGUE                     

 

 2016                         Ot            782.4            JAUNDICE NOS
                     

 

 2016                         Ot            791.9            ABN URINE 
FINDINGS NEC                     

 

 2016                         Ot            070.70            UNSPECIFIED 
VIRAL HEPATITIS C WITHOUT HE                     

 

 2016                         Ot            205.10            CHRONIC 
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                     

 

 2016                         Ot            238.71            ESSENTIAL 
THROMBOCYTHEMIA                     

 

 2016                         Ot            285.9            ANEMIA NOS  
                   

 

 2016                         Ot            288.00            NEUTROPENIA
, UNSPECIFIED                     

 

 2016                         Ot            530.81            ESOPHAGEAL 
REFLUX                     

 

 2016                         Ot            553.3            
DIAPHRAGMATIC HERNIA                     

 

 2016                         Ot            V58.69            OTH MED,LT,
CURRENT USE                     

 

 2016                         Ot            070.70            UNSPECIFIED 
VIRAL HEPATITIS C WITHOUT HE                     

 

 2016                         Ot            205.10            CHRONIC 
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                     

 

 2016                         Ot            238.71            ESSENTIAL 
THROMBOCYTHEMIA                     

 

 2016                         Ot            285.9            ANEMIA NOS  
                   

 

 2016                         Ot            288.00            NEUTROPENIA
, UNSPECIFIED                     

 

 2016                         Ot            530.81            ESOPHAGEAL 
REFLUX                     

 

 2016                         Ot            553.3            
DIAPHRAGMATIC HERNIA                     

 

 2016                         Ot            V58.69            OTH MED,LT,
CURRENT USE                     

 

 2016                         Ot            070.70            UNSPECIFIED 
VIRAL HEPATITIS C WITHOUT HE                     

 

 2016                         Ot            205.10            CHRONIC 
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                     

 

 2016                         Ot            790.6            ABN BLOOD 
CHEMISTRY NEC                     

 

 2016                         Ot            V58.69            OTH MED,LT,
CURRENT USE                     

 

 2016                         Ot            070.70            UNSPECIFIED 
VIRAL HEPATITIS C WITHOUT HE                     

 

 2016                         Ot            205.10            CHRONIC 
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                     

 

 2016                         Ot            238.71            ESSENTIAL 
THROMBOCYTHEMIA                     

 

 2016                         Ot            288.00            NEUTROPENIA
, UNSPECIFIED                     

 

 2016                         Ot            530.81            ESOPHAGEAL 
REFLUX                     

 

 2016                         Ot            553.3            
DIAPHRAGMATIC HERNIA                     

 

 2016                         Ot            V58.69            OTH MED,LT,
CURRENT USE                     

 

 2016            KATHRYN HOLLAND DO            Ot            V76.12 
           OTH SCREEN MAMMO-MALIGN NEOPLASM OF MISTY                     

 

 2016                         Ot            205.10            CHRONIC 
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                     

 

 2016            KATHRYN HOLLAND DO            Ot            070.70 
           UNSPECIFIED VIRAL HEPATITIS C WITHOUT HE                     

 

 2016            KATHRYN HOLLAND DO            Ot            511.9  
          PLEURAL EFFUSION NOS                     

 

 2016            KATHRYN HOLLAND DO            Ot            511.9  
          PLEURAL EFFUSION NOS                     

 

 2016                         Ot            070.70            UNSPECIFIED 
VIRAL HEPATITIS C WITHOUT HE                     

 

 2016                         Ot            208.90            UNSPECIFIED 
LEUKEMIA, W/O MENTION OF HAV                     

 

 2016                         Ot            511.9            PLEURAL 
EFFUSION NOS                     

 

 2016                         Ot            786.2            COUGH       
              

 

 2016                         Ot            V72.84            EXAM PRE-
OPERATIVE NOS                     

 

 2016            LUKE MAHAN DO            Ot            511.9       
     PLEURAL EFFUSION NOS                     

 

 2016            LUKE MAHAN DO            Ot            786.2       
     COUGH                     

 

 2016            DEIDRE QUEZADA            Ot            070.70      
      UNSPECIFIED VIRAL HEPATITIS C WITHOUT HE                     

 

 2016            DEIDRE QUEZADA            Ot            571.5       
     CIRRHOSIS OF LIVER NOS                     

 

 2016            KATHRYN HOLLAND DO            Ot            729.5  
          PAIN IN LIMB                     

 

 2016            KATHRYN HOLLAND DO            Ot            729.81 
           SWELLING OF LIMB                     

 

 2016            KATHRYN HOLLAND DO            Ot            511.9  
          PLEURAL EFFUSION NOS                     

 

 2016            GELLENDER DO, KATHRYN GIBBONS            Ot            518.0  
          PULMONARY COLLAPSE                     

 

 2016            GELLENDER DO, KATHRYN GIBBONS            Ot            786.05 
           SHORTNESS OF BREATH                     

 

 2016            GELLENDER DO, KATHRYN GIBBONS            Ot            R06.02 
           SHORTNESS OF BREATH                     

 

 2016            GELLENDER DO, KATHRYN GIBBONS            Ot            R06.02 
           SHORTNESS OF BREATH                     

 

 2016            GELLENDER DO, KATHRYN GIBBONS            Ot            R91.8  
          OTHER NONSPECIFIC ABNORMAL FINDING OF SAMUEL                     

 

 2016            GELLENDER DO, KATHRYN GIBBONS            Ot            N19    
        UNSPECIFIED KIDNEY FAILURE                     

 

 2016            GELLENDER DO, KATHRYN GIBBONS            Ot            R06.02 
           SHORTNESS OF BREATH                     

 

 2016            LUKE MAHAN DO            Ot            B19.20      
      UNSPECIFIED VIRAL HEPATITIS C WITHOUT HE                     

 

 2016            LUKE MAHAN DO            Ot            J90         
   PLEURAL EFFUSION, NOT ELSEWHERE CLASSIFI                     

 

 10/03/2016            LUKE MAHAN DO            Ot            B19.20      
      UNSPECIFIED VIRAL HEPATITIS C WITHOUT HE                     

 

 10/03/2016            LUKE MAHAN DO            Ot            C95.90      
      LEUKEMIA, UNSPECIFIED NOT HAVING ACHIEVE                     

 

 10/03/2016            LUKE MAHAN DO            Ot            J90         
   PLEURAL EFFUSION, NOT ELSEWHERE CLASSIFI                     

 

 10/03/2016            LUKE MAHAN DO            Ot            R05         
   COUGH                     

 

 10/03/2016            LUKE MAHAN DO            Ot            B19.20      
      UNSPECIFIED VIRAL HEPATITIS C WITHOUT HE                     

 

 10/03/2016            LUKE MAHAN DO            Ot            C95.90      
      LEUKEMIA, UNSPECIFIED NOT HAVING ACHIEVE                     

 

 10/03/2016            LUKE MAHAN DO            Ot            J90         
   PLEURAL EFFUSION, NOT ELSEWHERE CLASSIFI                     

 

 10/03/2016            LUKE MAHAN DO            Ot            R05         
   COUGH                     

 

 10/12/2016            GELLENDER DO, KATHRYN GIBBONS            Ot            R06.02 
           SHORTNESS OF BREATH                     

 

 10/12/2016            GELLENDER DO, KATHRYN GIBBONS            Ot            N19    
        UNSPECIFIED KIDNEY FAILURE                     

 

 10/12/2016            GELLENDER DO, KATHRYN GIBBONS            Ot            R06.02 
           SHORTNESS OF BREATH                     

 

 10/18/2016            GELLENDER DO, KATHRYN GIBBONS            Ot            R06.02 
           SHORTNESS OF BREATH                     

 

 10/18/2016            GELLENDER DO, KATHRYN GIBBONS            Ot            R91.8  
          OTHER NONSPECIFIC ABNORMAL FINDING OF SAMUEL                     

 

 10/20/2016            LUKE MAHAN DO            Ot            B19.20      
      UNSPECIFIED VIRAL HEPATITIS C WITHOUT HE                     

 

 10/20/2016            LUKE MAHAN DO            Ot            C95.90      
      LEUKEMIA, UNSPECIFIED NOT HAVING ACHIEVE                     

 

 10/20/2016            LUKE MAHAN DO            Ot            J90         
   PLEURAL EFFUSION, NOT ELSEWHERE CLASSIFI                     

 

 10/20/2016            LUKE MAHAN DO            Ot            R05         
   COUGH                     

 

 10/21/2016            KATHRYN HOLLAND DO            Ot            R06.02 
           SHORTNESS OF BREATH                     

 

 10/21/2016            KATHRYN HOLLAND DO            Ot            N19    
        UNSPECIFIED KIDNEY FAILURE                     

 

 10/21/2016            KATHRYN HOLLAND DO            Ot            R06.02 
           SHORTNESS OF BREATH                     

 

 10/26/2016            LUKE MAHAN DO            Ot            B19.20      
      UNSPECIFIED VIRAL HEPATITIS C WITHOUT HE                     

 

 10/26/2016            LUKE MAHAN DO            Ot            C95.90      
      LEUKEMIA, UNSPECIFIED NOT HAVING ACHIEVE                     

 

 10/26/2016            LUKE MAHAN DO            Ot            J90         
   PLEURAL EFFUSION, NOT ELSEWHERE CLASSIFI                     

 

 10/26/2016            LUKE MAHAN DO            Ot            R05         
   COUGH                     

 

 10/28/2016            LUKE MAHAN DO            Ot            B19.20      
      UNSPECIFIED VIRAL HEPATITIS C WITHOUT HE                     

 

 10/28/2016            LUKE MAHAN DO            Ot            C95.90      
      LEUKEMIA, UNSPECIFIED NOT HAVING ACHIEVE                     

 

 10/28/2016            LUKE MAHAN DO            Ot            J90         
   PLEURAL EFFUSION, NOT ELSEWHERE CLASSIFI                     

 

 10/28/2016            LUKE MAHAN DO            Ot            R05         
   COUGH                     

 

 10/31/2016            KARELY SALAS            Ot            M48.06     
       SPINAL STENOSIS, LUMBAR REGION                     

 

 10/31/2016            KARELY SALAS            Ot            M51.36     
       OTHER INTERVERTEBRAL DISC DEGENERATION,                      

 

 10/31/2016            KARELY SALAS            Ot            M54.5      
      LOW BACK PAIN                     

 

 10/31/2016            KARELY SALAS            Ot            Z79.899    
        OTHER LONG TERM (CURRENT) DRUG THERAPY                     

 

 2016            KARELY SALAS            Ot            M48.06     
       SPINAL STENOSIS, LUMBAR REGION                     

 

 2016            KARELY SALAS            Ot            M51.36     
       OTHER INTERVERTEBRAL DISC DEGENERATION,                      

 

 2016            KARELY SALAS            Ot            M54.5      
      LOW BACK PAIN                     

 

 2016            KARELY SALAS            Ot            Z79.899    
        OTHER LONG TERM (CURRENT) DRUG THERAPY                     

 

 2016            KATHRYN HOLLAND DO            Ot            R06.02 
           SHORTNESS OF BREATH                     

 

 2016            KATHRYN HOLLAND DO            Ot            R91.8  
          OTHER NONSPECIFIC ABNORMAL FINDING OF SAMUEL                     

 

 2016            LUKE MAHAN DO            Ot            B19.20      
      UNSPECIFIED VIRAL HEPATITIS C WITHOUT HE                     

 

 2016            LUKE MAHAN DO            Ot            C95.90      
      LEUKEMIA, UNSPECIFIED NOT HAVING ACHIEVE                     

 

 2016            LUKE MAHAN DO            Ot            J90         
   PLEURAL EFFUSION, NOT ELSEWHERE CLASSIFI                     

 

 2016            LUKE MAHAN DO            Ot            R05         
   COUGH                     

 

 2016            KATHRYN HOLLAND DO            Ot            M25.531
            PAIN IN RIGHT WRIST                     

 

 2017            DEIDRE QUEZADA            Ot            K74.60      
      UNSPECIFIED CIRRHOSIS OF LIVER                     

 

 2017            DUY COHEN, AMADA MONTES Ot            G62.9   
         POLYNEUROPATHY, UNSPECIFIED                     

 

 2017            DUY COHEN, AMADA MONTES Ot            Z79.899 
           OTHER LONG TERM (CURRENT) DRUG THERAPY                     

 

 2017            AMADA GORDILLO MD, Ot            G62.9   
         POLYNEUROPATHY, UNSPECIFIED                     

 

 2017            AMADA GORDILLO MD            Ot            Z79.899 
           OTHER LONG TERM (CURRENT) DRUG THERAPY                     

 

 2017            AMALIA HARE KATHRYN SHAI            Ot            M25.531
            PAIN IN RIGHT WRIST                     

 

 2017            AMALIA HARE KATHRYN SHAI            Ot            M25.531
            PAIN IN RIGHT WRIST                     

 

 2017            DEIDRE QUEZADA [facility]            Ot            K74.60      
      UNSPECIFIED CIRRHOSIS OF LIVER                     

 

 2017            DEIDRE QUEZADA [facility]            Ot            K74.60      
      UNSPECIFIED CIRRHOSIS OF LIVER                     

 

 2017            KATHRYN HOLLAND DO            Ot            J90    
        PLEURAL EFFUSION, NOT ELSEWHERE CLASSIFI                     

 

 2017            KATHRYN HOLLAND DO            Ot            J90    
        PLEURAL EFFUSION, NOT ELSEWHERE CLASSIFI                     

 

 2017                         Ot            070.70            UNSPECIFIED 
VIRAL HEPATITIS C WITHOUT HE                     

 

 2017                         Ot            780.60            FEVER, 
UNSPECIFIED                     

 

 2017                         Ot            780.79            OTH MALAISE 
FATIGUE                     

 

 2017                         Ot            782.4            JAUNDICE NOS
                     

 

 2017                         Ot            791.9            ABN URINE 
FINDINGS NEC                     

 

 2017                         Ot            070.70            UNSPECIFIED 
VIRAL HEPATITIS C WITHOUT HE                     

 

 2017                         Ot            205.10            CHRONIC 
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                     

 

 2017                         Ot            238.71            ESSENTIAL 
THROMBOCYTHEMIA                     

 

 2017                         Ot            285.9            ANEMIA NOS  
                   

 

 2017                         Ot            288.00            NEUTROPENIA
, UNSPECIFIED                     

 

 2017                         Ot            530.81            ESOPHAGEAL 
REFLUX                     

 

 2017                         Ot            553.3            
DIAPHRAGMATIC HERNIA                     

 

 2017                         Ot            V58.69            OTH MED,LT,
CURRENT USE                     

 

 2017                         Ot            070.70            UNSPECIFIED 
VIRAL HEPATITIS C WITHOUT HE                     

 

 2017                         Ot            205.10            CHRONIC 
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                     

 

 2017                         Ot            238.71            ESSENTIAL 
THROMBOCYTHEMIA                     

 

 2017                         Ot            285.9            ANEMIA NOS  
                   

 

 2017                         Ot            288.00            NEUTROPENIA
, UNSPECIFIED                     

 

 2017                         Ot            530.81            ESOPHAGEAL 
REFLUX                     

 

 2017                         Ot            553.3            
DIAPHRAGMATIC HERNIA                     

 

 2017                         Ot            V58.69            OTH MED,LT,
CURRENT USE                     

 

 2017                         Ot            070.70            UNSPECIFIED 
VIRAL HEPATITIS C WITHOUT HE                     

 

 2017                         Ot            205.10            CHRONIC 
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                     

 

 2017                         Ot            790.6            ABN BLOOD 
CHEMISTRY NEC                     

 

 2017                         Ot            V58.69            OTH MED,LT,
CURRENT USE                     

 

 2017                         Ot            070.70            UNSPECIFIED 
VIRAL HEPATITIS C WITHOUT HE                     

 

 2017                         Ot            205.10            CHRONIC 
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                     

 

 2017                         Ot            238.71            ESSENTIAL 
THROMBOCYTHEMIA                     

 

 2017                         Ot            288.00            NEUTROPENIA
, UNSPECIFIED                     

 

 2017                         Ot            530.81            ESOPHAGEAL 
REFLUX                     

 

 2017                         Ot            553.3            
DIAPHRAGMATIC HERNIA                     

 

 2017                         Ot            V58.69            OTH MED,LT,
CURRENT USE                     

 

 2017            GELLENDER DOKATHRYN            Ot            V76.12 
           OTH SCREEN MAMMO-MALIGN NEOPLASM OF MISTY                     

 

 2017                         Ot            205.10            CHRONIC 
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                     

 

 2017            GELLENDER DO, KATHRYN GIBBONS            Ot            070.70 
           UNSPECIFIED VIRAL HEPATITIS C WITHOUT HE                     

 

 2017            GELLENDER DOKATHRYN            Ot            511.9  
          PLEURAL EFFUSION NOS                     

 

 2017            GELLENDER DO, KATHRYN A            Ot            511.9  
          PLEURAL EFFUSION NOS                     

 

 2017                         Ot            070.70            UNSPECIFIED 
VIRAL HEPATITIS C WITHOUT HE                     

 

 2017                         Ot            208.90            UNSPECIFIED 
LEUKEMIA, W/O MENTION OF HAV                     

 

 2017                         Ot            511.9            PLEURAL 
EFFUSION NOS                     

 

 2017                         Ot            786.2            COUGH       
              

 

 2017                         Ot            V72.84            EXAM PRE-
OPERATIVE NOS                     

 

 2017            LUKE MAHAN DO            Ot            511.9       
     PLEURAL EFFUSION NOS                     

 

 2017            LUKE MAHAN DO            Ot            786.2       
     COUGH                     

 

 2017            DEIDRE QUEZADA ARNP            Ot            070.70      
      UNSPECIFIED VIRAL HEPATITIS C WITHOUT HE                     

 

 2017            DEIDRE QUEZADA ARNP            Ot            571.5       
     CIRRHOSIS OF LIVER NOS                     

 

 2017            KATHRYN HOLLAND DO            Ot            729.5  
          PAIN IN LIMB                     

 

 2017            LIANGLENDER KATHRYN HARE            Ot            729.81 
           SWELLING OF LIMB                     

 

 2017            KATHRYN HOLLAND DO            Ot            511.9  
          PLEURAL EFFUSION NOS                     

 

 2017            AMALIA KATHRYN HARE            Ot            518.0  
          PULMONARY COLLAPSE                     

 

 2017            KATHRYN HOLLAND DO            Ot            786.05 
           SHORTNESS OF BREATH                     

 

 2017            LIANGKATHRYN BROWN DO            Ot            R06.02 
           SHORTNESS OF BREATH                     

 

 2017            GELLENDER DOKATHRYN            Ot            R06.02 
           SHORTNESS OF BREATH                     

 

 2017            LIANGSHERRONDER KATHRYN HARE            Ot            R91.8  
          OTHER NONSPECIFIC ABNORMAL FINDING OF SAMUEL                     

 

 2017            KATHRYN HOLLAND DO            Ot            N19    
        UNSPECIFIED KIDNEY FAILURE                     

 

 2017            LIANGKATHRYN BROWN DO            Ot            R06.02 
           SHORTNESS OF BREATH                     

 

 2017            LUKE MAHAN DO            Ot            B19.20      
      UNSPECIFIED VIRAL HEPATITIS C WITHOUT HE                     

 

 2017            LUKE MAHAN DO            Ot            C95.90      
      LEUKEMIA, UNSPECIFIED NOT HAVING ACHIEVE                     

 

 2017            LUKE MAHAN DO            Ot            J90         
   PLEURAL EFFUSION, NOT ELSEWHERE CLASSIFI                     

 

 2017            LUKE MAHAN DO            Ot            R05         
   COUGH                     

 

 2017            LUKE MAHAN DO            Ot            B19.20      
      UNSPECIFIED VIRAL HEPATITIS C WITHOUT HE                     

 

 2017            LUKE MAHAN DO            Ot            C95.90      
      LEUKEMIA, UNSPECIFIED NOT HAVING ACHIEVE                     

 

 2017            LUKE MAHAN DO            Ot            J90         
   PLEURAL EFFUSION, NOT ELSEWHERE CLASSIFI                     

 

 2017            LUKE MAHAN DO            Ot            R05         
   COUGH                     

 

 2017            KATHRYN HOLLAND DO            Ot            M25.531
            PAIN IN RIGHT WRIST                     

 

 2017            PILAR DEIDRE J ARN            Ot            K74.60      
      UNSPECIFIED CIRRHOSIS OF LIVER                     

 

 2017            KATHRYN HOLLAND DO            Ot            J90    
        PLEURAL EFFUSION, NOT ELSEWHERE CLASSIFI                     

 

 09/15/2017            DEIDRE QUEZADA ARN            Ot            K74.60      
      UNSPECIFIED CIRRHOSIS OF LIVER                     

 

 09/15/2017            PILAR DEIDRE J [facility]            Ot            Z90.49      
      ACQUIRED ABSENCE OF OTHER SPECIFIED PART                     

 

 09/15/2017            KATHRYN HOLLAND DO            Ot            C92.91 
           MYELOID LEUKEMIA, UNSPECIFIED IN REMISSI                     

 

 09/15/2017            KATHRYN HOLLAND DO            Ot            E87.6  
          HYPOKALEMIA                     

 

 09/15/2017            KATHRYN HOLLAND DO            Ot            I48.0  
          PAROXYSMAL ATRIAL FIBRILLATION                     

 

 09/15/2017            KATHRYN HOLLAND DO            Ot            K21.9  
          GASTRO-ESOPHAGEAL REFLUX DISEASE WITHOUT                     

 

 09/15/2017            GELLENDER KATHRYN HARE            Ot            K59.09 
           OTHER CONSTIPATION                     

 

 09/15/2017            KATHRYN HOLLAND DO            Ot            Z86.19 
           PERSONAL HISTORY OF OTHER INFECTIOUS AND                     

 

 2017                         Ot            070.70            UNSPECIFIED 
VIRAL HEPATITIS C WITHOUT HE                     

 

 2017                         Ot            780.60            FEVER, 
UNSPECIFIED                     

 

 2017                         Ot            780.79            OTH MALAISE 
FATIGUE                     

 

 2017                         Ot            782.4            JAUNDICE NOS
                     

 

 2017                         Ot            791.9            ABN URINE 
FINDINGS NEC                     

 

 2017                         Ot            070.70            UNSPECIFIED 
VIRAL HEPATITIS C WITHOUT HE                     

 

 2017                         Ot            205.10            CHRONIC 
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                     

 

 2017                         Ot            238.71            ESSENTIAL 
THROMBOCYTHEMIA                     

 

 2017                         Ot            285.9            ANEMIA NOS  
                   

 

 2017                         Ot            288.00            NEUTROPENIA
, UNSPECIFIED                     

 

 2017                         Ot            530.81            ESOPHAGEAL 
REFLUX                     

 

 2017                         Ot            553.3            
DIAPHRAGMATIC HERNIA                     

 

 2017                         Ot            V58.69            OTH MED,LT,
CURRENT USE                     

 

 2017                         Ot            070.70            UNSPECIFIED 
VIRAL HEPATITIS C WITHOUT HE                     

 

 2017                         Ot            205.10            CHRONIC 
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                     

 

 2017                         Ot            238.71            ESSENTIAL 
THROMBOCYTHEMIA                     

 

 2017                         Ot            285.9            ANEMIA NOS  
                   

 

 2017                         Ot            288.00            NEUTROPENIA
, UNSPECIFIED                     

 

 2017                         Ot            530.81            ESOPHAGEAL 
REFLUX                     

 

 2017                         Ot            553.3            
DIAPHRAGMATIC HERNIA                     

 

 2017                         Ot            V58.69            OTH MED,LT,
CURRENT USE                     

 

 2017                         Ot            070.70            UNSPECIFIED 
VIRAL HEPATITIS C WITHOUT HE                     

 

 2017                         Ot            205.10            CHRONIC 
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                     

 

 2017                         Ot            790.6            ABN BLOOD 
CHEMISTRY NEC                     

 

 2017                         Ot            V58.69            OTH MED,LT,
CURRENT USE                     

 

 2017                         Ot            070.70            UNSPECIFIED 
VIRAL HEPATITIS C WITHOUT HE                     

 

 2017                         Ot            205.10            CHRONIC 
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                     

 

 2017                         Ot            238.71            ESSENTIAL 
THROMBOCYTHEMIA                     

 

 2017                         Ot            288.00            NEUTROPENIA
, UNSPECIFIED                     

 

 2017                         Ot            530.81            ESOPHAGEAL 
REFLUX                     

 

 2017                         Ot            553.3            
DIAPHRAGMATIC HERNIA                     

 

 2017                         Ot            V58.69            OTH MED,LT,
CURRENT USE                     

 

 2017            KATHRYN HOLLAND DO            Ot            V76.12 
           OTH SCREEN MAMMO-MALIGN NEOPLASM OF MISTY                     

 

 2017                         Ot            205.10            CHRONIC 
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                     

 

 2017            KATHRYN HOLLAND DO            Ot            070.70 
           UNSPECIFIED VIRAL HEPATITIS C WITHOUT HE                     

 

 2017            KATHRYN HOLLAND DO            Ot            511.9  
          PLEURAL EFFUSION NOS                     

 

 2017            KATHRYN HOLLAND DO            Ot            511.9  
          PLEURAL EFFUSION NOS                     

 

 2017                         Ot            070.70            UNSPECIFIED 
VIRAL HEPATITIS C WITHOUT HE                     

 

 2017                         Ot            208.90            UNSPECIFIED 
LEUKEMIA, W/O MENTION OF HAV                     

 

 2017                         Ot            511.9            PLEURAL 
EFFUSION NOS                     

 

 2017                         Ot            786.2            COUGH       
              

 

 2017                         Ot            V72.84            EXAM PRE-
OPERATIVE NOS                     

 

 2017            LUKE MAHAN DO            Ot            511.9       
     PLEURAL EFFUSION NOS                     

 

 2017            LUKE MAHAN DO            Ot            786.2       
     COUGH                     

 

 2017            DEIDRE QUEZADA            Ot            070.70      
      UNSPECIFIED VIRAL HEPATITIS C WITHOUT HE                     

 

 2017            DEIDRE QUEZADA            Ot            571.5       
     CIRRHOSIS OF LIVER NOS                     

 

 2017            KATHRYN HOLLAND DO            Ot            729.5  
          PAIN IN LIMB                     

 

 2017            KATHRYN HOLLAND DO            Ot            729.81 
           SWELLING OF LIMB                     

 

 2017            KATHRYN HOLLAND DO            Ot            511.9  
          PLEURAL EFFUSION NOS                     

 

 2017            KATHRYN HOLLAND DO            Ot            518.0  
          PULMONARY COLLAPSE                     

 

 2017            KATHRYN HOLLAND DO            Ot            786.05 
           SHORTNESS OF BREATH                     

 

 2017            KATHRYN HOLLAND DO            Ot            R06.02 
           SHORTNESS OF BREATH                     

 

 2017            KATHRYN HOLLAND DO            Ot            R06.02 
           SHORTNESS OF BREATH                     

 

 2017            KATHRYN HOLLAND DO            Ot            R91.8  
          OTHER NONSPECIFIC ABNORMAL FINDING OF SAMUEL                     

 

 2017            KATHRYN HOLLAND DO            Ot            N19    
        UNSPECIFIED KIDNEY FAILURE                     

 

 2017            KATHRYN HOLLAND DO            Ot            R06.02 
           SHORTNESS OF BREATH                     

 

 2017            LUKE MAHAN DO            Ot            B19.20      
      UNSPECIFIED VIRAL HEPATITIS C WITHOUT HE                     

 

 2017            LUKE MAHAN DO            Ot            C95.90      
      LEUKEMIA, UNSPECIFIED NOT HAVING ACHIEVE                     

 

 2017            LUKE MAHAN DO            Ot            J90         
   PLEURAL EFFUSION, NOT ELSEWHERE CLASSIFI                     

 

 2017            LUKE MAHAN DO            Ot            R05         
   COUGH                     

 

 2017            LUKE MAHAN DO            Ot            B19.20      
      UNSPECIFIED VIRAL HEPATITIS C WITHOUT HE                     

 

 2017            LUKE MAHAN DO            Ot            C95.90      
      LEUKEMIA, UNSPECIFIED NOT HAVING ACHIEVE                     

 

 2017            LUKE MAHAN DO            Ot            J90         
   PLEURAL EFFUSION, NOT ELSEWHERE CLASSIFI                     

 

 2017            LUKE MAHAN DO            Ot            R05         
   COUGH                     

 

 2017            AMALIA HARE KATHRYN GIBBONS            Ot            M25.531
            PAIN IN RIGHT WRIST                     

 

 2017            DEIDRE QUEZADA            Ot            K74.60      
      UNSPECIFIED CIRRHOSIS OF LIVER                     

 

 2017            AMALIA HARE KATHRYN GIBBONS            Ot            J90    
        PLEURAL EFFUSION, NOT ELSEWHERE CLASSIFI                     

 

 2017            DEIDRE QUEZADA            Ot            K74.60      
      UNSPECIFIED CIRRHOSIS OF LIVER                     

 

 2017            DEIDRE QUEZADAP            Ot            Z90.49      
      ACQUIRED ABSENCE OF OTHER SPECIFIED PART                     

 

 2017            KATHRYN HOLLAND DO            Ot            I48.2  
          CHRONIC ATRIAL FIBRILLATION                     

 

 2017            AMALIA HARE KATHRYN GIBBONS            Ot            I48.0  
          PAROXYSMAL ATRIAL FIBRILLATION                     

 

 2017            PILAR, DEIDRE J ARNP            Ot            K74.60      
      UNSPECIFIED CIRRHOSIS OF LIVER                     

 

 2017            DEIDRE QUEZADA ARNP            Ot            Z90.49      
      ACQUIRED ABSENCE OF OTHER SPECIFIED PART                     

 

 2017            AMALIA HARE, KATHRYN GIBBONS            Ot            I48.0  
          PAROXYSMAL ATRIAL FIBRILLATION                     

 

 2017            YAZMINDER DO, KATHRYN GIBBONS            Ot            I48.0  
          PAROXYSMAL ATRIAL FIBRILLATION                     

 

 2017            YAZMINDER DO, KATHRYN GIBBONS            Ot            I48.0  
          PAROXYSMAL ATRIAL FIBRILLATION                     

 

 10/03/2017            YAZMINDER DO, KATHRYN GIBBONS            Ot            I48.0  
          PAROXYSMAL ATRIAL FIBRILLATION                     

 

 10/10/2017            AMALIA HARE, KATHRYN GIBBONS            Ot            J90    
        PLEURAL EFFUSION, NOT ELSEWHERE CLASSIFI                     

 

 10/10/2017            AMALIA HARE, KATHRYN GIBBONS            Ot            J98.11 
           ATELECTASIS                     

 

 10/12/2017            AMALIA HARE, KATHRYN GIBBONS            Ot            I48.2  
          CHRONIC ATRIAL FIBRILLATION                     

 

 10/18/2017            TESSY COHEN [facility], ALI FACP CCDS            Ot            
I48.0            PAROXYSMAL ATRIAL FIBRILLATION                     

 

 10/20/2017            AMALIA HARE, KATHRYN GIBBONS            Ot            I48.2  
          CHRONIC ATRIAL FIBRILLATION                     

 

 10/25/2017            TESSY COHEN [facility], ALI FACP CCDS            Ot            
C92.11            CHRONIC MYELOID LEUKEMIA, BCR/ABL-POSITI                     

 

 10/25/2017            TESSY COHEN FAC, ALI FACP CCDS            Ot            
I48.0            PAROXYSMAL ATRIAL FIBRILLATION                     

 

 10/31/2017            AMALIA HARE, KATHRYN GIBBONS            Ot            J90    
        PLEURAL EFFUSION, NOT ELSEWHERE CLASSIFI                     

 

 10/31/2017            AMALIA HARE, KATHRYN GIBBONS            Ot            J98.11 
           ATELECTASIS                     

 

 2017            TESSY COHEN FAC, ALI FACP CCDS            Ot            
C92.11            CHRONIC MYELOID LEUKEMIA, BCR/ABL-POSITI                     

 

 2017            TESSY PALACIOS, ALI FACP CCDS            Ot            
I48.0            PAROXYSMAL ATRIAL FIBRILLATION                     

 

 2017            KATHRYN HOLLAND DO            Ot            J90    
        PLEURAL EFFUSION, NOT ELSEWHERE CLASSIFI                     

 

 2017            AMALIA HARE, KATHRYN GIBBONS            Ot            J98.11 
           ATELECTASIS                     

 

 2017            TESSY COHEN FACC, ALI FACP CCDS            Ot            
I48.0            PAROXYSMAL ATRIAL FIBRILLATION                     

 

 2017            TESSY COHEN FACC, ALI FACP CCDS            Ot            
I48.0            PAROXYSMAL ATRIAL FIBRILLATION                     

 

 2017            LUIS LAM MD            Ot            B19.20
            UNSPECIFIED VIRAL HEPATITIS C WITHOUT HE                     

 

 2017            LUIS LAM MD            Ot            F41.9 
           ANXIETY DISORDER, UNSPECIFIED                     

 

 2017            LUIS LAM MD            Ot            G62.9 
           POLYNEUROPATHY, UNSPECIFIED                     

 

 2017            LUIS LAM MD            Ot            I48.0 
           PAROXYSMAL ATRIAL FIBRILLATION                     

 

 2017            LUIS LAM MD            Ot            R00.2 
           PALPITATIONS                     

 

 2017            LUIS LAM MD            Ot            R07.9 
           CHEST PAIN, UNSPECIFIED                     

 

 2017            LUIS LAM MD            Ot            R19.7 
           DIARRHEA, UNSPECIFIED                     

 

 2017            LUIS LAM MD, Ot            Z79.01
            LONG TERM (CURRENT) USE OF ANTICOAGULANT                     

 

 2017            LUIS LAM MD            Ot            Z79.82
            LONG TERM (CURRENT) USE OF ASPIRIN                     

 

 2017            LUIS LAM MD            Ot            Z80.9 
           FAMILY HISTORY OF MALIGNANT NEOPLASM, UN                     

 

 2017            LUIS LAM MD            Ot            Z82.49
            FAMILY HX OF ISCHEM HEART DIS AND OTH DI                     

 

 2017            LUIS LAM MD            Ot            Z85.6 
           PERSONAL HISTORY OF LEUKEMIA                     

 

 2017            LUIS LAM MD            Ot            Z92.21
            PERSONAL HISTORY OF ANTINEOPLASTIC CHEMO                     

 

 2017            KATHRYN HOLLAND DO            Ot            J90    
        PLEURAL EFFUSION, NOT ELSEWHERE CLASSIFI                     

 

 2018            JENN NEAL            Ot            B19.20
            UNSPECIFIED VIRAL HEPATITIS C WITHOUT HE                     

 

 2018            JENN NEAL            Ot            C95.90
            LEUKEMIA, UNSPECIFIED NOT HAVING ACHIEVE                     

 

 2018            JENN NEAL            Ot            J90   
         PLEURAL EFFUSION, NOT ELSEWHERE CLASSIFI                     

 

 2018            JENN NEAL            Ot            Z79.01
            LONG TERM (CURRENT) USE OF ANTICOAGULANT                     

 

 2018                         Ot            070.70            UNSPECIFIED 
VIRAL HEPATITIS C WITHOUT HE                     

 

 2018                         Ot            205.10            CHRONIC 
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                     

 

 2018                         Ot            238.71            ESSENTIAL 
THROMBOCYTHEMIA                     

 

 2018                         Ot            288.00            NEUTROPENIA
, UNSPECIFIED                     

 

 2018                         Ot            530.81            ESOPHAGEAL 
REFLUX                     

 

 2018                         Ot            553.3            
DIAPHRAGMATIC HERNIA                     

 

 2018                         Ot            V58.69            OT MED,LT,
CURRENT USE                     

 

 2018                         Ot            205.10            CHRONIC 
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                     

 

 2018            AMALIA HARE KATHRYN SHAI            Ot            I48.0  
          PAROXYSMAL ATRIAL FIBRILLATION                     



                                                                               
                                                                               
                                                                               
                                                                               
                                                                               
                                                                               
                                                                               
                                                                               
                                                                               
                                                                               
                                                                               
                                                                               
                                    



Procedures

      





 Code            Description            Performed By            Performed On   
     

 

             41.31                                  BONE MARROW BIOPSY         
                          2012        

 

             34.91                                  THORACENTESIS              
                     2014        

 

             34.91                                  THORACENTESIS              
                     2014        



                      



Results

      





 Test            Result            Range        









 [id number] - 16 12:32         









 Serum or plasma urea nitrogen measurement (mass/volume)            15 mg/dL   
         7-18        

 

 Serum or plasma creatinine measurement (mass/volume)            0.83 mg/dL    
        0.60-1.30        

 

 Serum or plasma urea nitrogen/creatinine mass ratio            18             
NRG        

 

 Serum or plasma creatinine measurement with calculation of estimated 
glomerular filtration rate            >             NRG        









 Body fluid cell count - 16 08:35         









 Specimen source identification of body fluid            THORACEN             
NRG        

 

 Evaluation of color of body fluid            YELLOW             NRG        

 

 Determination of appearance of body fluid            SLT CLDY             NRG 
       

 

 Body fluid leukocytes count (number/volume)            2350 /uL            NRG
        

 

 Body fluid erythrocytes count (number/volume)            800 /uL            
NRG        

 

 Manual body fluid polymorphonuclear cells/100 leukocytes            0 %       
     NRG        

 

 Manual body fluid mononuclear cells/100 leukocytes            0 %            
NRG        

 

 Manual body fluid lymphocytes/100 leukocytes            98 %            NRG   
     

 

 Other cells/100 leukocytes in body fluid by manual count            2 %       
     NRG        









 Body fluid total protein measurement - 16 08:35         









 Body fluid total protein measurement            4.9 g/dL            NRG        









 Gram stain microscopy - 16 08:35         









 GRAM STAIN RESULT            FEW WBC'S, NO BACTERIA OBSERVED             NRG  
      









 Body fluid/serum or plasma lactate dehydrogenase (LDH) ratio - 16 08:35 
        









 Body fluid/serum or plasma lactate dehydrogenase (LDH) ratio            154 U/
L            NRG        









 Body fluid triglyceride measurement (mass/volume) - 16 08:35         









 Body fluid triglyceride measurement (mass/volume)            24 mg/dL         
   NRG        









 Bacterial body fluid culture - 16 08:35         









 Bacterial body fluid culture            NG             NRG        









 Complete urinalysis with reflex to culture - 10/31/16 19:28         









 Urine color determination            YELLOW             NRG        

 

 Urine clarity determination            SLIGHTLY CLOUDY             NRG        

 

 Urine pH measurement by test strip            5             5-9        

 

 Specific gravity of urine by test strip            1.025             1.016-
1.022        

 

 Urine protein assay by test strip, semi-quantitative            2+             
NEGATIVE        

 

 Urine glucose detection by automated test strip            NEGATIVE           
  NEGATIVE        

 

 Erythrocytes detection in urine sediment by light microscopy            1+    
         NEGATIVE        

 

 Urine ketones detection by automated test strip            NEGATIVE           
  NEGATIVE        

 

 Urine nitrite detection by test strip            NEGATIVE             NEGATIVE
        

 

 Urine total bilirubin detection by test strip            NEGATIVE             
NEGATIVE        

 

 Urine urobilinogen measurement by automated test strip (mass/volume)          
  NORMAL             NORMAL        

 

 Urine leukocyte esterase detection by dipstick            NEGATIVE             
NEGATIVE        

 

 Automated urine sediment erythrocyte count by microscopy (number/high power 
field)            RARE             NRG        

 

 Automated urine sediment leukocyte count by microscopy (number/high power field
)             [HPF]            NRG        

 

 Bacteria detection in urine sediment by light microscopy            NEGATIVE  
           NRG        

 

 Crystals detection in urine sediment by light microscopy            NONE      
       NRG        

 

 Casts detection in urine sediment by light microscopy            NONE         
    NRG        

 

 Mucus detection in urine sediment by light microscopy            LARGE        
     NRG        

 

 Complete urinalysis with reflex to culture            NO             NRG      
  









 Serum or plasma troponin i.cardiac measurement (mass/volume) - 17 16:59 
        









 Serum or plasma troponin i.cardiac measurement (mass/volume)            < ng/
mL            <0.30        









 Complete blood count (CBC) with automated white blood cell (WBC) differential 
- 17 11:05         









 Blood leukocytes automated count (number/volume)            3.9 10*3/uL       
     4.3-11.0        

 

 Blood erythrocytes automated count (number/volume)            4.47 10*6/uL    
        4.35-5.85        

 

 Venous blood hemoglobin measurement (mass/volume)            14.2 g/dL        
    11.5-16.0        

 

 Blood hematocrit (volume fraction)            43 %            35-52        

 

 Automated erythrocyte mean corpuscular volume            96 [foz_us]          
  80-99        

 

 Automated erythrocyte mean corpuscular hemoglobin (mass per erythrocyte)      
      32 pg            25-34        

 

 Automated erythrocyte mean corpuscular hemoglobin concentration measurement (
mass/volume)            33 g/dL            32-36        

 

 Automated erythrocyte distribution width ratio            15.5 %            
10.0-14.5        

 

 Automated blood platelet count (count/volume)            200 10*3/uL          
  130-400        

 

 Automated blood platelet mean volume measurement            9.7 [foz_us]      
      7.4-10.4        

 

 Automated blood neutrophils/100 leukocytes            42 %            42-75   
     

 

 Automated blood lymphocytes/100 leukocytes            44 %            12-44   
     

 

 Blood monocytes/100 leukocytes            10 %            0-12        

 

 Automated blood eosinophils/100 leukocytes            3 %            0-10     
   

 

 Automated blood basophils/100 leukocytes            1 %            0-10        

 

 Blood neutrophils automated count (number/volume)            1.6 10*3         
   1.8-7.8        

 

 Blood lymphocytes automated count (number/volume)            1.7 10*3         
   1.0-4.0        

 

 Blood monocytes automated count (number/volume)            0.4 10*3            
0.0-1.0        

 

 Automated eosinophil count            0.1 10*3/uL            0.0-0.3        

 

 Automated blood basophil count (count/volume)            0.1 10*3/uL          
  0.0-0.1        









 PT panel in platelet poor plasma by coagulation assay - 17 11:05         









 Prothrombin time (PT) in platelet poor plasma by coagulation assay            
12.9 s            12.2-14.7        

 

 INR in platelet poor plasma or blood by coagulation assay            1.0      
       0.8-1.4        









 Activated partial thromboplastin time (aPTT) in platelet poor plasma 
bycoagulation assay - 17 11:05         









 Activated partial thromboplastin time (aPTT) in platelet poor plasma 
bycoagulation assay            32 s            24-35        









 Comprehensive metabolic panel - 17 11:05         









 Serum or plasma sodium measurement (moles/volume)            142 mmol/L       
     135-145        

 

 Serum or plasma potassium measurement (moles/volume)            3.1 mmol/L    
        3.6-5.0        

 

 Serum or plasma chloride measurement (moles/volume)            111 mmol/L     
               

 

 Carbon dioxide            20 mmol/L            21-32        

 

 Serum or plasma anion gap determination (moles/volume)            11 mmol/L   
         5-14        

 

 Serum or plasma urea nitrogen measurement (mass/volume)            19 mg/dL   
         7-18        

 

 Serum or plasma creatinine measurement (mass/volume)            0.99 mg/dL    
        0.60-1.30        

 

 Serum or plasma urea nitrogen/creatinine mass ratio            19             
NRG        

 

 Serum or plasma creatinine measurement with calculation of estimated 
glomerular filtration rate            58             NRG        

 

 Serum or plasma glucose measurement (mass/volume)            113 mg/dL        
            

 

 Serum or plasma calcium measurement (mass/volume)            9.1 mg/dL        
    8.5-10.1        

 

 Serum or plasma total bilirubin measurement (mass/volume)            0.8 mg/dL
            0.1-1.0        

 

 Serum or plasma alkaline phosphatase measurement (enzymatic activity/volume)  
          70 U/L                    

 

 Serum or plasma aspartate aminotransferase measurement (enzymatic activity/
volume)            23 U/L            5-34        

 

 Serum or plasma alanine aminotransferase measurement (enzymatic activity/volume
)            18 U/L            0-55        

 

 Serum or plasma protein measurement (mass/volume)            8.0 g/dL         
   6.4-8.2        

 

 Serum or plasma albumin measurement (mass/volume)            4.3 g/dL         
   3.2-4.5        









 Magnesium - 17 11:05         









 Magnesium            2.1 mg/dL            1.8-2.4        









 Serum or plasma troponin i.cardiac measurement (mass/volume) - 17 11:05 
        









 Serum or plasma troponin i.cardiac measurement (mass/volume)            < ng/
mL            <0.30        









 Myoglobin, serum - 17 11:05         









 Myoglobin, serum            63.0 ng/mL            10.0-92.0        









 Serum or plasma lithium measurement (moles/volume) - 17 11:05         









 BNP level            106.8 pg/mL            <100.0        









 Methicillin resistant Staphylococcus aureus (MRSA) screening culture -  17:00         









 Methicillin resistant Staphylococcus aureus (MRSA) screening culture          
  NEG             NRG        









 Serum or plasma troponin i.cardiac measurement (mass/volume) - 17 17:13 
        









 Serum or plasma troponin i.cardiac measurement (mass/volume)            < ng/
mL            <0.30        









 THYROID STIMULATING HORMONE - 17 17:15         









 THYROID STIMULATING HORMONE            2.32 u[iU]/mL            0.35-4.94     
   









 Serum or plasma troponin i.cardiac measurement (mass/volume) - 09/15/17 00:45 
        









 Serum or plasma troponin i.cardiac measurement (mass/volume)            < ng/
mL            <0.30        









 Complete blood count (CBC) with automated white blood cell (WBC) differential 
- 09/15/17 04:13         









 Blood leukocytes automated count (number/volume)            5.2 10*3/uL       
     4.3-11.0        

 

 Blood erythrocytes automated count (number/volume)            3.99 10*6/uL    
        4.35-5.85        

 

 Venous blood hemoglobin measurement (mass/volume)            12.9 g/dL        
    11.5-16.0        

 

 Blood hematocrit (volume fraction)            39 %            35-52        

 

 Automated erythrocyte mean corpuscular volume            98 [foz_us]          
  80-99        

 

 Automated erythrocyte mean corpuscular hemoglobin (mass per erythrocyte)      
      32 pg            25-34        

 

 Automated erythrocyte mean corpuscular hemoglobin concentration measurement (
mass/volume)            33 g/dL            32-36        

 

 Automated erythrocyte distribution width ratio            15.6 %            
10.0-14.5        

 

 Automated blood platelet count (count/volume)            138 10*3/uL          
  130-400        

 

 Automated blood platelet mean volume measurement            9.9 [foz_us]      
      7.4-10.4        

 

 Automated blood neutrophils/100 leukocytes            41 %            42-75   
     

 

 Automated blood lymphocytes/100 leukocytes            48 %            12-44   
     

 

 Blood monocytes/100 leukocytes            8 %            0-12        

 

 Automated blood eosinophils/100 leukocytes            3 %            0-10     
   

 

 Automated blood basophils/100 leukocytes            1 %            0-10        

 

 Blood neutrophils automated count (number/volume)            2.1 10*3         
   1.8-7.8        

 

 Blood lymphocytes automated count (number/volume)            2.5 10*3         
   1.0-4.0        

 

 Blood monocytes automated count (number/volume)            0.4 10*3            
0.0-1.0        

 

 Automated eosinophil count            0.2 10*3/uL            0.0-0.3        

 

 Automated blood basophil count (count/volume)            0.0 10*3/uL          
  0.0-0.1        









 Comprehensive metabolic panel - 09/15/17 04:13         









 Serum or plasma sodium measurement (moles/volume)            143 mmol/L       
     135-145        

 

 Serum or plasma potassium measurement (moles/volume)            3.5 mmol/L    
        3.6-5.0        

 

 Serum or plasma chloride measurement (moles/volume)            120 mmol/L     
               

 

 Carbon dioxide            15 mmol/L            21-32        

 

 Serum or plasma anion gap determination (moles/volume)            8 mmol/L    
        5-14        

 

 Serum or plasma urea nitrogen measurement (mass/volume)            15 mg/dL   
         7-18        

 

 Serum or plasma creatinine measurement (mass/volume)            0.72 mg/dL    
        0.60-1.30        

 

 Serum or plasma urea nitrogen/creatinine mass ratio            21             
NRG        

 

 Serum or plasma creatinine measurement with calculation of estimated 
glomerular filtration rate            >             NRG        

 

 Serum or plasma glucose measurement (mass/volume)            96 mg/dL         
           

 

 Serum or plasma calcium measurement (mass/volume)            7.5 mg/dL        
    8.5-10.1        

 

 Serum or plasma total bilirubin measurement (mass/volume)            0.3 mg/dL
            0.1-1.0        

 

 Serum or plasma alkaline phosphatase measurement (enzymatic activity/volume)  
          54 U/L                    

 

 Serum or plasma aspartate aminotransferase measurement (enzymatic activity/
volume)            13 U/L            5-34        

 

 Serum or plasma alanine aminotransferase measurement (enzymatic activity/volume
)            11 U/L            0-55        

 

 Serum or plasma protein measurement (mass/volume)            5.7 g/dL         
   6.4-8.2        

 

 Serum or plasma albumin measurement (mass/volume)            3.1 g/dL         
   3.2-4.5        









 Serum or plasma phosphate measurement (mass/volume) - 09/15/17 04:13         









 Serum or plasma phosphate measurement (mass/volume)            2.5 mg/dL      
      2.3-4.7        









 Magnesium - 09/15/17 04:13         









 Magnesium            2.0 mg/dL            1.8-2.4        









 Lipid 1996 panel - 09/15/17 04:13         









 Serum or plasma triglyceride measurement (mass/volume)            72 mg/dL    
        <150        

 

 Serum or plasma cholesterol measurement (mass/volume)            111 mg/dL    
        < 200        

 

 Serum or plasma cholesterol in HDL measurement (mass/volume)            34 mg/
dL            40-60        

 

 Cholesterol in LDL [mass/volume] in serum or plasma by direct assay            
66 mg/dL            1-129        

 

 Serum or plasma cholesterol in VLDL measurement (mass/volume)            14 mg/
dL            5-40        









 PT panel in platelet poor plasma by coagulation assay - 17 12:55         









 Prothrombin time (PT) in platelet poor plasma by coagulation assay            
14.9 s            12.2-14.7        

 

 INR in platelet poor plasma or blood by coagulation assay            1.2      
       0.8-1.4        









 PT panel in platelet poor plasma by coagulation assay - 17 10:17         









 Prothrombin time (PT) in platelet poor plasma by coagulation assay            
19.0 s            12.2-14.7        

 

 INR in platelet poor plasma or blood by coagulation assay            1.6      
       0.8-1.4        









 PT panel in platelet poor plasma by coagulation assay - 17 09:54         









 Prothrombin time (PT) in platelet poor plasma by coagulation assay            
22.7 s            12.2-14.7        

 

 INR in platelet poor plasma or blood by coagulation assay            2.0      
       0.8-1.4        









 PT panel in platelet poor plasma by coagulation assay - 10/17/17 11:07         









 Prothrombin time (PT) in platelet poor plasma by coagulation assay            
30.2 s            12.2-14.7        

 

 INR in platelet poor plasma or blood by coagulation assay            2.9      
       0.8-1.4        









 PT panel in platelet poor plasma by coagulation assay - 10/23/17 09:50         









 Prothrombin time (PT) in platelet poor plasma by coagulation assay            
14.2 s            12.2-14.7        

 

 INR in platelet poor plasma or blood by coagulation assay            1.1      
       0.8-1.4        









 Complete blood count (CBC) with automated white blood cell (WBC) differential 
- 17 21:15         









 Blood leukocytes automated count (number/volume)            2.6 10*3/uL       
     4.3-11.0        

 

 Blood erythrocytes automated count (number/volume)            4.38 10*6/uL    
        4.35-5.85        

 

 Venous blood hemoglobin measurement (mass/volume)            13.8 g/dL        
    11.5-16.0        

 

 Blood hematocrit (volume fraction)            41 %            35-52        

 

 Automated erythrocyte mean corpuscular volume            94 [foz_us]          
  80-99        

 

 Automated erythrocyte mean corpuscular hemoglobin (mass per erythrocyte)      
      32 pg            25-34        

 

 Automated erythrocyte mean corpuscular hemoglobin concentration measurement (
mass/volume)            34 g/dL            32-36        

 

 Automated erythrocyte distribution width ratio            15.2 %            
10.0-14.5        

 

 Automated blood platelet count (count/volume)            186 10*3/uL          
  130-400        

 

 Automated blood platelet mean volume measurement            9.4 [foz_us]      
      7.4-10.4        

 

 Automated blood neutrophils/100 leukocytes            37 %            42-75   
     

 

 Automated blood lymphocytes/100 leukocytes            51 %            12-44   
     

 

 Blood monocytes/100 leukocytes            9 %            0-12        

 

 Automated blood eosinophils/100 leukocytes            3 %            0-10     
   

 

 Automated blood basophils/100 leukocytes            0 %            0-10        

 

 Blood neutrophils automated count (number/volume)            1.0 10*3         
   1.8-7.8        

 

 Blood lymphocytes automated count (number/volume)            1.3 10*3         
   1.0-4.0        

 

 Blood monocytes automated count (number/volume)            0.2 10*3            
0.0-1.0        

 

 Automated eosinophil count            0.1 10*3/uL            0.0-0.3        

 

 Automated blood basophil count (count/volume)            0.0 10*3/uL          
  0.0-0.1        









 Comprehensive metabolic panel - 17 21:15         









 Serum or plasma sodium measurement (moles/volume)            143 mmol/L       
     135-145        

 

 Serum or plasma potassium measurement (moles/volume)            3.4 mmol/L    
        3.6-5.0        

 

 Serum or plasma chloride measurement (moles/volume)            111 mmol/L     
               

 

 Carbon dioxide            20 mmol/L            21-32        

 

 Serum or plasma anion gap determination (moles/volume)            12 mmol/L   
         5-14        

 

 Serum or plasma urea nitrogen measurement (mass/volume)            18 mg/dL   
         7-18        

 

 Serum or plasma creatinine measurement (mass/volume)            0.96 mg/dL    
        0.60-1.30        

 

 Serum or plasma urea nitrogen/creatinine mass ratio            19             
NRG        

 

 Serum or plasma creatinine measurement with calculation of estimated 
glomerular filtration rate            60             NRG        

 

 Serum or plasma glucose measurement (mass/volume)            130 mg/dL        
            

 

 Serum or plasma calcium measurement (mass/volume)            9.3 mg/dL        
    8.5-10.1        

 

 Serum or plasma total bilirubin measurement (mass/volume)            0.6 mg/dL
            0.1-1.0        

 

 Serum or plasma alkaline phosphatase measurement (enzymatic activity/volume)  
          62 U/L                    

 

 Serum or plasma aspartate aminotransferase measurement (enzymatic activity/
volume)            19 U/L            5-34        

 

 Serum or plasma alanine aminotransferase measurement (enzymatic activity/volume
)            13 U/L            0-55        

 

 Serum or plasma protein measurement (mass/volume)            7.9 g/dL         
   6.4-8.2        

 

 Serum or plasma albumin measurement (mass/volume)            4.2 g/dL         
   3.2-4.5        









 Magnesium - 17 21:15         









 Magnesium            2.1 mg/dL            1.8-2.4        









 Serum or plasma troponin i.cardiac measurement (mass/volume) - 17 21:15 
        









 Serum or plasma troponin i.cardiac measurement (mass/volume)            < ng/
mL            <0.30        









 Myoglobin, serum - 17 21:15         









 Myoglobin, serum            36.4 ng/mL            10.0-92.0        









 PT panel in platelet poor plasma by coagulation assay - 17 21:15         









 Prothrombin time (PT) in platelet poor plasma by coagulation assay            
21.8 s            12.2-14.7        

 

 INR in platelet poor plasma or blood by coagulation assay            1.9      
       0.8-1.4        









 Activated partial thromboplastin time (aPTT) in platelet poor plasma 
bycoagulation assay - 17 21:15         









 Activated partial thromboplastin time (aPTT) in platelet poor plasma 
bycoagulation assay            47 s            24-35        









 Complete blood count (CBC) with automated white blood cell (WBC) differential 
- 17 05:33         









 Blood leukocytes automated count (number/volume)            4.8 10*3/uL       
     4.3-11.0        

 

 Blood erythrocytes automated count (number/volume)            5.00 10*6/uL    
        4.35-5.85        

 

 Venous blood hemoglobin measurement (mass/volume)            15.8 g/dL        
    11.5-16.0        

 

 Blood hematocrit (volume fraction)            46 %            35-52        

 

 Automated erythrocyte mean corpuscular volume            92 [foz_us]          
  80-99        

 

 Automated erythrocyte mean corpuscular hemoglobin (mass per erythrocyte)      
      32 pg            25-34        

 

 Automated erythrocyte mean corpuscular hemoglobin concentration measurement (
mass/volume)            34 g/dL            32-36        

 

 Automated erythrocyte distribution width ratio            15.7 %            
10.0-14.5        

 

 Automated blood platelet count (count/volume)            196 10*3/uL          
  130-400        

 

 Automated blood platelet mean volume measurement            9.4 [foz_us]      
      7.4-10.4        

 

 Automated blood neutrophils/100 leukocytes            79 %            42-75   
     

 

 Automated blood lymphocytes/100 leukocytes            12 %            12-44   
     

 

 Blood monocytes/100 leukocytes            8 %            0-12        

 

 Automated blood eosinophils/100 leukocytes            1 %            0-10     
   

 

 Automated blood basophils/100 leukocytes            0 %            0-10        

 

 Blood neutrophils automated count (number/volume)            3.8 10*3         
   1.8-7.8        

 

 Blood lymphocytes automated count (number/volume)            0.6 10*3         
   1.0-4.0        

 

 Blood monocytes automated count (number/volume)            0.4 10*3            
0.0-1.0        

 

 Automated eosinophil count            0.0 10*3/uL            0.0-0.3        

 

 Automated blood basophil count (count/volume)            0.0 10*3/uL          
  0.0-0.1        









 Comprehensive metabolic panel - 17 05:33         









 Serum or plasma sodium measurement (moles/volume)            140 mmol/L       
     135-145        

 

 Serum or plasma potassium measurement (moles/volume)            3.7 mmol/L    
        3.6-5.0        

 

 Serum or plasma chloride measurement (moles/volume)            108 mmol/L     
               

 

 Carbon dioxide            21 mmol/L            21-32        

 

 Serum or plasma anion gap determination (moles/volume)            11 mmol/L   
         5-14        

 

 Serum or plasma urea nitrogen measurement (mass/volume)            16 mg/dL   
         7-18        

 

 Serum or plasma creatinine measurement (mass/volume)            0.86 mg/dL    
        0.60-1.30        

 

 Serum or plasma urea nitrogen/creatinine mass ratio            19             
NRG        

 

 Serum or plasma creatinine measurement with calculation of estimated 
glomerular filtration rate            >             NRG        

 

 Serum or plasma glucose measurement (mass/volume)            116 mg/dL        
            

 

 Serum or plasma calcium measurement (mass/volume)            8.8 mg/dL        
    8.5-10.1        

 

 Serum or plasma total bilirubin measurement (mass/volume)            0.6 mg/dL
            0.1-1.0        

 

 Serum or plasma alkaline phosphatase measurement (enzymatic activity/volume)  
          58 U/L                    

 

 Serum or plasma aspartate aminotransferase measurement (enzymatic activity/
volume)            24 U/L            5-34        

 

 Serum or plasma alanine aminotransferase measurement (enzymatic activity/volume
)            15 U/L            0-55        

 

 Serum or plasma protein measurement (mass/volume)            8.3 g/dL         
   6.4-8.2        

 

 Serum or plasma albumin measurement (mass/volume)            4.4 g/dL         
   3.2-4.5        









 Serum or plasma amylase measurement (enzymatic activity/volume) - 17 05:
33         









 Serum or plasma amylase measurement (enzymatic activity/volume)            150 
U/L                    









 Lipase - 17 05:33         









 Lipase            25 U/L            8-78        









 Complete urinalysis with reflex to culture - 17 06:24         









 Urine color determination            YELLOW             NRG        

 

 Urine clarity determination            CLEAR             NRG        

 

 Urine pH measurement by test strip            5             5-9        

 

 Specific gravity of urine by test strip            1.025             1.016-
1.022        

 

 Urine protein assay by test strip, semi-quantitative            2+             
NEGATIVE        

 

 Urine glucose detection by automated test strip            NEGATIVE           
  NEGATIVE        

 

 Erythrocytes detection in urine sediment by light microscopy            4+    
         NEGATIVE        

 

 Urine ketones detection by automated test strip            NEGATIVE           
  NEGATIVE        

 

 Urine nitrite detection by test strip            NEGATIVE             NEGATIVE
        

 

 Urine total bilirubin detection by test strip            NEGATIVE             
NEGATIVE        

 

 Urine urobilinogen measurement by automated test strip (mass/volume)          
  NORMAL             NORMAL        

 

 Urine leukocyte esterase detection by dipstick            2+             
NEGATIVE        

 

 Automated urine sediment erythrocyte count by microscopy (number/high power 
field)             [HPF]            NRG        

 

 Automated urine sediment leukocyte count by microscopy (number/high power field
)             [HPF]            NRG        

 

 Bacteria detection in urine sediment by light microscopy            TRACE     
        NRG        

 

 Squamous epithelial cells detection in urine sediment by light microscopy     
       2-5             NRG        

 

 Crystals detection in urine sediment by light microscopy            NONE      
       NRG        

 

 Casts detection in urine sediment by light microscopy            NONE         
    NRG        

 

 Mucus detection in urine sediment by light microscopy            MODERATE     
        NRG        

 

 Complete urinalysis with reflex to culture            NO             NRG      
  









 PT panel in platelet poor plasma by coagulation assay - 18 07:36         









 Prothrombin time (PT) in platelet poor plasma by coagulation assay            
15.1 s            12.2-14.7        

 

 INR in platelet poor plasma or blood by coagulation assay            1.2      
       0.8-1.4        



                                                                               
                       



Encounters

      





 ACCT No.            Visit Date/Time            Discharge            Status    
        Pt. Type            Provider            Facility            Loc./Unit  
          Complaint        

 

 [id number]            2018 07:25:00            2018 23:59:59    
        CLS            Outpatient            KATHRYN HOLLAND DO            
[facility]            RAD            SOB        

 

 [id number]            2018 05:39:00            2018 11:00:00    
        DIS            Outpatient            KAELA AVILES DO            [facility]            PREOP            COLONOSCOPY        

 

 [id number]            2018 14:17:00            2018 23:59:59    
        CLS            Outpatient            KATHRYN HOLLAND DO            
[facility]            RAD            SOB, PLUERAL 
EFFUSION        

 

 [id number]            2017 12:44:00            2017 23:59:59    
        CLS            Outpatient            KATHRYN HOLLAND DO            
[facility]            RAD            LEFT PLEURAL 
EFFUSION        

 

 [id number]            2017 04:17:00            2017 07:14:00    
        DIS            Emergency            JENELLE  NICKI K            [facility]            ER            N/V/D        

 

 [id number]            2017 21:10:00            2017 22:50:00    
        DIS            Emergency            GENARO COHEN, LUIS MCKEE            
[facility]            ER            HEART RACING/CP      
  

 

 [id number]            2017 10:43:00            2017 23:59:59    
        CLS            Outpatient            TESSY COHEN FACC, CHEMA HANNA CCDS     
       [facility]            LAB            I48.0        

 

 [id number]            10/24/2017 12:18:00            10/24/2017 23:59:59    
        CLS            Outpatient            JENN NEAL          
  [facility]            RAD            Z51.81 
ANTICOAGULATED ON COUMADIN        

 

 [id number]            10/09/2017 16:43:00            10/09/2017 23:59:59    
        CLS            Outpatient            KATHRYN HOLLAND DO            
[facility]            RAD            PLEURAL PAIN LEFT 
SIDE,SOB        

 

 [id number]            10/04/2017 08:10:00            10/04/2017 23:59:59    
        CLS            Outpatient            TESSY COHEN FACC, CHEMA HANNA CCDS     
       [facility]            LAB            I48.0        

 

 [id number]            10/01/2017 03:08:00            10/01/2017 23:59:59    
        CLS            Preadmit            KATHRYN HOLLAND DO            
[facility]            LAB            A FIB        

 

 [id number]            2017 09:40:00            2017 00:01:00    
        DIS            Outpatient            KATHRYN HOLLAND DO            
[facility]            LAB            A FIB        

 

 [id number]            2017 12:30:00            2017 23:59:59    
        CLS            Outpatient            KATHRYN HOLLAND DO            
[facility]            LAB            A FIB        

 

 [id number]            2017 12:07:00            09/15/2017 16:30:00    
        DIS            Inpatient            KATHRYN HOLLAND DO            
[facility]            ICU            A-FIB WITH RVR      
  

 

 [id number]            2017 07:51:00            2017 23:59:59    
        CLS            Outpatient            DEIDRE QUEZADA            [facility]            RAD            K74.60        

 

 [id number]            2017 12:14:00            2017 23:59:59    
        CLS            Outpatient            KATHRYN HOLLAND DO            
[facility]            RAD            SOB PLEURAL EFFUSION 
DUE TO CANCER MEDICINE        

 

 [id number]            2017 16:27:00            2017 18:17:00    
        DIS            Emergency            DUY COHEN, AMADA MONTES            
[facility]            ER            SHOULDER BLADE/ARM 
NERVE BURNING        

 

 [id number]            2017 06:52:00            2017 23:59:59    
        CLS            Outpatient            DEIDRE QUEZADA            [facility] 
[facility]            RAD            CIRRHOSIS OF LIVER      
  

 

 [id number]            2016 09:32:00            2016 23:59:59    
        CLS            Outpatient            KATHRYN HOLLAND DO            
[facility]            RAD            RIGHT WRIST PAIN X4 
WEEKS        

 

 [id number]            10/31/2016 18:04:00            10/31/2016 20:04:00    
        DIS            Emergency            KARELY SALAS            [facility]ia 
[facility]            ER            BACK PAIN        

 

 [id number]            2016 07:42:00            2016 23:59:59    
        CLS            Outpatient            LUKE MAHAN DO            Via 
[facility]            RAD            PLEURAL EFFUSION,LEUKEMIA
,HEPATITIS C,COUGH        

 

 [id number]            2016 10:10:00            2016 23:59:59    
        CLS            Outpatient            LUKE MAHAN DO            [facility]ia 
[facility]            LAB            PLEURAL EFFUSION,LEUKEMIA
,HEP C,COUGH        

 

 [id number]            2016 09:19:00            2016 23:59:59    
        CLS            Outpatient            KATHRYN HOLLAND DO            
[facility]            RAD            SOB,ABNORMAL DENSITY
        

 

 [id number]            2016 12:20:00            2016 23:59:59    
        CLS            Outpatient            KATHRYN HOLLAND DO            
[facility]            LAB            KIDNEY FAILURE,SOB  
      

 

 [id number]            2016 11:50:00            2016 23:59:59    
        CLS            Outpatient            KATHRYN HOLLAND DO            
[facility]            RAD            SHORT OF BREATH     
   

 

 [id number]            2015 11:56:00            2015 23:59:59    
        CLS            Outpatient            KATHRYN HOLLAND DO            
[facility]            RAD            NO SOUNDS L LUNG,SOB
        

 

 [id number]            2015 07:20:00            2015 23:59:59    
        CLS            Outpatient            KATHRYN HOLLAND DO            
[facility]            RAD            RT LEG AND FOOT 
REDNESS/SWELLING        

 

 [id number]            06/10/2015 06:41:00            06/10/2015 23:59:59    
        CLS            Outpatient            DEIDRE QUEZADAP            Via 
[facility]            RAD            HEP C,CIRRHOSIS        

 

 [id number]            2014 16:28:00            2014 13:00:00    
        DIS            Inpatient            KAELA AVILES DO            [facility]ia 
[facility]            4TH            L RIB FRACTURES, 
PULMONARY CONTUSION, PNEUMOTHORAX        

 

 [id number]            10/02/2014 17:40:00            10/03/2014 10:35:00    
        DIS            Inpatient            KATHRYN HOLLAND DO            
[facility]            4TH            RIGHT PLEURAL 
EFFUSION; CONSTIPATION        

 

 [id number]            2014 15:02:00            2014 23:59:59    
        CLS            Outpatient            LUKE MAHAN DO            [facility]ia 
[facility]            RT            PLEURAL EFFUSION,COUGH,
LEUKEMIA        

 

 [id number]            2014 11:57:00            2014 14:20:00    
        DIS            Outpatient            LUKE MAHAN DO            [facility]ia 
[facility]            SDC            PLEURAL EFFUSION        

 

 [id number]            09/15/2014 16:39:00            09/15/2014 23:59:59    
        CLS            Outpatient            KATHRYN HOLLAND DO            
[facility]            RAD            RT PLEURAL EFFUSION 
       

 

 [id number]            2014 11:44:00            2014 23:59:59    
        CLS            Outpatient            KATHRYN HOLLAND DO            
[facility]            RAD            COUGH,SOA        

 

 [id number]            2014 21:42:00            2014 11:10:00    
        DIS            Inpatient            KATHRYN HOLLAND DO            
[facility]            4TH            NEW RIGHT PLEURAL 
EFFUSION        

 

 [id number]            2014 14:03:00            2014 23:59:59    
        CLS            Outpatient            KATHRYN HOLLAND DO            
[facility]            LAB            LIVER BIOPSY        

 

 [id number]            2013 13:34:00            2013 23:59:59    
        CLS            Outpatient                                              
              

 

 [id number]            06/10/2013 14:45:00            06/10/2013 23:59:59    
        CLS            Outpatient            KATHRYN HOLLAND DO            
[facility]            RAD            SCREENING        

 

 [id number]            2018 12:00:00                         PEN       
     Preadmit            KAELA AVILES DO            [facility]            ENDO            HX OF POLYPS        

 

 [id number]            2014 10:57:00                                   
   Document Registration                                                       
     

 

 [id number]            2013 00:00:00                                   
   Document Registration                                                       
     

 

 [id number]            2013 00:00:00                                   
   Document Registration                                                       
     

 

 [id number]            04/15/2013 11:37:00                                   
   Document Registration                                                       
     

 

 [id number]            2013 19:29:00                                   
   Document Registration                                                       
     

 

 [id number]            2013 08:48:00                                   
   Document Registration                                                       
     

 

 [id number]            2013 13:41:00                                   
   Document Registration                                                       
     

 

 [id number]            2013 09:47:00                                   
   Document Registration                                                       
     

 

 [id number]            2012 11:50:00                                   
   Document Registration                                                       
     

 

 [id number]            10/22/2012 08:57:00                                   
   Document Registration                                                       
     

 

 [id number]            10/09/2012 13:23:00                                   
   Document Registration                                                       
     

 

 [id number]            2012 13:16:00                                   
   Document Registration                                                       
     

 

 [id number]            2012 00:56:00                                   
   Document Registration                                                       
     

 

 [id number]            2012 19:56:00                                   
   Document Registration                                                       
     

 

 [id number]            2012 16:59:00                                   
   Document Registration                                                       
     

 

 [id number]            2012 13:48:00                                   
   Document Registration

## 2018-01-10 NOTE — XMS REPORT
Encounter Summary

 Created on: 01/10/2018



GumeFinay ALE

External Reference #: [id number]

: 1960

Sex: Female



Demographics







 Address  [address] [place]  [address]

 

 Home Phone  [phone]

 

 Preferred Language  English

 

 Marital Status  Unknown

 

 Church Affiliation  NON

 

 Race  White

 

 Ethnic Group  Not  or 





Author







 Author  [facility]

 

 Organization  [facility]

 

 Address  Unknown

 

 Phone  Unavailable







Support







 Name  Relationship  Address  Phone

 

 , Kelly Siddiqui  [employer]  [address]

[place]  [address]  [phone]

 

 , Razia Dunaway  [employer]  Unknown  [phone]







Care Team Providers







 Care Team Member Name  Role  Phone

 

  PCP  Unavailable







Reason for Visit

* 





 



  Reason   Comments

 

 



  Other   call to NC









Encounter Details







    



  Date   Type   Department   Care Team   Description

 

    



  10/13/2017   Telephone   [facility] for   Olga Polk APRN   Other (call to [place])



    Transplantation-Hepatolog   [address] [facility]   [address] 



    [address] [place] [address] 



    [facility]   [phone] 



    TRANSPLANTATION   [phone] (Fax) 



    [place], [place]  



    [address] [phone]  







Social History







    



  Tobacco Use   Types   Packs/Day   Years Used   Date

 

    



  Former Smoker   Cigarettes   1   10 

 

    



  Smokeless Tobacco: Former     Quit:



  User     2003









   



  Alcohol Use   Drinks/Week   oz/Week   Comments

 

   



  No   0 Standard   0.0 



   drinks or  



   equivalent  









 



  Sex Assigned at Birth   Date Recorded

 

 



  Not on file 



as of this encounter



Functional Status







  



  Functional Status   Response   Date of Assessment

 

  



  Does the patient have a hearing impairment:   No   2017

 

  



  Does the patient have a visual impairment:   Yes   2017

 

  



  Does the patient have impaired ambulation:   No   2017

 

  



  Does the patient have an activity of daily living   No   2017



  (ADL) impairment:  

 

  



  Does the patient have an instrumental activity of   No   2017



  daily living (IADL) impairment:  









  



  Cognitive Status   Response   Date of Assessment

 

  



  Does the patient have a cognitive impairment:   No   2017



as of this encounter



Miscellaneous Notes

* Telephone Encounter - Joanen Whitfield LPN - 10/16/2017  9:34 AM CDT



Returned pts phone call. Placed order for EGD/Colonoscopy and tasked to Maura 
to schedule. Placed order for colonoscopy prep to local pharmacy. Call 
concluded.





in this encounter



Plan of Treatment





Not on fileas of this encounter



Visit Diagnoses

Not on filein this encounter

## 2018-01-10 NOTE — XMS REPORT
Encounter Summary

 Created on: 01/10/2018



Wendy Dunaway

External Reference #: [id number]

: 1960

Sex: Female



Demographics







 Address  [address] [place]  [address]

 

 Home Phone  [phone]

 

 Preferred Language  English

 

 Marital Status  Unknown

 

 Jewish Affiliation  NON

 

 Race  White

 

 Ethnic Group  Not  or 





Author







 Author  [facility]

 

 Organization  [facility]

 

 Address  Unknown

 

 Phone  Unavailable







Support







 Name  Relationship  Address  Phone

 

 , Kelly Siddiqui  [employer]  [address]

[place]  [address]  [phone]

 

 , Razia Dunaway  [employer]  Unknown  [phone]







Care Team Providers







 Care Team Member Name  Role  Phone

 

  PCP  Unavailable







Encounter Details







    



  Date   Type   Department   Care Team   Description

 

    



  10/16/2017   Prep for Case   [facility] for   Olga Polk APRN 



    Transplantation-Hepatolog   [address] [facility]   [address] 



    [address] [place] [address] 



    [facility]   [phone] 



    TRANSPLANTATION   [phone] (Fax) 



    [place], [place]  



    [address] [phone]  







Social History







    



  Tobacco Use   Types   Packs/Day   Years Used   Date

 

    



  Former Smoker   Cigarettes   1   10 

 

    



  Smokeless Tobacco: Former     Quit:



  User     2003









   



  Alcohol Use   Drinks/Week   oz/Week   Comments

 

   



  No   0 Standard   0.0 



   drinks or  



   equivalent  









 



  Sex Assigned at Birth   Date Recorded

 

 



  Not on file 



as of this encounter



Functional Status







  



  Functional Status   Response   Date of Assessment

 

  



  Does the patient have a hearing impairment:   No   2017

 

  



  Does the patient have a visual impairment:   Yes   2017

 

  



  Does the patient have impaired ambulation:   No   2017

 

  



  Does the patient have an activity of daily living   No   2017



  (ADL) impairment:  

 

  



  Does the patient have an instrumental activity of   No   2017



  daily living (IADL) impairment:  









  



  Cognitive Status   Response   Date of Assessment

 

  



  Does the patient have a cognitive impairment:   No   2017



as of this encounter



Plan of Treatment





Not on fileas of this encounter



Visit Diagnoses

Not on filein this encounter

## 2018-01-10 NOTE — XMS REPORT
Encounter Summary

 Created on: 01/10/2018



Wendy Dunaway

External Reference #: [id number]

: 1960

Sex: Female



Demographics







 Address  [address] [place]  [address]

 

 Home Phone  [phone]

 

 Preferred Language  English

 

 Marital Status  Unknown

 

 Pentecostalism Affiliation  NON

 

 Race  White

 

 Ethnic Group  Not  or 





Author







 Author  [facility]

 

 Organization  [facility]

 

 Address  Unknown

 

 Phone  Unavailable







Support







 Name  Relationship  Address  Phone

 

 , Kelly Siddiqui  ECON  [address] [place] [address]  [address]  [phone]

 

 , Razia Dunaway  ECON  Unknown  [phone]







Care Team Providers







 Care Team Member Name  Role  Phone

 

  PCP  Unavailable







Reason for Visit

* 





 



  Reason   Comments

 

 



  Heme/Onc Care 









Encounter Details







    



  Date   Type   Department   Care Team   Description

 

    



  10/19/2017   Office Visit   [facility]   Gillian Chand MD   CML (chronic myelocytic



    [facility] [place]   [address]   leukemia) ([facility]);CML



    [address]   [address]   (chronic [place] [address]   [place] [address]   leukemia) ([facility])



    [phone] (Fax) 







Social History







    



  Tobacco Use   Types   Packs/Day   Years Used   Date

 

    



  Former Smoker   Cigarettes   1   10 

 

    



  Smokeless Tobacco: Former     Quit:



  User     2003









   



  Alcohol Use   Drinks/Week   oz/Week   Comments

 

   



  No   0 Standard   0.0 



   drinks or  



   equivalent  









 



  Sex Assigned at Birth   Date Recorded

 

 



  Not on file 



as of this encounter



Last Filed Vital Signs







  



  Vital Sign   Reading   Time Taken

 

  



  Blood Pressure   127/89   10/19/2017 10:51 AM CDT

 

  



  Pulse   78   10/19/2017 10:51 AM CDT

 

  



  Temperature   36.8   C (98.2   F)   10/19/2017 10:51 AM CDT

 

  



  Respiratory Rate   12   10/19/2017 10:51 AM CDT

 

  



  Oxygen Saturation   100%   10/19/2017 10:51 AM CDT

 

  



  Inhaled Oxygen   -   -



  Concentration  

 

  



  Weight   71 kg (156 lb 9.6 oz)   10/19/2017 10:51 AM CDT

 

  



  Height   174 cm (5' 8.5")   10/19/2017 10:51 AM CDT

 

  



  Body Mass Index   23.46   10/19/2017 10:51 AM CDT



in this encounter



Functional Status







  



  Functional Status   Response   Date of Assessment

 

  



  Does the patient have a hearing impairment:   No   2017

 

  



  Does the patient have a visual impairment:   Yes   2017

 

  



  Does the patient have impaired ambulation:   No   2017

 

  



  Does the patient have an activity of daily living   No   2017



  (ADL) impairment:  

 

  



  Does the patient have an instrumental activity of   No   2017



  daily living (IADL) impairment:  









  



  Cognitive Status   Response   Date of Assessment

 

  



  Does the patient have a cognitive impairment:   No   2017



as of this encounter



Progress Notes

* Gillian Chand MD - 10/19/2017 11:20 AM CDT



Formatting of this note may be different from the original.

Date of Service: 10/19/2017



Subjective:        

 

Reason for Visit:

Heme/Onc Care



Wendy Dunaway is a 57 y.o. female.



History of Present Illness



Ms. Dunaway is a 57-year-old woman with history of hepatitis C virus infection 
and peripheral neuropathy. She was diagnosed with chronic phase CML in 2012 
after presenting with leucocytosis and extrema thrombocytosis. Molecular 
testing was positive for p210 BCR-ABL translocation. She has been started on 
Gleevec. She has been on a dose between 400 and 800 over the past several 
months due to liver disease and dosing changes were made based on her response 
as well as her abnormal liver function Gleevec was discontinued in mid 2013 
as reported by the patient that her CML has progressed. Her oncologist, Dr. Stewart discontinued Gleevec and started the patient on nilotinib. As per the 
patient, the patient took nilotinib for 1 day and had severe exacerbation for 
her neuropathy, where she had to stop therapy. She has been off any TKI for the 
several weeks. She was started on Dasatinib and fist dose administered on March 
15. 



She has completed CMR on dasatinib. 



Interval History:



Patient  is here today for follow up of CML.

She is on dasatinib 40 mg by mouth once a day and has been compliant with with 
little side effects.  She unfortunately has had recurrent pleural effusions and 
more recently had likely secondary to atrial fibrillation for which she was 
started on anticoagulation and on rhythm control.  His most recent chest x-ray 
at her local hospital showed relapsed pleural effusion of the left side and she 
will be undergoing thoracentesis locally.

She has no other side effects to dasatinib therapy.



 

Review of Systems 

Constitutional: Negative for activity change, appetite change, chills, 
diaphoresis, fatigue, fever and unexpected weight change. 

HENT: Negative for congestion, facial swelling and sinus pressure.  

Respiratory: Positive for shortness of breath. Negative for cough, chest 
tightness and wheezing.  

Cardiovascular: Positive for palpitations. Negative for chest pain and leg 
swelling. 

Gastrointestinal: Negative for abdominal distention, abdominal pain, anal 
bleeding, blood in stool, constipation, diarrhea, nausea and vomiting. 

Genitourinary: Negative for hematuria. 

Musculoskeletal: Negative for arthralgias and myalgias. 

Skin: Negative for rash. 

Neurological: Positive for numbness (chronic,stable). 

Hematological: Negative for adenopathy. Does not bruise/bleed easily. 

Psychiatric/Behavioral: The patient is not nervous/anxious.  



Objective:       

 CARTIA  mg capsule TK 1 C PO D 

 dasatinib(+) (SPRYCEL) 20 mg tablet Take 2 tablets by mouth daily. 

 gabapentin (NEURONTIN) 600 mg tablet Take 1 tab every morning, 2 tabs in 
the afternoon, 2 tabs in the evening, and 1 tab at bedtime 

 omeprazole DR(+) (PRILOSEC) 40 mg capsule TK 1 C PO QAM 30 MINUTES BEFORE 
BREAKFAST 

 PEG 3350-Electrolytes (GOLYTELY) oral solution Mix with water as directed 
on package. Drink 240ml (8oz) every 10 minutes until gone. 

 potassium chloride (K-DUR) 10 mEq tablet TK 1 T PO D 

 QUEtiapine (SEROQUEL) 100 mg tablet Take 1/2 tab every morning, 1 tab at 1 
pm, 1 tab at 5 pm and 1/2 tab at bedtime. 

 triamcinolone acetonide (KENALOG) 0.1 % topical cream JARRED TID TO LEGS 

 warfarin (COUMADIN) 1 mg tablet TK 1 T PO  ONCE D 

 warfarin (COUMADIN) 5 mg tablet TK 1 T PO D 



Vitals: 

 10/19/17 1051 

BP: 127/89 

Pulse: 78 

Resp: 12 

Temp: 36.8 C (98.2 F) 

TempSrc: Oral 

SpO2: 100% 

Weight: 71 kg (156 lb 9.6 oz) 

Height: 174 cm (68.5") 



Body mass index is 23.46 kg/(m^2). 



Pain Score: Zero

 



Pain Addressed:  N/A



Patient Evaluated for a Clinical Trial: Patient not eligible for a treatment 
trial (including not needing treatment, needs palliative care, in remission). 



Eastern Cooperative Oncology Group performance status is 0, Fully active, able 
to carry on all pre-disease performance without restriction..



 Physical Exam 

Constitutional: She is oriented to person, place, and time. She appears well-
developed and well-nourished. 

HENT: 

Head: Normocephalic and atraumatic. 

Mouth/Throat: Oropharynx is clear and moist. 

Eyes: Conjunctivae and EOM are normal. 

Neck: Normal range of motion. Neck supple. 

Cardiovascular: Normal rate, regular rhythm and normal heart sounds.  

Pulmonary/Chest: Breath sounds normal. No respiratory distress. 

Decreased air entry, left chest.  

Abdominal: Soft. She exhibits no distension. There is no tenderness. There is 
no guarding. 

Musculoskeletal: Normal range of motion. She exhibits no edema. 

Lymphadenopathy: 

  She has no cervical adenopathy. 

Neurological: She is alert and oriented to person, place, and time. 

Skin: Skin is warm and dry. No rash noted. No erythema. No pallor. 

Psychiatric: She has a normal mood and affect. Her behavior is normal. Judgment 
and thought content normal. 

Vitals reviewed.



 



 

Assessment and Plan:



Problem 

Cml (Chronic Myelocytic Leukemia) ([facility]) 

 Diagnosis: CM chronic phase

Date of Diagnosis: 2012

Treatment:

Imatinib 400-800 mg PO daily  to 2013: Best response not available, 
patient progressed 2013 ( Cytogenetic progression/ relapse) 

Nilotinib 200 MG PO BID: unable to tolerate, exacerbated neuropathy

Dasatinib 100 mg PO daily: started March 15, 2013

Response: CCR, CMR

Dasatinib reduced to 70 mg PO daily: Oct , 2013

7/30/15-Continue Dasatinib 70 mg po daily.

May 2016: Dasatinib 50 mg po daily.

Oct   2016: Dasatinib 40 mg po daily. CMR

Oct   2017: Dasatinib 20 mg po daily.  Reduction due to recurrent pleural 
effusions 



Ms. Dunaway is a 57-year-old woman with a history of hep C infection and 
unexplained neuropathy.  She also has significant dysphagia and odynophagia and 
has significant difficulty in observing oral medication tablets.  



She was diagnosed with chronic phase CML in 2012.  She has been under the 
care of Dr. Peteresn.  There has not been any outside records available for us 
to review today and most of the information available has been provided by the 
patient verbally.  Her treatment hostory is as above. 

Repeat the bone marrow biopsy did not show clonal evolution of her CML. Remains 
in CP-CML. No Kinase-domain mutations were detected. 



Since diagnoses her hep C therapy will resulted in complete eradication of 
hepatitis C virus infection with sustained molecular remission.  Her dysphagia 
and odynophagia has resolved and she is able to take oral pills.



 



Current Assessment and Plan:



CML (chronic myelocytic leukemia) (HCC)



Mrs. Dunaway is here today for follow up for CML.She has been on long term 
therapy with Dasatinib.She reports compliance with medication and tolerating 
treatment well.She has had complete molecular response to treatment.



Unfortunately she has recurrent pleural effusion which result in recurrent 
thoracenteses.  She has had several dose reductions but continues to have 
recurrent effusions.



She will undergo thoracentesis locally, and she is getting cardiac and 
pulmonary care locally.

We will dose reduce her dasatinib to 20 mg by mouth once a day especially in 
light of her complete remission.  Will continue follow-up every 3 months 
otherwise.



If she has recurrent pleural effusions despite their major dose reductions and 
dasatinib will consider switching her to bosutinib.



Health care maintenance-She is up to date on Mammogram and Pap Smear.She is due 
for colonoscopy in .Continue to follow up with PCP for health 
care maintenance.



Continue to follow up with Hepatology for history of Cirrhosis.



Patient to call if she develops unexplained fatigue, fevers, chills,SOB,chest 
pain or any other concerns.She verbalized understanding and agreed with plan.



RTC in 3 months for follow up with Dr Chand.



 



in this encounter



Miscellaneous Notes

* Assessment & Plan Note - Gillian Chand MD - 10/19/2017 12:11 PM CDT



Associated Problem(s): CML (chronic myelocytic leukemia) (HCC)





Mrs. Dunaway is here today for follow up for CML.She has been on long term 
therapy with Dasatinib.She reports compliance with medication and tolerating 
treatment well.She has had complete molecular response to treatment.



Unfortunately she has recurrent pleural effusion which result in recurrent 
thoracenteses.  She has had several dose reductions but continues to have 
recurrent effusions.



She will undergo thoracentesis locally, and she is getting cardiac and 
pulmonary care locally.

We will dose reduce her dasatinib to 20 mg by mouth once a day especially in 
light of her complete remission.  Will continue follow-up every 3 months 
otherwise.



If she has recurrent pleural effusions despite their major dose reductions and 
dasatinib will consider switching her to bosutinib.



Health care maintenance-She is up to date on Mammogram and Pap Smear.She is due 
for colonoscopy in .Continue to follow up with PCP for health 
care maintenance.



Continue to follow up with Hepatology for history of Cirrhosis.



Patient to call if she develops unexplained fatigue, fevers, chills,SOB,chest 
pain or any other concerns.She verbalized understanding and agreed with plan.



RTC in 3 months for follow up with Dr Chand.





in this encounter



Plan of Treatment







   



  Name   Priority   Associated Diagnoses   Order Schedule

 

   



  CBC AND DIFF   Routine   CML (chronic myelocytic   Expected: 2018



    leukemia) ([facility])   (Approximate), Expires:



     10/19/2018

 

   



  BCR  PCR BLOOD OR BONE MARROW   Routine   CML (chronic myelocytic   
Expected: 2018



    leukemia) (HCC)   (Approximate), Expires:



     10/19/2018

 

   



  COMPREHENSIVE METABOLIC PANEL   Routine   CML (chronic myelocytic   Expected: 
2018



    leukemia) ([facility])   (Approximate), Expires:



     10/19/2018



as of this encounter



Visit Diagnoses











  Diagnosis

 





  CML (chronic myelocytic leukemia) (HCC)

 





  Chronic myeloid leukemia, without mention of having achieved remission



in this encounter

## 2018-01-10 NOTE — XMS REPORT
Encounter Summary

 Created on: 01/10/2018



GumeFinay ALE

External Reference #: [id number]

: 1960

Sex: Female



Demographics







 Address  [address] [place]  [address]

 

 Home Phone  [phone]

 

 Preferred Language  English

 

 Marital Status  Unknown

 

 Jehovah's witness Affiliation  NON

 

 Race  White

 

 Ethnic Group  Not  or 





Author







 Author  [facility]

 

 Organization  [facility]

 

 Address  Unknown

 

 Phone  Unavailable







Support







 Name  Relationship  Address  Phone

 

 , Kelly Siddiqui  EC[employer]  [address]

[place]  [address]  [phone]

 

 , Razia Dunaway  ECON  Unknown  [phone]







Care Team Providers







 Care Team Member Name  Role  Phone

 

  PCP  Unavailable







Encounter Details







    



  Date   Type   Department   Care Team   Description

 

    



  10/31/2017   Orders Only   [facility]   Olga Polk APRN   Cirrhosis of liver



    Transplantation-Hepatolog   [address]   without ascites,



    y Clinic   [address]   unspecified hepatic



    [address] [place] [address]   cirrhosis type (HCC)



    [facility]   [phone]   (Primary Dx)



    TRANSPLANTATION   [phone] (Fax) 



    [place], [place]  



    [address] [phone]  







Social History







    



  Tobacco Use   Types   Packs/Day   Years Used   Date

 

    



  Former Smoker   Cigarettes   1   10 

 

    



  Smokeless Tobacco: Former     Quit:



  User     2003









   



  Alcohol Use   Drinks/Week   oz/Week   Comments

 

   



  No   0 Standard   0.0 



   drinks or  



   equivalent  









 



  Sex Assigned at Birth   Date Recorded

 

 



  Not on file 



as of this encounter



Functional Status







  



  Functional Status   Response   Date of Assessment

 

  



  Does the patient have a hearing impairment:   No   2017

 

  



  Does the patient have a visual impairment:   Yes   2017

 

  



  Does the patient have impaired ambulation:   No   2017

 

  



  Does the patient have an activity of daily living   No   2017



  (ADL) impairment:  

 

  



  Does the patient have an instrumental activity of   No   2017



  daily living (IADL) impairment:  









  



  Cognitive Status   Response   Date of Assessment

 

  



  Does the patient have a cognitive impairment:   No   2017



as of this encounter



Plan of Treatment





Not on fileas of this encounter



Results

* ALPHA FETO PROTEIN (AFP) (10/23/2017 10:13 AM)





  



  Component   Value   Ref Range

 

  



  Alpha Feto Protein   1.9   0.0 - 8.7 ng/ml









 



  Specimen   Performing Laboratory

 

 



  Blood   LABDE INTERFACE









 Narrative

 

 



Outside Lab Verified by Dane Platt on 10/31/2017.





in this encounter



Visit Diagnoses











  Diagnosis

 





  Cirrhosis of liver without ascites, unspecified hepatic cirrhosis type (HCC) 
- Primary



in this encounter

## 2018-02-23 ENCOUNTER — HOSPITAL ENCOUNTER (OUTPATIENT)
Dept: HOSPITAL 75 - LAB | Age: 58
End: 2018-02-23
Attending: STUDENT IN AN ORGANIZED HEALTH CARE EDUCATION/TRAINING PROGRAM
Payer: MEDICARE

## 2018-02-23 DIAGNOSIS — C92.10: Primary | ICD-10-CM

## 2018-02-23 PROCEDURE — 81206 BCR/ABL1 GENE MAJOR BP: CPT

## 2018-02-23 PROCEDURE — 36415 COLL VENOUS BLD VENIPUNCTURE: CPT

## 2018-02-26 ENCOUNTER — HOSPITAL ENCOUNTER (OUTPATIENT)
Dept: HOSPITAL 75 - RAD | Age: 58
End: 2018-02-26
Attending: NURSE PRACTITIONER
Payer: MEDICARE

## 2018-02-26 DIAGNOSIS — J90: ICD-10-CM

## 2018-02-26 DIAGNOSIS — K74.60: Primary | ICD-10-CM

## 2018-02-26 PROCEDURE — 76700 US EXAM ABDOM COMPLETE: CPT

## 2018-02-26 NOTE — DIAGNOSTIC IMAGING REPORT
PROCEDURE: US abdomen complete.



TECHNIQUE: Multiple real-time grayscale images were obtained over

the abdomen in various projections.



INDICATION: Cirrhosis.



FINDINGS: The liver is upper limits of normal in size. There is

generalized hepatic heterogeneity but no discrete liver mass is

identified. The gallbladder is surgically absent. No intrahepatic

or extrahepatic biliary ductal dilatation is seen. The portal

vein is patent and demonstrates normal direction of flow. The

pancreas is unremarkable. The spleen is upper limits of normal in

size. Aorta is non-aneurysmal. The IVC is unremarkable. The right

and left kidneys are unremarkable. There is a large left pleural

effusion. No ascites is detected.



IMPRESSION:

1. Liver heterogeneity but no discrete liver mass is identified.

2. No evidence of splenomegaly or ascites.

3. Large left pleural effusion.



Dictated by: 



  Dictated on workstation # [id number]

## 2018-03-23 LAB
ALBUMIN SERPL-MCNC: 4.3 GM/DL (ref 3.2–4.5)
ALP SERPL-CCNC: 69 U/L (ref 40–136)
ALT SERPL-CCNC: 13 U/L (ref 0–55)
BASOPHILS # BLD AUTO: 0 10^3/UL (ref 0–0.1)
BASOPHILS NFR BLD AUTO: 1 % (ref 0–10)
BILIRUB SERPL-MCNC: 0.6 MG/DL (ref 0.1–1)
BUN/CREAT SERPL: 20
CALCIUM SERPL-MCNC: 9.3 MG/DL (ref 8.5–10.1)
CHLORIDE SERPL-SCNC: 109 MMOL/L (ref 98–107)
CO2 SERPL-SCNC: 22 MMOL/L (ref 21–32)
CREAT SERPL-MCNC: 0.8 MG/DL (ref 0.6–1.3)
EOSINOPHIL # BLD AUTO: 0.1 10^3/UL (ref 0–0.3)
EOSINOPHIL NFR BLD AUTO: 3 % (ref 0–10)
ERYTHROCYTE [DISTWIDTH] IN BLOOD BY AUTOMATED COUNT: 15.2 % (ref 10–14.5)
GFR SERPLBLD BASED ON 1.73 SQ M-ARVRAT: > 60 ML/MIN
GLUCOSE SERPL-MCNC: 98 MG/DL (ref 70–105)
HCT VFR BLD CALC: 43 % (ref 35–52)
HGB BLD-MCNC: 14.7 G/DL (ref 11.5–16)
LYMPHOCYTES # BLD AUTO: 1.2 X 10^3 (ref 1–4)
LYMPHOCYTES NFR BLD AUTO: 37 % (ref 12–44)
MANUAL DIFFERENTIAL PERFORMED BLD QL: NO
MCH RBC QN AUTO: 32 PG (ref 25–34)
MCHC RBC AUTO-ENTMCNC: 34 G/DL (ref 32–36)
MCV RBC AUTO: 93 FL (ref 80–99)
MONOCYTES # BLD AUTO: 0.3 X 10^3 (ref 0–1)
MONOCYTES NFR BLD AUTO: 8 % (ref 0–12)
NEUTROPHILS # BLD AUTO: 1.6 X 10^3 (ref 1.8–7.8)
NEUTROPHILS NFR BLD AUTO: 51 % (ref 42–75)
PLATELET # BLD: 197 10^3/UL (ref 130–400)
PMV BLD AUTO: 9.2 FL (ref 7.4–10.4)
POTASSIUM SERPL-SCNC: 4 MMOL/L (ref 3.6–5)
PROT SERPL-MCNC: 7.6 GM/DL (ref 6.4–8.2)
RBC # BLD AUTO: 4.58 10^6/UL (ref 4.35–5.85)
SODIUM SERPL-SCNC: 138 MMOL/L (ref 135–145)
WBC # BLD AUTO: 3.2 10^3/UL (ref 4.3–11)

## 2018-04-24 LAB
ALBUMIN SERPL-MCNC: 4.3 GM/DL (ref 3.2–4.5)
ALP SERPL-CCNC: 65 U/L (ref 40–136)
ALT SERPL-CCNC: 12 U/L (ref 0–55)
BASOPHILS # BLD AUTO: 0 10^3/UL (ref 0–0.1)
BASOPHILS NFR BLD AUTO: 1 % (ref 0–10)
BILIRUB SERPL-MCNC: 0.5 MG/DL (ref 0.1–1)
BUN/CREAT SERPL: 21
CALCIUM SERPL-MCNC: 9.6 MG/DL (ref 8.5–10.1)
CHLORIDE SERPL-SCNC: 110 MMOL/L (ref 98–107)
CO2 SERPL-SCNC: 22 MMOL/L (ref 21–32)
CREAT SERPL-MCNC: 0.82 MG/DL (ref 0.6–1.3)
EOSINOPHIL # BLD AUTO: 0.1 10^3/UL (ref 0–0.3)
EOSINOPHIL NFR BLD AUTO: 2 % (ref 0–10)
ERYTHROCYTE [DISTWIDTH] IN BLOOD BY AUTOMATED COUNT: 14.8 % (ref 10–14.5)
GFR SERPLBLD BASED ON 1.73 SQ M-ARVRAT: > 60 ML/MIN
GLUCOSE SERPL-MCNC: 93 MG/DL (ref 70–105)
HCT VFR BLD CALC: 42 % (ref 35–52)
HGB BLD-MCNC: 14.4 G/DL (ref 11.5–16)
LYMPHOCYTES # BLD AUTO: 1.2 X 10^3 (ref 1–4)
LYMPHOCYTES NFR BLD AUTO: 35 % (ref 12–44)
MANUAL DIFFERENTIAL PERFORMED BLD QL: NO
MCH RBC QN AUTO: 33 PG (ref 25–34)
MCHC RBC AUTO-ENTMCNC: 34 G/DL (ref 32–36)
MCV RBC AUTO: 94 FL (ref 80–99)
MONOCYTES # BLD AUTO: 0.3 X 10^3 (ref 0–1)
MONOCYTES NFR BLD AUTO: 8 % (ref 0–12)
NEUTROPHILS # BLD AUTO: 1.8 X 10^3 (ref 1.8–7.8)
NEUTROPHILS NFR BLD AUTO: 53 % (ref 42–75)
PLATELET # BLD: 196 10^3/UL (ref 130–400)
PMV BLD AUTO: 9.3 FL (ref 7.4–10.4)
POTASSIUM SERPL-SCNC: 3.8 MMOL/L (ref 3.6–5)
PROT SERPL-MCNC: 7.3 GM/DL (ref 6.4–8.2)
RBC # BLD AUTO: 4.43 10^6/UL (ref 4.35–5.85)
SODIUM SERPL-SCNC: 139 MMOL/L (ref 135–145)
WBC # BLD AUTO: 3.5 10^3/UL (ref 4.3–11)

## 2018-05-29 ENCOUNTER — HOSPITAL ENCOUNTER (OUTPATIENT)
Dept: HOSPITAL 75 - LAB | Age: 58
LOS: 23 days | Discharge: HOME | End: 2018-06-21
Attending: STUDENT IN AN ORGANIZED HEALTH CARE EDUCATION/TRAINING PROGRAM
Payer: MEDICARE

## 2018-05-29 DIAGNOSIS — C92.10: Primary | ICD-10-CM

## 2018-05-29 LAB
ALBUMIN SERPL-MCNC: 4.3 GM/DL (ref 3.2–4.5)
ALP SERPL-CCNC: 76 U/L (ref 40–136)
ALT SERPL-CCNC: 16 U/L (ref 0–55)
BASOPHILS # BLD AUTO: 0 10^3/UL (ref 0–0.1)
BASOPHILS NFR BLD AUTO: 1 % (ref 0–10)
BILIRUB SERPL-MCNC: 0.6 MG/DL (ref 0.1–1)
BUN/CREAT SERPL: 24
CALCIUM SERPL-MCNC: 9.4 MG/DL (ref 8.5–10.1)
CHLORIDE SERPL-SCNC: 112 MMOL/L (ref 98–107)
CO2 SERPL-SCNC: 22 MMOL/L (ref 21–32)
CREAT SERPL-MCNC: 0.85 MG/DL (ref 0.6–1.3)
EOSINOPHIL # BLD AUTO: 0.1 10^3/UL (ref 0–0.3)
EOSINOPHIL NFR BLD AUTO: 2 % (ref 0–10)
ERYTHROCYTE [DISTWIDTH] IN BLOOD BY AUTOMATED COUNT: 14.5 % (ref 10–14.5)
GFR SERPLBLD BASED ON 1.73 SQ M-ARVRAT: > 60 ML/MIN
GLUCOSE SERPL-MCNC: 77 MG/DL (ref 70–105)
HCT VFR BLD CALC: 42 % (ref 35–52)
HGB BLD-MCNC: 14.7 G/DL (ref 11.5–16)
LYMPHOCYTES # BLD AUTO: 1.2 X 10^3 (ref 1–4)
LYMPHOCYTES NFR BLD AUTO: 32 % (ref 12–44)
MANUAL DIFFERENTIAL PERFORMED BLD QL: NO
MCH RBC QN AUTO: 33 PG (ref 25–34)
MCHC RBC AUTO-ENTMCNC: 35 G/DL (ref 32–36)
MCV RBC AUTO: 95 FL (ref 80–99)
MONOCYTES # BLD AUTO: 0.4 X 10^3 (ref 0–1)
MONOCYTES NFR BLD AUTO: 11 % (ref 0–12)
NEUTROPHILS # BLD AUTO: 2 X 10^3 (ref 1.8–7.8)
NEUTROPHILS NFR BLD AUTO: 55 % (ref 42–75)
PLATELET # BLD: 186 10^3/UL (ref 130–400)
PMV BLD AUTO: 9.5 FL (ref 7.4–10.4)
POTASSIUM SERPL-SCNC: 4 MMOL/L (ref 3.6–5)
PROT SERPL-MCNC: 7.6 GM/DL (ref 6.4–8.2)
RBC # BLD AUTO: 4.47 10^6/UL (ref 4.35–5.85)
SODIUM SERPL-SCNC: 141 MMOL/L (ref 135–145)
WBC # BLD AUTO: 3.6 10^3/UL (ref 4.3–11)

## 2018-05-29 PROCEDURE — 81206 BCR/ABL1 GENE MAJOR BP: CPT

## 2018-05-29 PROCEDURE — 85025 COMPLETE CBC W/AUTO DIFF WBC: CPT

## 2018-05-29 PROCEDURE — 36415 COLL VENOUS BLD VENIPUNCTURE: CPT

## 2018-05-29 PROCEDURE — 83615 LACTATE (LD) (LDH) ENZYME: CPT

## 2018-05-29 PROCEDURE — 80053 COMPREHEN METABOLIC PANEL: CPT

## 2018-06-20 ENCOUNTER — HOSPITAL ENCOUNTER (EMERGENCY)
Dept: HOSPITAL 75 - ER | Age: 58
Discharge: HOME | End: 2018-06-20
Payer: MEDICARE

## 2018-06-20 VITALS — SYSTOLIC BLOOD PRESSURE: 107 MMHG | DIASTOLIC BLOOD PRESSURE: 75 MMHG

## 2018-06-20 VITALS — HEIGHT: 68 IN | WEIGHT: 158 LBS | BODY MASS INDEX: 23.95 KG/M2

## 2018-06-20 DIAGNOSIS — Z88.1: ICD-10-CM

## 2018-06-20 DIAGNOSIS — C92.11: ICD-10-CM

## 2018-06-20 DIAGNOSIS — I48.0: Primary | ICD-10-CM

## 2018-06-20 DIAGNOSIS — Z87.19: ICD-10-CM

## 2018-06-20 DIAGNOSIS — Z92.21: ICD-10-CM

## 2018-06-20 DIAGNOSIS — Z79.01: ICD-10-CM

## 2018-06-20 DIAGNOSIS — Z88.6: ICD-10-CM

## 2018-06-20 DIAGNOSIS — Z88.2: ICD-10-CM

## 2018-06-20 DIAGNOSIS — F41.9: ICD-10-CM

## 2018-06-20 LAB
ALBUMIN SERPL-MCNC: 4.3 GM/DL (ref 3.2–4.5)
ALP SERPL-CCNC: 73 U/L (ref 40–136)
ALT SERPL-CCNC: 15 U/L (ref 0–55)
APTT BLD: 51 SEC (ref 24–35)
BASOPHILS # BLD AUTO: 0 10^3/UL (ref 0–0.1)
BASOPHILS NFR BLD AUTO: 1 % (ref 0–10)
BILIRUB SERPL-MCNC: 0.5 MG/DL (ref 0.1–1)
BUN/CREAT SERPL: 17
CALCIUM SERPL-MCNC: 9.6 MG/DL (ref 8.5–10.1)
CHLORIDE SERPL-SCNC: 108 MMOL/L (ref 98–107)
CO2 SERPL-SCNC: 21 MMOL/L (ref 21–32)
CREAT SERPL-MCNC: 0.99 MG/DL (ref 0.6–1.3)
EOSINOPHIL # BLD AUTO: 0.1 10^3/UL (ref 0–0.3)
EOSINOPHIL NFR BLD AUTO: 2 % (ref 0–10)
ERYTHROCYTE [DISTWIDTH] IN BLOOD BY AUTOMATED COUNT: 14.1 % (ref 10–14.5)
GFR SERPLBLD BASED ON 1.73 SQ M-ARVRAT: 57 ML/MIN
GLUCOSE SERPL-MCNC: 99 MG/DL (ref 70–105)
HCT VFR BLD CALC: 42 % (ref 35–52)
HGB BLD-MCNC: 14.8 G/DL (ref 11.5–16)
INR PPP: 2.3 (ref 0.8–1.4)
LYMPHOCYTES # BLD AUTO: 1.3 X 10^3 (ref 1–4)
LYMPHOCYTES NFR BLD AUTO: 33 % (ref 12–44)
MAGNESIUM SERPL-MCNC: 2.2 MG/DL (ref 1.8–2.4)
MANUAL DIFFERENTIAL PERFORMED BLD QL: NO
MCH RBC QN AUTO: 33 PG (ref 25–34)
MCHC RBC AUTO-ENTMCNC: 35 G/DL (ref 32–36)
MCV RBC AUTO: 94 FL (ref 80–99)
MONOCYTES # BLD AUTO: 0.3 X 10^3 (ref 0–1)
MONOCYTES NFR BLD AUTO: 7 % (ref 0–12)
MYOGLOBIN SERPL-MCNC: 50.4 NG/ML (ref 10–92)
NEUTROPHILS # BLD AUTO: 2.2 X 10^3 (ref 1.8–7.8)
NEUTROPHILS NFR BLD AUTO: 56 % (ref 42–75)
PLATELET # BLD: 168 10^3/UL (ref 130–400)
PMV BLD AUTO: 9.7 FL (ref 7.4–10.4)
POTASSIUM SERPL-SCNC: 3.6 MMOL/L (ref 3.6–5)
PROT SERPL-MCNC: 7.5 GM/DL (ref 6.4–8.2)
PROTHROMBIN TIME: 25 SEC (ref 12.2–14.7)
RBC # BLD AUTO: 4.45 10^6/UL (ref 4.35–5.85)
SODIUM SERPL-SCNC: 140 MMOL/L (ref 135–145)
WBC # BLD AUTO: 3.9 10^3/UL (ref 4.3–11)

## 2018-06-20 PROCEDURE — 85730 THROMBOPLASTIN TIME PARTIAL: CPT

## 2018-06-20 PROCEDURE — 84484 ASSAY OF TROPONIN QUANT: CPT

## 2018-06-20 PROCEDURE — 83735 ASSAY OF MAGNESIUM: CPT

## 2018-06-20 PROCEDURE — 85025 COMPLETE CBC W/AUTO DIFF WBC: CPT

## 2018-06-20 PROCEDURE — 93041 RHYTHM ECG TRACING: CPT

## 2018-06-20 PROCEDURE — 83874 ASSAY OF MYOGLOBIN: CPT

## 2018-06-20 PROCEDURE — 80053 COMPREHEN METABOLIC PANEL: CPT

## 2018-06-20 PROCEDURE — 36415 COLL VENOUS BLD VENIPUNCTURE: CPT

## 2018-06-20 PROCEDURE — 71045 X-RAY EXAM CHEST 1 VIEW: CPT

## 2018-06-20 PROCEDURE — 85610 PROTHROMBIN TIME: CPT

## 2018-06-20 PROCEDURE — 93005 ELECTROCARDIOGRAM TRACING: CPT

## 2018-06-20 NOTE — DIAGNOSTIC IMAGING REPORT
INDICATION: Chest pain and cardiac dysrhythmia.



Portable upright AP view of the chest is obtained with comparison

made to study of 12/28/2017.



FINDINGS: Heart size and pulmonary vascularity are within normal

limits, and the lungs are clear, bilaterally.  



IMPRESSION: Unremarkable chest.



Dictated by: 



  Dictated on workstation # [id number]

## 2018-06-20 NOTE — ED CARDIAC GENERAL
History of Present Illness


General


Stated Complaint:  HEART RACING


Source:  patient


Exam Limitations:  no limitations





History of Present Illness


Date Seen by Provider:  2018


Time Seen by Provider:  19:44


Initial Comments


is to ER per private vehicle with reports of heart racing throughout the day 

today with some associated lightheadedness.she has some dizziness. She denies 

shortness of breath or chest pain. She does have a history of hepatitis C with 

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 

since been told that she is cancer free and is no longer on treatment. She has 

a known history of atrial fibrillation. She is on warfarin but states that been 

having trouble achieving a therapeutic INR. At this very moment she has no 

symptoms.


Timing/Duration:  12-24 hours


Severity:  moderate


NTG SL PTA:  No


ASA po PTA:  No


Associated Systoms:  No Chest Pain, No Headaches, No Malaise, No Nausea/Vomiting





Allergies and Home Medications


Allergies


Coded Allergies:  


     sulfamethoxazole (Verified  Allergy, Severe, RASH, 18)


     trimethoprim (Verified  Allergy, Severe, RASH, 18)


     Sulfa (Sulfonamide Antibiotics) (Verified  Allergy, Mild, 18)


     zolpidem (Verified  Allergy, Mild, skin sensation, 18)


     celecoxib (Verified  Adverse Reaction, Intermediate, hives, 18)





Home Medications


Dasatinib 20 Mg Tablet, 20 MG PO HS, (Reported)


Gabapentin 600 Mg Tablet, 600 MG PO DAILY, (Reported)


Gabapentin 600 Mg Tablet, 1,200 MG PO NOON AND HS, (Reported)


Potassium Chloride 10 Meq Capsule.er, 10 MEQ PO DAILY


   Prescribed by: LOAN GRANADOS on 9/15/17 1549


Quetiapine Fumarate 100 Mg Tablet, 50 MG PO MORNING AND HS, (Reported)


Quetiapine Fumarate 100 Mg Tablet, 100 MG PO NOON AND EVENING, (Reported)


Warfarin Sodium 5 Mg Tablet, 5 MG PO DAILY, (Reported)


Warfarin Sodium 1 Mg Tablet, 1 MG PO DAILY, (Reported)





Patient Home Medication List


Home Medication List Reviewed:  Yes





Review of Systems


Constitutional:  see HPI


EENTM:  No Symptoms Reported


Respiratory:  No Symptoms Reported; Denies Shortness of Air


Cardiovascular:  See HPI; Denies Chest Pain, Denies Edema; Irregular Heart Rate

, Lightheadedness, Palpitations


Gastrointestinal:  No Symptoms Reported


Genitourinary:  No Symptoms Reported


Musculoskeletal:  no symptoms reported


Skin:  no symptoms reported


Psychiatric/Neurological:  No Symptoms Reported


Endocrine:  No Symptoms Reported





Past Medical-Social-Family Hx


Patient Social History


Alcohol Beverage of Choice:  Wine


2nd Hand Smoke Exposure:  No


Recent Foreign Travel:  No


Contact w/Someone Who Travel:  No


Recent Hopitalizations:  No





Immunizations Up To Date


Tetanus Booster (TDap):  Less than 5yrs


PED Vaccines UTD:  Yes


Date of Pneumonia Vaccine:  2012





Seasonal Allergies


Seasonal Allergies:  Yes





Past Medical History


Surgeries:  Yes (HEMORRHOID, chest tube, thoracentesis)


Abdominal, Gallbladder, Rectal


Respiratory:  Yes (pleural effusions)


Cardiac:  Yes


Atrial Fibrillation


Neurological:  Yes


Neuropathy


Reproductive Disorders:  No


GYN History:  Menopausal


Sexually Transmitted Disease:  No


HIV/AIDS:  No


Genitourinary:  No


Gastrointestinal:  Yes


Abdominal Hernia, Hemorrhoids, Hepatitis, Cirrhosis


Musculoskeletal:  Yes


Fibromyalgia


Endocrine:  No


HEENT:  No


Loss of Vision:  Bilateral


Cancer:  Yes (CML- IN REMISSION)


Leukemia


Did You Recieve Any Treatments:  Yes


What Type of Treatment Did You:  Chemotherapy


Psychosocial:  Yes


Anxiety


Integumentary:  No


Blood Disorders:  No


Adverse Reaction/Blood Tranf:  No (HAS HAD BLOOD WITH NO REACTION)





Family Medical History





Diabetes mellitus


  19 MOTHER


Hypertension


  19 MOTHER


Myocardial infarction


  19 FATHER


Neoplasm (grandmother)


Psychosocial problem


  19 MOTHER


Visual disorder


  19 MOTHER


Heart Disease, Cancer, Diabetes





Physical Exam


Vital Signs





Vital Signs - First Documented








 18





 19:18


 


Temp 96.9


 


Pulse 85


 


Resp 12


 


B/P (MAP) 121/88 (99)


 


O2 Delivery Room Air





Capillary Refill :


General Appearance:  No Apparent Distress, WD/WN


HEENT:  PERRL/EOMI, TMs Normal


Neck:  Full Range of Motion, Normal Inspection


Respiratory:  Normal Breath Sounds, No Accessory Muscle Use, No Respiratory 

Distress, Other (lung sounds are diminished in the left base)


Cardiovascular:  Regular Rate, Rhythm, Normal Peripheral Pulses


Gastrointestinal:  Normal Bowel Sounds, Non Tender, Soft


Extremity:  Normal Capillary Refill, Normal Inspection


Neurologic/Psychiatric:  Alert, Oriented x3


Skin:  Normal Color, Warm/Dry





Progress/Results/Core Measures


Results/Orders


Lab Results





Laboratory Tests








Test


 18


19:40 Range/Units


 


 


White Blood Count


 3.9 L


 4.3-11.0


10^3/uL


 


Red Blood Count


 4.45 


 4.35-5.85


10^6/uL


 


Hemoglobin 14.8  11.5-16.0  G/DL


 


Hematocrit 42  35-52  %


 


Mean Corpuscular Volume 94  80-99  FL


 


Mean Corpuscular Hemoglobin 33  25-34  PG


 


Mean Corpuscular Hemoglobin


Concent 35 


 32-36  G/DL





 


Red Cell Distribution Width 14.1  10.0-14.5  %


 


Platelet Count


 168 


 130-400


10^3/uL


 


Mean Platelet Volume 9.7  7.4-10.4  FL


 


Neutrophils (%) (Auto) 56  42-75  %


 


Lymphocytes (%) (Auto) 33  12-44  %


 


Monocytes (%) (Auto) 7  0-12  %


 


Eosinophils (%) (Auto) 2  0-10  %


 


Basophils (%) (Auto) 1  0-10  %


 


Neutrophils # (Auto) 2.2  1.8-7.8  X 10^3


 


Lymphocytes # (Auto) 1.3  1.0-4.0  X 10^3


 


Monocytes # (Auto) 0.3  0.0-1.0  X 10^3


 


Eosinophils # (Auto)


 0.1 


 0.0-0.3


10^3/uL


 


Basophils # (Auto)


 0.0 


 0.0-0.1


10^3/uL


 


Prothrombin Time 25.0 H 12.2-14.7  SEC


 


INR Comment 2.3 H 0.8-1.4  


 


Activated Partial


Thromboplast Time 51 H


 24-35  SEC





 


Sodium Level 140  135-145  MMOL/L


 


Potassium Level 3.6  3.6-5.0  MMOL/L


 


Chloride Level 108 H   MMOL/L


 


Carbon Dioxide Level 21  21-32  MMOL/L


 


Anion Gap 11  5-14  MMOL/L


 


Blood Urea Nitrogen 17  7-18  MG/DL


 


Creatinine


 0.99 


 0.60-1.30


MG/DL


 


Estimat Glomerular Filtration


Rate 57 


  





 


BUN/Creatinine Ratio 17   


 


Glucose Level 99    MG/DL


 


Calcium Level 9.6  8.5-10.1  MG/DL


 


Magnesium Level 2.2  1.8-2.4  MG/DL


 


Total Bilirubin 0.5  0.1-1.0  MG/DL


 


Aspartate Amino Transf


(AST/SGOT) 20 


 5-34  U/L





 


Alanine Aminotransferase


(ALT/SGPT) 15 


 0-55  U/L





 


Alkaline Phosphatase 73    U/L


 


Myoglobin


 50.4 


 10.0-92.0


NG/ML


 


Troponin I < 0.30  <0.30  NG/ML


 


Total Protein 7.5  6.4-8.2  GM/DL


 


Albumin 4.3  3.2-4.5  GM/DL








My Orders





Orders - KARELY SALAS


Cbc With Automated Diff (18 19:31)


Magnesium (18 19:31)


Chest 1 View, Ap/Pa Only (18:31)


Ekg Tracing (18:31)


Cardiac Profile 1 (18)


Comprehensive Metabolic Panel (18:)


Myoglobin Serum (18:)


Protime With Inr (18:)


Partial Thromboplastin Time (18:)


O2 (18:)


Monitor-Rhythm Ecg Trace Only (18:)


Lipid Panel (18 06:00)


Aspirin Chewable Tablet (Baby Aspirin Ch (18 19:45)


Saline Lock/Iv-Start (18:31)


Ns Iv 1000 Ml (Sodium Chloride 0.9%) (18 20:30)





Medications Given in ED





Current Medications








 Medications  Dose


 Ordered  Sig/Shakir


 Route  Start Time


 Stop Time Status Last Admin


Dose Admin


 


 Aspirin  324 mg  ONCE  ONCE


 PO  18 19:45


 18 19:46 DC 18 19:46


324 MG








Vital Signs/I&O











 18





 19:18


 


Temp 96.9


 


Pulse 85


 


Resp 12


 


B/P (MAP) 121/88 (99)


 


O2 Delivery Room Air








Initial ECG Impression Date:  2018


Initial ECG Impression Time:  19:25


Initial ECG Rate:  80


Initial ECG Rhythm:  Normal Sinus


Initial ECG Intervals:  Normal


Initial ECG Impression:  Nonspecific Changes


Comment


no ectopy, normal sinus rhythm, no ST segment depression or elevation.





Diagnostic Imaging





   Diagonstic Imaging:  Xray


   Plain Films/CT/US/NM/MRI:  chest


Comments


NAME:   DARIN BEE


MED REC#:   [id number]


ACCOUNT#:   [id number]


PT STATUS:   REG ER


:   1960


PHYSICIAN:   KARELY SALAS


ADMIT DATE:   18/ER


 ***Draft***


Date of Exam:18





CHEST 1 VIEW, AP/PA ONLY








INDICATION: Chest pain and cardiac dysrhythmia.





Portable upright AP view of the chest is obtained with comparison


made to study of 2017.





FINDINGS: Heart size and pulmonary vascularity are within normal


limits, and the lungs are clear, bilaterally.  





IMPRESSION: Unremarkable chest.





  Dictated on workstation # [id number]








Dict:   18


Trans:   18


[facility] [id number]





Interpreted by:     AMANUEL FAITH MD


Electronically signed by:





Departure


Communication (Admissions)


-she remains asymptomatic and states she is feeling good. She does wish to 

go on home and this seems reasonable.\Given that she is maintaining normal 

sinus rhythm during her ER stay with  normal labs. I discussed with  Dr. Holland and he agrees to see her tomorrow at 11 AM.





Impression





 Primary Impression:  


 Paroxysmal atrial fibrillation


Disposition:  01 HOME, SELF-CARE


Condition:  Stable





Departure-Patient Inst.


Decision time for Depature:  20:32


Referrals:  


KATHRYN HOLLAND DO (PCP/Family)


Primary Care Physician


Patient Instructions:  Atrial Fibrillation (DC)





Add. Discharge Instructions:  


1. You're to see Dr. Dr. Holland tomorrow morning at 11 AM.Return to the 

emergency room for any worsening symptoms such as shortness of breath, chest 

pain, heart racing.





Copy


Copies To 1:   KATHRYN HOLLAND PETER J APRN 2018 19:47

## 2018-06-27 ENCOUNTER — HOSPITAL ENCOUNTER (OUTPATIENT)
Dept: HOSPITAL 75 - LAB | Age: 58
LOS: 90 days | Discharge: HOME | End: 2018-09-25
Attending: STUDENT IN AN ORGANIZED HEALTH CARE EDUCATION/TRAINING PROGRAM
Payer: MEDICARE

## 2018-06-27 DIAGNOSIS — C92.10: Primary | ICD-10-CM

## 2018-06-27 LAB
ALBUMIN SERPL-MCNC: 4.1 GM/DL (ref 3.2–4.5)
ALP SERPL-CCNC: 74 U/L (ref 40–136)
ALT SERPL-CCNC: 13 U/L (ref 0–55)
BASOPHILS # BLD AUTO: 0 10^3/UL (ref 0–0.1)
BASOPHILS NFR BLD AUTO: 1 % (ref 0–10)
BILIRUB SERPL-MCNC: 0.5 MG/DL (ref 0.1–1)
BUN/CREAT SERPL: 22
CALCIUM SERPL-MCNC: 9.2 MG/DL (ref 8.5–10.1)
CHLORIDE SERPL-SCNC: 111 MMOL/L (ref 98–107)
CO2 SERPL-SCNC: 20 MMOL/L (ref 21–32)
CREAT SERPL-MCNC: 0.79 MG/DL (ref 0.6–1.3)
EOSINOPHIL # BLD AUTO: 0.1 10^3/UL (ref 0–0.3)
EOSINOPHIL NFR BLD AUTO: 3 % (ref 0–10)
ERYTHROCYTE [DISTWIDTH] IN BLOOD BY AUTOMATED COUNT: 14.4 % (ref 10–14.5)
GFR SERPLBLD BASED ON 1.73 SQ M-ARVRAT: > 60 ML/MIN
GLUCOSE SERPL-MCNC: 92 MG/DL (ref 70–105)
HCT VFR BLD CALC: 44 % (ref 35–52)
HGB BLD-MCNC: 14.8 G/DL (ref 11.5–16)
LYMPHOCYTES # BLD AUTO: 1.1 X 10^3 (ref 1–4)
LYMPHOCYTES NFR BLD AUTO: 32 % (ref 12–44)
MANUAL DIFFERENTIAL PERFORMED BLD QL: NO
MCH RBC QN AUTO: 32 PG (ref 25–34)
MCHC RBC AUTO-ENTMCNC: 34 G/DL (ref 32–36)
MCV RBC AUTO: 95 FL (ref 80–99)
MONOCYTES # BLD AUTO: 0.4 X 10^3 (ref 0–1)
MONOCYTES NFR BLD AUTO: 11 % (ref 0–12)
NEUTROPHILS # BLD AUTO: 1.8 X 10^3 (ref 1.8–7.8)
NEUTROPHILS NFR BLD AUTO: 53 % (ref 42–75)
PLATELET # BLD: 179 10^3/UL (ref 130–400)
PMV BLD AUTO: 9.6 FL (ref 7.4–10.4)
POTASSIUM SERPL-SCNC: 3.7 MMOL/L (ref 3.6–5)
PROT SERPL-MCNC: 7.4 GM/DL (ref 6.4–8.2)
RBC # BLD AUTO: 4.62 10^6/UL (ref 4.35–5.85)
SODIUM SERPL-SCNC: 141 MMOL/L (ref 135–145)
WBC # BLD AUTO: 3.3 10^3/UL (ref 4.3–11)

## 2018-06-27 PROCEDURE — 85025 COMPLETE CBC W/AUTO DIFF WBC: CPT

## 2018-06-27 PROCEDURE — 83615 LACTATE (LD) (LDH) ENZYME: CPT

## 2018-06-27 PROCEDURE — 81206 BCR/ABL1 GENE MAJOR BP: CPT

## 2018-06-27 PROCEDURE — 80053 COMPREHEN METABOLIC PANEL: CPT

## 2018-06-27 PROCEDURE — 36415 COLL VENOUS BLD VENIPUNCTURE: CPT

## 2018-07-11 ENCOUNTER — HOSPITAL ENCOUNTER (OUTPATIENT)
Dept: HOSPITAL 75 - RAD | Age: 58
End: 2018-07-11
Attending: FAMILY MEDICINE
Payer: MEDICARE

## 2018-07-11 DIAGNOSIS — J90: Primary | ICD-10-CM

## 2018-07-11 PROCEDURE — 71046 X-RAY EXAM CHEST 2 VIEWS: CPT

## 2018-07-11 NOTE — DIAGNOSTIC IMAGING REPORT
INDICATION: Pleural effusion.



COMPARISON: 6/27/2018



FINDINGS: 2 views of the chest are obtained. Heart size is

normal. The pulmonary vessels appear unremarkable. There is no

pneumothorax or mediastinal widening. There is a small left

pleural effusion which appears slightly larger than on the prior

study. Suspect some minimal left basilar atelectasis. The right

lung remains clear. The osseous structures appear unremarkable.



IMPRESSION: Small left pleural effusion minimally increased in

size from the prior study. No additional abnormality is seen.



Dictated by: 



  Dictated on workstation # [id number]

## 2018-12-10 NOTE — HISTORY & PHYSICAL
History of Present Illness


History of Present Illness


Reason for visit/HPI


56 yo F admitted for Afib with RVR


-She reports today she bent over and when she stood back up she felt dizzy and 

that her heart was beating faster.  She was evaluated in the ER and found to be 

in Afib.  She reports she has had these bouts previously but they only last a 

couple minutes at most.  Pt does have CML and is treated with Sprycel.  She 

will sometimes develop right pleural effusion which Dr. Zavala has drained.  

Current xray shows more fluid on left lung than right. 


Pt reports she has had diarrhea for 2 days, denies sick contacts nor does she 

have a fever.  She usually is constipated and takes miralax but this is not the 

case.  Last loose bowel movement was last night.  She does not feel like she 

has been keeping herself hydrated.\


Denies tobacco or etoh use. 


Pt was given asa and diltiazem in the ER.


Dr. Reeves was consulted for cardiology evaluation.


Pt reports she is feeling much better.


Date of Admission


Sep 14, 2017 at 12:07


Date Seen by Provider:  Sep 14, 2017


Time Seen by Provider:  18:28


I consulted on this patient on


17


 18:28


Attending Physician


Rah Castro DO


Admitting Physician


Kvng Morrison MD


Consult


Dr Reeves Cardiology





Allergies and Home Medications


Allergies


Coded Allergies:  


     sulfamethoxazole (Verified  Allergy, Severe, RASH, 10/2/14)


     trimethoprim (Verified  Allergy, Severe, RASH, 10/2/14)


     Sulfa (Sulfonamide Antibiotics) (Verified  Allergy, Mild, 10/2/14)


     zolpidem (Verified  Allergy, Mild, skin sensation, 10/2/14)


     celecoxib (Verified  Adverse Reaction, Intermediate, hives, 10/2/14)





Home Medications


Dasatinib 20 Mg Tablet, 40 MG PO HS, (Reported)


Fluticasone Propionate 9.9 Ml [place].susp, 1 SPRAY NS DAILY, (Reported)


Gabapentin 600 Mg Tablet, 600 MG PO 0900, (Reported)


Gabapentin 600 Mg Tablet, 1,200 MG PO 1600,2100, (Reported)


   TAKES 2 (600MG) TABLETS 


Quetiapine Fumarate 100 Mg Tablet, 50 MG PO 0900,2100, (Reported)


   TAKES 1/2 (100MG) TABLET 


Quetiapine Fumarate 100 Mg Tablet, 100 MG PO 1300,1700, (Reported)





Past Medical-Social-Family Hx


Patient Social History


Alcohol Use:  Denies Use


Recreational Drug Use:  No


Smoking Status:  Never a Smoker


Physical Abuse Screen:  No


Sexual Abuse:  No


Recent Foreign Travel:  No


Contact w/other who traveled:  No


Recent Hopitalizations:  No


Recent Infectious Disease Expo:  No





Immunizations Up To Date


Tetanus Booster (TDap):  Less than 5yrs


Pediatric:  Yes


Date of Pneumonia Vaccine:  2012





Seasonal Allergies


Seasonal Allergies:  Yes





Surgeries


Gallbladder





Respiratory


No





Cardiovascular


No





Neurological


Yes


Neuropathy





Reproductive System


Hx Reproductive Disorders:  No


Sexually Transmitted Disease:  No


HIV/AIDS:  No


GYN History:  Hysterectomy





Genitourinary


No





Gastrointestinal


No


Hepatitis, Cirrhosis





Musculoskeletal


Yes


Fibromyalgia





Endocrine


History of Endocrine Disorders:  No





HEENT


History of HEENT Disorders:  No





Cancer


Yes (CML- IN REMISSION)


Leukemia


Did You Recieve Any Treatments:  Yes


Type of Treatment:  Chemotherapy





Psychosocial


History of Psychiatric Problem:  Yes


Behavioral Health Disorders:  Anxiety





Integumentary


History of Skin or Integumenta:  No





Blood Transfusions


History of Blood Disorders:  No


Adverse Reaction to a Blood Tr:  No





Family Medical History


Significant Family History:  Heart Disease, Cancer, Diabetes


Family Hx:  


Diabetes mellitus


  19 MOTHER


Hypertension


  19 MOTHER


Myocardial infarction


  19 FATHER


Neoplasm (grandmother)


Psychosocial problem


  19 MOTHER


Visual disorder


  19 MOTHER





Review of Systems


Review of Systems


General:  No Chills, No Night Sweats


HEENT:  No Head Aches, No Visual Changes


Pulmonary:  No Dyspnea, No Cough


Cardiovascular:  Chest Pain, Palpitations


Gastrointestinal:  Diarrhea, No: Nausea, Vomiting, Abdominal Pain


Genitourinary:  No Dysuria, No Frequency


Musculoskeletal:  No: neck pain, shoulder pain


Neurological:  Weakness





Physical Exam


Vital Signs





Vital Sign - Last 12Hours








 17





 10:59


 


Temp 97.5


 


Pulse 159


 


Resp 18


 


B/P (MAP) 120/91


 


Pulse Ox 97


 


O2 Delivery Room Air





Capillary Refill : Less Than 3 Seconds


General Appearance:  No Apparent Distress, WD/WN


HEENT:  PERRL/EOMI, Other (diagonal lobular crease,  anterior tragal crease)


Neck:  Full Range of Motion, Normal Inspection, Non Tender, Supple


Respiratory:  Chest Non Tender, Lungs Clear, Normal Breath Sounds, No Accessory 

Muscle Use, No Respiratory Distress


Cardiovascular:  Irregularly Irregular, Tachycardia


Gastrointestinal:  Normal Bowel Sounds, Non Tender, Soft


Rectal:  Deferred


Back:  Normal Inspection, No CVA Tenderness, No Vertebral Tenderness


Extremity:  Normal Capillary Refill, Non Tender, No Calf Tenderness, No Pedal 

Edema


Neurologic/Psychiatric:  Alert, Oriented x3, No Motor/Sensory Deficits, Normal 

Mood/Affect


Skin:  Normal Color, Warm/Dry





Assessment/Plan


56 yo F





atrial fibrillation with RVR- continue diltiazem, digoxin-   Dr. Reeves 

consulted--   eliquis 5mg BID, asa 81mg 


             [id number] -  1 (female)       


             -TSH, ECHO pending     


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 You at [facility]-  


hypokalemia from diarrhea-  replacing.


h/o hepatitis C-  completed harvoni a few years ago.





Dispo: monitor overnight-  checking TSH, ECHO-  possibly home tomorrow if she 

is in NSR and doing well.


Problems:  





Clinical Quality Measures


AMI/AHF:


ASA po Prior to arrival:  No





DVT/VTE Risk/Contraindication:


Risk Factor Score Per Nursin


RFS Level Per Nursing on Admit:  4+=Very High











KVNG MORRISON MD Sep 14, 2017 18:33 Pre-surgical [place] [facility] [place].   Pre-surgical assessment for stereotactic electroencephalography on 12/17/18 with Dr. Mehta.

## 2019-01-22 ENCOUNTER — HOSPITAL ENCOUNTER (EMERGENCY)
Dept: HOSPITAL 75 - ER | Age: 59
Discharge: HOME | End: 2019-01-22
Payer: MEDICARE

## 2019-01-22 VITALS — DIASTOLIC BLOOD PRESSURE: 82 MMHG | SYSTOLIC BLOOD PRESSURE: 123 MMHG

## 2019-01-22 VITALS — BODY MASS INDEX: 25.01 KG/M2 | WEIGHT: 165 LBS | HEIGHT: 68 IN

## 2019-01-22 DIAGNOSIS — Z92.21: ICD-10-CM

## 2019-01-22 DIAGNOSIS — Z85.6: ICD-10-CM

## 2019-01-22 DIAGNOSIS — Z82.49: ICD-10-CM

## 2019-01-22 DIAGNOSIS — B19.20: ICD-10-CM

## 2019-01-22 DIAGNOSIS — Z87.19: ICD-10-CM

## 2019-01-22 DIAGNOSIS — I48.91: Primary | ICD-10-CM

## 2019-01-22 DIAGNOSIS — Z88.1: ICD-10-CM

## 2019-01-22 DIAGNOSIS — F41.9: ICD-10-CM

## 2019-01-22 DIAGNOSIS — Z88.8: ICD-10-CM

## 2019-01-22 DIAGNOSIS — Z79.01: ICD-10-CM

## 2019-01-22 DIAGNOSIS — Z98.890: ICD-10-CM

## 2019-01-22 DIAGNOSIS — Z87.09: ICD-10-CM

## 2019-01-22 DIAGNOSIS — Z88.2: ICD-10-CM

## 2019-01-22 LAB
ALBUMIN SERPL-MCNC: 4.2 GM/DL (ref 3.2–4.5)
ALP SERPL-CCNC: 82 U/L (ref 40–136)
ALT SERPL-CCNC: 13 U/L (ref 0–55)
APTT BLD: 39 SEC (ref 24–35)
BASOPHILS # BLD AUTO: 0 10^3/UL (ref 0–0.1)
BASOPHILS NFR BLD AUTO: 1 % (ref 0–10)
BILIRUB SERPL-MCNC: 0.3 MG/DL (ref 0.1–1)
BUN/CREAT SERPL: 16
CALCIUM SERPL-MCNC: 9.3 MG/DL (ref 8.5–10.1)
CHLORIDE SERPL-SCNC: 109 MMOL/L (ref 98–107)
CO2 SERPL-SCNC: 21 MMOL/L (ref 21–32)
CREAT SERPL-MCNC: 1.1 MG/DL (ref 0.6–1.3)
EOSINOPHIL # BLD AUTO: 0.1 10^3/UL (ref 0–0.3)
EOSINOPHIL NFR BLD AUTO: 2 % (ref 0–10)
ERYTHROCYTE [DISTWIDTH] IN BLOOD BY AUTOMATED COUNT: 14.1 % (ref 10–14.5)
GFR SERPLBLD BASED ON 1.73 SQ M-ARVRAT: 51 ML/MIN
GLUCOSE SERPL-MCNC: 109 MG/DL (ref 70–105)
HCT VFR BLD CALC: 43 % (ref 35–52)
HGB BLD-MCNC: 14.7 G/DL (ref 11.5–16)
INR PPP: 1.5 (ref 0.8–1.4)
LYMPHOCYTES # BLD AUTO: 1.2 X 10^3 (ref 1–4)
LYMPHOCYTES NFR BLD AUTO: 24 % (ref 12–44)
MAGNESIUM SERPL-MCNC: 2.5 MG/DL (ref 1.8–2.4)
MANUAL DIFFERENTIAL PERFORMED BLD QL: NO
MCH RBC QN AUTO: 33 PG (ref 25–34)
MCHC RBC AUTO-ENTMCNC: 34 G/DL (ref 32–36)
MCV RBC AUTO: 96 FL (ref 80–99)
MONOCYTES # BLD AUTO: 0.5 X 10^3 (ref 0–1)
MONOCYTES NFR BLD AUTO: 10 % (ref 0–12)
NEUTROPHILS # BLD AUTO: 3.1 X 10^3 (ref 1.8–7.8)
NEUTROPHILS NFR BLD AUTO: 63 % (ref 42–75)
PLATELET # BLD: 207 10^3/UL (ref 130–400)
PMV BLD AUTO: 9.2 FL (ref 7.4–10.4)
POTASSIUM SERPL-SCNC: 3.3 MMOL/L (ref 3.6–5)
PROT SERPL-MCNC: 7.6 GM/DL (ref 6.4–8.2)
PROTHROMBIN TIME: 17.6 SEC (ref 12.2–14.7)
RBC # BLD AUTO: 4.49 10^6/UL (ref 4.35–5.85)
SODIUM SERPL-SCNC: 141 MMOL/L (ref 135–145)
TSH SERPL DL<=0.05 MIU/L-ACNC: 3.33 UIU/ML (ref 0.35–4.94)
WBC # BLD AUTO: 4.8 10^3/UL (ref 4.3–11)

## 2019-01-22 PROCEDURE — 93005 ELECTROCARDIOGRAM TRACING: CPT

## 2019-01-22 PROCEDURE — 80053 COMPREHEN METABOLIC PANEL: CPT

## 2019-01-22 PROCEDURE — 85610 PROTHROMBIN TIME: CPT

## 2019-01-22 PROCEDURE — 84484 ASSAY OF TROPONIN QUANT: CPT

## 2019-01-22 PROCEDURE — 84443 ASSAY THYROID STIM HORMONE: CPT

## 2019-01-22 PROCEDURE — 93041 RHYTHM ECG TRACING: CPT

## 2019-01-22 PROCEDURE — 36415 COLL VENOUS BLD VENIPUNCTURE: CPT

## 2019-01-22 PROCEDURE — 71045 X-RAY EXAM CHEST 1 VIEW: CPT

## 2019-01-22 PROCEDURE — 83735 ASSAY OF MAGNESIUM: CPT

## 2019-01-22 PROCEDURE — 85730 THROMBOPLASTIN TIME PARTIAL: CPT

## 2019-01-22 PROCEDURE — 85025 COMPLETE CBC W/AUTO DIFF WBC: CPT

## 2019-01-22 PROCEDURE — 83880 ASSAY OF NATRIURETIC PEPTIDE: CPT

## 2019-01-22 NOTE — ED CARDIAC GENERAL
History of Present Illness


General


Chief Complaint:  Cardiac/General Problems


Stated Complaint:  TACHYCARDIC;HX A-FIB


Nursing Triage Note:  


AMBULATORY TO ED WITH C/O HEART RACING SINCE 1530 AFTER CLEANING ALL DAY AND 

NOT 


STOPPING TO EAT LUNCH. HX OF AFIB. TAKES XARELTO AND CARDIA AT HS, HAS NOT 


MISSED DOSES BUT HAS NOT TAKEN TONIGHTS DOSE YET. DID NOT CHECK HEART RATE AT 


HOME. BY TIME OF ARRIVAL STATES SHE FELT HER HEART RATE HAD STARTED TO SLOW 

DOWN 


WITHIN THE LAST 20-25 MINUTES. RECENT SINUS INFECTION WITH TREATMENT WITH 


ANTIBIOTICS FINISHED TODAY, PT UNAWARE OF ANTIBX NAME.


Source:  patient





History of Present Illness


Date Seen by Provider:  Jan 22, 2019


Time Seen by Provider:  19:10


Initial Comments


PT ARRIVES VIA POV FROM HOME


PT STATES SHE HAS A HISTORY OF ATRIAL FIBRILLATION AND TODAY AROUND 1500 HER 

HEART BEGAN BEATING FAST. DID NOT CHECK HER PULSE OR BLOOD PRESSURE. 


STATES SYMPTOMS STOPPED APPROXIMATELY 20 MINUTES AGO


NO CHEST PAIN 


NO SHORTNESS OF BREATH


NO DIZZINESS


NO SWEATS


NO SWELLING IN LEGS/ FEET OR PAIN IN CALVES. 


NO NAUSEA/VOMITING


STATES SHE HAS BEEN CLEANING HOUSE ALL DAY AND DID NOT TAKE A BREAK FOR LUNCH 

TODAY. SYMPTOMS BEGAN WHILE SHE WAS CLEANING. 





PT STATES SHE HAS NOT HAD AN EPISODE OF ATRIAL FIB FOR SEVERAL MONTHS


PT DENIES ANY CHANGES IN DOSES OF HER MEDICATIONS. 


PT IS ON XARELTO AND CARTIA, BUT HAS NOT TAKEN THEM THIS EVENING





PCP: DR. HOLLAND


CARDIOLOGIST: DR. DRUMMOND





Allergies and Home Medications


Allergies


Coded Allergies:  


     sulfamethoxazole (Verified  Allergy, Severe, RASH, 1/4/18)


     trimethoprim (Verified  Allergy, Severe, RASH, 1/4/18)


     Sulfa (Sulfonamide Antibiotics) (Verified  Allergy, Mild, 1/4/18)


     zolpidem (Verified  Allergy, Mild, skin sensation, 1/4/18)


     celecoxib (Verified  Adverse Reaction, Intermediate, hives, 1/4/18)





Home Medications


Dasatinib 20 Mg Tablet, 20 MG PO HS, (Reported)


Gabapentin 600 Mg Tablet, 600 MG PO DAILY, (Reported)


Gabapentin 600 Mg Tablet, 1,200 MG PO NOON AND HS, (Reported)


Potassium Chloride 10 Meq Capsule.er, 10 MEQ PO DAILY


   Prescribed by: LOAN GRANADOS on 9/15/17 9042


Quetiapine Fumarate 100 Mg Tablet, 50 MG PO MORNING AND HS, (Reported)


Quetiapine Fumarate 100 Mg Tablet, 100 MG PO NOON AND EVENING, (Reported)


Warfarin Sodium 5 Mg Tablet, 5 MG PO DAILY, (Reported)


Warfarin Sodium 1 Mg Tablet, 1 MG PO DAILY, (Reported)





Patient Home Medication List


Home Medication List Reviewed:  Yes





Review of Systems


Review of Systems


Constitutional:  no symptoms reported


EENTM:  See HPI; No Nose Congestion, No Throat Pain


Cardiovascular:  See HPI


Gastrointestinal:  No Symptoms Reported


Genitourinary:  No Symptoms Reported


Musculoskeletal:  no symptoms reported


Skin:  no symptoms reported


Psychiatric/Neurological:  No Symptoms Reported


Endocrine:  No Symptoms Reported


Hematologic/Lymphatic:  No Symptoms Reported





Past Medical-Social-Family Hx


Patient Social History


Alcohol Use:  Rarely Uses


Number of Drinks Today:  


Alcohol Beverage of Choice:  Wine


Recreational Drug Use:  No


Smoking Status:  Never a Smoker


2nd Hand Smoke Exposure:  No


Recent Foreign Travel:  No


Contact w/Someone Who Travel:  No


Recent Infectious Disease Expo:  No


Recent Hopitalizations:  No





Immunizations Up To Date


Tetanus Booster (TDap):  Less than 5yrs


PED Vaccines UTD:  Yes


Date of Pneumonia Vaccine:  Jul 19, 2012





Seasonal Allergies


Seasonal Allergies:  Yes





Past Medical History


Surgeries:  Yes (HEMORRHOID, chest tube, thoracentesis; UMBILICAL HERNIA; 

ENDOSCOPIES)


Abdominal, Gallbladder, Rectal


Respiratory:  Yes (pleural effusions)


Cardiac:  Yes


Atrial Fibrillation


Neurological:  Yes


Neuropathy


Reproductive Disorders:  No


GYN History:  Menopausal


Sexually Transmitted Disease:  No


HIV/AIDS:  No


Genitourinary:  No


Gastrointestinal:  Yes (HEPATITIS C--RESOLVED TX WITH HARVONI)


Abdominal Hernia, Hemorrhoids, Hepatitis, Cirrhosis


Musculoskeletal:  Yes


Fibromyalgia


Endocrine:  No


HEENT:  No


Loss of Vision:  Bilateral


Cancer:  Yes (CML- IN REMISSION)


Leukemia


Did You Recieve Any Treatments:  Yes


What Type of Treatment Did You:  Chemotherapy


Psychosocial:  Yes


Anxiety


Integumentary:  No


Blood Disorders:  No (HX HEP C)


Adverse Reaction/Blood Tranf:  No (HAS HAD BLOOD WITH NO REACTION)





Family Medical History





Diabetes mellitus


  19 MOTHER


Hypertension


  19 MOTHER


Myocardial infarction


  19 FATHER


Neoplasm (grandmother)


Psychosocial problem


  19 MOTHER


Visual disorder


  19 MOTHER


Heart Disease, Cancer, Diabetes





Physical Exam


Vital Signs





Vital Signs - First Documented








 1/22/19 1/22/19





 19:02 20:25


 


Temp 96.7 


 


Pulse 81 


 


Resp 17 


 


B/P (MAP) 136/86 (103) 


 


Pulse Ox  99





Capillary Refill : Less Than 3 Seconds


Height, Weight, BMI


Height: 5'8.00"


Weight: 165lbs. 0oz. [id number]; 24.2 BMI


Method:Stated


General Appearance:  No Apparent Distress, WD/WN


Neck:  Normal Inspection


Respiratory:  Normal Breath Sounds, No Accessory Muscle Use, No Respiratory 

Distress


Cardiovascular:  Regular Rate, Rhythm, No Edema, No JVD, No Murmur, Normal 

Peripheral Pulses


Gastrointestinal:  Non Tender, Soft


Extremity:  Normal Inspection, No Calf Tenderness, No Pedal Edema


Neurologic/Psychiatric:  Alert, Oriented x3, No Motor/Sensory Deficits, CNs II-

XII Norm as Tested


Skin:  Normal Color, Warm/Dry; No Rash





Progress/Results/Core Measures


Results/Orders


Lab Results





Laboratory Tests








Test


 1/22/19


19:20 Range/Units


 


 


White Blood Count


 4.8 


 4.3-11.0


10^3/uL


 


Red Blood Count


 4.49 


 4.35-5.85


10^6/uL


 


Hemoglobin 14.7  11.5-16.0  G/DL


 


Hematocrit 43  35-52  %


 


Mean Corpuscular Volume 96  80-99  FL


 


Mean Corpuscular Hemoglobin 33  25-34  PG


 


Mean Corpuscular Hemoglobin


Concent 34 


 32-36  G/DL





 


Red Cell Distribution Width 14.1  10.0-14.5  %


 


Platelet Count


 207 


 130-400


10^3/uL


 


Mean Platelet Volume 9.2  7.4-10.4  FL


 


Neutrophils (%) (Auto) 63  42-75  %


 


Lymphocytes (%) (Auto) 24  12-44  %


 


Monocytes (%) (Auto) 10  0-12  %


 


Eosinophils (%) (Auto) 2  0-10  %


 


Basophils (%) (Auto) 1  0-10  %


 


Neutrophils # (Auto) 3.1  1.8-7.8  X 10^3


 


Lymphocytes # (Auto) 1.2  1.0-4.0  X 10^3


 


Monocytes # (Auto) 0.5  0.0-1.0  X 10^3


 


Eosinophils # (Auto)


 0.1 


 0.0-0.3


10^3/uL


 


Basophils # (Auto)


 0.0 


 0.0-0.1


10^3/uL


 


Prothrombin Time 17.6 H 12.2-14.7  SEC


 


INR Comment 1.5 H 0.8-1.4  


 


Activated Partial


Thromboplast Time 39 H


 24-35  SEC





 


Sodium Level 141  135-145  MMOL/L


 


Potassium Level 3.3 L 3.6-5.0  MMOL/L


 


Chloride Level 109 H   MMOL/L


 


Carbon Dioxide Level 21  21-32  MMOL/L


 


Anion Gap 11  5-14  MMOL/L


 


Blood Urea Nitrogen 18  7-18  MG/DL


 


Creatinine


 1.10 


 0.60-1.30


MG/DL


 


Estimat Glomerular Filtration


Rate 51 


  





 


BUN/Creatinine Ratio 16   


 


Glucose Level 109 H   MG/DL


 


Calcium Level 9.3  8.5-10.1  MG/DL


 


Corrected Calcium 9.1  8.5-10.1  MG/DL


 


Magnesium Level 2.5 H 1.8-2.4  MG/DL


 


Total Bilirubin 0.3  0.1-1.0  MG/DL


 


Aspartate Amino Transf


(AST/SGOT) 24 


 5-34  U/L





 


Alanine Aminotransferase


(ALT/SGPT) 13 


 0-55  U/L





 


Alkaline Phosphatase 82    U/L


 


Troponin I < 0.028  <0.028  NG/ML


 


B-Type Natriuretic Peptide < 10.0  <100.0  PG/ML


 


Total Protein 7.6  6.4-8.2  GM/DL


 


Albumin 4.2  3.2-4.5  GM/DL


 


TSH Cascade Testing


 3.33 


 0.35-4.94


UIU/ML








My Orders





Orders - JENELLE,NICKI K DO


Saline Lock/Iv-Start (1/22/19 19:11)


Monitor-Rhythm Ecg Trace Only (1/22/19 19:11)


BNP (1/22/19 19:11)


Cbc With Automated Diff (1/22/19 19:11)


Comprehensive Metabolic Panel (1/22/19 19:11)


Magnesium (1/22/19 19:11)


Protime With Inr (1/22/19 19:11)


Partial Thromboplastin Time (1/22/19 19:11)


Thyroid Analyzer (1/22/19 19:11)


Troponin I (1/22/19 19:11)


Chest 1 View, Ap/Pa Only (1/22/19 19:11)


Ekg Tracing (1/22/19 19:39)


Aspirin Chewable Tablet (Baby Aspirin Ch (1/22/19 20:00)


Potassium Chloride (Tablet) (Klor Con Ta (1/22/19 20:30)





Medications Given in ED





Current Medications








 Medications  Dose


 Ordered  Sig/Shakir


 Route  Start Time


 Stop Time Status Last Admin


Dose Admin


 


 Potassium Chloride  10 meq  ONCE  ONCE


 PO  1/22/19 20:30


 1/22/19 20:30 DC 1/22/19 20:23


10 MEQ








Vital Signs/I&O











 1/22/19 1/22/19





 19:02 20:25


 


Temp 96.7 96.7


 


Pulse 81 77


 


Resp 17 17


 


B/P (MAP) 136/86 (103) 123/82 (96)


 


Pulse Ox  99














Blood Pressure Mean:  103











Progress


Progress Note :  


Progress Note


PT COMPLETELY ASYMPTOMATIC DURING ENTIRE ER STAY WITH NO ARRHYTHMIAS


Initial ECG Impression Date:  Jan 22, 2019


Initial ECG Impression Time:  19:06


Initial ECG Rate:  86


Initial ECG Rhythm:  Normal Sinus





Diagnostic Imaging





Comments


CXR--NO ACUTE PROCESS, STABLE CARDIOMEGALY--PER RADIOLOGIST REPORT @ 2004


   Reviewed:  Reviewed by Me





Departure


Impression





 Primary Impression:  


 History of atrial fibrillation


 Additional Impression:  


 PALPITATIONS--RESOLVED


Disposition:  01 HOME, SELF-CARE


Condition:  Improved





Departure-Patient Inst.


Referrals:  


KATHRYN HOLLAND DO (PCP/Family)


Primary Care Physician








TONY DRUMMOND MD


Patient Instructions:  Atrial Fibrillation (DC)





Add. Discharge Instructions:  


CONTINUE YOUR MEDICATIONS AS PRESCRIBED





FOLLOW UP WITH DR. DRUMMOND THIS WEEK FOR FURTHER CARE


RETURN TO ER IF SYMPTOMS RETURN





All discharge instructions reviewed with patient and/or family. Voiced 

understanding.











NICKI ALMANZAR DO Jan 22, 2019 20:05

## 2019-01-22 NOTE — XMS REPORT
Continuity of Care Document

 Created on: 2019



DARIN BEE

External Reference #: [id number]

: 1960

Sex: Female



Demographics







 Address  [address] [place]  [address]

 

 Home Phone  [phone] x

 

 Preferred Language  Unknown

 

 Marital Status  Unknown

 

 Restorationism Affiliation  Unknown

 

 Race  Unknown

 

 Ethnic Group  Unknown





Author







 Author  [facility]

 

 Organization  [facility]

 

 Address  Unknown

 

 Phone  Unavailable



              



Allergies

      





 Active            Description            Code            Type            
Severity            Reaction            Onset            Reported/Identified   
         Relationship to Patient            Clinical Status        

 

 Yes            sulfamethoxazole            [id number]            Drug Allergy 
           Severe            RASH                         2018           
                       

 

 Yes            trimethoprim            [id number]            Drug Allergy     
       Severe            RASH                         2018               
                   

 

 Yes            celecoxib            [id number]            Drug Allergy        
    Moderate            hives                         2018               
                   

 

 Yes            Sulfa (Sulfonamide Antibiotics)            [id number]          
  Drug Allergy            Mild            N/A                         2018                                  

 

 Yes            zolpidem            [id number]            Drug Allergy         
   Mild            skin sensation                         2018           
                       



                          



Medications

      



There is no data.                  



Problems

      





 Date Dx Coded            Attending            Type            Code            
Diagnosis            Diagnosed By        

 

 2012                         Ot            724.2            LUMBAGO     
                

 

 2012                         Ot            041.49            OTHER AND 
UNSPECIFIED ESCHERICHIA COLI [                     

 

 2012                         Ot            054.9            HERPES 
SIMPLEX NOS                     

 

 2012                         Ot            070.70            UNSPECIFIED 
VIRAL HEPATITIS C WITHOUT HE                     

 

 2012                         Ot            205.10            CHRONIC 
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                     

 

 2012                         Ot            238.79            OTHER 
LYMPHATIC AND HEMATOPOIETIC TISSUE                     

 

 2012                         Ot            280.9            IRON DEFIC 
ANEMIA NOS                     

 

 2012                         Ot            300.00            ANXIETY 
STATE NOS                     

 

 2012                         Ot            311            DEPRESSIVE 
DISORDER NEC                     

 

 2012                         Ot            536.8            STOMACH 
FUNCTION DIS NEC                     

 

 2012                         Ot            571.8            CHRONIC 
LIVER DIS NEC                     

 

 2012                         Ot            599.0            URIN TRACT 
INFECTION NOS                     

 

 2012                         Ot            784.99            OTHER 
SYMPTOMS INVOLVING HEAD AND NECK                     

 

 10/25/2012                         Ot            285.9            ANEMIA NOS  
                   

 

 10/25/2012                         Ot            V58.69            OTH MED,LT,
CURRENT USE                     

 

 2013                         Ot            070.70            UNSPECIFIED 
VIRAL HEPATITIS C WITHOUT HE                     

 

 2013                         Ot            205.10            CHRONIC 
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                     

 

 2013                         Ot            238.71            ESSENTIAL 
THROMBOCYTHEMIA                     

 

 2013                         Ot            285.9            ANEMIA NOS  
                   

 

 2013                         Ot            288.00            NEUTROPENIA
, UNSPECIFIED                     

 

 2013                         Ot            530.81            ESOPHAGEAL 
REFLUX                     

 

 2013                         Ot            553.3            
DIAPHRAGMATIC HERNIA                     

 

 2013                         Ot            V58.69            OTH MED,LT,
CURRENT USE                     

 

 2013                         Ot            205.10            CHRONIC 
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                     

 

 2013                         Ot            461.9            ACUTE 
SINUSITIS NOS                     

 

 2013                         Ot            784.0            HEADACHE    
                 

 

 2013                         Ot            070.70            UNSPECIFIED 
VIRAL HEPATITIS C WITHOUT HE                     

 

 2013                         Ot            205.10            CHRONIC 
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                     

 

 2013                         Ot            238.71            ESSENTIAL 
THROMBOCYTHEMIA                     

 

 2013                         Ot            288.00            NEUTROPENIA
, UNSPECIFIED                     

 

 2013                         Ot            530.81            ESOPHAGEAL 
REFLUX                     

 

 2013                         Ot            553.3            
DIAPHRAGMATIC HERNIA                     

 

 2013                         Ot            V58.69            OTH MED,LT,
CURRENT USE                     

 

 2013                         Ot            382.9            OTITIS MEDIA 
NOS                     

 

 2013                         Ot            723.1            CERVICALGIA 
                    

 

 2013                         Ot            784.1            THROAT PAIN 
                    

 

 2013                         Ot            205.10            CHRONIC 
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                     

 

 2014            KATHRYN HOLLAND DO            Ot            205.10 
           CHRONIC MYELOID LEUKEMIA, W/O MENTION AC                     

 

 2014            KATHRYN HOLLAND DO            Ot            300.00 
           ANXIETY STATE NOS                     

 

 2014            KATHRYN HOLLAND DO            Ot            477.9  
          ALLERGIC RHINITIS NOS                     

 

 2014            KATHRYN HOLLAND DO            Ot            511.9  
          PLEURAL EFFUSION NOS                     

 

 2014            KATHRYN HOLLAND DO            Ot            530.81 
           ESOPHAGEAL REFLUX                     

 

 2014            KATHRYN HOLLAND DO            Ot            558.9  
          NONINF GASTROENTERIT NEC                     

 

 2014            KATHRYN HOLLAND DO            Ot            577.0  
          ACUTE PANCREATITIS                     

 

 2014            KATHRYN HOLLAND DO            Ot            V12.09 
           PERSONAL HISTORY OTH SPEC INFECT   MAYRA                     

 

 2014            KATHRYN HOLLAND DO            Ot            V15.82 
           HISTORY OF TOBACCO USE                     

 

 2014            KATHRYN HOLLAND DO            Ot            V17.3  
          FAM HX-ISCHEM HEART DIS                     

 

 2014            LUKE MAHAN DO            Ot            511.9       
     PLEURAL EFFUSION NOS                     

 

 10/03/2014            KATHRYN HOLLAND DO            Ot            205.10 
           CHRONIC MYELOID LEUKEMIA, W/O MENTION AC                     

 

 10/03/2014            KATHRYN HOLLAND DO            Ot            300.00 
           ANXIETY STATE NOS                     

 

 10/03/2014            KATHRYN HOLLAND DO            Ot            511.9  
          PLEURAL EFFUSION NOS                     

 

 10/03/2014            KATHRYN HOLLAND DO            Ot            530.81 
           ESOPHAGEAL REFLUX                     

 

 10/03/2014            KATHRYN HOLLAND DO            Ot            564.00 
           UNSPEC CONSTIPATION                     

 

 10/03/2014            KATHRYN HOLLAND DO            Ot            569.9  
          INTESTINAL DISORDER NOS                     

 

 10/03/2014            KATHRYN HOLLAND DO            Ot            789.00 
           ABDOMINAL PAIN, UNSPECIFIED SITE                     

 

 10/03/2014            KATHRYN HOLLAND DO            Ot            V15.82 
           HISTORY OF TOBACCO USE                     

 

 2014            KAELA AVILES DO            Ot            511.9       
     PLEURAL EFFUSION NOS                     

 

 2014            KAELA AVILES DO            Ot            807.05      
      FRACTURE FIVE RIBS-CLOSE                     

 

 2014            KAELA AVILES DO            Ot            860.0       
     TRAUM PNEUMOTHORAX-CLOSE                     

 

 2014            KAELA AVILES DO            Ot            861.21      
      LUNG CONTUSION-CLOSED                     

 

 2014            KAELA AVILES DO            Ot            873.49      
      OPEN WOUND OF FACE NEC                     

 

 2014            KAELA AVILES DO            Ot            E000.8      
      OTHER EXTERNAL CAUSE STATUS                     

 

 2014            KAELA AVILES DO            Ot            E812.1      
      MV COLLISION NOS-PASNGR                     

 

 2015            DEIDRE QUEZADA ARNP            Ot            070.70      
                            

 

 2015            DEIDRE QUEZADA ARNP            Ot            571.5       
                           

 

 2015            DEIDRE QUEZADA ARNP            Ot            070.70      
                            

 

 2015            DEIDRE QUEZADA ARNP            Ot            571.5       
                           

 

 2015            KATHRNY HOLLAND DO            Ot            729.5  
                                

 

 2015            KATHRYN HOLLAND DO            Ot            729.81 
                                 

 

 2015            KATHRYN HOLLAND DO            Ot            729.5  
                                

 

 2015            KATHRYN HOLLAND DO            Ot            729.81 
                                 

 

 2016                         Ot            070.70            UNSPECIFIED 
VIRAL HEPATITIS C WITHOUT HE                     

 

 2016                         Ot            780.60            FEVER, 
UNSPECIFIED                     

 

 2016                         Ot            780.79            OTH MALAISE 
FATIGUE                     

 

 2016                         Ot            782.4            JAUNDICE NOS
                     

 

 2016                         Ot            791.9            ABN URINE 
FINDINGS NEC                     

 

 2016                         Ot            070.70            UNSPECIFIED 
VIRAL HEPATITIS C WITHOUT HE                     

 

 2016                         Ot            205.10            CHRONIC 
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                     

 

 2016                         Ot            238.71            ESSENTIAL 
THROMBOCYTHEMIA                     

 

 2016                         Ot            285.9            ANEMIA NOS  
                   

 

 2016                         Ot            288.00            NEUTROPENIA
, UNSPECIFIED                     

 

 2016                         Ot            530.81            ESOPHAGEAL 
REFLUX                     

 

 2016                         Ot            553.3            
DIAPHRAGMATIC HERNIA                     

 

 2016                         Ot            V58.69            OTH MED,LT,
CURRENT USE                     

 

 2016                         Ot            070.70            UNSPECIFIED 
VIRAL HEPATITIS C WITHOUT HE                     

 

 2016                         Ot            205.10            CHRONIC 
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                     

 

 2016                         Ot            238.71            ESSENTIAL 
THROMBOCYTHEMIA                     

 

 2016                         Ot            285.9            ANEMIA NOS  
                   

 

 2016                         Ot            288.00            NEUTROPENIA
, UNSPECIFIED                     

 

 2016                         Ot            530.81            ESOPHAGEAL 
REFLUX                     

 

 2016                         Ot            553.3            
DIAPHRAGMATIC HERNIA                     

 

 2016                         Ot            V58.69            OTH MED,LT,
CURRENT USE                     

 

 2016                         Ot            070.70            UNSPECIFIED 
VIRAL HEPATITIS C WITHOUT HE                     

 

 2016                         Ot            205.10            CHRONIC 
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                     

 

 2016                         Ot            790.6            ABN BLOOD 
CHEMISTRY NEC                     

 

 2016                         Ot            V58.69            OTH MED,LT,
CURRENT USE                     

 

 2016                         Ot            070.70            UNSPECIFIED 
VIRAL HEPATITIS C WITHOUT HE                     

 

 2016                         Ot            205.10            CHRONIC 
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                     

 

 2016                         Ot            238.71            ESSENTIAL 
THROMBOCYTHEMIA                     

 

 2016                         Ot            288.00            NEUTROPENIA
, UNSPECIFIED                     

 

 2016                         Ot            530.81            ESOPHAGEAL 
REFLUX                     

 

 2016                         Ot            553.3            
DIAPHRAGMATIC HERNIA                     

 

 2016                         Ot            V58.69            OTH MED,LT,
CURRENT USE                     

 

 2016            GELLENDER DOKATHRYN            Ot            V76.12 
           OTH SCREEN MAMMO-MALIGN NEOPLASM OF MISTY                     

 

 2016                         Ot            205.10            CHRONIC 
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                     

 

 2016            GELLENDER KATHRYN HARE            Ot            070.70 
           UNSPECIFIED VIRAL HEPATITIS C WITHOUT HE                     

 

 2016            KATHRYN HOLLAND DO            Ot            511.9  
          PLEURAL EFFUSION NOS                     

 

 2016            GELLENDER DOKATHRYN            Ot            511.9  
          PLEURAL EFFUSION NOS                     

 

 2016                         Ot            070.70            UNSPECIFIED 
VIRAL HEPATITIS C WITHOUT HE                     

 

 2016                         Ot            208.90            UNSPECIFIED 
LEUKEMIA, W/O MENTION OF HAV                     

 

 2016                         Ot            511.9            PLEURAL 
EFFUSION NOS                     

 

 2016                         Ot            786.2            COUGH       
              

 

 2016                         Ot            V72.84            EXAM PRE-
OPERATIVE NOS                     

 

 2016            KALLI HARE LUKE MCALLISTER            Ot            511.9       
     PLEURAL EFFUSION NOS                     

 

 2016            KALLI HARE LUKE MCALLISTER            Ot            786.2       
     COUGH                     

 

 2016            DEIDRE QUEZADA ARN            Ot            070.70      
      UNSPECIFIED VIRAL HEPATITIS C WITHOUT HE                     

 

 2016            DEIDRE QUEZADA [facility]            Ot            571.5       
     CIRRHOSIS OF LIVER NOS                     

 

 2016            YAZMINDER DO, KATHRYN GIBBONS            Ot            729.5  
          PAIN IN LIMB                     

 

 2016            LIANGLENDER DO, KATHRYN GIBBONS            Ot            729.81 
           SWELLING OF LIMB                     

 

 2016            LIANGLENDER DO, KATHRYN GIBBONS            Ot            511.9  
          PLEURAL EFFUSION NOS                     

 

 2016            LIANGLENDER DO, KATHRYN GIBBONS            Ot            518.0  
          PULMONARY COLLAPSE                     

 

 2016            GELLENDER DO, KATHRYN GIBBONS            Ot            786.05 
           SHORTNESS OF BREATH                     

 

 2016            GELLENDER DO, KATHRYN GIBBONS            Ot            R06.02 
           SHORTNESS OF BREATH                     

 

 2016            GELLENDER DO, KATHRYN GIBBONS            Ot            N19    
        UNSPECIFIED KIDNEY FAILURE                     

 

 2016            GELLENDER DO, KATHRYN GIBBONS            Ot            R06.02 
           SHORTNESS OF BREATH                     

 

 2016            GELLENDER DO, KATHRYN GIBBONS            Ot            N19    
        UNSPECIFIED KIDNEY FAILURE                     

 

 2016            GELLENDER DO, KATHRYN GIBBONS            Ot            R06.02 
           SHORTNESS OF BREATH                     

 

 2016                         Ot            070.70            UNSPECIFIED 
VIRAL HEPATITIS C WITHOUT HE                     

 

 2016                         Ot            205.10            CHRONIC 
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                     

 

 2016                         Ot            238.71            ESSENTIAL 
THROMBOCYTHEMIA                     

 

 2016                         Ot            285.9            ANEMIA NOS  
                   

 

 2016                         Ot            288.00            NEUTROPENIA
, UNSPECIFIED                     

 

 2016                         Ot            530.81            ESOPHAGEAL 
REFLUX                     

 

 2016                         Ot            553.3            
DIAPHRAGMATIC HERNIA                     

 

 2016                         Ot            V58.69            OTH MED,LT,
CURRENT USE                     

 

 2016                         Ot            070.70            UNSPECIFIED 
VIRAL HEPATITIS C WITHOUT HE                     

 

 2016                         Ot            205.10            CHRONIC 
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                     

 

 2016                         Ot            238.71            ESSENTIAL 
THROMBOCYTHEMIA                     

 

 2016                         Ot            285.9            ANEMIA NOS  
                   

 

 2016                         Ot            288.00            NEUTROPENIA
, UNSPECIFIED                     

 

 2016                         Ot            530.81            ESOPHAGEAL 
REFLUX                     

 

 2016                         Ot            553.3            
DIAPHRAGMATIC HERNIA                     

 

 2016                         Ot            V58.69            OTH MED,LT,
CURRENT USE                     

 

 2016                         Ot            070.70            UNSPECIFIED 
VIRAL HEPATITIS C WITHOUT HE                     

 

 2016                         Ot            205.10            CHRONIC 
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                     

 

 2016                         Ot            790.6            ABN BLOOD 
CHEMISTRY NEC                     

 

 2016                         Ot            V58.69            OTH MED,LT,
CURRENT USE                     

 

 2016                         Ot            070.70            UNSPECIFIED 
VIRAL HEPATITIS C WITHOUT HE                     

 

 2016                         Ot            205.10            CHRONIC 
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                     

 

 2016                         Ot            238.71            ESSENTIAL 
THROMBOCYTHEMIA                     

 

 2016                         Ot            288.00            NEUTROPENIA
, UNSPECIFIED                     

 

 2016                         Ot            530.81            ESOPHAGEAL 
REFLUX                     

 

 2016                         Ot            553.3            
DIAPHRAGMATIC HERNIA                     

 

 2016                         Ot            V58.69            OTH MED,LT,
CURRENT USE                     

 

 2016            KATHRYN HOLLAND DO            Ot            V76.12 
           OTH SCREEN MAMMO-MALIGN NEOPLASM OF MISTY                     

 

 2016                         Ot            205.10            CHRONIC 
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                     

 

 2016            KATHRYN HOLLAND DO            Ot            070.70 
           UNSPECIFIED VIRAL HEPATITIS C WITHOUT HE                     

 

 2016            KATHRYN HOLLAND DO            Ot            511.9  
          PLEURAL EFFUSION NOS                     

 

 2016            KATHRYN HOLLAND DO            Ot            511.9  
          PLEURAL EFFUSION NOS                     

 

 2016                         Ot            070.70            UNSPECIFIED 
VIRAL HEPATITIS C WITHOUT HE                     

 

 2016                         Ot            208.90            UNSPECIFIED 
LEUKEMIA, W/O MENTION OF HAV                     

 

 2016                         Ot            511.9            PLEURAL 
EFFUSION NOS                     

 

 2016                         Ot            786.2            COUGH       
              

 

 2016                         Ot            V72.84            EXAM PRE-
OPERATIVE NOS                     

 

 2016            LUKE MAHAN DO            Ot            511.9       
     PLEURAL EFFUSION NOS                     

 

 2016            LUKE MAHAN DO            Ot            786.2       
     COUGH                     

 

 2016            DEIDRE QUEZADA            Ot            070.70      
      UNSPECIFIED VIRAL HEPATITIS C WITHOUT HE                     

 

 2016            DEIDRE QUEZADA            Ot            571.5       
     CIRRHOSIS OF LIVER NOS                     

 

 2016            AMALIA HARE, KATHRYN GIBBONS            Ot            729.5  
          PAIN IN LIMB                     

 

 2016            AMALIA HARE, KATHRYN GIBBONS            Ot            729.81 
           SWELLING OF LIMB                     

 

 2016            AMALIA HARE, KTAHRYN GIBBONS            Ot            511.9  
          PLEURAL EFFUSION NOS                     

 

 2016            AMALIA HARE, KATHRYN GIBBONS            Ot            518.0  
          PULMONARY COLLAPSE                     

 

 2016            AMALIA HARE, KATHRYN GIBBONS            Ot            786.05 
           SHORTNESS OF BREATH                     

 

 2016            GELLENDER DO, KATHRYN GIBBONS            Ot            R06.02 
           SHORTNESS OF BREATH                     

 

 2016            GELLENDER DO, KATHRYN GIBBONS            Ot            R06.02 
           SHORTNESS OF BREATH                     

 

 2016            YAZMINDER , KATHRYN GIBBONS            Ot            R91.8  
          OTHER NONSPECIFIC ABNORMAL FINDING OF SAMUEL                     

 

 2016            KATHRYN HOLLAND DO            Ot            N19    
        UNSPECIFIED KIDNEY FAILURE                     

 

 2016            AMALIA HARE, KATHRYN GIBBONS            Ot            R06.02 
           SHORTNESS OF BREATH                     

 

 2016                         Ot            070.70            UNSPECIFIED 
VIRAL HEPATITIS C WITHOUT HE                     

 

 2016                         Ot            780.60            FEVER, 
UNSPECIFIED                     

 

 2016                         Ot            780.79            OTH MALAISE 
FATIGUE                     

 

 2016                         Ot            782.4            JAUNDICE NOS
                     

 

 2016                         Ot            791.9            ABN URINE 
FINDINGS NEC                     

 

 2016                         Ot            070.70            UNSPECIFIED 
VIRAL HEPATITIS C WITHOUT HE                     

 

 2016                         Ot            205.10            CHRONIC 
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                     

 

 2016                         Ot            238.71            ESSENTIAL 
THROMBOCYTHEMIA                     

 

 2016                         Ot            285.9            ANEMIA NOS  
                   

 

 2016                         Ot            288.00            NEUTROPENIA
, UNSPECIFIED                     

 

 2016                         Ot            530.81            ESOPHAGEAL 
REFLUX                     

 

 2016                         Ot            553.3            
DIAPHRAGMATIC HERNIA                     

 

 2016                         Ot            V58.69            OTH MED,LT,
CURRENT USE                     

 

 2016                         Ot            070.70            UNSPECIFIED 
VIRAL HEPATITIS C WITHOUT HE                     

 

 2016                         Ot            205.10            CHRONIC 
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                     

 

 2016                         Ot            238.71            ESSENTIAL 
THROMBOCYTHEMIA                     

 

 2016                         Ot            285.9            ANEMIA NOS  
                   

 

 2016                         Ot            288.00            NEUTROPENIA
, UNSPECIFIED                     

 

 2016                         Ot            530.81            ESOPHAGEAL 
REFLUX                     

 

 2016                         Ot            553.3            
DIAPHRAGMATIC HERNIA                     

 

 2016                         Ot            V58.69            OTH MED,LT,
CURRENT USE                     

 

 2016                         Ot            070.70            UNSPECIFIED 
VIRAL HEPATITIS C WITHOUT HE                     

 

 2016                         Ot            205.10            CHRONIC 
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                     

 

 2016                         Ot            790.6            ABN BLOOD 
CHEMISTRY NEC                     

 

 2016                         Ot            V58.69            OTH MED,LT,
CURRENT USE                     

 

 2016                         Ot            070.70            UNSPECIFIED 
VIRAL HEPATITIS C WITHOUT HE                     

 

 2016                         Ot            205.10            CHRONIC 
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                     

 

 2016                         Ot            238.71            ESSENTIAL 
THROMBOCYTHEMIA                     

 

 2016                         Ot            288.00            NEUTROPENIA
, UNSPECIFIED                     

 

 2016                         Ot            530.81            ESOPHAGEAL 
REFLUX                     

 

 2016                         Ot            553.3            
DIAPHRAGMATIC HERNIA                     

 

 2016                         Ot            V58.69            OTH MED,LT,
CURRENT USE                     

 

 2016            KATHRYN HOLLAND DO            Ot            V76.12 
           OTH SCREEN MAMMO-MALIGN NEOPLASM OF MISTY                     

 

 2016                         Ot            205.10            CHRONIC 
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                     

 

 2016            KATHRYN HOLLAND DO            Ot            070.70 
           UNSPECIFIED VIRAL HEPATITIS C WITHOUT HE                     

 

 2016            KATHRYN HOLLAND DO            Ot            511.9  
          PLEURAL EFFUSION NOS                     

 

 2016            KATHRYN HOLLAND DO            Ot            511.9  
          PLEURAL EFFUSION NOS                     

 

 2016                         Ot            070.70            UNSPECIFIED 
VIRAL HEPATITIS C WITHOUT HE                     

 

 2016                         Ot            208.90            UNSPECIFIED 
LEUKEMIA, W/O MENTION OF HAV                     

 

 2016                         Ot            511.9            PLEURAL 
EFFUSION NOS                     

 

 2016                         Ot            786.2            COUGH       
              

 

 2016                         Ot            V72.84            EXAM PRE-
OPERATIVE NOS                     

 

 2016            LUKE MAHAN DO            Ot            511.9       
     PLEURAL EFFUSION NOS                     

 

 2016            LUKE MAHAN DO            Ot            786.2       
     COUGH                     

 

 2016            DEIDRE QUEZADA            Ot            070.70      
      UNSPECIFIED VIRAL HEPATITIS C WITHOUT HE                     

 

 2016            DEIDRE QUEZADA            Ot            571.5       
     CIRRHOSIS OF LIVER NOS                     

 

 2016            KATHRYN HOLLAND DO            Ot            729.5  
          PAIN IN LIMB                     

 

 2016            KATHRYN HOLLAND DO            Ot            729.81 
           SWELLING OF LIMB                     

 

 2016            KATHRYN HOLLAND DO            Ot            511.9  
          PLEURAL EFFUSION NOS                     

 

 2016            GELLENDER DO, KATHRYN GIBBONS            Ot            518.0  
          PULMONARY COLLAPSE                     

 

 2016            GELLENDER DO, KATHRYN GIBBONS            Ot            786.05 
           SHORTNESS OF BREATH                     

 

 2016            GELLENDER DO, KATHRYN GIBBONS            Ot            R06.02 
           SHORTNESS OF BREATH                     

 

 2016            GELLENDER DO, KATHRYN GIBBONS            Ot            R06.02 
           SHORTNESS OF BREATH                     

 

 2016            GELLENDER DO, KATHRYN GIBBONS            Ot            R91.8  
          OTHER NONSPECIFIC ABNORMAL FINDING OF SAMUEL                     

 

 2016            GELLENDER DO, KATHRYN GIBBONS            Ot            N19    
        UNSPECIFIED KIDNEY FAILURE                     

 

 2016            GELLENDER DO, KATHRYN GIBBONS            Ot            R06.02 
           SHORTNESS OF BREATH                     

 

 2016            LUKE MAHAN DO            Ot            B19.20      
      UNSPECIFIED VIRAL HEPATITIS C WITHOUT HE                     

 

 2016            LUKE MAHAN DO            Ot            J90         
   PLEURAL EFFUSION, NOT ELSEWHERE CLASSIFI                     

 

 10/03/2016            LUKE MAHAN DO            Ot            B19.20      
      UNSPECIFIED VIRAL HEPATITIS C WITHOUT HE                     

 

 10/03/2016            LUKE MAHAN DO            Ot            C95.90      
      LEUKEMIA, UNSPECIFIED NOT HAVING ACHIEVE                     

 

 10/03/2016            LUKE MAHAN DO            Ot            J90         
   PLEURAL EFFUSION, NOT ELSEWHERE CLASSIFI                     

 

 10/03/2016            LUKE MAHAN DO            Ot            R05         
   COUGH                     

 

 10/03/2016            LUKE MAHAN DO            Ot            B19.20      
      UNSPECIFIED VIRAL HEPATITIS C WITHOUT HE                     

 

 10/03/2016            LUKE MAHAN DO            Ot            C95.90      
      LEUKEMIA, UNSPECIFIED NOT HAVING ACHIEVE                     

 

 10/03/2016            LUKE MAHAN DO            Ot            J90         
   PLEURAL EFFUSION, NOT ELSEWHERE CLASSIFI                     

 

 10/03/2016            LUKE MAHAN DO            Ot            R05         
   COUGH                     

 

 10/12/2016            GELLENDER DO, KATHRYN GIBBONS            Ot            R06.02 
           SHORTNESS OF BREATH                     

 

 10/12/2016            GELLENDER DO, KATHRYN GIBBONS            Ot            N19    
        UNSPECIFIED KIDNEY FAILURE                     

 

 10/12/2016            GELLENDER DO, KATHRYN GIBBONS            Ot            R06.02 
           SHORTNESS OF BREATH                     

 

 10/18/2016            GELLENDER DO, KATHRYN GIBBONS            Ot            R06.02 
           SHORTNESS OF BREATH                     

 

 10/18/2016            GELLENDER DO, KATHRYN GIBBONS            Ot            R91.8  
          OTHER NONSPECIFIC ABNORMAL FINDING OF SAMUEL                     

 

 10/20/2016            LUKE MAHAN DO            Ot            B19.20      
      UNSPECIFIED VIRAL HEPATITIS C WITHOUT HE                     

 

 10/20/2016            LUKE MAHAN DO            Ot            C95.90      
      LEUKEMIA, UNSPECIFIED NOT HAVING ACHIEVE                     

 

 10/20/2016            LUKE MAHAN DO            Ot            J90         
   PLEURAL EFFUSION, NOT ELSEWHERE CLASSIFI                     

 

 10/20/2016            LUKE MAHAN DO            Ot            R05         
   COUGH                     

 

 10/21/2016            KATHRYN HOLLAND DO            Ot            R06.02 
           SHORTNESS OF BREATH                     

 

 10/21/2016            AMALIA HARE KATHRYN GIBBONS            Ot            N19    
        UNSPECIFIED KIDNEY FAILURE                     

 

 10/21/2016            LIANGKEVIN HARE KATHRYN GIBBONS            Ot            R06.02 
           SHORTNESS OF BREATH                     

 

 10/26/2016            LUKE MAHAN DO            Ot            B19.20      
      UNSPECIFIED VIRAL HEPATITIS C WITHOUT HE                     

 

 10/26/2016            LUKE MAHAN DO            Ot            C95.90      
      LEUKEMIA, UNSPECIFIED NOT HAVING ACHIEVE                     

 

 10/26/2016            LUKE MAHAN DO            Ot            J90         
   PLEURAL EFFUSION, NOT ELSEWHERE CLASSIFI                     

 

 10/26/2016            LUKE MAHAN DO            Ot            R05         
   COUGH                     

 

 10/28/2016            LUKE MAHAN DO            Ot            B19.20      
      UNSPECIFIED VIRAL HEPATITIS C WITHOUT HE                     

 

 10/28/2016            LUKE MAHAN DO            Ot            C95.90      
      LEUKEMIA, UNSPECIFIED NOT HAVING ACHIEVE                     

 

 10/28/2016            LUKE MAHAN DO            Ot            J90         
   PLEURAL EFFUSION, NOT ELSEWHERE CLASSIFI                     

 

 10/28/2016            LUKE MAHAN DO            Ot            R05         
   COUGH                     

 

 10/31/2016            KARELY SALAS            Ot            M48.06     
       SPINAL STENOSIS, LUMBAR REGION                     

 

 10/31/2016            KARELY SALAS            Ot            M51.36     
       OTHER INTERVERTEBRAL DISC DEGENERATION,                      

 

 10/31/2016            KARELY SALAS            Ot            M54.5      
      LOW BACK PAIN                     

 

 10/31/2016            KARELY SALAS            Ot            Z79.899    
        OTHER LONG TERM (CURRENT) DRUG THERAPY                     

 

 2016            KARELY SALAS            Ot            M48.06     
       SPINAL STENOSIS, LUMBAR REGION                     

 

 2016            KARELY SALAS            Ot            M51.36     
       OTHER INTERVERTEBRAL DISC DEGENERATION,                      

 

 2016            KARELY SALAS            Ot            M54.5      
      LOW BACK PAIN                     

 

 2016            KARELY SALAS            Ot            Z79.899    
        OTHER LONG TERM (CURRENT) DRUG THERAPY                     

 

 2016            AMALIA HARE KATHRYN SHAI            Ot            R06.02 
           SHORTNESS OF BREATH                     

 

 2016            KATHRYN HOLLAND DO A            Ot            R91.8  
          OTHER NONSPECIFIC ABNORMAL FINDING OF SAMUEL                     

 

 2016            LUKE MAHAN DO            Ot            B19.20      
      UNSPECIFIED VIRAL HEPATITIS C WITHOUT HE                     

 

 2016            LUKE MAHAN DO            Ot            C95.90      
      LEUKEMIA, UNSPECIFIED NOT HAVING ACHIEVE                     

 

 2016            LUKE MAHAN DO            Ot            J90         
   PLEURAL EFFUSION, NOT ELSEWHERE CLASSIFI                     

 

 2016            LUKE MAHAN DO            Ot            R05         
   COUGH                     

 

 2016            KATHRYN HOLLAND DO            Ot            M25.531
            PAIN IN RIGHT WRIST                     

 

 2017            DEIDRE QUEZADA            Ot            K74.60      
      UNSPECIFIED CIRRHOSIS OF LIVER                     

 

 2017            AMADA GORDILLO MD, Ot            G62.9   
         POLYNEUROPATHY, UNSPECIFIED                     

 

 2017            DUY COHEN, AMADA MONTES Ot            Z79.899 
           OTHER LONG TERM (CURRENT) DRUG THERAPY                     

 

 2017            AMADA GORDILLO MD, Ot            G62.9   
         POLYNEUROPATHY, UNSPECIFIED                     

 

 2017            AMADA GORDILLO MD            Ot            Z79.899 
           OTHER LONG TERM (CURRENT) DRUG THERAPY                     

 

 2017            AMALIA HARE KATHRYN SHAI            Ot            M25.531
            PAIN IN RIGHT WRIST                     

 

 2017            KATHRYN HOLLAND DO            Ot            M25.531
            PAIN IN RIGHT WRIST                     

 

 2017            DEIDRE QUEZADA            Ot            K74.60      
      UNSPECIFIED CIRRHOSIS OF LIVER                     

 

 2017            DEIDRE QUEZADA [facility]            Ot            K74.60      
      UNSPECIFIED CIRRHOSIS OF LIVER                     

 

 2017            KATHRYN HOLLAND DO            Ot            J90    
        PLEURAL EFFUSION, NOT ELSEWHERE CLASSIFI                     

 

 2017            KATHRYN HOLLAND DO            Ot            J90    
        PLEURAL EFFUSION, NOT ELSEWHERE CLASSIFI                     

 

 2017                         Ot            070.70            UNSPECIFIED 
VIRAL HEPATITIS C WITHOUT HE                     

 

 2017                         Ot            780.60            FEVER, 
UNSPECIFIED                     

 

 2017                         Ot            780.79            OTH MALAISE 
FATIGUE                     

 

 2017                         Ot            782.4            JAUNDICE NOS
                     

 

 2017                         Ot            791.9            ABN URINE 
FINDINGS NEC                     

 

 2017                         Ot            070.70            UNSPECIFIED 
VIRAL HEPATITIS C WITHOUT HE                     

 

 2017                         Ot            205.10            CHRONIC 
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                     

 

 2017                         Ot            238.71            ESSENTIAL 
THROMBOCYTHEMIA                     

 

 2017                         Ot            285.9            ANEMIA NOS  
                   

 

 2017                         Ot            288.00            NEUTROPENIA
, UNSPECIFIED                     

 

 2017                         Ot            530.81            ESOPHAGEAL 
REFLUX                     

 

 2017                         Ot            553.3            
DIAPHRAGMATIC HERNIA                     

 

 2017                         Ot            V58.69            OTH MED,LT,
CURRENT USE                     

 

 2017                         Ot            070.70            UNSPECIFIED 
VIRAL HEPATITIS C WITHOUT HE                     

 

 2017                         Ot            205.10            CHRONIC 
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                     

 

 2017                         Ot            238.71            ESSENTIAL 
THROMBOCYTHEMIA                     

 

 2017                         Ot            285.9            ANEMIA NOS  
                   

 

 2017                         Ot            288.00            NEUTROPENIA
, UNSPECIFIED                     

 

 2017                         Ot            530.81            ESOPHAGEAL 
REFLUX                     

 

 2017                         Ot            553.3            
DIAPHRAGMATIC HERNIA                     

 

 2017                         Ot            V58.69            OTH MED,LT,
CURRENT USE                     

 

 2017                         Ot            070.70            UNSPECIFIED 
VIRAL HEPATITIS C WITHOUT HE                     

 

 2017                         Ot            205.10            CHRONIC 
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                     

 

 2017                         Ot            790.6            ABN BLOOD 
CHEMISTRY NEC                     

 

 2017                         Ot            V58.69            OTH MED,LT,
CURRENT USE                     

 

 2017                         Ot            070.70            UNSPECIFIED 
VIRAL HEPATITIS C WITHOUT HE                     

 

 2017                         Ot            205.10            CHRONIC 
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                     

 

 2017                         Ot            238.71            ESSENTIAL 
THROMBOCYTHEMIA                     

 

 2017                         Ot            288.00            NEUTROPENIA
, UNSPECIFIED                     

 

 2017                         Ot            530.81            ESOPHAGEAL 
REFLUX                     

 

 2017                         Ot            553.3            
DIAPHRAGMATIC HERNIA                     

 

 2017                         Ot            V58.69            OTH MED,LT,
CURRENT USE                     

 

 2017            GELLENDER DOKATHRYN            Ot            V76.12 
           OTH SCREEN MAMMO-MALIGN NEOPLASM OF MISTY                     

 

 2017                         Ot            205.10            CHRONIC 
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                     

 

 2017            GELLENDER DOKATHRYN            Ot            070.70 
           UNSPECIFIED VIRAL HEPATITIS C WITHOUT HE                     

 

 2017            GELLENDER DOKATHRYN            Ot            511.9  
          PLEURAL EFFUSION NOS                     

 

 2017            GELLENDER DO, KATHRYN SHAI            Ot            511.9  
          PLEURAL EFFUSION NOS                     

 

 2017                         Ot            070.70            UNSPECIFIED 
VIRAL HEPATITIS C WITHOUT HE                     

 

 2017                         Ot            208.90            UNSPECIFIED 
LEUKEMIA, W/O MENTION OF HAV                     

 

 2017                         Ot            511.9            PLEURAL 
EFFUSION NOS                     

 

 2017                         Ot            786.2            COUGH       
              

 

 2017                         Ot            V72.84            EXAM PRE-
OPERATIVE NOS                     

 

 2017            LUKE MAHAN DO            Ot            511.9       
     PLEURAL EFFUSION NOS                     

 

 2017            LUKE MAHAN DO            Ot            786.2       
     COUGH                     

 

 2017            DEIDRE QUEZADA ARNP            Ot            070.70      
      UNSPECIFIED VIRAL HEPATITIS C WITHOUT HE                     

 

 2017            DEIDRE QUEZADA ARNP            Ot            571.5       
     CIRRHOSIS OF LIVER NOS                     

 

 2017            KATHRYN HOLLAND DO            Ot            729.5  
          PAIN IN LIMB                     

 

 2017            YAZMINDER KATHRYN HARE            Ot            729.81 
           SWELLING OF LIMB                     

 

 2017            KATHRYN HOLLAND DO            Ot            511.9  
          PLEURAL EFFUSION NOS                     

 

 2017            LIANGLENKATHRYN ORLANDO DO            Ot            518.0  
          PULMONARY COLLAPSE                     

 

 2017            KATHRYN HOLLAND DO            Ot            786.05 
           SHORTNESS OF BREATH                     

 

 2017            KATHRYN HOLLAND DO            Ot            R06.02 
           SHORTNESS OF BREATH                     

 

 2017            GELLENDER DO, KATHRYN GIBBONS            Ot            R06.02 
           SHORTNESS OF BREATH                     

 

 2017            GELSHERRONDER DOKATHRYN            Ot            R91.8  
          OTHER NONSPECIFIC ABNORMAL FINDING OF SAMUEL                     

 

 2017            KATHRYN HOLLAND DO            Ot            N19    
        UNSPECIFIED KIDNEY FAILURE                     

 

 2017            KATHRYN HOLLAND DO            Ot            R06.02 
           SHORTNESS OF BREATH                     

 

 2017            LUKE MAHAN DO            Ot            B19.20      
      UNSPECIFIED VIRAL HEPATITIS C WITHOUT HE                     

 

 2017            LUKE MAHAN DO            Ot            C95.90      
      LEUKEMIA, UNSPECIFIED NOT HAVING ACHIEVE                     

 

 2017            LUKE MAHAN DO            Ot            J90         
   PLEURAL EFFUSION, NOT ELSEWHERE CLASSIFI                     

 

 2017            LUKE MAHAN DO            Ot            R05         
   COUGH                     

 

 2017            LUKE MAHAN DO            Ot            B19.20      
      UNSPECIFIED VIRAL HEPATITIS C WITHOUT HE                     

 

 2017            LUKE MAHAN DO            Ot            C95.90      
      LEUKEMIA, UNSPECIFIED NOT HAVING ACHIEVE                     

 

 2017            LUKE MAHAN DO            Ot            J90         
   PLEURAL EFFUSION, NOT ELSEWHERE CLASSIFI                     

 

 2017            LUKE MAHAN DO            Ot            R05         
   COUGH                     

 

 2017            KATHRYN HOLLAND DO            Ot            M25.531
            PAIN IN RIGHT WRIST                     

 

 2017            PILAR DEIDRE GREGORIO ARN            Ot            K74.60      
      UNSPECIFIED CIRRHOSIS OF LIVER                     

 

 2017            KATHRYN HOLLAND DO            Ot            J90    
        PLEURAL EFFUSION, NOT ELSEWHERE CLASSIFI                     

 

 09/15/2017            PILAR DEIDRE J ARNP            Ot            K74.60      
      UNSPECIFIED CIRRHOSIS OF LIVER                     

 

 09/15/2017            PILAR DEIDRE J ARN            Ot            Z90.49      
      ACQUIRED ABSENCE OF OTHER SPECIFIED PART                     

 

 09/15/2017            AMALIA HARE KATHRYN GIBBONS            Ot            C92.91 
           MYELOID LEUKEMIA, UNSPECIFIED IN REMISSI                     

 

 09/15/2017            KATHRYN HOLLAND DO            Ot            E87.6  
          HYPOKALEMIA                     

 

 09/15/2017            KATHRYN HOLLAND DO            Ot            I48.0  
          PAROXYSMAL ATRIAL FIBRILLATION                     

 

 09/15/2017            KATHRYN HOLLAND DO            Ot            K21.9  
          GASTRO-ESOPHAGEAL REFLUX DISEASE WITHOUT                     

 

 09/15/2017            KATHRYN HOLLAND DO            Ot            K59.09 
           OTHER CONSTIPATION                     

 

 09/15/2017            AMALIA HARE KATHRYN GIBBONS            Ot            Z86.19 
           PERSONAL HISTORY OF OTHER INFECTIOUS AND                     

 

 2017                         Ot            070.70            UNSPECIFIED 
VIRAL HEPATITIS C WITHOUT HE                     

 

 2017                         Ot            780.60            FEVER, 
UNSPECIFIED                     

 

 2017                         Ot            780.79            OTH MALAISE 
FATIGUE                     

 

 2017                         Ot            782.4            JAUNDICE NOS
                     

 

 2017                         Ot            791.9            ABN URINE 
FINDINGS NEC                     

 

 2017                         Ot            070.70            UNSPECIFIED 
VIRAL HEPATITIS C WITHOUT HE                     

 

 2017                         Ot            205.10            CHRONIC 
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                     

 

 2017                         Ot            238.71            ESSENTIAL 
THROMBOCYTHEMIA                     

 

 2017                         Ot            285.9            ANEMIA NOS  
                   

 

 2017                         Ot            288.00            NEUTROPENIA
, UNSPECIFIED                     

 

 2017                         Ot            530.81            ESOPHAGEAL 
REFLUX                     

 

 2017                         Ot            553.3            
DIAPHRAGMATIC HERNIA                     

 

 2017                         Ot            V58.69            OTH MED,LT,
CURRENT USE                     

 

 2017                         Ot            070.70            UNSPECIFIED 
VIRAL HEPATITIS C WITHOUT HE                     

 

 2017                         Ot            205.10            CHRONIC 
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                     

 

 2017                         Ot            238.71            ESSENTIAL 
THROMBOCYTHEMIA                     

 

 2017                         Ot            285.9            ANEMIA NOS  
                   

 

 2017                         Ot            288.00            NEUTROPENIA
, UNSPECIFIED                     

 

 2017                         Ot            530.81            ESOPHAGEAL 
REFLUX                     

 

 2017                         Ot            553.3            
DIAPHRAGMATIC HERNIA                     

 

 2017                         Ot            V58.69            OTH MED,LT,
CURRENT USE                     

 

 2017                         Ot            070.70            UNSPECIFIED 
VIRAL HEPATITIS C WITHOUT HE                     

 

 2017                         Ot            205.10            CHRONIC 
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                     

 

 2017                         Ot            790.6            ABN BLOOD 
CHEMISTRY NEC                     

 

 2017                         Ot            V58.69            OTH MED,LT,
CURRENT USE                     

 

 2017                         Ot            070.70            UNSPECIFIED 
VIRAL HEPATITIS C WITHOUT HE                     

 

 2017                         Ot            205.10            CHRONIC 
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                     

 

 2017                         Ot            238.71            ESSENTIAL 
THROMBOCYTHEMIA                     

 

 2017                         Ot            288.00            NEUTROPENIA
, UNSPECIFIED                     

 

 2017                         Ot            530.81            ESOPHAGEAL 
REFLUX                     

 

 2017                         Ot            553.3            
DIAPHRAGMATIC HERNIA                     

 

 2017                         Ot            V58.69            OTH MED,LT,
CURRENT USE                     

 

 2017            KATHRYN HOLLAND DO            Ot            V76.12 
           OTH SCREEN MAMMO-MALIGN NEOPLASM OF MISTY                     

 

 2017                         Ot            205.10            CHRONIC 
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                     

 

 2017            KATHRYN HOLLAND DO            Ot            070.70 
           UNSPECIFIED VIRAL HEPATITIS C WITHOUT HE                     

 

 2017            KATHRYN HOLLAND DO            Ot            511.9  
          PLEURAL EFFUSION NOS                     

 

 2017            KATHRYN HOLLAND DO            Ot            511.9  
          PLEURAL EFFUSION NOS                     

 

 2017                         Ot            070.70            UNSPECIFIED 
VIRAL HEPATITIS C WITHOUT HE                     

 

 2017                         Ot            208.90            UNSPECIFIED 
LEUKEMIA, W/O MENTION OF HAV                     

 

 2017                         Ot            511.9            PLEURAL 
EFFUSION NOS                     

 

 2017                         Ot            786.2            COUGH       
              

 

 2017                         Ot            V72.84            EXAM PRE-
OPERATIVE NOS                     

 

 2017            LUKE MAHAN DO            Ot            511.9       
     PLEURAL EFFUSION NOS                     

 

 2017            LUKE MAHAN DO            Ot            786.2       
     COUGH                     

 

 2017            DEIDRE QUEZADA            Ot            070.70      
      UNSPECIFIED VIRAL HEPATITIS C WITHOUT HE                     

 

 2017            DEIDRE QUEZADA            Ot            571.5       
     CIRRHOSIS OF LIVER NOS                     

 

 2017            KATHRYN HOLLAND DO            Ot            729.5  
          PAIN IN LIMB                     

 

 2017            KATHRYN HOLLAND DO            Ot            729.81 
           SWELLING OF LIMB                     

 

 2017            KATHRYN HOLLAND DO            Ot            511.9  
          PLEURAL EFFUSION NOS                     

 

 2017            KATHRYN HOLLAND DO            Ot            518.0  
          PULMONARY COLLAPSE                     

 

 2017            KATHRYN HOLLAND DO            Ot            786.05 
           SHORTNESS OF BREATH                     

 

 2017            KATHRYN HOLLAND DO            Ot            R06.02 
           SHORTNESS OF BREATH                     

 

 2017            KATHRYN HOLLAND DO            Ot            R06.02 
           SHORTNESS OF BREATH                     

 

 2017            KATHRYN HOLLAND DO            Ot            R91.8  
          OTHER NONSPECIFIC ABNORMAL FINDING OF SAMUEL                     

 

 2017            KATHRYN HOLLAND DO            Ot            N19    
        UNSPECIFIED KIDNEY FAILURE                     

 

 2017            KATHRYN HOLLAND DO            Ot            R06.02 
           SHORTNESS OF BREATH                     

 

 2017            LUKE MAHAN DO            Ot            B19.20      
      UNSPECIFIED VIRAL HEPATITIS C WITHOUT HE                     

 

 2017            LUKE MAHAN DO            Ot            C95.90      
      LEUKEMIA, UNSPECIFIED NOT HAVING ACHIEVE                     

 

 2017            LUKE MAHAN DO            Ot            J90         
   PLEURAL EFFUSION, NOT ELSEWHERE CLASSIFI                     

 

 2017            LUKE MAHAN DO            Ot            R05         
   COUGH                     

 

 2017            LUKE MAHAN DO            Ot            B19.20      
      UNSPECIFIED VIRAL HEPATITIS C WITHOUT HE                     

 

 2017            LUKE MAHAN DO, Ot            C95.90      
      LEUKEMIA, UNSPECIFIED NOT HAVING ACHIEVE                     

 

 2017            LUKE MAHAN DO            Ot            J90         
   PLEURAL EFFUSION, NOT ELSEWHERE CLASSIFI                     

 

 2017            LUKE MAHAN DO            Ot            R05         
   COUGH                     

 

 2017            AMALIA HARE KATHRYN GIBBONS            Ot            M25.531
            PAIN IN RIGHT WRIST                     

 

 2017            DEIDRE QUEZADA            Ot            K74.60      
      UNSPECIFIED CIRRHOSIS OF LIVER                     

 

 2017            AMALIA HARE KATHRYN GIBBONS            Ot            J90    
        PLEURAL EFFUSION, NOT ELSEWHERE CLASSIFI                     

 

 2017            DEIDRE QUEZADA            Ot            K74.60      
      UNSPECIFIED CIRRHOSIS OF LIVER                     

 

 2017            DEIDRE QUEZADA            Ot            Z90.49      
      ACQUIRED ABSENCE OF OTHER SPECIFIED PART                     

 

 2017            KATHRYN HOLLAND DO            Ot            I48.2  
          CHRONIC ATRIAL FIBRILLATION                     

 

 2017            KATHRYN HOLLAND DO            Ot            I48.0  
          PAROXYSMAL ATRIAL FIBRILLATION                     

 

 2017            PILAR, DEIDRE J ARNP            Ot            K74.60      
      UNSPECIFIED CIRRHOSIS OF LIVER                     

 

 2017            DEIDRE QUEZADA ARNP            Ot            Z90.49      
      ACQUIRED ABSENCE OF OTHER SPECIFIED PART                     

 

 2017            AMALIA HARE, KATHRYN GIBBONS            Ot            I48.0  
          PAROXYSMAL ATRIAL FIBRILLATION                     

 

 2017            YAZMINDER , KATHRYN GIBBONS            Ot            I48.0  
          PAROXYSMAL ATRIAL FIBRILLATION                     

 

 2017            AMALIA HARE, KATHRYN GIBBONS            Ot            I48.0  
          PAROXYSMAL ATRIAL FIBRILLATION                     

 

 10/03/2017            YAZMINDER DO, KATHRYN GIBBONS            Ot            I48.0  
          PAROXYSMAL ATRIAL FIBRILLATION                     

 

 10/10/2017            AMALIA HARE, KATHRYN GIBBONS            Ot            J90    
        PLEURAL EFFUSION, NOT ELSEWHERE CLASSIFI                     

 

 10/10/2017            AMALIA HARE, KATHRYN GIBBONS            Ot            J98.11 
           ATELECTASIS                     

 

 10/12/2017            AMALIA HARE, KATHRYN GIBBONS            Ot            I48.2  
          CHRONIC ATRIAL FIBRILLATION                     

 

 10/18/2017            TESSY COHEN [facility], ALI F[facility] CCDS            Ot            
I48.0            PAROXYSMAL ATRIAL FIBRILLATION                     

 

 10/20/2017            KATHRYN HOLLAND DO            Ot            I48.2  
          CHRONIC ATRIAL FIBRILLATION                     

 

 10/25/2017            TESSY COHEN [facility], ALI [facility] CCDS            Ot            
C92.11            CHRONIC MYELOID LEUKEMIA, BCR/ABL-POSITI                     

 

 10/25/2017            TESSY COHEN FAC, ALI [facility] CCDS            Ot            
I48.0            PAROXYSMAL ATRIAL FIBRILLATION                     

 

 10/31/2017            AMALIA HARE, KATHRYN GIBBONS            Ot            J90    
        PLEURAL EFFUSION, NOT ELSEWHERE CLASSIFI                     

 

 10/31/2017            AMALIA HARE, KATHRYN GIBBONS            Ot            J98.11 
           ATELECTASIS                     

 

 2017            TESSY COHEN FAC, ALI [facility] CCDS            Ot            
C92.11            CHRONIC MYELOID LEUKEMIA, BCR/ABL-POSITI                     

 

 2017            TESSY PALACIOS, ALI [facility] CCDS            Ot            
I48.0            PAROXYSMAL ATRIAL FIBRILLATION                     

 

 2017            KATHRYN HOLLAND DO            Ot            J90    
        PLEURAL EFFUSION, NOT ELSEWHERE CLASSIFI                     

 

 2017            AMALIA HARE, KATHRYN GIBBONS            Ot            J98.11 
           ATELECTASIS                     

 

 2017            TESSY PALACIOS, ALI [facility] CCDS            Ot            
I48.0            PAROXYSMAL ATRIAL FIBRILLATION                     

 

 2017            TESSY PALACIOS, ALI [facility] CCDS            Ot            
I48.0            PAROXYSMAL ATRIAL FIBRILLATION                     

 

 2017            GENARO COHEN, LUIS MCKEE            Ot            B19.20
            UNSPECIFIED VIRAL HEPATITIS C WITHOUT HE                     

 

 2017            LUIS LAM MD            Ot            F41.9 
           ANXIETY DISORDER, UNSPECIFIED                     

 

 2017            LUIS LAM MD, Ot            G62.9 
           POLYNEUROPATHY, UNSPECIFIED                     

 

 2017            LUIS LAM MD            Ot            I48.0 
           PAROXYSMAL ATRIAL FIBRILLATION                     

 

 2017            LUIS LAM MD            Ot            R00.2 
           PALPITATIONS                     

 

 2017            LUIS LAM MD            Ot            R07.9 
           CHEST PAIN, UNSPECIFIED                     

 

 2017            LUIS LAM MD            Ot            R19.7 
           DIARRHEA, UNSPECIFIED                     

 

 2017            LUIS LAM MD, Ot            Z79.01
            LONG TERM (CURRENT) USE OF ANTICOAGULANT                     

 

 2017            LUIS LAM MD            Ot            Z79.82
            LONG TERM (CURRENT) USE OF ASPIRIN                     

 

 2017            LUIS LAM MD            Ot            Z80.9 
           FAMILY HISTORY OF MALIGNANT NEOPLASM, UN                     

 

 2017            LUIS LAM MD            Ot            Z82.49
            FAMILY HX OF ISCHEM HEART DIS AND OTH DI                     

 

 2017            LUIS LAM MD            Ot            Z85.6 
           PERSONAL HISTORY OF LEUKEMIA                     

 

 2017            LUIS LAM MD            Ot            Z92.21
            PERSONAL HISTORY OF ANTINEOPLASTIC CHEMO                     

 

 2017            NICKI ALMANZAR DO            Ot            C92.11         
   CHRONIC MYELOID LEUKEMIA, BCR/ABL-POSITI                     

 

 2017            NICKI ALMANZAR DO            Ot            F41.9          
  ANXIETY DISORDER, UNSPECIFIED                     

 

 2017            CAROLE ALMANZAR DOA K            Ot            G62.9          
  POLYNEUROPATHY, UNSPECIFIED                     

 

 2017            CAROLE ALMANZAR DOA K            Ot            I48.91         
   UNSPECIFIED ATRIAL FIBRILLATION                     

 

 2017            NICKI ALMANZAR DO K            Ot            K52.9          
  NONINFECTIVE GASTROENTERITIS AND COLITIS                     

 

 2017            NICKI ALMANZAR DO            Ot            R11.2          
  NAUSEA WITH VOMITING, UNSPECIFIED                     

 

 2017            NICKI ALMANZAR DO K            Ot            Z79.01         
   LONG TERM (CURRENT) USE OF ANTICOAGULANT                     

 

 2017            CAROLE ALMANZAR DOA K            Ot            Z79.82         
   LONG TERM (CURRENT) USE OF ASPIRIN                     

 

 2017            JENELLE DO, NICKI K            Ot            Z87.19         
   PERSONAL HISTORY OF OTHER DISEASES OF TH                     

 

 2017            NICKI ALMANZAR DO            Ot            Z90.710        
    ACQUIRED ABSENCE OF BOTH CERVIX AND UTER                     

 

 2017            NICKI ALMANZAR DO            Ot            Z92.21         
   PERSONAL HISTORY OF ANTINEOPLASTIC CHEMO                     

 

 2017            KATHRYN HOLLAND DO SHAI            Ot            J90    
        PLEURAL EFFUSION, NOT ELSEWHERE CLASSIFI                     

 

 2018            JENN NEAL            Ot            B19.20
            UNSPECIFIED VIRAL HEPATITIS C WITHOUT HE                     

 

 2018            JENN NEAL            Ot            C95.90
            LEUKEMIA, UNSPECIFIED NOT HAVING ACHIEVE                     

 

 2018            JENN NEAL            Ot            J90   
         PLEURAL EFFUSION, NOT ELSEWHERE CLASSIFI                     

 

 2018            JENN NEAL            Ot            Z79.01
            LONG TERM (CURRENT) USE OF ANTICOAGULANT                     

 

 2018            KAELA AVILES DO            Ot            Z01.818     
       ENCOUNTER FOR OTHER PREPROCEDURAL EXAMIN                     

 

 2018            KAELA AVILES DO            Ot            Z86.010     
       PERSONAL HISTORY OF COLONIC POLYPS                     

 

 2018                         Ot            070.70            UNSPECIFIED 
VIRAL HEPATITIS C WITHOUT HE                     

 

 2018                         Ot            205.10            CHRONIC 
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                     

 

 2018                         Ot            238.71            ESSENTIAL 
THROMBOCYTHEMIA                     

 

 2018                         Ot            288.00            NEUTROPENIA
, UNSPECIFIED                     

 

 2018                         Ot            530.81            ESOPHAGEAL 
REFLUX                     

 

 2018                         Ot            553.3            
DIAPHRAGMATIC HERNIA                     

 

 2018                         Ot            V58.69            OTH MED,LT,
CURRENT USE                     

 

 2018                         Ot            205.10            CHRONIC 
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                     

 

 2018            AMALIA HARE KATHRYN SHAI            Ot            I48.0  
          PAROXYSMAL ATRIAL FIBRILLATION                     

 

 2018            YAZMINDARION DO KATHRYN GIBBONS            Ot            J90    
        PLEURAL EFFUSION, NOT ELSEWHERE CLASSIFI                     

 

 2018            KAELA AVILES DO            Ot            C95.90      
      LEUKEMIA, UNSPECIFIED NOT HAVING ACHIEVE                     

 

 2018            KAELA AVILES DO            Ot            D12.2       
     BENIGN NEOPLASM OF ASCENDING COLON                     

 

 2018            KAELA AVILES DO            Ot            G62.9       
     POLYNEUROPATHY, UNSPECIFIED                     

 

 2018            KAELA AVILES DO            Ot            I48.91      
      UNSPECIFIED ATRIAL FIBRILLATION                     

 

 2018            KAELA AVILES DO            Ot            K57.30      
      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                     

 

 2018            KAELA AVILES DO            Ot            M79.7       
     FIBROMYALGIA                     

 

 2018            KAELA AVILES DO            Ot            Z12.11      
      ENCOUNTER FOR SCREENING FOR MALIGNANT NE                     

 

 2018            KAELA AVILES DO            Ot            Z79.01      
      LONG TERM (CURRENT) USE OF ANTICOAGULANT                     

 

 2018            KAELA AVILES DO            Ot            Z79.899     
       OTHER LONG TERM (CURRENT) DRUG THERAPY                     

 

 01/15/2018            TESSY COHEN [facility], ALI [facility] CCDS            Ot            
I48.0            PAROXYSMAL ATRIAL FIBRILLATION                     

 

 2018            [facility]            Ot            
I48.0            PAROXYSMAL ATRIAL FIBRILLATION                     

 

 2018            GELLENDARION HARE, KATHRYN A            Ot            J90    
        PLEURAL EFFUSION, NOT ELSEWHERE CLASSIFI                     

 

 2018            JENN NEAL            Ot            B19.20
            UNSPECIFIED VIRAL HEPATITIS C WITHOUT HE                     

 

 2018            JENN NEAL            Ot            C95.90
            LEUKEMIA, UNSPECIFIED NOT HAVING ACHIEVE                     

 

 2018            JENN NEAL APRRAUL            Ot            J90   
         PLEURAL EFFUSION, NOT ELSEWHERE CLASSIFI                     

 

 2018            JENN NEAL APRN            Ot            Z79.01
            LONG TERM (CURRENT) USE OF ANTICOAGULANT                     

 

 2018            GELLENDER DO, KATHRYN A            Ot            J90    
        PLEURAL EFFUSION, NOT ELSEWHERE CLASSIFI                     

 

 2018            GELLENDER DO, KATHRYN A            Ot            J90    
        PLEURAL EFFUSION, NOT ELSEWHERE CLASSIFI                     

 

 2018            GELLENDER DO, KATHRYN A            Ot            J90    
        PLEURAL EFFUSION, NOT ELSEWHERE CLASSIFI                     

 

 2018            GELLENDER DO, KATHRYN A            Ot            J90    
        PLEURAL EFFUSION, NOT ELSEWHERE CLASSIFI                     

 

 2018            GELLENDER DO, KATHRYN A            Ot            J90    
        PLEURAL EFFUSION, NOT ELSEWHERE CLASSIFI                     

 

 2018                         Ot            070.70            UNSPECIFIED 
VIRAL HEPATITIS C WITHOUT HE                     

 

 2018                         Ot            205.10            CHRONIC 
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                     

 

 2018                         Ot            238.71            ESSENTIAL 
THROMBOCYTHEMIA                     

 

 2018                         Ot            285.9            ANEMIA NOS  
                   

 

 2018                         Ot            288.00            NEUTROPENIA
, UNSPECIFIED                     

 

 2018                         Ot            530.81            ESOPHAGEAL 
REFLUX                     

 

 2018                         Ot            553.3            
DIAPHRAGMATIC HERNIA                     

 

 2018                         Ot            V58.69            OTH MED,LT,
CURRENT USE                     

 

 2018                         Ot            070.70            UNSPECIFIED 
VIRAL HEPATITIS C WITHOUT HE                     

 

 2018                         Ot            205.10            CHRONIC 
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                     

 

 2018                         Ot            238.71            ESSENTIAL 
THROMBOCYTHEMIA                     

 

 2018                         Ot            285.9            ANEMIA NOS  
                   

 

 2018                         Ot            288.00            NEUTROPENIA
, UNSPECIFIED                     

 

 2018                         Ot            530.81            ESOPHAGEAL 
REFLUX                     

 

 2018                         Ot            553.3            
DIAPHRAGMATIC HERNIA                     

 

 2018                         Ot            V58.69            OTH MED,LT,
CURRENT USE                     

 

 2018                         Ot            070.70            UNSPECIFIED 
VIRAL HEPATITIS C WITHOUT HE                     

 

 2018                         Ot            205.10            CHRONIC 
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                     

 

 2018                         Ot            790.6            ABN BLOOD 
CHEMISTRY NEC                     

 

 2018                         Ot            V58.69            OTH MED,LT,
CURRENT USE                     

 

 2018                         Ot            070.70            UNSPECIFIED 
VIRAL HEPATITIS C WITHOUT HE                     

 

 2018                         Ot            205.10            CHRONIC 
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                     

 

 2018                         Ot            238.71            ESSENTIAL 
THROMBOCYTHEMIA                     

 

 2018                         Ot            288.00            NEUTROPENIA
, UNSPECIFIED                     

 

 2018                         Ot            530.81            ESOPHAGEAL 
REFLUX                     

 

 2018                         Ot            553.3            
DIAPHRAGMATIC HERNIA                     

 

 2018                         Ot            V58.69            OTH MED,LT,
CURRENT USE                     

 

 2018            KATHRYN HOLLAND DO            Ot            V76.12 
           OTH SCREEN MAMMO-MALIGN NEOPLASM OF MISTY                     

 

 2018                         Ot            205.10            CHRONIC 
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                     

 

 2018            KATHRYN HOLLAND DO            Ot            070.70 
           UNSPECIFIED VIRAL HEPATITIS C WITHOUT HE                     

 

 2018            KATHRYN HOLLAND DO            Ot            511.9  
          PLEURAL EFFUSION NOS                     

 

 2018            KATHRYN HOLLAND DO            Ot            511.9  
          PLEURAL EFFUSION NOS                     

 

 2018                         Ot            070.70            UNSPECIFIED 
VIRAL HEPATITIS C WITHOUT HE                     

 

 2018                         Ot            208.90            UNSPECIFIED 
LEUKEMIA, W/O MENTION OF HAV                     

 

 2018                         Ot            511.9            PLEURAL 
EFFUSION NOS                     

 

 2018                         Ot            786.2            COUGH       
              

 

 2018                         Ot            V72.84            EXAM PRE-
OPERATIVE NOS                     

 

 2018            LUKE MAHAN DO            Ot            511.9       
     PLEURAL EFFUSION NOS                     

 

 2018            LUKE MAHAN DO            Ot            786.2       
     COUGH                     

 

 2018            DEIDRE QUEZADA            Ot            070.70      
      UNSPECIFIED VIRAL HEPATITIS C WITHOUT HE                     

 

 2018            DEIDRE QUEZADAP            Ot            571.5       
     CIRRHOSIS OF LIVER NOS                     

 

 2018            LIANGLENDARION HARE, KATHRYN SHAI            Ot            729.5  
          PAIN IN LIMB                     

 

 2018            LIANGLENDER , KATHRYN GIBBONS            Ot            729.81 
           SWELLING OF LIMB                     

 

 2018            LIANGLENDER DO, KATHRYN GIBBONS            Ot            511.9  
          PLEURAL EFFUSION NOS                     

 

 2018            GELLENDER DO, KATHRYN GIBBONS            Ot            518.0  
          PULMONARY COLLAPSE                     

 

 2018            GELLENDER DO, KATHRYN GIBBONS            Ot            786.05 
           SHORTNESS OF BREATH                     

 

 2018            GELLENDER DO, KATHRYN GIBBONS            Ot            R06.02 
           SHORTNESS OF BREATH                     

 

 2018            GELLENDER DO, KATHRYN GIBBONS            Ot            R06.02 
           SHORTNESS OF BREATH                     

 

 2018            GELLENDER DO, KATHRYN GIBBONS            Ot            R91.8  
          OTHER NONSPECIFIC ABNORMAL FINDING OF SAMUEL                     

 

 2018            AMALIA HARE, KATHRYN GIBBONS            Ot            N19    
        UNSPECIFIED KIDNEY FAILURE                     

 

 2018            GELLENDER DO, KATHRYN GIBBONS            Ot            R06.02 
           SHORTNESS OF BREATH                     

 

 2018            LUKE MAHAN DO            Ot            B19.20      
      UNSPECIFIED VIRAL HEPATITIS C WITHOUT HE                     

 

 2018            LUKE MAHAN DO            Ot            C95.90      
      LEUKEMIA, UNSPECIFIED NOT HAVING ACHIEVE                     

 

 2018            LUKE MAHAN DO            Ot            J90         
   PLEURAL EFFUSION, NOT ELSEWHERE CLASSIFI                     

 

 2018            LUKE MAHAN DO            Ot            R05         
   COUGH                     

 

 2018            LUKE MAHAN DO            Ot            B19.20      
      UNSPECIFIED VIRAL HEPATITIS C WITHOUT HE                     

 

 2018            LUKE MAHAN DO            Ot            C95.90      
      LEUKEMIA, UNSPECIFIED NOT HAVING ACHIEVE                     

 

 2018            LUKE MAHAN DO            Ot            J90         
   PLEURAL EFFUSION, NOT ELSEWHERE CLASSIFI                     

 

 2018            LUKE MAHAN DO            Ot            R05         
   COUGH                     

 

 2018            LIANGLENDER KATHRYN            Ot            M25.531
            PAIN IN RIGHT WRIST                     

 

 2018            DEIDRE QUEZADA            Ot            K74.60      
      UNSPECIFIED CIRRHOSIS OF LIVER                     

 

 2018            AMALIA HAREKATHRYN            Ot            J90    
        PLEURAL EFFUSION, NOT ELSEWHERE CLASSIFI                     

 

 2018            DEIDRE QUEZADA ARNP            Ot            K74.60      
      UNSPECIFIED CIRRHOSIS OF LIVER                     

 

 2018            DEIDRE QUEZADA ARNP            Ot            Z90.49      
      ACQUIRED ABSENCE OF OTHER SPECIFIED PART                     

 

 2018            AMALIA HARE, KATHRYN GIBBONS            Ot            I48.2  
          CHRONIC ATRIAL FIBRILLATION                     

 

 2018            LIANGLENDARION HARE, KATHRYN GIBBONS            Ot            I48.0  
          PAROXYSMAL ATRIAL FIBRILLATION                     

 

 2018            TESSY COHEN [facility] CCDS            Ot            
C92.11            CHRONIC MYELOID LEUKEMIA, BCR/ABL-POSITI                     

 

 2018            TESSY COHEN [facility], ALI [facility] CCDS            Ot            
I48.0            PAROXYSMAL ATRIAL FIBRILLATION                     

 

 2018            AMALIA HARE, KATHRYN GIBBONS            Ot            J90    
        PLEURAL EFFUSION, NOT ELSEWHERE CLASSIFI                     

 

 2018            AMALIA HARE, KATHRYN GIBBONS            Ot            J98.11 
           ATELECTASIS                     

 

 2018            JENN NEAL APRN            Ot            B19.20
            UNSPECIFIED VIRAL HEPATITIS C WITHOUT HE                     

 

 2018            JENN NEAL APRN            Ot            C95.90
            LEUKEMIA, UNSPECIFIED NOT HAVING ACHIEVE                     

 

 2018            JENN NEAL APRN            Ot            J90   
         PLEURAL EFFUSION, NOT ELSEWHERE CLASSIFI                     

 

 2018            JENN NEAL APRN            Ot            Z79.01
            LONG TERM (CURRENT) USE OF ANTICOAGULANT                     

 

 2018            AMALIA HARE, KATHRYN A            Ot            J90    
        PLEURAL EFFUSION, NOT ELSEWHERE CLASSIFI                     

 

 2018            LIANGLENDER , KATHRYN A            Ot            J90    
        PLEURAL EFFUSION, NOT ELSEWHERE CLASSIFI                     

 

 2018            AMALIA HARE KATHRYN A            Ot            J90    
        PLEURAL EFFUSION, NOT ELSEWHERE CLASSIFI                     

 

 2018            TESSY COHEN [facility] CCDS            Ot            
I48.0            PAROXYSMAL ATRIAL FIBRILLATION                     

 

 2018            MAGUI MCALLISTER MD            Ot            C92.10  
          CHRONIC MYELOID LEUK, BCR/ABL-POSITIVE,                      

 

 2018            DEIDRE QUEZADA ARNP            Ot            J90         
   PLEURAL EFFUSION, NOT ELSEWHERE CLASSIFI                     

 

 2018            DEIDRE QUEZADA ARNP            Ot            K74.60      
      UNSPECIFIED CIRRHOSIS OF LIVER                     

 

 2018            DEIDRE QUEZADA ARNP            Ot            J90         
   PLEURAL EFFUSION, NOT ELSEWHERE CLASSIFI                     

 

 2018            DEIDRE QUEZADA ARNP            Ot            K74.60      
      UNSPECIFIED CIRRHOSIS OF LIVER                     

 

 2018            ANGÉLICA MCALLISTER MDHEEM            Ot            C92.10  
          CHRONIC MYELOID LEUK, BCR/ABL-POSITIVE,                      

 

 2018            PILARANA LUISAKALPANA PERKINS ARNP            Ot            J90         
   PLEURAL EFFUSION, NOT ELSEWHERE CLASSIFI                     

 

 2018            PILAR DEIDRE J ARNP            Ot            K74.60      
      UNSPECIFIED CIRRHOSIS OF LIVER                     

 

 2018            PILARANA LUISAKALPANA PERKINS ARNP            Ot            J90         
   PLEURAL EFFUSION, NOT ELSEWHERE CLASSIFI                     

 

 2018            PILAR DEIDRE J ARNP            Ot            K74.60      
      UNSPECIFIED CIRRHOSIS OF LIVER                     

 

 2018            PILAR DEIDRE J ARNP            Ot            K74.60      
      UNSPECIFIED CIRRHOSIS OF LIVER                     

 

 2018            PILAR DEIDRE J ARNP            Ot            Z86.19      
      PERSONAL HISTORY OF OTHER INFECTIOUS AND                     

 

 2018            PILAR DEIDRE J ARNP            Ot            K74.60      
      UNSPECIFIED CIRRHOSIS OF LIVER                     

 

 2018            PILAR DEIDRE J ARNP            Ot            Z86.19      
      PERSONAL HISTORY OF OTHER INFECTIOUS AND                     

 

 2018            PILAR DEIDRE J ARNP            Ot            K74.60      
      UNSPECIFIED CIRRHOSIS OF LIVER                     

 

 2018            PILAR DEIDRE J ARNP            Ot            Z86.19      
      PERSONAL HISTORY OF OTHER INFECTIOUS AND                     

 

 2018            ARY COHEN, MAGUI            Ot            C92.10  
          CHRONIC MYELOID LEUK, BCR/ABL-POSITIVE,                      

 

 2018                         Ot            070.70            UNSPECIFIED 
VIRAL HEPATITIS C WITHOUT HE                     

 

 2018                         Ot            205.10            CHRONIC 
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                     

 

 2018                         Ot            790.6            ABN BLOOD 
CHEMISTRY NEC                     

 

 2018                         Ot            V58.69            OTH MED,LT,
CURRENT USE                     

 

 2018                         Ot            070.70            UNSPECIFIED 
VIRAL HEPATITIS C WITHOUT HE                     

 

 2018                         Ot            205.10            CHRONIC 
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                     

 

 2018                         Ot            238.71            ESSENTIAL 
THROMBOCYTHEMIA                     

 

 2018                         Ot            288.00            NEUTROPENIA
, UNSPECIFIED                     

 

 2018                         Ot            530.81            ESOPHAGEAL 
REFLUX                     

 

 2018                         Ot            553.3            
DIAPHRAGMATIC HERNIA                     

 

 2018                         Ot            V58.69            OTH MED,LT,
CURRENT USE                     

 

 2018            KATHRYN HOLLAND DO            Ot            V76.12 
           OTH SCREEN MAMMO-MALIGN NEOPLASM OF MISTY                     

 

 2018                         Ot            205.10            CHRONIC 
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                     

 

 2018            KATHRYN HOLLAND DO            Ot            070.70 
           UNSPECIFIED VIRAL HEPATITIS C WITHOUT HE                     

 

 2018            GELLENDER DO, KATHRYN GIBBONS            Ot            511.9  
          PLEURAL EFFUSION NOS                     

 

 2018            GELLENDER DO, KATHRYN GIBBONS            Ot            511.9  
          PLEURAL EFFUSION NOS                     

 

 2018                         Ot            070.70            UNSPECIFIED 
VIRAL HEPATITIS C WITHOUT HE                     

 

 2018                         Ot            208.90            UNSPECIFIED 
LEUKEMIA, W/O MENTION OF HAV                     

 

 2018                         Ot            511.9            PLEURAL 
EFFUSION NOS                     

 

 2018                         Ot            786.2            COUGH       
              

 

 2018                         Ot            V72.84            EXAM PRE-
OPERATIVE NOS                     

 

 2018            LUKE MAHAN DO            Ot            511.9       
     PLEURAL EFFUSION NOS                     

 

 2018            LUKE MAHAN DO            Ot            786.2       
     COUGH                     

 

 2018            DEIDRE QUEZADA ARNP            Ot            070.70      
      UNSPECIFIED VIRAL HEPATITIS C WITHOUT HE                     

 

 2018            DEIDRE QUEZADA ARNP            Ot            571.5       
     CIRRHOSIS OF LIVER NOS                     

 

 2018            LIANGLENDARION HARE, KATHRYN GIBBONS            Ot            729.5  
          PAIN IN LIMB                     

 

 2018            GELLENDER DO, KATHRYN SHAI            Ot            729.81 
           SWELLING OF LIMB                     

 

 2018            GELLENDER DO, KATHRYN GIBBONS            Ot            511.9  
          PLEURAL EFFUSION NOS                     

 

 2018            LIANGLENDER DO, KATHRYN SHAI            Ot            518.0  
          PULMONARY COLLAPSE                     

 

 2018            GELLENDER DO, KATHRYN SHAI            Ot            786.05 
           SHORTNESS OF BREATH                     

 

 2018            GELLENDER DO, KATHRYN SHAI            Ot            R06.02 
           SHORTNESS OF BREATH                     

 

 2018            GELLENDER DO, KATHRYN GIBBONS            Ot            R06.02 
           SHORTNESS OF BREATH                     

 

 2018            GELLENDER DO, KATHRYN SHAI            Ot            R91.8  
          OTHER NONSPECIFIC ABNORMAL FINDING OF SAMUEL                     

 

 2018            GELLENDER DO, KATHRYN SHAI            Ot            N19    
        UNSPECIFIED KIDNEY FAILURE                     

 

 2018            GELLENDER DO, KATHRYN GIBBONS            Ot            R06.02 
           SHORTNESS OF BREATH                     

 

 2018            LUKE MAHAN DO            Ot            B19.20      
      UNSPECIFIED VIRAL HEPATITIS C WITHOUT HE                     

 

 2018            LUKE MAHAN DO            Ot            C95.90      
      LEUKEMIA, UNSPECIFIED NOT HAVING ACHIEVE                     

 

 2018            LUKE MAHAN DO            Ot            J90         
   PLEURAL EFFUSION, NOT ELSEWHERE CLASSIFI                     

 

 2018            LUKE MAHAN DO            Ot            R05         
   COUGH                     

 

 2018            LUKE MAHAN DO            Ot            B19.20      
      UNSPECIFIED VIRAL HEPATITIS C WITHOUT HE                     

 

 2018            LUKE MAHAN DO            Ot            C95.90      
      LEUKEMIA, UNSPECIFIED NOT HAVING ACHIEVE                     

 

 2018            LUKE MAHAN DO            Ot            J90         
   PLEURAL EFFUSION, NOT ELSEWHERE CLASSIFI                     

 

 2018            LUKE MAHAN DO            Ot            R05         
   COUGH                     

 

 2018            KATHRYN HOLLAND DO            Ot            M25.531
            PAIN IN RIGHT WRIST                     

 

 2018            DEIDRE QUEZADA ARNP            Ot            K74.60      
      UNSPECIFIED CIRRHOSIS OF LIVER                     

 

 2018            AMALIA HAREKATHRYN            Ot            J90    
        PLEURAL EFFUSION, NOT ELSEWHERE CLASSIFI                     

 

 2018            DEIDRE QUEZADA ARNP            Ot            K74.60      
      UNSPECIFIED CIRRHOSIS OF LIVER                     

 

 2018            DEIDRE QUEZADA ARNP            Ot            Z90.49      
      ACQUIRED ABSENCE OF OTHER SPECIFIED PART                     

 

 2018            LIANGSHERRONDARION KATHRYN HARE            Ot            I48.2  
          CHRONIC ATRIAL FIBRILLATION                     

 

 2018            KATHRYN HOLLAND DO            Ot            I48.0  
          PAROXYSMAL ATRIAL FIBRILLATION                     

 

 2018            TESSY COHEN FAC, ALI [facility] CCDS            Ot            
C92.11            CHRONIC MYELOID LEUKEMIA, BCR/ABL-POSITI                     

 

 2018            TESSY COHEN FAC, ALI [facility] CCDS            Ot            
I48.0            PAROXYSMAL ATRIAL FIBRILLATION                     

 

 2018            LIANGKATHRYN BROWN DO            Ot            J90    
        PLEURAL EFFUSION, NOT ELSEWHERE CLASSIFI                     

 

 2018            LIANGSHERRONDARION KATRHYN HARE            Ot            J98.11 
           ATELECTASIS                     

 

 2018            JENN NEAL APRRAUL            Ot            B19.20
            UNSPECIFIED VIRAL HEPATITIS C WITHOUT HE                     

 

 2018            JENN NEAL APRN            Ot            C95.90
            LEUKEMIA, UNSPECIFIED NOT HAVING ACHIEVE                     

 

 2018            JENN NEAL APRN            Ot            J90   
         PLEURAL EFFUSION, NOT ELSEWHERE CLASSIFI                     

 

 2018            JENN NEAL APRN            Ot            Z79.01
            LONG TERM (CURRENT) USE OF ANTICOAGULANT                     

 

 2018            LIANGKEVIN KATHRYN            Ot            J90    
        PLEURAL EFFUSION, NOT ELSEWHERE CLASSIFI                     

 

 2018            KATHRYN HOLLAND DO            Ot            J90    
        PLEURAL EFFUSION, NOT ELSEWHERE CLASSIFI                     

 

 2018            KATHRYN HOLLAND DO            Ot            J90    
        PLEURAL EFFUSION, NOT ELSEWHERE CLASSIFI                     

 

 2018            TESSY COHEN [facility], ALI [facility] CCDS            Ot            
I48.0            PAROXYSMAL ATRIAL FIBRILLATION                     

 

 2018            DEIDRE QUEZADA ARNP            Ot            J90         
   PLEURAL EFFUSION, NOT ELSEWHERE CLASSIFI                     

 

 2018            DEIDRE QUEZADA ARNP            Ot            K74.60      
      UNSPECIFIED CIRRHOSIS OF LIVER                     

 

 2018            MAGUI MCALLISTER MD            Ot            C92.10  
          CHRONIC MYELOID LEUK, BCR/ABL-POSITIVE,                      

 

 2018            MAGUI MCALLISTER MD            Ot            C92.10  
          CHRONIC MYELOID LEUK, BCR/ABL-POSITIVE,                      

 

 2018            DEIDRE QUEZADA ARNP            Ot            K74.60      
      UNSPECIFIED CIRRHOSIS OF LIVER                     

 

 2018            DEIDRE QUEZADA ARNP            Ot            Z86.19      
      PERSONAL HISTORY OF OTHER INFECTIOUS AND                     

 

 2018            [facility]            Ot            
I48.0            PAROXYSMAL ATRIAL FIBRILLATION                     

 

 2018            [facility] COH[facility]            Ot            
Z79.01            LONG TERM (CURRENT) USE OF ANTICOAGULANT                     

 

 2018            KARELY SALAS APRN            Ot            C92.11     
       CHRONIC MYELOID LEUKEMIA, BCR/ABL-POSITI                     

 

 2018            KARELY SALAS APRN            Ot            F41.9      
      ANXIETY DISORDER, UNSPECIFIED                     

 

 2018            KARELY SALAS APRN            Ot            I48.0      
      PAROXYSMAL ATRIAL FIBRILLATION                     

 

 2018            KARELY SALAS APRN            Ot            R42        
    DIZZINESS AND GIDDINESS                     

 

 2018            KARELY SALAS APRN            Ot            Z79.01     
       LONG TERM (CURRENT) USE OF ANTICOAGULANT                     

 

 2018            KARELY SALAS APRN            Ot            Z87.19     
       PERSONAL HISTORY OF OTHER DISEASES OF TH                     

 

 2018            KARELY SALAS APRN            Ot            Z88.1      
      ALLERGY STATUS TO OTHER ANTIBIOTIC AGENT                     

 

 2018            KARELY SALAS APRN            Ot            Z88.2      
      ALLERGY STATUS TO SULFONAMIDES STATUS                     

 

 2018            KARELY SALAS            Ot            Z88.6      
      ALLERGY STATUS TO ANALGESIC AGENT STATUS                     

 

 2018            KARELY SALAS APRN            Ot            Z92.21     
       PERSONAL HISTORY OF ANTINEOPLASTIC CHEMO                     

 

 2018            MAGUI MCALLISTER MD            Ot            C92.10  
          CHRONIC MYELOID LEUK, BCR/ABL-POSITIVE,                      

 

 2018            SALAS, PETER J APRN            Ot            C92.11     
       CHRONIC MYELOID LEUKEMIA, BCR/ABL-POSITI                     

 

 2018            KARELY SALAS APRN            Ot            F41.9      
      ANXIETY DISORDER, UNSPECIFIED                     

 

 2018            KARELY SALAS APRN            Ot            I48.0      
      PAROXYSMAL ATRIAL FIBRILLATION                     

 

 2018            KARELY SALAS APRN            Ot            R42        
    DIZZINESS AND GIDDINESS                     

 

 2018            KARELY SALAS APRN            Ot            Z79.01     
       LONG TERM (CURRENT) USE OF ANTICOAGULANT                     

 

 2018            KARELY SALAS APRN            Ot            Z87.19     
       PERSONAL HISTORY OF OTHER DISEASES OF TH                     

 

 2018            KARELY SALAS APRRAUL            Ot            Z88.1      
      ALLERGY STATUS TO OTHER ANTIBIOTIC AGENT                     

 

 2018            KARELY SALAS            Ot            Z88.2      
      ALLERGY STATUS TO SULFONAMIDES STATUS                     

 

 2018            KARELY SALAS APRRAUL            Ot            Z88.6      
      ALLERGY STATUS TO ANALGESIC AGENT STATUS                     

 

 2018            KARELY SALAS APRN            Ot            Z92.21     
       PERSONAL HISTORY OF ANTINEOPLASTIC CHEMO                     

 

 2018            TESSY COHEN FAC, ALI [facility] CCDS            Ot            
I48.0            PAROXYSMAL ATRIAL FIBRILLATION                     

 

 2018            TESSY COHEN FAC, ALI [facility] CCDS            Ot            
Z79.01            LONG TERM (CURRENT) USE OF ANTICOAGULANT                     

 

 2018            MAGUI MCALLISTER MD            Ot            C92.10  
          CHRONIC MYELOID LEUK, BCR/ABL-POSITIVE,                      

 

 2018            TNOY DRUMMOND MD            Ot            I48.0       
     PAROXYSMAL ATRIAL FIBRILLATION                     

 

 2018            TONY DRUMMOND MD            Ot            I48.0       
     PAROXYSMAL ATRIAL FIBRILLATION                     

 

 2018            MAGUI MCALLISTER MD            Ot            C92.10  
          CHRONIC MYELOID LEUK, BCR/ABL-POSITIVE,                      

 

 2018            TONY DRUMMOND MD            Ot            B19.20      
      UNSPECIFIED VIRAL HEPATITIS C WITHOUT HE                     

 

 2018            TONY DRUMMOND MD            Ot            C95.90      
      LEUKEMIA, UNSPECIFIED NOT HAVING ACHIEVE                     

 

 2018            TONY DRUMMOND MD            Ot            I48.0       
     PAROXYSMAL ATRIAL FIBRILLATION                     

 

 2018            TONY DRUMMOND MD            Ot            M79.89      
      OTHER SPECIFIED SOFT TISSUE DISORDERS                     

 

 2018            TONY DRUMMOND MD            Ot            R05         
   COUGH                     

 

 2018            TONY DRUMMOND MD            Ot            R06.00      
      DYSPNEA, UNSPECIFIED                     

 

 2018            TESSY COHEN FACC, CHEMA PALACIOSP CCDS            Ot            
I48.0            PAROXYSMAL ATRIAL FIBRILLATION                     

 

 2018            TESSY COHEN FACC, CHEMA PALACIOSP CCDS            Ot            
Z79.01            LONG TERM (CURRENT) USE OF ANTICOAGULANT                     

 

 2018            AMALIA HARE, KATHRYN GIBBONS            Ot            J90    
        PLEURAL EFFUSION, NOT ELSEWHERE CLASSIFI                     

 

 2018            TONY DRUMMOND MD            Ot            B19.20      
      UNSPECIFIED VIRAL HEPATITIS C WITHOUT HE                     

 

 2018            TONY DRUMMOND MD            Ot            C95.90      
      LEUKEMIA, UNSPECIFIED NOT HAVING ACHIEVE                     

 

 2018            TONY DRUMMOND MD            Ot            I48.0       
     PAROXYSMAL ATRIAL FIBRILLATION                     

 

 2018            TONY DRUMMOND MD            Ot            M79.89      
      OTHER SPECIFIED SOFT TISSUE DISORDERS                     

 

 2018            TONY DRUMMOND MD            Ot            R05         
   COUGH                     

 

 2018            TONY DRUMMOND MD            Ot            R06.00      
      DYSPNEA, UNSPECIFIED                     

 

 2018            TONY DRUMMOND MD            Ot            B19.20      
      UNSPECIFIED VIRAL HEPATITIS C WITHOUT HE                     

 

 2018            TONY DRUMMOND MD            Ot            C95.90      
      LEUKEMIA, UNSPECIFIED NOT HAVING ACHIEVE                     

 

 2018            TONY DRUMMOND MD            Ot            I48.0       
     PAROXYSMAL ATRIAL FIBRILLATION                     

 

 2018            TONY DRUMMOND MD            Ot            M79.89      
      OTHER SPECIFIED SOFT TISSUE DISORDERS                     

 

 2018            TONY DRUMMOND MD            Ot            R05         
   COUGH                     

 

 2018            TONY DRUMMOND MD            Ot            R06.00      
      DYSPNEA, UNSPECIFIED                     

 

 2018            GELSHERRONDER DO, KATHRYN A            Ot            J90    
        PLEURAL EFFUSION, NOT ELSEWHERE CLASSIFI                     

 

 2018            DEIDRE QUEZADA ARNP            Ot            K74.60      
      UNSPECIFIED CIRRHOSIS OF LIVER                     

 

 2018            DEIDRE QUEZADA ARNP            Ot            Z86.19      
      PERSONAL HISTORY OF OTHER INFECTIOUS AND                     

 

 2018            TESSY COHEN FACC, CHEMA PALACIOSP CCDS            Ot            
I48.0            PAROXYSMAL ATRIAL FIBRILLATION                     

 

 2018            TESSY COHEN FACC, CHEMA PALACIOSP CCDS            Ot            
Z79.01            LONG TERM (CURRENT) USE OF ANTICOAGULANT                     

 

 2018            TESSY COHEN FACC, CHEMA PALACIOSP CCDS            Ot            
I48.0            PAROXYSMAL ATRIAL FIBRILLATION                     

 

 2018            TESSY COHEN FACC, CHEMA PALACIOSP CCDS            Ot            
Z51.81            ENCOUNTER FOR THERAPEUTIC DRUG LEVEL MON                     

 

 2018            TESSY COHEN FAC, CHEMA PALACIOSP CCDS            Ot            
Z79.01            LONG TERM (CURRENT) USE OF ANTICOAGULANT                     

 

 2018            PILAR DEIDRE J ARNP            Ot            J90         
   PLEURAL EFFUSION, NOT ELSEWHERE CLASSIFI                     

 

 2018            PILAR DEIDRE J ARNP            Ot            K74.60      
      UNSPECIFIED CIRRHOSIS OF LIVER                     

 

 2018            DEIDRE QUEZADA ARNP            Ot            K74.60      
      UNSPECIFIED CIRRHOSIS OF LIVER                     

 

 2018            DEIDRE QUEZADA ARNP            Ot            Z86.19      
      PERSONAL HISTORY OF OTHER INFECTIOUS AND                     

 

 2018            DEIDRE QUEZADA ARNP            Ot            J90         
   PLEURAL EFFUSION, NOT ELSEWHERE CLASSIFI                     

 

 2018            DEIDRE QUEZADA ARNP            Ot            K74.60      
      UNSPECIFIED CIRRHOSIS OF LIVER                     

 

 2018            ARY COHEN, MAGUI            Ot            C92.10  
          CHRONIC MYELOID LEUK, BCR/ABL-POSITIVE,                      

 

 2018            MAGUI MCALLISTER MD            Ot            C92.10  
          CHRONIC MYELOID LEUK, BCR/ABL-POSITIVE,                      

 

 2018            DEIDRE QUEZADA ARNP            Ot            K74.60      
      UNSPECIFIED CIRRHOSIS OF LIVER                     

 

 2018            DEIDRE QUEZADA ARNP            Ot            Z86.19      
      PERSONAL HISTORY OF OTHER INFECTIOUS AND                     

 

 2018            DEIDRE QUEZADA ARNP            Ot            J90         
   PLEURAL EFFUSION, NOT ELSEWHERE CLASSIFI                     

 

 2018            DEIDRE QUEZADA ARNP            Ot            K74.60      
      UNSPECIFIED CIRRHOSIS OF LIVER                     

 

 10/16/2018            MAGUI MCALLISTER MD            Ot            C92.10  
          CHRONIC MYELOID LEUK, BCR/ABL-POSITIVE,                      



                                                                               
                                                                               
                                                                               
                                                                               
                                                                               
                                                                               
                                                                               
                                                                               
                                                                               
                                                                               
                                                                               
                                                                               
                                                                               
                                                                               
                                                                               
                                                                               
                                                                               
                                                                               
                                                                               
                     



Procedures

      





 Code            Description            Performed By            Performed On   
     

 

             41.31                                  BONE MARROW BIOPSY         
                          2012        

 

             34.91                                  THORACENTESIS              
                     2014        

 

             34.91                                  THORACENTESIS              
                     2014        



                      



Results

      





 Test            Result            Range        









 [id number] - 16 12:32         









 Serum or plasma urea nitrogen measurement (mass/volume)            15 mg/dL   
         7-18        

 

 Serum or plasma creatinine measurement (mass/volume)            0.83 mg/dL    
        0.60-1.30        

 

 Serum or plasma urea nitrogen/creatinine mass ratio            18             
NRG        

 

 Serum or plasma creatinine measurement with calculation of estimated 
glomerular filtration rate            >             NRG        









 Body fluid cell count - 16 08:35         









 Specimen source identification of body fluid            THORACEN             
NRG        

 

 Evaluation of color of body fluid            YELLOW             NRG        

 

 Determination of appearance of body fluid            SLT CLDY             NRG 
       

 

 Body fluid leukocytes count (number/volume)            2350 /uL            NRG
        

 

 Body fluid erythrocytes count (number/volume)            800 /uL            
NRG        

 

 Manual body fluid polymorphonuclear cells/100 leukocytes            0 %       
     NRG        

 

 Manual body fluid mononuclear cells/100 leukocytes            0 %            
NRG        

 

 Manual body fluid lymphocytes/100 leukocytes            98 %            NRG   
     

 

 Other cells/100 leukocytes in body fluid by manual count            2 %       
     NRG        









 Body fluid total protein measurement - 16 08:35         









 Body fluid total protein measurement            4.9 g/dL            NRG        









 Gram stain microscopy - 16 08:35         









 GRAM STAIN RESULT            FEW WBC'S, NO BACTERIA OBSERVED             NRG  
      









 Body fluid/serum or plasma lactate dehydrogenase (LDH) ratio - 16 08:35 
        









 Body fluid/serum or plasma lactate dehydrogenase (LDH) ratio            154 U/
L            NRG        









 Body fluid triglyceride measurement (mass/volume) - 16 08:35         









 Body fluid triglyceride measurement (mass/volume)            24 mg/dL         
   NRG        









 Bacterial body fluid culture - 16 08:35         









 Bacterial body fluid culture            NG             NRG        









 Complete urinalysis with reflex to culture - 10/31/16 19:28         









 Urine color determination            YELLOW             NRG        

 

 Urine clarity determination            SLIGHTLY CLOUDY             NRG        

 

 Urine pH measurement by test strip            5             5-9        

 

 Specific gravity of urine by test strip            1.025             1.016-
1.022        

 

 Urine protein assay by test strip, semi-quantitative            2+             
NEGATIVE        

 

 Urine glucose detection by automated test strip            NEGATIVE           
  NEGATIVE        

 

 Erythrocytes detection in urine sediment by light microscopy            1+    
         NEGATIVE        

 

 Urine ketones detection by automated test strip            NEGATIVE           
  NEGATIVE        

 

 Urine nitrite detection by test strip            NEGATIVE             NEGATIVE
        

 

 Urine total bilirubin detection by test strip            NEGATIVE             
NEGATIVE        

 

 Urine urobilinogen measurement by automated test strip (mass/volume)          
  NORMAL             NORMAL        

 

 Urine leukocyte esterase detection by dipstick            NEGATIVE             
NEGATIVE        

 

 Automated urine sediment erythrocyte count by microscopy (number/high power 
field)            RARE             NRG        

 

 Automated urine sediment leukocyte count by microscopy (number/high power field
)             [HPF]            NRG        

 

 Bacteria detection in urine sediment by light microscopy            NEGATIVE  
           NRG        

 

 Crystals detection in urine sediment by light microscopy            NONE      
       NRG        

 

 Casts detection in urine sediment by light microscopy            NONE         
    NRG        

 

 Mucus detection in urine sediment by light microscopy            LARGE        
     NRG        

 

 Complete urinalysis with reflex to culture            NO             NRG      
  









 Serum or plasma troponin i.cardiac measurement (mass/volume) - 17 16:59 
        









 Serum or plasma troponin i.cardiac measurement (mass/volume)            < ng/
mL            <0.30        









 Complete blood count (CBC) with automated white blood cell (WBC) differential 
- 17 11:05         









 Blood leukocytes automated count (number/volume)            3.9 10*3/uL       
     4.3-11.0        

 

 Blood erythrocytes automated count (number/volume)            4.47 10*6/uL    
        4.35-5.85        

 

 Venous blood hemoglobin measurement (mass/volume)            14.2 g/dL        
    11.5-16.0        

 

 Blood hematocrit (volume fraction)            43 %            35-52        

 

 Automated erythrocyte mean corpuscular volume            96 [foz_us]          
  80-99        

 

 Automated erythrocyte mean corpuscular hemoglobin (mass per erythrocyte)      
      32 pg            25-34        

 

 Automated erythrocyte mean corpuscular hemoglobin concentration measurement (
mass/volume)            33 g/dL            32-36        

 

 Automated erythrocyte distribution width ratio            15.5 %            
10.0-14.5        

 

 Automated blood platelet count (count/volume)            200 10*3/uL          
  130-400        

 

 Automated blood platelet mean volume measurement            9.7 [foz_us]      
      7.4-10.4        

 

 Automated blood neutrophils/100 leukocytes            42 %            42-75   
     

 

 Automated blood lymphocytes/100 leukocytes            44 %            12-44   
     

 

 Blood monocytes/100 leukocytes            10 %            0-12        

 

 Automated blood eosinophils/100 leukocytes            3 %            0-10     
   

 

 Automated blood basophils/100 leukocytes            1 %            0-10        

 

 Blood neutrophils automated count (number/volume)            1.6 10*3         
   1.8-7.8        

 

 Blood lymphocytes automated count (number/volume)            1.7 10*3         
   1.0-4.0        

 

 Blood monocytes automated count (number/volume)            0.4 10*3            
0.0-1.0        

 

 Automated eosinophil count            0.1 10*3/uL            0.0-0.3        

 

 Automated blood basophil count (count/volume)            0.1 10*3/uL          
  0.0-0.1        









 PT panel in platelet poor plasma by coagulation assay - 17 11:05         









 Prothrombin time (PT) in platelet poor plasma by coagulation assay            
12.9 s            12.2-14.7        

 

 INR in platelet poor plasma or blood by coagulation assay            1.0      
       0.8-1.4        









 Activated partial thromboplastin time (aPTT) in platelet poor plasma 
bycoagulation assay - 17 11:05         









 Activated partial thromboplastin time (aPTT) in platelet poor plasma 
bycoagulation assay            32 s            24-35        









 Comprehensive metabolic panel - 17 11:05         









 Serum or plasma sodium measurement (moles/volume)            142 mmol/L       
     135-145        

 

 Serum or plasma potassium measurement (moles/volume)            3.1 mmol/L    
        3.6-5.0        

 

 Serum or plasma chloride measurement (moles/volume)            111 mmol/L     
               

 

 Carbon dioxide            20 mmol/L            21-32        

 

 Serum or plasma anion gap determination (moles/volume)            11 mmol/L   
         5-14        

 

 Serum or plasma urea nitrogen measurement (mass/volume)            19 mg/dL   
         7-18        

 

 Serum or plasma creatinine measurement (mass/volume)            0.99 mg/dL    
        0.60-1.30        

 

 Serum or plasma urea nitrogen/creatinine mass ratio            19             
NRG        

 

 Serum or plasma creatinine measurement with calculation of estimated 
glomerular filtration rate            58             NRG        

 

 Serum or plasma glucose measurement (mass/volume)            113 mg/dL        
            

 

 Serum or plasma calcium measurement (mass/volume)            9.1 mg/dL        
    8.5-10.1        

 

 Serum or plasma total bilirubin measurement (mass/volume)            0.8 mg/dL
            0.1-1.0        

 

 Serum or plasma alkaline phosphatase measurement (enzymatic activity/volume)  
          70 U/L                    

 

 Serum or plasma aspartate aminotransferase measurement (enzymatic activity/
volume)            23 U/L            5-34        

 

 Serum or plasma alanine aminotransferase measurement (enzymatic activity/volume
)            18 U/L            0-55        

 

 Serum or plasma protein measurement (mass/volume)            8.0 g/dL         
   6.4-8.2        

 

 Serum or plasma albumin measurement (mass/volume)            4.3 g/dL         
   3.2-4.5        









 Magnesium - 17 11:05         









 Magnesium            2.1 mg/dL            1.8-2.4        









 Serum or plasma troponin i.cardiac measurement (mass/volume) - 17 11:05 
        









 Serum or plasma troponin i.cardiac measurement (mass/volume)            < ng/
mL            <0.30        









 Myoglobin, serum - 17 11:05         









 Myoglobin, serum            63.0 ng/mL            10.0-92.0        









 Serum or plasma lithium measurement (moles/volume) - 17 11:05         









 BNP level            106.8 pg/mL            <100.0        









 Methicillin resistant Staphylococcus aureus (MRSA) screening culture -  17:00         









 Methicillin resistant Staphylococcus aureus (MRSA) screening culture          
  NEG             NRG        









 Serum or plasma troponin i.cardiac measurement (mass/volume) - 17 17:13 
        









 Serum or plasma troponin i.cardiac measurement (mass/volume)            < ng/
mL            <0.30        









 THYROID STIMULATING HORMONE - 17 17:15         









 THYROID STIMULATING HORMONE            2.32 u[iU]/mL            0.35-4.94     
   









 Serum or plasma troponin i.cardiac measurement (mass/volume) - 09/15/17 00:45 
        









 Serum or plasma troponin i.cardiac measurement (mass/volume)            < ng/
mL            <0.30        









 Complete blood count (CBC) with automated white blood cell (WBC) differential 
- 09/15/17 04:13         









 Blood leukocytes automated count (number/volume)            5.2 10*3/uL       
     4.3-11.0        

 

 Blood erythrocytes automated count (number/volume)            3.99 10*6/uL    
        4.35-5.85        

 

 Venous blood hemoglobin measurement (mass/volume)            12.9 g/dL        
    11.5-16.0        

 

 Blood hematocrit (volume fraction)            39 %            35-52        

 

 Automated erythrocyte mean corpuscular volume            98 [foz_us]          
  80-99        

 

 Automated erythrocyte mean corpuscular hemoglobin (mass per erythrocyte)      
      32 pg            25-34        

 

 Automated erythrocyte mean corpuscular hemoglobin concentration measurement (
mass/volume)            33 g/dL            32-36        

 

 Automated erythrocyte distribution width ratio            15.6 %            
10.0-14.5        

 

 Automated blood platelet count (count/volume)            138 10*3/uL          
  130-400        

 

 Automated blood platelet mean volume measurement            9.9 [foz_us]      
      7.4-10.4        

 

 Automated blood neutrophils/100 leukocytes            41 %            42-75   
     

 

 Automated blood lymphocytes/100 leukocytes            48 %            12-44   
     

 

 Blood monocytes/100 leukocytes            8 %            0-12        

 

 Automated blood eosinophils/100 leukocytes            3 %            0-10     
   

 

 Automated blood basophils/100 leukocytes            1 %            0-10        

 

 Blood neutrophils automated count (number/volume)            2.1 10*3         
   1.8-7.8        

 

 Blood lymphocytes automated count (number/volume)            2.5 10*3         
   1.0-4.0        

 

 Blood monocytes automated count (number/volume)            0.4 10*3            
0.0-1.0        

 

 Automated eosinophil count            0.2 10*3/uL            0.0-0.3        

 

 Automated blood basophil count (count/volume)            0.0 10*3/uL          
  0.0-0.1        









 Comprehensive metabolic panel - 09/15/17 04:13         









 Serum or plasma sodium measurement (moles/volume)            143 mmol/L       
     135-145        

 

 Serum or plasma potassium measurement (moles/volume)            3.5 mmol/L    
        3.6-5.0        

 

 Serum or plasma chloride measurement (moles/volume)            120 mmol/L     
               

 

 Carbon dioxide            15 mmol/L            21-32        

 

 Serum or plasma anion gap determination (moles/volume)            8 mmol/L    
        5-14        

 

 Serum or plasma urea nitrogen measurement (mass/volume)            15 mg/dL   
         7-18        

 

 Serum or plasma creatinine measurement (mass/volume)            0.72 mg/dL    
        0.60-1.30        

 

 Serum or plasma urea nitrogen/creatinine mass ratio            21             
NRG        

 

 Serum or plasma creatinine measurement with calculation of estimated 
glomerular filtration rate            >             NRG        

 

 Serum or plasma glucose measurement (mass/volume)            96 mg/dL         
           

 

 Serum or plasma calcium measurement (mass/volume)            7.5 mg/dL        
    8.5-10.1        

 

 Serum or plasma total bilirubin measurement (mass/volume)            0.3 mg/dL
            0.1-1.0        

 

 Serum or plasma alkaline phosphatase measurement (enzymatic activity/volume)  
          54 U/L                    

 

 Serum or plasma aspartate aminotransferase measurement (enzymatic activity/
volume)            13 U/L            5-34        

 

 Serum or plasma alanine aminotransferase measurement (enzymatic activity/volume
)            11 U/L            0-55        

 

 Serum or plasma protein measurement (mass/volume)            5.7 g/dL         
   6.4-8.2        

 

 Serum or plasma albumin measurement (mass/volume)            3.1 g/dL         
   3.2-4.5        









 Serum or plasma phosphate measurement (mass/volume) - 09/15/17 04:13         









 Serum or plasma phosphate measurement (mass/volume)            2.5 mg/dL      
      2.3-4.7        









 Magnesium - 09/15/17 04:13         









 Magnesium            2.0 mg/dL            1.8-2.4        









 Lipid 1996 panel - 09/15/17 04:13         









 Serum or plasma triglyceride measurement (mass/volume)            72 mg/dL    
        <150        

 

 Serum or plasma cholesterol measurement (mass/volume)            111 mg/dL    
        < 200        

 

 Serum or plasma cholesterol in HDL measurement (mass/volume)            34 mg/
dL            40-60        

 

 Cholesterol in LDL [mass/volume] in serum or plasma by direct assay            
66 mg/dL            1-129        

 

 Serum or plasma cholesterol in VLDL measurement (mass/volume)            14 mg/
dL            5-40        









 PT panel in platelet poor plasma by coagulation assay - 17 12:55         









 Prothrombin time (PT) in platelet poor plasma by coagulation assay            
14.9 s            12.2-14.7        

 

 INR in platelet poor plasma or blood by coagulation assay            1.2      
       0.8-1.4        









 PT panel in platelet poor plasma by coagulation assay - 17 10:17         









 Prothrombin time (PT) in platelet poor plasma by coagulation assay            
19.0 s            12.2-14.7        

 

 INR in platelet poor plasma or blood by coagulation assay            1.6      
       0.8-1.4        









 PT panel in platelet poor plasma by coagulation assay - 17 09:54         









 Prothrombin time (PT) in platelet poor plasma by coagulation assay            
22.7 s            12.2-14.7        

 

 INR in platelet poor plasma or blood by coagulation assay            2.0      
       0.8-1.4        









 PT panel in platelet poor plasma by coagulation assay - 10/17/17 11:07         









 Prothrombin time (PT) in platelet poor plasma by coagulation assay            
30.2 s            12.2-14.7        

 

 INR in platelet poor plasma or blood by coagulation assay            2.9      
       0.8-1.4        









 PT panel in platelet poor plasma by coagulation assay - 10/23/17 09:50         









 Prothrombin time (PT) in platelet poor plasma by coagulation assay            
14.2 s            12.2-14.7        

 

 INR in platelet poor plasma or blood by coagulation assay            1.1      
       0.8-1.4        









 Complete blood count (CBC) with automated white blood cell (WBC) differential 
- 17 21:15         









 Blood leukocytes automated count (number/volume)            2.6 10*3/uL       
     4.3-11.0        

 

 Blood erythrocytes automated count (number/volume)            4.38 10*6/uL    
        4.35-5.85        

 

 Venous blood hemoglobin measurement (mass/volume)            13.8 g/dL        
    11.5-16.0        

 

 Blood hematocrit (volume fraction)            41 %            35-52        

 

 Automated erythrocyte mean corpuscular volume            94 [foz_us]          
  80-99        

 

 Automated erythrocyte mean corpuscular hemoglobin (mass per erythrocyte)      
      32 pg            25-34        

 

 Automated erythrocyte mean corpuscular hemoglobin concentration measurement (
mass/volume)            34 g/dL            32-36        

 

 Automated erythrocyte distribution width ratio            15.2 %            
10.0-14.5        

 

 Automated blood platelet count (count/volume)            186 10*3/uL          
  130-400        

 

 Automated blood platelet mean volume measurement            9.4 [foz_us]      
      7.4-10.4        

 

 Automated blood neutrophils/100 leukocytes            37 %            42-75   
     

 

 Automated blood lymphocytes/100 leukocytes            51 %            12-44   
     

 

 Blood monocytes/100 leukocytes            9 %            0-12        

 

 Automated blood eosinophils/100 leukocytes            3 %            0-10     
   

 

 Automated blood basophils/100 leukocytes            0 %            0-10        

 

 Blood neutrophils automated count (number/volume)            1.0 10*3         
   1.8-7.8        

 

 Blood lymphocytes automated count (number/volume)            1.3 10*3         
   1.0-4.0        

 

 Blood monocytes automated count (number/volume)            0.2 10*3            
0.0-1.0        

 

 Automated eosinophil count            0.1 10*3/uL            0.0-0.3        

 

 Automated blood basophil count (count/volume)            0.0 10*3/uL          
  0.0-0.1        









 Comprehensive metabolic panel - 17 21:15         









 Serum or plasma sodium measurement (moles/volume)            143 mmol/L       
     135-145        

 

 Serum or plasma potassium measurement (moles/volume)            3.4 mmol/L    
        3.6-5.0        

 

 Serum or plasma chloride measurement (moles/volume)            111 mmol/L     
               

 

 Carbon dioxide            20 mmol/L            21-32        

 

 Serum or plasma anion gap determination (moles/volume)            12 mmol/L   
         5-14        

 

 Serum or plasma urea nitrogen measurement (mass/volume)            18 mg/dL   
         7-18        

 

 Serum or plasma creatinine measurement (mass/volume)            0.96 mg/dL    
        0.60-1.30        

 

 Serum or plasma urea nitrogen/creatinine mass ratio            19             
NRG        

 

 Serum or plasma creatinine measurement with calculation of estimated 
glomerular filtration rate            60             NRG        

 

 Serum or plasma glucose measurement (mass/volume)            130 mg/dL        
            

 

 Serum or plasma calcium measurement (mass/volume)            9.3 mg/dL        
    8.5-10.1        

 

 Serum or plasma total bilirubin measurement (mass/volume)            0.6 mg/dL
            0.1-1.0        

 

 Serum or plasma alkaline phosphatase measurement (enzymatic activity/volume)  
          62 U/L                    

 

 Serum or plasma aspartate aminotransferase measurement (enzymatic activity/
volume)            19 U/L            5-34        

 

 Serum or plasma alanine aminotransferase measurement (enzymatic activity/volume
)            13 U/L            0-55        

 

 Serum or plasma protein measurement (mass/volume)            7.9 g/dL         
   6.4-8.2        

 

 Serum or plasma albumin measurement (mass/volume)            4.2 g/dL         
   3.2-4.5        









 Magnesium - 17 21:15         









 Magnesium            2.1 mg/dL            1.8-2.4        









 Serum or plasma troponin i.cardiac measurement (mass/volume) - 17 21:15 
        









 Serum or plasma troponin i.cardiac measurement (mass/volume)            < ng/
mL            <0.30        









 Myoglobin, serum - 17 21:15         









 Myoglobin, serum            36.4 ng/mL            10.0-92.0        









 PT panel in platelet poor plasma by coagulation assay - 17 21:15         









 Prothrombin time (PT) in platelet poor plasma by coagulation assay            
21.8 s            12.2-14.7        

 

 INR in platelet poor plasma or blood by coagulation assay            1.9      
       0.8-1.4        









 Activated partial thromboplastin time (aPTT) in platelet poor plasma 
bycoagulation assay - 17 21:15         









 Activated partial thromboplastin time (aPTT) in platelet poor plasma 
bycoagulation assay            47 s            24-35        









 Complete blood count (CBC) with automated white blood cell (WBC) differential 
- 17 05:33         









 Blood leukocytes automated count (number/volume)            4.8 10*3/uL       
     4.3-11.0        

 

 Blood erythrocytes automated count (number/volume)            5.00 10*6/uL    
        4.35-5.85        

 

 Venous blood hemoglobin measurement (mass/volume)            15.8 g/dL        
    11.5-16.0        

 

 Blood hematocrit (volume fraction)            46 %            35-52        

 

 Automated erythrocyte mean corpuscular volume            92 [foz_us]          
  80-99        

 

 Automated erythrocyte mean corpuscular hemoglobin (mass per erythrocyte)      
      32 pg            25-34        

 

 Automated erythrocyte mean corpuscular hemoglobin concentration measurement (
mass/volume)            34 g/dL            32-36        

 

 Automated erythrocyte distribution width ratio            15.7 %            
10.0-14.5        

 

 Automated blood platelet count (count/volume)            196 10*3/uL          
  130-400        

 

 Automated blood platelet mean volume measurement            9.4 [foz_us]      
      7.4-10.4        

 

 Automated blood neutrophils/100 leukocytes            79 %            42-75   
     

 

 Automated blood lymphocytes/100 leukocytes            12 %            12-44   
     

 

 Blood monocytes/100 leukocytes            8 %            0-12        

 

 Automated blood eosinophils/100 leukocytes            1 %            0-10     
   

 

 Automated blood basophils/100 leukocytes            0 %            0-10        

 

 Blood neutrophils automated count (number/volume)            3.8 10*3         
   1.8-7.8        

 

 Blood lymphocytes automated count (number/volume)            0.6 10*3         
   1.0-4.0        

 

 Blood monocytes automated count (number/volume)            0.4 10*3            
0.0-1.0        

 

 Automated eosinophil count            0.0 10*3/uL            0.0-0.3        

 

 Automated blood basophil count (count/volume)            0.0 10*3/uL          
  0.0-0.1        









 Comprehensive metabolic panel - 17 05:33         









 Serum or plasma sodium measurement (moles/volume)            140 mmol/L       
     135-145        

 

 Serum or plasma potassium measurement (moles/volume)            3.7 mmol/L    
        3.6-5.0        

 

 Serum or plasma chloride measurement (moles/volume)            108 mmol/L     
               

 

 Carbon dioxide            21 mmol/L            21-32        

 

 Serum or plasma anion gap determination (moles/volume)            11 mmol/L   
         5-14        

 

 Serum or plasma urea nitrogen measurement (mass/volume)            16 mg/dL   
         7-18        

 

 Serum or plasma creatinine measurement (mass/volume)            0.86 mg/dL    
        0.60-1.30        

 

 Serum or plasma urea nitrogen/creatinine mass ratio            19             
NRG        

 

 Serum or plasma creatinine measurement with calculation of estimated 
glomerular filtration rate            >             NRG        

 

 Serum or plasma glucose measurement (mass/volume)            116 mg/dL        
            

 

 Serum or plasma calcium measurement (mass/volume)            8.8 mg/dL        
    8.5-10.1        

 

 Serum or plasma total bilirubin measurement (mass/volume)            0.6 mg/dL
            0.1-1.0        

 

 Serum or plasma alkaline phosphatase measurement (enzymatic activity/volume)  
          58 U/L                    

 

 Serum or plasma aspartate aminotransferase measurement (enzymatic activity/
volume)            24 U/L            5-34        

 

 Serum or plasma alanine aminotransferase measurement (enzymatic activity/volume
)            15 U/L            0-55        

 

 Serum or plasma protein measurement (mass/volume)            8.3 g/dL         
   6.4-8.2        

 

 Serum or plasma albumin measurement (mass/volume)            4.4 g/dL         
   3.2-4.5        









 Serum or plasma amylase measurement (enzymatic activity/volume) - 17 05:
33         









 Serum or plasma amylase measurement (enzymatic activity/volume)            150 
U/L                    









 Lipase - 17 05:33         









 Lipase            25 U/L            8-78        









 Complete urinalysis with reflex to culture - 17 06:24         









 Urine color determination            YELLOW             NRG        

 

 Urine clarity determination            CLEAR             NRG        

 

 Urine pH measurement by test strip            5             5-9        

 

 Specific gravity of urine by test strip            1.025             1.016-
1.022        

 

 Urine protein assay by test strip, semi-quantitative            2+             
NEGATIVE        

 

 Urine glucose detection by automated test strip            NEGATIVE           
  NEGATIVE        

 

 Erythrocytes detection in urine sediment by light microscopy            4+    
         NEGATIVE        

 

 Urine ketones detection by automated test strip            NEGATIVE           
  NEGATIVE        

 

 Urine nitrite detection by test strip            NEGATIVE             NEGATIVE
        

 

 Urine total bilirubin detection by test strip            NEGATIVE             
NEGATIVE        

 

 Urine urobilinogen measurement by automated test strip (mass/volume)          
  NORMAL             NORMAL        

 

 Urine leukocyte esterase detection by dipstick            2+             
NEGATIVE        

 

 Automated urine sediment erythrocyte count by microscopy (number/high power 
field)             [HPF]            NRG        

 

 Automated urine sediment leukocyte count by microscopy (number/high power field
)             [HPF]            NRG        

 

 Bacteria detection in urine sediment by light microscopy            TRACE     
        NRG        

 

 Squamous epithelial cells detection in urine sediment by light microscopy     
       2-5             NRG        

 

 Crystals detection in urine sediment by light microscopy            NONE      
       NRG        

 

 Casts detection in urine sediment by light microscopy            NONE         
    NRG        

 

 Mucus detection in urine sediment by light microscopy            MODERATE     
        NRG        

 

 Complete urinalysis with reflex to culture            NO             NRG      
  









 PT panel in platelet poor plasma by coagulation assay - 18 07:36         









 Prothrombin time (PT) in platelet poor plasma by coagulation assay            
15.1 s            12.2-14.7        

 

 INR in platelet poor plasma or blood by coagulation assay            1.2      
       0.8-1.4        









 BCR-ABL gene translocation detection - 18 10:45         









 Blood or tissue t(9;22)(q34.1;q11)(ABL1,BCR) b2a2+b3a2 fusion transcript count 
by molecular genetics method (number/volume)            See Report             
NRG        









 Complete blood count (CBC) with automated white blood cell (WBC) differential 
- 18 09:12         









 Blood leukocytes automated count (number/volume)            3.2 10*3/uL       
     4.3-11.0        

 

 Blood erythrocytes automated count (number/volume)            4.58 10*6/uL    
        4.35-5.85        

 

 Venous blood hemoglobin measurement (mass/volume)            14.7 g/dL        
    11.5-16.0        

 

 Blood hematocrit (volume fraction)            43 %            35-52        

 

 Automated erythrocyte mean corpuscular volume            93 [foz_us]          
  80-99        

 

 Automated erythrocyte mean corpuscular hemoglobin (mass per erythrocyte)      
      32 pg            25-34        

 

 Automated erythrocyte mean corpuscular hemoglobin concentration measurement (
mass/volume)            34 g/dL            32-36        

 

 Automated erythrocyte distribution width ratio            15.2 %            
10.0-14.5        

 

 Automated blood platelet count (count/volume)            197 10*3/uL          
  130-400        

 

 Automated blood platelet mean volume measurement            9.2 [foz_us]      
      7.4-10.4        

 

 Automated blood neutrophils/100 leukocytes            51 %            42-75   
     

 

 Automated blood lymphocytes/100 leukocytes            37 %            12-44   
     

 

 Blood monocytes/100 leukocytes            8 %            0-12        

 

 Automated blood eosinophils/100 leukocytes            3 %            0-10     
   

 

 Automated blood basophils/100 leukocytes            1 %            0-10        

 

 Blood neutrophils automated count (number/volume)            1.6 10*3         
   1.8-7.8        

 

 Blood lymphocytes automated count (number/volume)            1.2 10*3         
   1.0-4.0        

 

 Blood monocytes automated count (number/volume)            0.3 10*3            
0.0-1.0        

 

 Automated eosinophil count            0.1 10*3/uL            0.0-0.3        

 

 Automated blood basophil count (count/volume)            0.0 10*3/uL          
  0.0-0.1        









 Comprehensive metabolic panel - 18 09:12         









 Serum or plasma sodium measurement (moles/volume)            138 mmol/L       
     135-145        

 

 Serum or plasma potassium measurement (moles/volume)            4.0 mmol/L    
        3.6-5.0        

 

 Serum or plasma chloride measurement (moles/volume)            109 mmol/L     
               

 

 Carbon dioxide            22 mmol/L            21-32        

 

 Serum or plasma anion gap determination (moles/volume)            7 mmol/L    
        5-14        

 

 Serum or plasma urea nitrogen measurement (mass/volume)            16 mg/dL   
         7-18        

 

 Serum or plasma creatinine measurement (mass/volume)            0.80 mg/dL    
        0.60-1.30        

 

 Serum or plasma urea nitrogen/creatinine mass ratio            20             
NRG        

 

 Serum or plasma creatinine measurement with calculation of estimated 
glomerular filtration rate            >             NRG        

 

 Serum or plasma glucose measurement (mass/volume)            98 mg/dL         
           

 

 Serum or plasma calcium measurement (mass/volume)            9.3 mg/dL        
    8.5-10.1        

 

 Serum or plasma total bilirubin measurement (mass/volume)            0.6 mg/dL
            0.1-1.0        

 

 Serum or plasma alkaline phosphatase measurement (enzymatic activity/volume)  
          69 U/L                    

 

 Serum or plasma aspartate aminotransferase measurement (enzymatic activity/
volume)            20 U/L            5-34        

 

 Serum or plasma alanine aminotransferase measurement (enzymatic activity/volume
)            13 U/L            0-55        

 

 Serum or plasma protein measurement (mass/volume)            7.6 g/dL         
   6.4-8.2        

 

 Serum or plasma albumin measurement (mass/volume)            4.3 g/dL         
   3.2-4.5        









 Lactate dehydrogenase 1 [enzymatic activity/volume] in serum or plasma -  09:12         









 Lactate dehydrogenase 1 [enzymatic activity/volume] in serum or plasma        
    266 U/L            125-220        









 BCR-ABL gene translocation detection - 18 09:12         









 Blood or tissue t(9;22)(q34.1;q11)(ABL1,BCR) b2a2+b3a2 fusion transcript count 
by molecular genetics method (number/volume)            See Report             
NRG        









 Complete blood count (CBC) with automated white blood cell (WBC) differential 
- 18 11:38         









 Blood leukocytes automated count (number/volume)            3.5 10*3/uL       
     4.3-11.0        

 

 Blood erythrocytes automated count (number/volume)            4.43 10*6/uL    
        4.35-5.85        

 

 Venous blood hemoglobin measurement (mass/volume)            14.4 g/dL        
    11.5-16.0        

 

 Blood hematocrit (volume fraction)            42 %            35-52        

 

 Automated erythrocyte mean corpuscular volume            94 [foz_us]          
  80-99        

 

 Automated erythrocyte mean corpuscular hemoglobin (mass per erythrocyte)      
      33 pg            25-34        

 

 Automated erythrocyte mean corpuscular hemoglobin concentration measurement (
mass/volume)            34 g/dL            32-36        

 

 Automated erythrocyte distribution width ratio            14.8 %            
10.0-14.5        

 

 Automated blood platelet count (count/volume)            196 10*3/uL          
  130-400        

 

 Automated blood platelet mean volume measurement            9.3 [foz_us]      
      7.4-10.4        

 

 Automated blood neutrophils/100 leukocytes            53 %            42-75   
     

 

 Automated blood lymphocytes/100 leukocytes            35 %            12-44   
     

 

 Blood monocytes/100 leukocytes            8 %            0-12        

 

 Automated blood eosinophils/100 leukocytes            2 %            0-10     
   

 

 Automated blood basophils/100 leukocytes            1 %            0-10        

 

 Blood neutrophils automated count (number/volume)            1.8 10*3         
   1.8-7.8        

 

 Blood lymphocytes automated count (number/volume)            1.2 10*3         
   1.0-4.0        

 

 Blood monocytes automated count (number/volume)            0.3 10*3            
0.0-1.0        

 

 Automated eosinophil count            0.1 10*3/uL            0.0-0.3        

 

 Automated blood basophil count (count/volume)            0.0 10*3/uL          
  0.0-0.1        









 Comprehensive metabolic panel - 18 11:38         









 Serum or plasma sodium measurement (moles/volume)            139 mmol/L       
     135-145        

 

 Serum or plasma potassium measurement (moles/volume)            3.8 mmol/L    
        3.6-5.0        

 

 Serum or plasma chloride measurement (moles/volume)            110 mmol/L     
               

 

 Carbon dioxide            22 mmol/L            21-32        

 

 Serum or plasma anion gap determination (moles/volume)            7 mmol/L    
        5-14        

 

 Serum or plasma urea nitrogen measurement (mass/volume)            17 mg/dL   
         7-18        

 

 Serum or plasma creatinine measurement (mass/volume)            0.82 mg/dL    
        0.60-1.30        

 

 Serum or plasma urea nitrogen/creatinine mass ratio            21             
NRG        

 

 Serum or plasma creatinine measurement with calculation of estimated 
glomerular filtration rate            >             NRG        

 

 Serum or plasma glucose measurement (mass/volume)            93 mg/dL         
           

 

 Serum or plasma calcium measurement (mass/volume)            9.6 mg/dL        
    8.5-10.1        

 

 Serum or plasma total bilirubin measurement (mass/volume)            0.5 mg/dL
            0.1-1.0        

 

 Serum or plasma alkaline phosphatase measurement (enzymatic activity/volume)  
          65 U/L                    

 

 Serum or plasma aspartate aminotransferase measurement (enzymatic activity/
volume)            20 U/L            5-34        

 

 Serum or plasma alanine aminotransferase measurement (enzymatic activity/volume
)            12 U/L            0-55        

 

 Serum or plasma protein measurement (mass/volume)            7.3 g/dL         
   6.4-8.2        

 

 Serum or plasma albumin measurement (mass/volume)            4.3 g/dL         
   3.2-4.5        









 Lactate dehydrogenase 1 [enzymatic activity/volume] in serum or plasma -  11:38         









 Lactate dehydrogenase 1 [enzymatic activity/volume] in serum or plasma        
    237 U/L            125-220        









 Serum or plasma alpha-1-fetoprotein.tumor marker measurement (mass/volume) -  11:38         









 Serum or plasma alpha-1-fetoprotein.tumor marker measurement (mass/volume)    
        2.0 %            0.0-8.8        









 BCR-ABL gene translocation detection - 18 11:38         









 Blood or tissue t(9;22)(q34.1;q11)(ABL1,BCR) b2a2+b3a2 fusion transcript count 
by molecular genetics method (number/volume)            See Report             
NRG        









 Complete blood count (CBC) with automated white blood cell (WBC) differential 
- 18 11:37         









 Blood leukocytes automated count (number/volume)            3.6 10*3/uL       
     4.3-11.0        

 

 Blood erythrocytes automated count (number/volume)            4.47 10*6/uL    
        4.35-5.85        

 

 Venous blood hemoglobin measurement (mass/volume)            14.7 g/dL        
    11.5-16.0        

 

 Blood hematocrit (volume fraction)            42 %            35-52        

 

 Automated erythrocyte mean corpuscular volume            95 [foz_us]          
  80-99        

 

 Automated erythrocyte mean corpuscular hemoglobin (mass per erythrocyte)      
      33 pg            25-34        

 

 Automated erythrocyte mean corpuscular hemoglobin concentration measurement (
mass/volume)            35 g/dL            32-36        

 

 Automated erythrocyte distribution width ratio            14.5 %            
10.0-14.5        

 

 Automated blood platelet count (count/volume)            186 10*3/uL          
  130-400        

 

 Automated blood platelet mean volume measurement            9.5 [foz_us]      
      7.4-10.4        

 

 Automated blood neutrophils/100 leukocytes            55 %            42-75   
     

 

 Automated blood lymphocytes/100 leukocytes            32 %            12-44   
     

 

 Blood monocytes/100 leukocytes            11 %            0-12        

 

 Automated blood eosinophils/100 leukocytes            2 %            0-10     
   

 

 Automated blood basophils/100 leukocytes            1 %            0-10        

 

 Blood neutrophils automated count (number/volume)            2.0 10*3         
   1.8-7.8        

 

 Blood lymphocytes automated count (number/volume)            1.2 10*3         
   1.0-4.0        

 

 Blood monocytes automated count (number/volume)            0.4 10*3            
0.0-1.0        

 

 Automated eosinophil count            0.1 10*3/uL            0.0-0.3        

 

 Automated blood basophil count (count/volume)            0.0 10*3/uL          
  0.0-0.1        









 Comprehensive metabolic panel - 18 11:37         









 Serum or plasma sodium measurement (moles/volume)            141 mmol/L       
     135-145        

 

 Serum or plasma potassium measurement (moles/volume)            4.0 mmol/L    
        3.6-5.0        

 

 Serum or plasma chloride measurement (moles/volume)            112 mmol/L     
               

 

 Carbon dioxide            22 mmol/L            21-32        

 

 Serum or plasma anion gap determination (moles/volume)            7 mmol/L    
        5-14        

 

 Serum or plasma urea nitrogen measurement (mass/volume)            20 mg/dL   
         7-18        

 

 Serum or plasma creatinine measurement (mass/volume)            0.85 mg/dL    
        0.60-1.30        

 

 Serum or plasma urea nitrogen/creatinine mass ratio            24             
NRG        

 

 Serum or plasma creatinine measurement with calculation of estimated 
glomerular filtration rate            >             NRG        

 

 Serum or plasma glucose measurement (mass/volume)            77 mg/dL         
           

 

 Serum or plasma calcium measurement (mass/volume)            9.4 mg/dL        
    8.5-10.1        

 

 Serum or plasma total bilirubin measurement (mass/volume)            0.6 mg/dL
            0.1-1.0        

 

 Serum or plasma alkaline phosphatase measurement (enzymatic activity/volume)  
          76 U/L                    

 

 Serum or plasma aspartate aminotransferase measurement (enzymatic activity/
volume)            18 U/L            5-34        

 

 Serum or plasma alanine aminotransferase measurement (enzymatic activity/volume
)            16 U/L            0-55        

 

 Serum or plasma protein measurement (mass/volume)            7.6 g/dL         
   6.4-8.2        

 

 Serum or plasma albumin measurement (mass/volume)            4.3 g/dL         
   3.2-4.5        









 Lactate dehydrogenase 1 [enzymatic activity/volume] in serum or plasma -  11:37         









 Lactate dehydrogenase 1 [enzymatic activity/volume] in serum or plasma        
    234 U/L            125-220        









 BCR-ABL gene translocation detection - 18 11:37         









 [id number]            Blood             NRG        

 

 Blood or tissue t(9;22)(q34.1;q11)(ABL1,BCR) b2a2+b3a2 fusion transcript count 
by molecular genetics method (number/volume)            See Report             
NRG        









 Complete blood count (CBC) with automated white blood cell (WBC) differential 
- 18 19:40         









 Blood leukocytes automated count (number/volume)            3.9 10*3/uL       
     4.3-11.0        

 

 Blood erythrocytes automated count (number/volume)            4.45 10*6/uL    
        4.35-5.85        

 

 Venous blood hemoglobin measurement (mass/volume)            14.8 g/dL        
    11.5-16.0        

 

 Blood hematocrit (volume fraction)            42 %            35-52        

 

 Automated erythrocyte mean corpuscular volume            94 [foz_us]          
  80-99        

 

 Automated erythrocyte mean corpuscular hemoglobin (mass per erythrocyte)      
      33 pg            25-34        

 

 Automated erythrocyte mean corpuscular hemoglobin concentration measurement (
mass/volume)            35 g/dL            32-36        

 

 Automated erythrocyte distribution width ratio            14.1 %            
10.0-14.5        

 

 Automated blood platelet count (count/volume)            168 10*3/uL          
  130-400        

 

 Automated blood platelet mean volume measurement            9.7 [foz_us]      
      7.4-10.4        

 

 Automated blood neutrophils/100 leukocytes            56 %            42-75   
     

 

 Automated blood lymphocytes/100 leukocytes            33 %            12-44   
     

 

 Blood monocytes/100 leukocytes            7 %            0-12        

 

 Automated blood eosinophils/100 leukocytes            2 %            0-10     
   

 

 Automated blood basophils/100 leukocytes            1 %            0-10        

 

 Blood neutrophils automated count (number/volume)            2.2 10*3         
   1.8-7.8        

 

 Blood lymphocytes automated count (number/volume)            1.3 10*3         
   1.0-4.0        

 

 Blood monocytes automated count (number/volume)            0.3 10*3            
0.0-1.0        

 

 Automated eosinophil count            0.1 10*3/uL            0.0-0.3        

 

 Automated blood basophil count (count/volume)            0.0 10*3/uL          
  0.0-0.1        









 PT panel in platelet poor plasma by coagulation assay - 18 19:40         









 Prothrombin time (PT) in platelet poor plasma by coagulation assay            
25.0 s            12.2-14.7        

 

 INR in platelet poor plasma or blood by coagulation assay            2.3      
       0.8-1.4        









 Activated partial thromboplastin time (aPTT) in platelet poor plasma 
bycoagulation assay - 18 19:40         









 Activated partial thromboplastin time (aPTT) in platelet poor plasma 
bycoagulation assay            51 s            24-35        









 Comprehensive metabolic panel - 18 19:40         









 Serum or plasma sodium measurement (moles/volume)            140 mmol/L       
     135-145        

 

 Serum or plasma potassium measurement (moles/volume)            3.6 mmol/L    
        3.6-5.0        

 

 Serum or plasma chloride measurement (moles/volume)            108 mmol/L     
               

 

 Carbon dioxide            21 mmol/L            21-32        

 

 Serum or plasma anion gap determination (moles/volume)            11 mmol/L   
         5-14        

 

 Serum or plasma urea nitrogen measurement (mass/volume)            17 mg/dL   
         7-18        

 

 Serum or plasma creatinine measurement (mass/volume)            0.99 mg/dL    
        0.60-1.30        

 

 Serum or plasma urea nitrogen/creatinine mass ratio            17             
NRG        

 

 Serum or plasma creatinine measurement with calculation of estimated 
glomerular filtration rate            57             NRG        

 

 Serum or plasma glucose measurement (mass/volume)            99 mg/dL         
           

 

 Serum or plasma calcium measurement (mass/volume)            9.6 mg/dL        
    8.5-10.1        

 

 Serum or plasma total bilirubin measurement (mass/volume)            0.5 mg/dL
            0.1-1.0        

 

 Serum or plasma alkaline phosphatase measurement (enzymatic activity/volume)  
          73 U/L                    

 

 Serum or plasma aspartate aminotransferase measurement (enzymatic activity/
volume)            20 U/L            5-34        

 

 Serum or plasma alanine aminotransferase measurement (enzymatic activity/volume
)            15 U/L            0-55        

 

 Serum or plasma protein measurement (mass/volume)            7.5 g/dL         
   6.4-8.2        

 

 Serum or plasma albumin measurement (mass/volume)            4.3 g/dL         
   3.2-4.5        









 Magnesium - 18 19:40         









 Magnesium            2.2 mg/dL            1.8-2.4        









 Serum or plasma troponin i.cardiac measurement (mass/volume) - 18 19:40 
        









 Serum or plasma troponin i.cardiac measurement (mass/volume)            < ng/
mL            <0.30        









 Myoglobin, serum - 18 19:40         









 Myoglobin, serum            50.4 ng/mL            10.0-92.0        









 Complete blood count (CBC) with automated white blood cell (WBC) differential 
- 18 07:05         









 Blood leukocytes automated count (number/volume)            3.3 10*3/uL       
     4.3-11.0        

 

 Blood erythrocytes automated count (number/volume)            4.62 10*6/uL    
        4.35-5.85        

 

 Venous blood hemoglobin measurement (mass/volume)            14.8 g/dL        
    11.5-16.0        

 

 Blood hematocrit (volume fraction)            44 %            35-52        

 

 Automated erythrocyte mean corpuscular volume            95 [foz_us]          
  80-99        

 

 Automated erythrocyte mean corpuscular hemoglobin (mass per erythrocyte)      
      32 pg            25-34        

 

 Automated erythrocyte mean corpuscular hemoglobin concentration measurement (
mass/volume)            34 g/dL            32-36        

 

 Automated erythrocyte distribution width ratio            14.4 %            
10.0-14.5        

 

 Automated blood platelet count (count/volume)            179 10*3/uL          
  130-400        

 

 Automated blood platelet mean volume measurement            9.6 [foz_us]      
      7.4-10.4        

 

 Automated blood neutrophils/100 leukocytes            53 %            42-75   
     

 

 Automated blood lymphocytes/100 leukocytes            32 %            12-44   
     

 

 Blood monocytes/100 leukocytes            11 %            0-12        

 

 Automated blood eosinophils/100 leukocytes            3 %            0-10     
   

 

 Automated blood basophils/100 leukocytes            1 %            0-10        

 

 Blood neutrophils automated count (number/volume)            1.8 10*3         
   1.8-7.8        

 

 Blood lymphocytes automated count (number/volume)            1.1 10*3         
   1.0-4.0        

 

 Blood monocytes automated count (number/volume)            0.4 10*3            
0.0-1.0        

 

 Automated eosinophil count            0.1 10*3/uL            0.0-0.3        

 

 Automated blood basophil count (count/volume)            0.0 10*3/uL          
  0.0-0.1        









 Comprehensive metabolic panel - 18 07:05         









 Serum or plasma sodium measurement (moles/volume)            141 mmol/L       
     135-145        

 

 Serum or plasma potassium measurement (moles/volume)            3.7 mmol/L    
        3.6-5.0        

 

 Serum or plasma chloride measurement (moles/volume)            111 mmol/L     
               

 

 Carbon dioxide            20 mmol/L            21-32        

 

 Serum or plasma anion gap determination (moles/volume)            10 mmol/L   
         5-14        

 

 Serum or plasma urea nitrogen measurement (mass/volume)            17 mg/dL   
         7-18        

 

 Serum or plasma creatinine measurement (mass/volume)            0.79 mg/dL    
        0.60-1.30        

 

 Serum or plasma urea nitrogen/creatinine mass ratio            22             
NRG        

 

 Serum or plasma creatinine measurement with calculation of estimated 
glomerular filtration rate            >             NRG        

 

 Serum or plasma glucose measurement (mass/volume)            92 mg/dL         
           

 

 Serum or plasma calcium measurement (mass/volume)            9.2 mg/dL        
    8.5-10.1        

 

 Serum or plasma total bilirubin measurement (mass/volume)            0.5 mg/dL
            0.1-1.0        

 

 Serum or plasma alkaline phosphatase measurement (enzymatic activity/volume)  
          74 U/L                    

 

 Serum or plasma aspartate aminotransferase measurement (enzymatic activity/
volume)            18 U/L            5-34        

 

 Serum or plasma alanine aminotransferase measurement (enzymatic activity/volume
)            13 U/L            0-55        

 

 Serum or plasma protein measurement (mass/volume)            7.4 g/dL         
   6.4-8.2        

 

 Serum or plasma albumin measurement (mass/volume)            4.1 g/dL         
   3.2-4.5        









 Lactate dehydrogenase 1 [enzymatic activity/volume] in serum or plasma -  07:05         









 Lactate dehydrogenase 1 [enzymatic activity/volume] in serum or plasma        
    209 U/L            125-220        









 BCR-ABL gene translocation detection - 18 07:05         









 [id number]            Blood             NRG        

 

 Blood or tissue t(9;22)(q34.1;q11)(ABL1,BCR) b2a2+b3a2 fusion transcript count 
by molecular genetics method (number/volume)            See Report             
NRG        



                                                                               
                                                                         



Encounters

      





 ACCT No.            Visit Date/Time            Discharge            Status    
        Pt. Type            Provider            Facility            Loc./Unit  
          Complaint        

 

 [id number]            2018 00:09:00            2018 23:59:59    
        CLS            Preadmit            MAGUI MCALLISTER MD            
[facility]            LAB            C92.10        

 

 [id number]            2018 06:39:00            2018 00:01:00    
        DIS            Outpatient            MAGUI MCALLISTER MD            
[facility]            LAB            C92.10        

 

 [id number]            2018 07:46:00            2018 23:59:59    
        CLS            Outpatient            DEIDRE QUEZADA            [facility]            RAD            K74.60 CIRRHOSIS OF 
LIVER W/O ASCITES        

 

 [id number]            2018 00:10:00            2018 23:59:59    
        CLS            Preadmit            CHEMA STILL MD, FACC, FACP CCDS       
     [facility]            LAB            I48.0        

 

 [id number]            2018 11:14:00            2018 00:01:00    
        DIS            Outpatient            CHEMA STILL MD, FACC, FACP CCDS     
       [facility]            LAB            I48.0        

 

 [id number]            2018 09:47:00            2018 23:59:59    
        CLS            Outpatient            DEIDRE QUEZADA            [facility]            LAB            K74.60,Z86.19        

 

 [id number]            2018 10:30:00            2018 23:59:59    
        CLS            Outpatient            KATHRYN HOLLAND DO            
[facility]            RAD            PLUERAL EFFUSION    
    

 

 [id number]            2018 06:42:00            2018 23:59:59    
        CLS            Outpatient            TONY DRUMMOND MD            [facility]            CARD            DYSPNEA        

 

 [id number]            2018 11:27:00            2018 00:01:00    
        DIS            Outpatient            MAGUI MCALLISTER MD            
[facility]            LAB            C92.10        

 

 [id number]            2018 19:05:00            2018 20:51:00    
        DIS            Emergency            SALASKARELY PACKER APRN            [facility]            ER            HEART RACING        

 

 [id number]            2018 11:24:00            2018 23:59:59    
        CLS            Outpatient            DEIDRE QUEZADA            [facility]            LAB            K74.60        

 

 [id number]            2018 06:51:00            2018 23:59:59    
        CLS            Outpatient            DEIDRE QUEZADA            [facility]            RAD            K74.60        

 

 [id number]            2018 10:29:00            2018 23:59:59    
        CLS            Outpatient            MAGUI MCALLISTER MD            
[facility]            LAB            C92.10        

 

 [id number]            2018 00:15:00            2018 23:59:59    
        CLS            Preadmit            CHEMA STILL MD, FACC, FACP CCDS       
     [facility]            LAB            I48.0        

 

 [id number]            2017 10:43:00            01/15/2018 00:01:00    
        DIS            Outpatient            CHEMA STILL MD, FACC, FACP CCDS     
       [facility]            LAB            I48.0        

 

 [id number]            2018 09:50:00            2018 23:59:59    
        CLS            Outpatient            KAELA AVILES DO            [facility]            ENDO            HX OF POLYPS        

 

 [id number]            2018 07:25:00            2018 23:59:59    
        CLS            Outpatient            KATHRYN HOLLAND DO            
[facility]            RAD            SOB        

 

 [id number]            2018 05:39:00            2018 11:00:00    
        DIS            Outpatient            KAELA AVILES DO D            [facility]            PREOP            COLONOSCOPY        

 

 [id number]            2018 14:17:00            2018 23:59:59    
        CLS            Outpatient            KATHRYN HOLLAND DO            
[facility]            RAD            SOB, PLUERAL 
EFFUSION        

 

 [id number]            2017 12:44:00            2017 23:59:59    
        CLS            Outpatient            KATHRYN HOLLAND DO            
[facility]            RAD            LEFT PLEURAL 
EFFUSION        

 

 [id number]            2017 04:17:00            2017 07:14:00    
        DIS            Emergency            JENELLE  NICKI SANDHYA            [facility]            ER            N/V/D        

 

 [id number]            2017 21:10:00            2017 22:50:00    
        DIS            Emergency            GENARO COHEN, LUIS MCKEE            
[facility]            ER            HEART RACING/CP      
  

 

 [id number]            10/24/2017 12:18:00            10/24/2017 23:59:59    
        CLS            Outpatient            JENN NEAL          
  [facility]            RAD            Z51.81 
ANTICOAGULATED ON COUMADIN        

 

 [id number]            10/09/2017 16:43:00            10/09/2017 23:59:59    
        CLS            Outpatient            KATHRYN HOLLAND DO            
[facility]            RAD            PLEURAL PAIN LEFT 
SIDE,SOB        

 

 [id number]            10/04/2017 08:10:00            10/04/2017 23:59:59    
        CLS            Outpatient            TESSY COHEN FACC, CHEMA HANNA CCDS     
       [facility]            LAB            I48.0        

 

 [id number]            10/01/2017 03:08:00            10/01/2017 23:59:59    
        CLS            Preadmit            KATHRYN HOLLAND DO            
[facility]            LAB            A FIB        

 

 [id number]            2017 09:40:00            2017 00:01:00    
        DIS            Outpatient            KATHRYN HOLLAND DO            
[facility]            LAB            A FIB        

 

 [id number]            2017 12:30:00            2017 23:59:59    
        CLS            Outpatient            KATHRYN HOLLAND DO            
[facility]            LAB            A FIB        

 

 [id number]            2017 12:07:00            09/15/2017 16:30:00    
        DIS            Inpatient            KATHRYN HOLLAND DO            
[facility]            ICU            A-FIB WITH RVR      
  

 

 [id number]            2017 07:51:00            2017 23:59:59    
        CLS            Outpatient            DEIDRE QUEZADA            [facility]            RAD            K74.60        

 

 [id number]            2017 12:14:00            2017 23:59:59    
        CLS            Outpatient            KATHRYN HOLLAND DO            
[facility]            RAD            SOB PLEURAL EFFUSION 
DUE TO CANCER MEDICINE        

 

 [id number]            2017 16:27:00            2017 18:17:00    
        DIS            Emergency            AMADA GORDILLO MD            
[facility]            ER            SHOULDER BLADE/ARM 
NERVE BURNING        

 

 [id number]            2017 06:52:00            2017 23:59:59    
        CLS            Outpatient            DEIDRE QUEZADA            [facility]            RAD            CIRRHOSIS OF LIVER      
  

 

 [id number]            2016 09:32:00            2016 23:59:59    
        CLS            Outpatient            KATHRYN HOLLAND DO            
[facility]            RAD            RIGHT WRIST PAIN X4 
WEEKS        

 

 [id number]            10/31/2016 18:04:00            10/31/2016 20:04:00    
        DIS            Emergency            KARELY SALAS            [facility]            ER            BACK PAIN        

 

 [id number]            2016 07:42:00            2016 23:59:59    
        CLS            Outpatient            LUKE MAHAN DO            [facility]            RAD            PLEURAL EFFUSION,LEUKEMIA
,HEPATITIS C,COUGH        

 

 [id number]            2016 10:10:00            2016 23:59:59    
        CLS            Outpatient            LUKE MAHAN DO            [facility]            LAB            PLEURAL EFFUSION,LEUKEMIA
,HEP C,COUGH        

 

 [id number]            2016 09:19:00            2016 23:59:59    
        CLS            Outpatient            LIANGLENKATHRYN ORLANDO DO            
[facility]            RAD            SOB,ABNORMAL DENSITY
        

 

 [id number]            2016 12:20:00            2016 23:59:59    
        CLS            Outpatient            KATHRYN HOLLAND DO            
[facility]            LAB            KIDNEY FAILURE,SOB  
      

 

 [id number]            2016 11:50:00            2016 23:59:59    
        CLS            Outpatient            AMALIA HARE KATHRYN SHAI            
[facility]            RAD            SHORT OF BREATH     
   

 

 [id number]            2015 11:56:00            2015 23:59:59    
        CLS            Outpatient            KATHRYN HOLLAND DO            
[facility]            RAD            NO SOUNDS L LUNG,SOB
        

 

 [id number]            2015 07:20:00            2015 23:59:59    
        CLS            Outpatient            KATHRYN HOLLAND DO            
[facility]            RAD            RT LEG AND FOOT 
REDNESS/SWELLING        

 

 [id number]            06/10/2015 06:41:00            06/10/2015 23:59:59    
        CLS            Outpatient            DEIDRE QUEZADA            [facility]            RAD            HEP C,CIRRHOSIS        

 

 [id number]            2014 16:28:00            2014 13:00:00    
        DIS            Inpatient            KAELA AVILES DO            [facility]            4TH            L RIB FRACTURES, 
PULMONARY CONTUSION, PNEUMOTHORAX        

 

 [id number]            10/02/2014 17:40:00            10/03/2014 10:35:00    
        DIS            Inpatient            LIANGSHERRONKATHRYN ORLANDO DO            
[facility]            4TH            RIGHT PLEURAL 
EFFUSION; CONSTIPATION        

 

 [id number]            2014 15:02:00            2014 23:59:59    
        CLS            Outpatient            LUKE MAHAN DO            [facility]            RT            PLEURAL EFFUSION,COUGH,
LEUKEMIA        

 

 [id number]            2014 11:57:00            2014 14:20:00    
        DIS            Outpatient            LUKE MAHAN DO            [facility]            SDC            PLEURAL EFFUSION        

 

 [id number]            09/15/2014 16:39:00            09/15/2014 23:59:59    
        CLS            Outpatient            KATHRYN HOLLAND DO            
[facility]            RAD            RT PLEURAL EFFUSION 
       

 

 [id number]            2014 11:44:00            2014 23:59:59    
        CLS            Outpatient            KATHRYN HOLLAND DO            
[facility]            RAD            COUGH,SOA        

 

 [id number]            2014 21:42:00            2014 11:10:00    
        DIS            Inpatient            KATHRYN HOLLAND DO            
[facility]            4TH            NEW RIGHT PLEURAL 
EFFUSION        

 

 [id number]            2014 14:03:00            2014 23:59:59    
        CLS            Outpatient            KATHRYN HOLLAND DO            
[facility]            LAB            LIVER BIOPSY        

 

 [id number]            2013 13:34:00            2013 23:59:59    
        CLS            Outpatient                                              
              

 

 [id number]            06/10/2013 14:45:00            06/10/2013 23:59:59    
        CLS            Outpatient            KATHRYN HOLLAND DO            
[facility]            RAD            SCREENING        

 

 [id number]            2014 10:57:00                                   
   Document Registration                                                       
     

 

 [id number]            2013 00:00:00                                   
   Document Registration                                                       
     

 

 [id number]            2013 00:00:00                                   
   Document Registration                                                       
     

 

 [id number]            04/15/2013 11:37:00                                   
   Document Registration                                                       
     

 

 [id number]            2013 19:29:00                                   
   Document Registration                                                       
     

 

 [id number]            2013 08:48:00                                   
   Document Registration                                                       
     

 

 [id number]            2013 13:41:00                                   
   Document Registration                                                       
     

 

 [id number]            2013 09:47:00                                   
   Document Registration                                                       
     

 

 [id number]            2012 11:50:00                                   
   Document Registration                                                       
     

 

 [id number]            10/22/2012 08:57:00                                   
   Document Registration                                                       
     

 

 [id number]            10/09/2012 13:23:00                                   
   Document Registration                                                       
     

 

 [id number]            2012 13:16:00                                   
   Document Registration                                                       
     

 

 [id number]            2012 00:56:00                                   
   Document Registration                                                       
     

 

 [id number]            2012 19:56:00                                   
   Document Registration                                                       
     

 

 [id number]            2012 16:59:00                                   
   Document Registration                                                       
     

 

 [id number]            2012 13:48:00                                   
   Document Registration                                                       
     

 

 [id number]            2015 11:58:18                         ACT            
Document Registration                                                          
  

 

 [id number]            2019 13:45:00            2019 23:59:59            
CLS            Outpatient            VIK GHOTRA LAC                         
[facility]

## 2019-01-22 NOTE — XMS REPORT
Clinical Summary

 Created on: 2019



Wendy Dunaway

External Reference #: [id number]

: 1960

Sex: Female



Demographics







 Address  [address] [place]  [address]

 

 Home Phone  [phone]

 

 Preferred Language  English

 

 Marital Status  Unknown

 

 Nondenominational Affiliation  NON

 

 Race  White

 

 Ethnic Group  Not  or 





Author







 Author  [facility]

 

 Organization  [facility]

 

 Address  Unknown

 

 Phone  Unavailable







Support







 Name  Relationship  Address  Phone

 

 Kelly Siddiqui  ECON  [address] [place] [address]  [address]  [phone]

 

 Razia Dunaway  ECON  Unknown  [phone]







Care Team Providers







 Care Team Member Name  Role  Phone

 

 Aleksander Boothe MD  Unavailable  [phone]

 

 Rah Castro DO  PCP  [phone]

 

 Gillian Chand MD  Unavailable  [phone]

 

 Kimberly Del Cid  Unavailable  Unavailable

 

 Coy Shen MD  Unavailable  [phone]

 

 Uri Chau MD  Unavailable  [phone]

 

 Olga Tiwari RN  Unavailable  [phone]

 

 Yola Morales RN  Unavailable  Unavailable

 

 Gabino Cr PHARMD  Unavailable  Unavailable

 

 Jaelyn Hernandez  Unavailable  Unavailable

 

 Olga Polk APRRAUL  Unavailable  [phone]

 

 Esa Betancourt  Unavailable  Unavailable

 

 Karley Mena  Unavailable  Unavailable

 

 Matt Pruett MD  Unavailable  [phone]

 

 Maura Love  Unavailable  Unavailable

 

 Geraldine Sanderson  Unavailable  Unavailable







Source Comments

Some departments are not documenting in the electronic medical record.  If you 
do not see the information that you expected, contact Release of Information in 
the Health Information Management department at [phone] for further 
assistance in locating additional records.[facility]



Allergies







     Comments



  Active Allergy   Reactions   Severity   Noted Date 

 

     



Throat swells. 



  Celecoxib   HIVES,   Medium   2013 



   SHORTNESS OF   



   BREATH   

 

      



  Sulfa (Sulfonamide   HIVES   Medium   2012 



  Antibiotics)    

 

      



  Trimethoprim   UNKNOWN   Low  

 

      



  Zolpidem   UNKNOWN   Low  







Medications







      End Date  Status



  Medication   Sig   Dispensed   Refills   Start  



      Date  

 

         Active



  gabapentin (NEURONTIN)   Take 1 tab    0     



  600 mg tablet   every     6  



   morning, 2     



   tabs in the     



   afternoon, 2     



   tabs in the     



   evening, and     



   1 tab at     



   bedtime     

 

         Active



  QUEtiapine (SEROQUEL) 100   Take 1/2 tab    0   06/15/201  



  mg tablet   every     6  



   morning, 1.5     



   tab at 1 pm,     



   1.5 tab at 5     



   pm and 1/2     



   tab at     



   bedtime.     

 

         Active



  warfarin (COUMADIN) 1 mg   Take 1 mg by    0   



  tablet   mouth daily.     

 

         Active



  warfarin (COUMADIN) 5 mg   Take 5 mg by    0   



  tablet   mouth daily.     

 

         Active



  ascorbic acid(+) (VITAMIN   Take 1,000 mg    0   



  C) 1,000 mg tablet   by mouth     



   daily.     

 

         Active



  diltiazem CD (CARDIZEM   Take 240 mg    0   



  CD) 240 mg capsule   by mouth     



   daily.     

 

         Active



  potassium chloride(+)   Take 10 mEq    0   



  (MICRO-K) 10 mEq capsule   by mouth     



   daily. Take     



   with a meal     



   and a full     



   glass of     



   water.     

 

         Active



  rivaroxaban (XARELTO) 20   Take 20 mg by    0   



  mg tablet   mouth daily.     



   Take with     



   food.     

 

         Active



  cholecalciferol (VITAMIN   Take 1,000    0   



  D-3) 1,000 units tablet   Units by     



   mouth daily.     







Active Problems







 



  Problem   Noted Date

 

 



  History of hepatitis C   2016

 

 



  Pleural effusion   2015

 

 



  Overview:



  Traumatic left hemothorax.



  Confirmed by diagnostic / therapeutic thoracentesis





  Last Assessment & Plan:



  Evaluated by  cardiothoracic surgery, no interventions, deferred to



  pulmonary.

 

 



  Colon polyps   2015

 

 



  Overview:



  Tubular adenomas

 

 



  Cirrhosis   2014

 

 



  CML (chronic myelocytic leukemia)   2012

 

 



  Overview:



  Diagnosis: CM chronic phase



  Date of Diagnosis: 2012



  Treatment:



  Imatinib 400-800 mg PO daily  to 2013: Best response not available,



  patient progressed 2013 ( Cytogenetic progression/ relapse)



  Nilotinib 200 MG PO BID: unable to tolerate, exacerbated neuropathy



  Dasatinib 100 mg PO daily: started March 15, 2013



  Response: CCR, CMR



  Dasatinib reduced to 70 mg PO daily: Oct , 2013



  7/30/15-Continue Dasatinib 70 mg po daily.



  May 2016: Dasatinib 50 mg po daily.



  Oct   2016: Dasatinib 40 mg po daily. CMR



  Oct   2017: Dasatinib 20 mg po daily.  Reduction due to recurrent pleural



  effusions



  2018:  Holding dasatinb





  Ms. Dunaway is a 57-year-old woman with a history of hep C infection and



  unexplained neuropathy.  She also has significant dysphagia and odynophagia



  and has significant difficulty in observing oral medication tablets.





  She was diagnosed with chronic phase CML in 2012.  She has been under



  the care of Dr. Petersen.  There has not been any outside records available



  for us to review today and most of the information available has been



  provided by the patient verbally.  Her treatment hostory is as above.



  Repeat the bone marrow biopsy did not show clonal evolution of her CML.



  Remains in CP-CML. No Kinase-domain mutations were detected.





  Since diagnoses her hep C therapy will resulted in complete eradication of



  hepatitis C virus infection with sustained molecular remission.  Her



  dysphagia and odynophagia has resolved and she is able to take oral pills.





  





  L



  ast Assessment & Plan:



  Mrs. Dunaway is here today for follow up for CML.She had been on long term



  therapy with Dasatinib.She reports compliance with medication and



  tolerating treatment well.She has had complete molecular response to



  treatment.Unfortunately she has recurrent pleural effusion which result in



  recurrent thoracenteses.  She has had several dose reductions but continues



  to have recurrent effusions. Given the recurrent pleural effusion despite



  the maximum dose reduction on dasatinib I recommended to discontinue



  dasatinib at this time.  Patient has had a sustained complete clinical



  remission for over 2 years and believe that she is a candidate for TKR



  discontinuation altogether.





  I discussed with the patient discontinuation date has been published and



  confirmed and now in the NCCN guidelines.  The patient was supposed to



  undergo monthly laboratory screening at [facility] for PCR however she is



  compliant to get her labs look to be stenotic through the PCR.  PCR was



  collected today.  I think the patient about the importance of adherence to



  restrict surveillance on monthly basis.  She will undergo surveillance for



  the next month if the known the [facility],  she will return in 6



  months for clinical assessment.





  Surveillance in the second and will be every 6 weeks.





  Should she relapse we recommended bosutinib as her Therapy.





  Health care maintenance-She is up to date on Mammogram and Pap Smear.She



  is due for colonoscopy in .Continue to follow up with PCP for



  health care maintenance.





  Continue to follow up with Hepatology for history of Cirrhosis.

 

 



  Iron deficiency   2012

 

 



  Overview:



  Absent iron stains on bone marrow biopsy.





  L



  ast Assessment & Plan:



  Ferritin is > 500 , rules out iron def.

 

 



  Hiatal hernia   2012

 

 



  Overview:



  Per pt, limits her ability to take po pills.





  L



  ast Assessment & Plan:



  Pt reports that her trouble swallowing is secondary to GERD symptoms and



  that her GERD has resolved. She denies any current trouble with dysphagia



  and is able to eat and drink.

 

 



  Normocytic anemia   2012

 

 



  Overview:



  Last transfusion 





  L



  ast Assessment & Plan:



  Remains well above transfusion needs. Labs will hopefully continue to



  improve with treatment. She should have a work up for iron deficiency in



  the future however.







Resolved Problems







  



  Problem   Noted Date   Resolved Date

 

  



  Total bilirubin, elevated   2012

 

 



  Overview:



  Likely related to imatinib.





  L



  ast Assessment & Plan:



  Likely related to imatinib as it increased with the increased dosing. We



  will decrease imatinib back to 400 mg daily and have her labs checked with



  Dr. Petersen next week. Should her jaundice worsen over the weekend or she



  has diffuse itching she should present to her local ER.

 

  



  Thrombocythemia   2012

 

 



  Overview:



  CHANEL-2 negative.





  L



  ast Assessment & Plan:



  Improved. Now WNL.  She has stopped hydrea.

 

  



  Hepatitis C   2012

 

 



  Overview:



  Due to prior IV drug use, untreated





  L



  ast Assessment & Plan:



  Completed HCV therapy.



  Patient has an appointment with Hepatology today.









Immunizations







  



  Name   Dates Previously Given   Next Due

 

  



  HEP A/HEP B Combined   2015, 2015, 2015 



  Vaccine  







Family History







   



  Medical History   Relation   Name   Comments

 

   



  Coronary Artery Disease   Father    MI

 

   



  Cancer   Mother    Gallbladder cancer

 

   



  Liver Disease   Sister  









   



  Relation   Name   Status   Comments

 

   



  Brother    Alive 

 

   



  Father     

 

   



  Mother     

 

   



  Sister     

 

   



  Sister   







Social History







     Date



  Tobacco Use   Types   Packs/Day   Years Used 

 

      



  Former Smoker   Cigarettes   1   10 

 

    



  Smokeless Tobacco: Former     Quit: 2003



  User   









   



  Alcohol Use   Drinks/Week   oz/Week   Comments

 

   



  No   0 Standard   0.0 



   drinks or  



   equivalent  









 



  Sex Assigned at Birth   Date Recorded

 

 



  Not on file 









   Industry



  Job Start Date   Occupation 

 

   Not on file



  Not on file   Not on file 









   Travel End



  Travel History   Travel Start 













  No recent travel history available.







Last Filed Vital Signs







   Time Taken



  Vital Sign   Reading 

 

   2018  3:27 PM CDT



  Blood Pressure   110/72 

 

   2018  3:27 PM CDT



  Pulse   69 

 

   2018  3:27 PM CDT



  Temperature   36.6 C (97.8 F) 

 

   2018  3:27 PM CDT



  Respiratory Rate   18 

 

   2018  3:27 PM CDT



  Oxygen Saturation   100% 

 

   -



  Inhaled Oxygen   - 



  Concentration  

 

   2018  3:27 PM CDT



  Weight   74.1 kg (163 lb 6.4 oz) 

 

   2018  3:27 PM CDT



  Height   172.7 cm (5' 8") 

 

   2018  3:27 PM CDT



  Body Mass Index   24.84 







Plan of Treatment







   



  Health Maintenance   Due Date   Last Done   Comments

 

   



  PHYSICAL (COMPREHENSIVE)   1966  



  EXAM   

 

   



  DTAP/TDAP VACCINES (1 -   1977  



  Tdap)   

 

   



  CERVICAL CANCER SCREENING   1989  

 

   



  SHINGLES RECOMBINANT   2009  



  VACCINE (1 of 2)   

 

   



  BREAST CANCER SCREENING   2017 

 

   



  INFLUENZA VACCINE   2018  

 

   



  COLORECTAL CANCER   10/21/2024   10/21/2014 



  SCREENING   

 

   



  HIV SCREENING   Completed   2014 







Procedures







     Comments



  Procedure Name   Priority   Date/Time   Associated Diagnosis 

 

     



Results for this procedure are in the results section.



  LDH-LACTATE DEHYDROGENASE   Routine   2018   CML (chronic myelocytic 



    11:00 AM CST   leukemia) (HCC) 

 

     



Results for this procedure are in the results section.



  COMPREHENSIVE METABOLIC   Routine   2018   CML (chronic myelocytic 



  PANEL    11:00 AM CST   leukemia) (HCC) 

 

     



Results for this procedure are in the results section.



  CBC AND DIFF   Routine   2018   CML (chronic myelocytic 



    11:00 AM CST   leukemia) ([facility]) 

 

     



Results for this procedure are in the results section.



  BCR  PCR BLOOD OR   Routine   2018   CML (chronic myelocytic 



  BONE MARROW    11:00 AM CST   leukemia) (HCC) 



from Last 3 Months



Results

* BCR  PCR BLOOD OR BONE MARROW (2018 11:00 AM CST)





   



  Component   Value   Ref Range   Performed At

 

   



  Specimen   BLOOD    [facility]

 

   



  Final Diagnosis,BCR   Patient Name: Wendy Dunaway    [facility]



   Physician(s):Dr. Gillian Chand  



   Ordering Facility: The  



   [facility]  



   Medical Record #:[id number]  



   Date of Birth,  



   Sex:1960, F  



   Collection Date:2018  



   Received Date:2018  



   Report Date:2018  



   CMOL Reference #:[id number]  



   Accession #:[id number]  



   Test Performed  



   BCR-ABL p210 fusion gene  



   transcript analysis  



   Specimen Type  



   Blood  



   Analytical Results  



   The BCR-ABL Mbcr b2a2 and/or  



   b3a2 fusion gene transcripts  



   were NOT  



   DETECTED in this specimen.  



   Interpretation  



   The BCR-ABL Mbcr b2a2 and b3a2  



   fusion gene transcripts were  



   NOT  



   DETECTED in this specimen. The  



   limit of detection for this  



   assay is  



   an  



   Mbcr copy number of at least  



   10 and an NCN of 0.001%.  



   Analysis of  



   BCR-ABL transcript levels  



   during and/or after therapy  



   reflects the  



   level of  



   disease suppression in  



   patients with CML and some  



   patients with AML,  



   and is effective for  



   monitoring treatment  



   efficiency or, in some  



   cases,  



   the need to reassess therapy.  



   These results should be  



   interpreted  



   only in the context of total  



   clinical information.  



   Therapeutic action  



   should not  



   be taken based solely on these  



   results.  



   Methodology  



   Total RNA from peripheral  



   blood or bone marrow samples  



   was isolated  



   and reverse transcribed with  



   random primers, and  



   thecDNA products  



   were quantitated by real time  



   quantitative PCR (RQ-PCR).  



   Primers and  



   probes for bcr exons b2 and b3  



   and abl exon 2 are used in the  



   RQ-  



   PCR to detect the Mbcr  



   transcripts b2a2 and b3a2. A  



   set of plasmid  



   standards containing 10^1 to  



   10^6 copies each of BCR-ABL  



   and ABL were  



   used to generate standard  



   curves for the quantitation of  



   BCR-ABL and  



   ABL. ABL copy numbers are used  



   as control for the quality and

  



   quantity  



   of sample RNA and to normalize  



   the BCR-ABL copy numbers. The  



   test  



   result is expressed as  



   BCR-ABL/ABL ratio, then  



   converted to an  



   international scale using the  



   IS-Low Calibrator.  



   Intended Use  



   The BCR-ABL Mbcr RQ-PCR assay  



   detects and quantitates the  



   BCR-ABL  



   Mbcr b2a2 and b3a2 (p210)  



   fusion gene transcripts which  



   present in  



   95% of chronic myelogenous  



   leukemia (CML) patients and  



   approximately  



   35% of Ph-positive adult acute  



   lymphocytic leukemia (ALL)  



   patients.  



   This test does not detect  



   other BCR-ABL fusions,  



   including e1a2  



   (p190), so should not be used  



   in the diagnostic setting.  



   Serial  



   analysis of the  



   Mbcr b2a2 & b3a2 transcripts  



   can be used for monitoring  



   patient  



   response to CML treatment  



   including tyrosine kinase  



   inhibitors  



   imatinib and  



   dasatinib.  



   References  



   1. Leni et  



   al.Desirable performance  



   characteristics for BCR-ABL  



   measurement on  



   aninternational  



   reporting scale to allow  



   consistent  



   interpretation of individual  



   patient response and  



   comparison of  



   response rates between  



   clinical trials.Blood 2008  



   112:4942-1277.  



   2. Katina Rosa et al.  



   Guidelines for the measurement  



   of BCR-ABL 1  



   transscripts in chronic  



   myeloid leukaemia. [place]  



   Journal of  



   Haematology, 153, 179-190.  



   3. Nichole Cooper et al.  



   Establishment of the 1st World  



   Health  



   Organization International  



   Genetic Reference Panel for  



   quantification of BCR-ABL  



   Mrna. Blood 2010.  



   4. Daniel.Molecular  



   Monitoring of BCR-ABL as a  



   guide  



   toclinical management  



   of chronic myeloid  



   leukaemia.Blood. 2006:  



   20 29-41.  



   5. Sage et al. Monitoring of  



   CML patients responding to  



   treatment  



   with tyrosine kinase  



   inhibitors: review and  



   recommendations for  



   harmonizing current  



   methodology for detecting  



   BCR-aBL transcripts and  



   kinase domain mutations for  



   expressing results. Blood  



   2006:108 (1  



   28-37.  



   6. Syed et  



   al.Quantitative Monitoring  



   of BCR-ABL  



   Transcript-Suggestion of a  



   Simplified Approach  



   Considering Inaccuracy  



   of  



   Measurement and Calibration.  



   Leukemia & Lymphoma 2004:45  



   (1)  



   4842-2653.  



   Disclaimer  



   This test was performed by the  



   [facility]  



   Laboratory. This test was  



   developed and its performance  



   characteristics  



   determined by the Clinical  



   [facility] Laboratory.  



   It has not  



   been cleared nor approved by  



   the U.S. Food and Drug  



   Administration.  



   The FDA has determined that  



   such clearance is not  



   necessary. This  



   test is used for clinical  



   purposes. It should not be  



   regarded as  



   investigational or for  



   research. This laboratory is  



   certified under  



   the Clinical Laboratory  



   Improvement Amendments of 1988  



   (CLIA-88) as  



   qualified to perform high  



   complexity clinical laboratory  



   testing.  



   [facility]  



   Laboratory, Department of  



   Pathology and  



   Laboratory Medicine,  



   [facility]  



   [facility], [address], [address], [address] [place], [address].  



   Tel: [phone]; Fax:  



   [phone].  













  Specimen

 





  Blood









   



  Performing Organization   Address   City/State/Zipcode   Phone Number

 

   



  [facility]   

 

   



  [facility]   See results for address.  





* CBC AND DIFF (2018 11:00 AM CST)





   



  Component   Value   Ref Range   Performed At

 

   



  White Blood Cells   4.0 (L)   4.5 - 11.0 K/UL   [facility]

 

   



  RBC   4.69   4.0 - 5.0 M/UL   [facility]

 

   



  Hemoglobin   15.6 (H)   12.0 - 15.0 GM/DL   [facility]

 

   



  Hematocrit   45.7 (H)   36 - 45 %   [facility]

 

   



  MCV   97.3   80 - 100 FL   [facility]

 

   



  MCH   33.3   26 - 34 PG   [facility]

 

   



  MCHC   34.2   32.0 - 36.0 G/DL   [facility]

 

   



  RDW   13.8   11 - 15 %   [facility]

 

   



  Platelet Count   181   150 - 400 K/UL   [facility]

 

   



  MPV   7.5   7 - 11 FL   [facility]

 

   



  Neutrophils   52   41 - 77 %   [facility]

 

   



  Lymphocytes   33   24 - 44 %   [facility]

 

   



  Monocytes   10   4 - 12 %   [facility]

 

   



  Eosinophils   3   0 - 5 %   [facility]

 

   



  Basophils   2   0 - 2 %   [facility]

 

   



  Absolute Neutrophil Count   2.10   1.8 - 7.0 K/UL   [facility]

 

   



  Absolute Lymph Count   1.30   1.0 - 4.8 K/UL   [facility]

 

   



  Absolute Monocyte Count   0.40   0 - 0.80 K/UL   [facility]

 

   



  Absolute Eosinophil Count   0.10   0 - 0.45 K/UL   [facility]

 

   



  Absolute Basophil Count   0.10   0 - 0.20 K/UL   [facility]













  Specimen

 





  Blood









   



  Performing Organization   Address   City/[facility]   Phone Number

 

   



  [facility]   [address] [place] [address] 





* LDH-LACTATE DEHYDROGENASE (2018 11:00 AM CST)





   



  Component   Value   Ref Range   Performed At

 

   



  Lactate Dehydrogenase   171   100 - 210 U/L   [facility]













  Specimen

 





  Blood









   



  Performing Organization   Address   City/[facility]   Phone Number

 

   



  [facility]   [address] [place] [address] 





* COMPREHENSIVE METABOLIC PANEL (2018 11:00 AM CST)





   



  Component   Value   Ref Range   Performed At

 

   



  Sodium   140   137 - 147 MMOL/L   [facility]

 

   



  Potassium   3.9   3.5 - 5.1 MMOL/L   [facility]

 

   



  Chloride   109   98 - 110 MMOL/L   [facility]

 

   



  Glucose   90   70 - 100 MG/DL   [facility]

 

   



  Blood Urea Nitrogen   15   7 - 25 MG/DL   [facility]

 

   



  Creatinine   0.85   0.4 - 1.00 MG/DL   [facility]

 

   



  Calcium   9.7   8.5 - 10.6 MG/DL   [facility]

 

   



  Total Protein   7.4   6.0 - 8.0 G/DL   [facility]

 

   



  Total Bilirubin   0.4   0.3 - 1.2 MG/DL   [facility]

 

   



  Albumin   4.3   3.5 - 5.0 G/DL   [facility]

 

   



  Alk Phosphatase   77   25 - 110 U/L   [facility]

 

   



  AST (SGOT)   14   7 - 40 U/L   [facility]

 

   



  CO2   27   21 - 30 MMOL/L   [facility]

 

   



  ALT (SGPT)   9   7 - 56 U/L   [facility]

 

   



  Anion Gap   4   3 - 12   [facility]

 

   



  eGFR Non African American   >60   >60 mL/min   [facility]



   Comment:  



   The eGFR is not validated for  



   use in drug dosing  



   adjustments.Continue to  



   use  



   estimated creatinine  



   clearance per dosing reference  



   text.Please contact the  



   Clinical Pharmacist for  



   questions.  

 

   



  eGFR    >60   >60 mL/min   [facility]



   Comment:  



   The eGFR is not validated for  



   use in drug dosing  



   adjustments.Continue to  



   use  



   estimated creatinine  



   clearance per dosing reference  



   text.Please contact the  



   Clinical Pharmacist for  



   questions.  













  Specimen

 





  Blood









   



  Performing Organization   Address   City/State/Zipcode   Phone Number

 

   



  [facility]   [address] [place] [address] 





from Last 3 Months



Insurance







     



  Payer   Benefit   Subscriber ID   Type   Phone   Address



   Plan /    



   Group    

 

     



  MEDICARE   MEDICARE   xxxxxxxxxxx   Medicare  



   PART A AND    



   B    

 

     



  [place]



   [place]ID    









     



  Guarantor Name   Account   Relation to   Date of   Phone   Billing Address



   Type   Patient   Birth  

 

     



  Wendy Dunaway   Personal/F   Self   1960   [phone]   [address]     (Home)   [place] [address]

 

     



  Wendy Dunaway   Third   Self   1960   [phone]   [address]     (Home)   [place] [address]



   Liability    







Advance Directives





Patient has advance care planning documents on file. For more information, 
please contact:



[facility]



[address]



[place] [address]

## 2019-01-22 NOTE — XMS REPORT
[facility]

 Created on: 2018



Wendy Dunaway

External Reference #: [id number]

: 1960

Sex: Female



Demographics







 Address  [address] [place]  [address]

 

 Preferred Language  Unknown

 

 Marital Status  Unknown

 

 [employer] Affiliation  Unknown

 

 Race  Unknown

 

 Ethnic Group  Unknown





Author







 Author  CALVINUBALDO BEAL

 

 [facility]

 

 Address  Unknown

 

 Phone  [phone]







Care Team Providers







 Care Team Member Name  Role  Phone

 

 UBALDO KAUFMAN  Unavailable  [phone]







PROBLEMS

Unknown Problems



ALLERGIES







 Substance  Reaction  Event Type  Date  Status

 

 Sulfamethoxazole  Unknown  Drug Allergy    Active







ENCOUNTERS







 Encounter  Location  Date  Diagnosis

 

 [facility]  [id number] [place] [address] 
[id number]  18 Dec, 2018   

 

 [facility]  [address] [facility] [id number] [place] [address] 
[id number]    Caries K02.9 and Dental examination Z01.20

 

 [facility]  [id number] [place] [address] 
[id number]  31 Oct, 2018  Dental examination Z01.20

 

 [facility]  [id number] [place] [address] 
[id number]    Caries K02.9 and Dental examination Z01.20







IMMUNIZATIONS

No Known Immunizations



SOCIAL HISTORY

Never Assessed



REASON FOR VISIT

Ju



PLAN OF CARE







 Activity  Details









  









 Follow Up  prn Reason:fillings







VITAL SIGNS







 Blood pressure systolic  133 mmHg  2018

 

 Blood pressure diastolic  83 mmHg  2018







MEDICATIONS







 Medication  Instructions  Dosage  Frequency  Start Date  End Date  Duration  
Status

 

 Gabapentin                    Active

 

 Sprycel                    Not-Taking







RESULTS

No Results



PROCEDURES







 Procedure  Date Ordered  Result  Body Site

 

 COMP ORAL EVALUATION - NEW/EST PT  2018      

 

 INTRAORL-PERIAPICAL 1 FILM 60354  2018      

 

 BITEWINGS - FOUR FILMS  2018      

 

 INTRAORL-PERIAPICAL EA ADD FILM  2018      

 

 PANORAMIC FILM SEE ALSO CODE 18781  2018      







INSTRUCTIONS





MEDICATIONS ADMINISTERED

No Known Medications



MEDICAL (GENERAL) HISTORY







 Type  Description  Date

 

 Medical History  Leukemia    

 

 Surgical History  No Surgical history information   

 

 Hospitalization History  [facility]  2018

## 2019-01-22 NOTE — DIAGNOSTIC IMAGING REPORT
INDICATION: Tachycardia



COMPARISON: 11/07/2018



FINDINGS:  

A single view of the chest demonstrates stable cardiac

enlargement. There is minimal scarring in the left base. No acute

infiltrate seen. There is no pneumothorax or effusion. Osseous

structures stable.



IMPRESSION:

Stable cardiac enlargement without pulmonary edema or acute

infiltrate.



Dictated by: 



  Dictated on workstation # [id number]

## 2019-01-22 NOTE — XMS REPORT
[facility]

 Created on: 10/31/2018



Wendy Dunaway

External Reference #: [id number]

: 1960

Sex: Female



Demographics







 Address  [address] [place]  [address]

 

 Preferred Language  Unknown

 

 Marital Status  Unknown

 

 Confucianist Affiliation  Unknown

 

 Race  Unknown

 

 Ethnic Group  Unknown





Author







 Author  UBALDO KAUFMAN

 

 [facility]

 

 Address  Unknown

 

 Phone  [phone]







Care Team Providers







 Care Team Member Name  Role  Phone

 

 CALVINLIAN  UBALDO  Unavailable  [phone]







PROBLEMS

Unknown Problems



ALLERGIES

No Information



ENCOUNTERS







 Encounter  Location  Date  Diagnosis

 

 [facility]  [address] [place] [address] 
[id number]     

 

 [facility]  [id number] [place] [address] 
[id number]  31 Oct, 2018  Dental examination Z01.20

 

 [facility]  [address] [place] [address] 
[id number]    Caries K02.9 and Dental examination Z01.20







IMMUNIZATIONS

No Known Immunizations



SOCIAL HISTORY

Never Assessed



REASON FOR VISIT

RADHA



PLAN OF CARE





VITAL SIGNS





MEDICATIONS

Unknown Medications



RESULTS

No Results



PROCEDURES







 Procedure  Date Ordered  Result  Body Site

 

 Billing Notes on claim  Oct 31, 2018      







INSTRUCTIONS





MEDICATIONS ADMINISTERED

No Known Medications



MEDICAL (GENERAL) HISTORY







 Type  Description  Date

 

 Medical History  Leukemia

## 2019-01-30 ENCOUNTER — HOSPITAL ENCOUNTER (OUTPATIENT)
Dept: HOSPITAL 75 - RAD | Age: 59
End: 2019-01-30
Attending: UROLOGY
Payer: MEDICARE

## 2019-01-30 DIAGNOSIS — Z90.49: ICD-10-CM

## 2019-01-30 DIAGNOSIS — R31.29: ICD-10-CM

## 2019-01-30 DIAGNOSIS — K57.30: Primary | ICD-10-CM

## 2019-01-30 PROCEDURE — 74176 CT ABD & PELVIS W/O CONTRAST: CPT

## 2019-01-30 NOTE — DIAGNOSTIC IMAGING REPORT
PROCEDURE: CT abdomen and pelvis without contrast.



TECHNIQUE: Multiple contiguous axial images were obtained through

the abdomen and pelvis without the use of intravenous contrast. 



INDICATION: Painless hematuria.



FINDINGS: The previous CT of the abdomen/pelvis exam of

07/19/2012 noted marked splenomegaly and mild hepatosplenomegaly.

In the interval since the prior exam both the liver and the

spleen have diminished in size. There is no evidence for either

splenomegaly or hepatomegaly at this time. The spleen is somewhat

prominent measuring 10.6 cm in length.



There is no evidence for nephrolithiasis or urolithiasis and the

kidneys do not appear to be obstructed. The urinary bladder is

only partially filled and consequently not well evaluated. There

is no obvious bladder abnormality evident.



There is no pelvic mass or free fluid collection evident. The

uterus is not enlarged. There is diverticulosis of the sigmoid

and descending colon but there is no sign of acute

diverticulitis. The appendix was visualized. There is an

appendicolith within the appendix but there is no evidence for

acute appendicitis.



The pancreas, the adrenals, the aorta and the inferior vena cava

are unremarkable for an acute abnormality. The gallbladder is

surgically absent. This is also noted on the prior exam. The

stomach is partially filled with fluid and particulate matter

consequently difficult to assess. Lung bases are clear. The bone

windows show no evidence for a fracture or for a destructive

lesion. Incidental note is made of an umbilical metallic prong.

This was also present on the prior exam.



IMPRESSION:



1. The hepatosplenomegaly seen on the prior exam has resolved.

There is no acute abnormality of abdomen or pelvis.



2. There is no sign of nephrolithiasis or urolithiasis. The

urinary bladder is grossly unremarkable.



3. There is diverticulosis of the sigmoid and descending colon

without evidence for acute diverticulitis. 



Dictated by: 



  Dictated on workstation # [id number]

## 2019-03-25 ENCOUNTER — HOSPITAL ENCOUNTER (OUTPATIENT)
Dept: HOSPITAL 75 - LAB | Age: 59
End: 2019-03-25
Attending: PHYSICIAN ASSISTANT
Payer: MEDICARE

## 2019-03-25 DIAGNOSIS — R06.02: ICD-10-CM

## 2019-03-25 DIAGNOSIS — I48.0: ICD-10-CM

## 2019-03-25 DIAGNOSIS — C92.11: ICD-10-CM

## 2019-03-25 DIAGNOSIS — R06.09: Primary | ICD-10-CM

## 2019-03-25 LAB
ALBUMIN SERPL-MCNC: 4.1 GM/DL (ref 3.2–4.5)
ALBUMIN SERPL-MCNC: 4.1 GM/DL (ref 3.2–4.5)
ALP SERPL-CCNC: 86 U/L (ref 40–136)
ALP SERPL-CCNC: 86 U/L (ref 40–136)
ALT SERPL-CCNC: 9 U/L (ref 0–55)
ALT SERPL-CCNC: 9 U/L (ref 0–55)
BASOPHILS # BLD AUTO: 0 10^3/UL (ref 0–0.1)
BASOPHILS NFR BLD AUTO: 1 % (ref 0–10)
BILIRUB SERPL-MCNC: 0.6 MG/DL (ref 0.1–1)
BILIRUB SERPL-MCNC: 0.8 MG/DL (ref 0.1–1)
BUN/CREAT SERPL: 14
BUN/CREAT SERPL: 14
CALCIUM SERPL-MCNC: 9.2 MG/DL (ref 8.5–10.1)
CALCIUM SERPL-MCNC: 9.7 MG/DL (ref 8.5–10.1)
CHLORIDE SERPL-SCNC: 110 MMOL/L (ref 98–107)
CHLORIDE SERPL-SCNC: 110 MMOL/L (ref 98–107)
CHOLEST SERPL-MCNC: 172 MG/DL (ref ?–200)
CO2 SERPL-SCNC: 19 MMOL/L (ref 21–32)
CO2 SERPL-SCNC: 20 MMOL/L (ref 21–32)
CREAT SERPL-MCNC: 0.84 MG/DL (ref 0.6–1.3)
CREAT SERPL-MCNC: 0.84 MG/DL (ref 0.6–1.3)
EOSINOPHIL # BLD AUTO: 0.2 10^3/UL (ref 0–0.3)
EOSINOPHIL NFR BLD AUTO: 4 % (ref 0–10)
ERYTHROCYTE [DISTWIDTH] IN BLOOD BY AUTOMATED COUNT: 14.2 % (ref 10–14.5)
GFR SERPLBLD BASED ON 1.73 SQ M-ARVRAT: > 60 ML/MIN
GFR SERPLBLD BASED ON 1.73 SQ M-ARVRAT: > 60 ML/MIN
GLUCOSE SERPL-MCNC: 94 MG/DL (ref 70–105)
GLUCOSE SERPL-MCNC: 95 MG/DL (ref 70–105)
HCT VFR BLD CALC: 42 % (ref 35–52)
HDLC SERPL-MCNC: 54 MG/DL (ref 40–60)
HGB BLD-MCNC: 14.1 G/DL (ref 11.5–16)
LYMPHOCYTES # BLD AUTO: 1.1 X 10^3 (ref 1–4)
LYMPHOCYTES NFR BLD AUTO: 25 % (ref 12–44)
MANUAL DIFFERENTIAL PERFORMED BLD QL: NO
MCH RBC QN AUTO: 32 PG (ref 25–34)
MCHC RBC AUTO-ENTMCNC: 33 G/DL (ref 32–36)
MCV RBC AUTO: 97 FL (ref 80–99)
MONOCYTES # BLD AUTO: 0.4 X 10^3 (ref 0–1)
MONOCYTES NFR BLD AUTO: 9 % (ref 0–12)
NEUTROPHILS # BLD AUTO: 2.8 X 10^3 (ref 1.8–7.8)
NEUTROPHILS NFR BLD AUTO: 62 % (ref 42–75)
PLATELET # BLD: 189 10^3/UL (ref 130–400)
PMV BLD AUTO: 9.8 FL (ref 7.4–10.4)
POTASSIUM SERPL-SCNC: 3.9 MMOL/L (ref 3.6–5)
POTASSIUM SERPL-SCNC: 3.9 MMOL/L (ref 3.6–5)
PROT SERPL-MCNC: 7.2 GM/DL (ref 6.4–8.2)
PROT SERPL-MCNC: 7.2 GM/DL (ref 6.4–8.2)
SODIUM SERPL-SCNC: 139 MMOL/L (ref 135–145)
SODIUM SERPL-SCNC: 140 MMOL/L (ref 135–145)
TRIGL SERPL-MCNC: 64 MG/DL (ref ?–150)
VLDLC SERPL CALC-MCNC: 13 MG/DL (ref 5–40)
WBC # BLD AUTO: 4.5 10^3/UL (ref 4.3–11)

## 2019-03-25 PROCEDURE — 80053 COMPREHEN METABOLIC PANEL: CPT

## 2019-03-25 PROCEDURE — 80061 LIPID PANEL: CPT

## 2019-03-25 PROCEDURE — 36415 COLL VENOUS BLD VENIPUNCTURE: CPT

## 2019-03-26 ENCOUNTER — HOSPITAL ENCOUNTER (OUTPATIENT)
Dept: HOSPITAL 75 - RAD | Age: 59
End: 2019-03-26
Attending: NURSE PRACTITIONER
Payer: MEDICARE

## 2019-03-26 ENCOUNTER — HOSPITAL ENCOUNTER (OUTPATIENT)
Dept: HOSPITAL 75 - LAB | Age: 59
LOS: 89 days | Discharge: HOME | End: 2019-06-23
Attending: NURSE PRACTITIONER
Payer: MEDICARE

## 2019-03-26 DIAGNOSIS — K74.60: Primary | ICD-10-CM

## 2019-03-26 DIAGNOSIS — Z90.49: ICD-10-CM

## 2019-03-26 LAB
INR PPP: 1.7 (ref 0.8–1.4)
PROTHROMBIN TIME: 20.9 SEC (ref 12.2–14.7)

## 2019-03-26 PROCEDURE — 82105 ALPHA-FETOPROTEIN SERUM: CPT

## 2019-03-26 PROCEDURE — 85025 COMPLETE CBC W/AUTO DIFF WBC: CPT

## 2019-03-26 PROCEDURE — 80053 COMPREHEN METABOLIC PANEL: CPT

## 2019-03-26 PROCEDURE — 85610 PROTHROMBIN TIME: CPT

## 2019-03-26 PROCEDURE — 76700 US EXAM ABDOM COMPLETE: CPT

## 2019-03-26 PROCEDURE — 36415 COLL VENOUS BLD VENIPUNCTURE: CPT

## 2019-03-26 NOTE — DIAGNOSTIC IMAGING REPORT
PROCEDURE: US abdomen complete.



TECHNIQUE: Multiple real-time grayscale images were obtained over

the abdomen in various projections.



INDICATION: Cirrhosis.



FINDINGS: The liver is normal in size at 15.9 cm. No discrete

liver mass is identified. The portal vein is patent and shows

normal direction of flow. Gallbladder is surgically absent. No

biliary ductal dilatation is seen. The visualized pancreas is

unremarkable. The spleen is normal in size at 11 cm. Proximal

aorta is non-aneurysmal. Mid and distal aorta were obscured by

bowel gas. IVC is unremarkable. Both kidneys are unremarkable. No

calculi or hydronephrosis is seen. There is no ascites.



IMPRESSION: Essentially unremarkable abdominal ultrasound. No

discrete liver mass is seen. No definite findings to suggest

portal hypertension are identified.



Dictated by: 



  Dictated on workstation # [id number]

## 2019-04-17 ENCOUNTER — HOSPITAL ENCOUNTER (OUTPATIENT)
Dept: HOSPITAL 75 - PREOP | Age: 59
End: 2019-04-17
Attending: OBSTETRICS & GYNECOLOGY
Payer: MEDICARE

## 2019-04-17 VITALS — WEIGHT: 165 LBS | HEIGHT: 68 IN | BODY MASS INDEX: 25.01 KG/M2

## 2019-04-17 DIAGNOSIS — Z01.818: Primary | ICD-10-CM

## 2019-04-23 ENCOUNTER — HOSPITAL ENCOUNTER (OUTPATIENT)
Dept: HOSPITAL 75 - SDC | Age: 59
LOS: 1 days | Discharge: HOME | End: 2019-04-24
Attending: OBSTETRICS & GYNECOLOGY
Payer: MEDICARE

## 2019-04-23 VITALS — SYSTOLIC BLOOD PRESSURE: 152 MMHG | DIASTOLIC BLOOD PRESSURE: 89 MMHG

## 2019-04-23 VITALS — SYSTOLIC BLOOD PRESSURE: 119 MMHG | DIASTOLIC BLOOD PRESSURE: 75 MMHG

## 2019-04-23 VITALS — SYSTOLIC BLOOD PRESSURE: 156 MMHG | DIASTOLIC BLOOD PRESSURE: 87 MMHG

## 2019-04-23 VITALS — SYSTOLIC BLOOD PRESSURE: 134 MMHG | DIASTOLIC BLOOD PRESSURE: 88 MMHG

## 2019-04-23 VITALS — DIASTOLIC BLOOD PRESSURE: 82 MMHG | SYSTOLIC BLOOD PRESSURE: 145 MMHG

## 2019-04-23 VITALS — DIASTOLIC BLOOD PRESSURE: 80 MMHG | SYSTOLIC BLOOD PRESSURE: 141 MMHG

## 2019-04-23 VITALS — DIASTOLIC BLOOD PRESSURE: 81 MMHG | SYSTOLIC BLOOD PRESSURE: 136 MMHG

## 2019-04-23 VITALS — SYSTOLIC BLOOD PRESSURE: 117 MMHG | DIASTOLIC BLOOD PRESSURE: 77 MMHG

## 2019-04-23 VITALS — SYSTOLIC BLOOD PRESSURE: 124 MMHG | DIASTOLIC BLOOD PRESSURE: 73 MMHG

## 2019-04-23 VITALS — SYSTOLIC BLOOD PRESSURE: 118 MMHG | DIASTOLIC BLOOD PRESSURE: 75 MMHG

## 2019-04-23 VITALS — DIASTOLIC BLOOD PRESSURE: 80 MMHG | SYSTOLIC BLOOD PRESSURE: 123 MMHG

## 2019-04-23 VITALS — BODY MASS INDEX: 25.01 KG/M2 | HEIGHT: 68 IN | WEIGHT: 165 LBS

## 2019-04-23 VITALS — SYSTOLIC BLOOD PRESSURE: 150 MMHG | DIASTOLIC BLOOD PRESSURE: 86 MMHG

## 2019-04-23 VITALS — SYSTOLIC BLOOD PRESSURE: 139 MMHG | DIASTOLIC BLOOD PRESSURE: 87 MMHG

## 2019-04-23 DIAGNOSIS — C95.90: ICD-10-CM

## 2019-04-23 DIAGNOSIS — I48.0: ICD-10-CM

## 2019-04-23 DIAGNOSIS — Z79.899: ICD-10-CM

## 2019-04-23 DIAGNOSIS — N39.3: ICD-10-CM

## 2019-04-23 DIAGNOSIS — N81.2: Primary | ICD-10-CM

## 2019-04-23 DIAGNOSIS — Z79.01: ICD-10-CM

## 2019-04-23 DIAGNOSIS — M79.7: ICD-10-CM

## 2019-04-23 DIAGNOSIS — B19.20: ICD-10-CM

## 2019-04-23 DIAGNOSIS — K21.9: ICD-10-CM

## 2019-04-23 DIAGNOSIS — Z87.891: ICD-10-CM

## 2019-04-23 LAB
APTT PPP: YELLOW S
BACTERIA #/AREA URNS HPF: NEGATIVE /HPF
BASOPHILS # BLD AUTO: 0 10^3/UL (ref 0–0.1)
BASOPHILS NFR BLD AUTO: 1 % (ref 0–10)
BILIRUB UR QL STRIP: NEGATIVE
EOSINOPHIL # BLD AUTO: 0.1 10^3/UL (ref 0–0.3)
EOSINOPHIL NFR BLD AUTO: 3 % (ref 0–10)
ERYTHROCYTE [DISTWIDTH] IN BLOOD BY AUTOMATED COUNT: 14 % (ref 10–14.5)
FIBRINOGEN PPP-MCNC: CLEAR MG/DL
GLUCOSE UR STRIP-MCNC: NEGATIVE MG/DL
HCT VFR BLD CALC: 41 % (ref 35–52)
HGB BLD-MCNC: 13.9 G/DL (ref 11.5–16)
KETONES UR QL STRIP: NEGATIVE
LEUKOCYTE ESTERASE UR QL STRIP: (no result)
LYMPHOCYTES # BLD AUTO: 1.1 X 10^3 (ref 1–4)
LYMPHOCYTES NFR BLD AUTO: 34 % (ref 12–44)
MANUAL DIFFERENTIAL PERFORMED BLD QL: NO
MCH RBC QN AUTO: 33 PG (ref 25–34)
MCHC RBC AUTO-ENTMCNC: 34 G/DL (ref 32–36)
MCV RBC AUTO: 97 FL (ref 80–99)
MONOCYTES # BLD AUTO: 0.4 X 10^3 (ref 0–1)
MONOCYTES NFR BLD AUTO: 11 % (ref 0–12)
NEUTROPHILS # BLD AUTO: 1.7 X 10^3 (ref 1.8–7.8)
NEUTROPHILS NFR BLD AUTO: 51 % (ref 42–75)
NITRITE UR QL STRIP: NEGATIVE
PH UR STRIP: 7 [PH] (ref 5–9)
PLATELET # BLD: 194 10^3/UL (ref 130–400)
PMV BLD AUTO: 9.6 FL (ref 7.4–10.4)
PROT UR QL STRIP: NEGATIVE
RBC #/AREA URNS HPF: (no result) /HPF
SP GR UR STRIP: 1.01 (ref 1.02–1.02)
SQUAMOUS #/AREA URNS HPF: (no result) /HPF
UROBILINOGEN UR-MCNC: NORMAL MG/DL
WBC # BLD AUTO: 3.3 10^3/UL (ref 4.3–11)
WBC #/AREA URNS HPF: (no result) /HPF

## 2019-04-23 PROCEDURE — 94664 DEMO&/EVAL PT USE INHALER: CPT

## 2019-04-23 PROCEDURE — 86900 BLOOD TYPING SEROLOGIC ABO: CPT

## 2019-04-23 PROCEDURE — 81000 URINALYSIS NONAUTO W/SCOPE: CPT

## 2019-04-23 PROCEDURE — 85025 COMPLETE CBC W/AUTO DIFF WBC: CPT

## 2019-04-23 PROCEDURE — 86901 BLOOD TYPING SEROLOGIC RH(D): CPT

## 2019-04-23 PROCEDURE — 87081 CULTURE SCREEN ONLY: CPT

## 2019-04-23 PROCEDURE — 36415 COLL VENOUS BLD VENIPUNCTURE: CPT

## 2019-04-23 PROCEDURE — 86850 RBC ANTIBODY SCREEN: CPT

## 2019-04-23 RX ADMIN — KETOROLAC TROMETHAMINE SCH MG: 30 INJECTION, SOLUTION INTRAMUSCULAR; INTRAVENOUS at 20:51

## 2019-04-23 RX ADMIN — BUSPIRONE HYDROCHLORIDE SCH MG: 15 TABLET ORAL at 14:53

## 2019-04-23 RX ADMIN — KETOROLAC TROMETHAMINE SCH MG: 30 INJECTION, SOLUTION INTRAMUSCULAR; INTRAVENOUS at 14:53

## 2019-04-23 RX ADMIN — DOCUSATE SODIUM SCH MG: 100 CAPSULE ORAL at 20:52

## 2019-04-23 RX ADMIN — QUETIAPINE FUMARATE SCH MG: 100 TABLET, FILM COATED ORAL at 12:18

## 2019-04-23 RX ADMIN — QUETIAPINE FUMARATE SCH MG: 100 TABLET, FILM COATED ORAL at 16:59

## 2019-04-23 RX ADMIN — QUETIAPINE FUMARATE SCH MG: 100 TABLET, FILM COATED ORAL at 20:54

## 2019-04-23 RX ADMIN — SODIUM CHLORIDE, SODIUM LACTATE, POTASSIUM CHLORIDE, AND CALCIUM CHLORIDE PRN MLS/HR: 600; 310; 30; 20 INJECTION, SOLUTION INTRAVENOUS at 08:33

## 2019-04-23 RX ADMIN — SODIUM CHLORIDE, SODIUM LACTATE, POTASSIUM CHLORIDE, AND CALCIUM CHLORIDE PRN MLS/HR: 600; 310; 30; 20 INJECTION, SOLUTION INTRAVENOUS at 07:07

## 2019-04-23 RX ADMIN — BUSPIRONE HYDROCHLORIDE SCH MG: 15 TABLET ORAL at 20:55

## 2019-04-23 RX ADMIN — ACETAMINOPHEN SCH MG: 500 TABLET ORAL at 20:52

## 2019-04-23 RX ADMIN — GABAPENTIN SCH MG: 600 TABLET, FILM COATED ORAL at 16:00

## 2019-04-23 RX ADMIN — SODIUM CHLORIDE, SODIUM LACTATE, POTASSIUM CHLORIDE, AND CALCIUM CHLORIDE SCH MLS/HR: 600; 310; 30; 20 INJECTION, SOLUTION INTRAVENOUS at 16:10

## 2019-04-23 RX ADMIN — SODIUM CHLORIDE, SODIUM LACTATE, POTASSIUM CHLORIDE, AND CALCIUM CHLORIDE SCH MLS/HR: 600; 310; 30; 20 INJECTION, SOLUTION INTRAVENOUS at 10:07

## 2019-04-23 RX ADMIN — CARVEDILOL SCH MG: 25 TABLET, FILM COATED ORAL at 17:00

## 2019-04-23 RX ADMIN — GABAPENTIN SCH MG: 600 TABLET, FILM COATED ORAL at 20:53

## 2019-04-23 RX ADMIN — CARVEDILOL SCH MG: 25 TABLET, FILM COATED ORAL at 20:54

## 2019-04-23 RX ADMIN — KETOROLAC TROMETHAMINE SCH MG: 30 INJECTION, SOLUTION INTRAMUSCULAR; INTRAVENOUS at 09:10

## 2019-04-23 RX ADMIN — ACETAMINOPHEN SCH MG: 500 TABLET ORAL at 11:18

## 2019-04-23 NOTE — OPERATIVE REPORT
Operative Report


Date of Procedure/Surgery


Apr 23, 2019


Surgeon (s)


SIXTO BAIRES DO


Assistant (s):  NONE





Post-Operative Diagnosis





incomplete urogenital prolapse,


3-4+ cystocele





Procedure Performed





Anterior colporrhaphy





Description of Procedure


Anesthesia Type:  General


Estimated blood loss (mL):  150 ml


Specimen(s) collected/removed


none


Packing:  


ESTRACE VAGINAL PACK


Description of the Procedure


With informed consent the patient was taken to the operating room where general 

anesthesia was found to be adequate.  She was prepped and draped in the usual 

sterile fashion in the dorsolithotomy in candy cane stirrups.


A Potts catheter was placed in the bladder for drainage.  She has opted to have 

anterior colporrhaphy and pubovaginal sling but declined hysterectomy.  She 

also has no rectocele symptoms.


A weighted speculum was placed in the vagina and the anterior vaginal wall in 

the suburethral region was grasped with an Allis clamp.  There was a 3-4+ 

cystocele noted.  The anterior vaginal tissue was injected with diluted 

Vasopressin and then a midline incision was made.  I then dissected the vaginal 

epithelium off of the underlying pubovaginal fascia and then dissected 

laterally to the white line.  There was a large midline defect.  


this was repaired in two layers of 2-0 Vicryl in a mattress type interrupted 

fashion.  





Dr. Tawil now came in and did the Sling and this will be dictated under 

separate cover.





Once he was finished, the left arm of the Solyx was noted to be outside the 

vaginal epithelium (button holing).  so I  the skin and then closed 

this over the sling.  I did this with 4-0 Monocryl.  I now trimmed the excess 

vaginal epithelium and then closed the vaginal defect with 2-0 Vicryl in a 

running fashion.  





The vagina was now packed with Estrace cream and vaginal packing.  The Potts 

had previously been replaced.  The patient was now awakened and taken to the 

recovery room in a stable fashion.





Sponge, lap, needle and instrument counts correct times two.


Findings of the Procedure


3-4 + cystocele


small uterus, some uteral prolapse, 


small rectocele





Allergies and Home Medications


Allergies


Coded Allergies:  


     sulfamethoxazole (Verified  Allergy, Severe, RASH, 1/4/18)


     trimethoprim (Verified  Allergy, Severe, RASH, 1/4/18)


     Sulfa (Sulfonamide Antibiotics) (Verified  Allergy, Mild, 1/4/18)


     zolpidem (Verified  Allergy, Mild, skin sensation, 1/4/18)


     celecoxib (Verified  Adverse Reaction, Intermediate, hives, Pt has 

received Ibuprofen in the past, 4/23/19)





Home Medications


Acetaminophen 500 Mg Tablet, 1,000 MG PO Q8HR


   Prescribed by: SIXTO BAIRES on 4/24/19 0852


Buspirone HCl 15 Mg Tablet, 15 MG PO 0900,2100, (Reported)


Buspirone HCl 5 Mg Tablet, 5 MG PO 1400, (Reported)


Cholecalciferol (Vitamin D3) 400 Unit Tablet, 400 UNIT PO DAILY, (Reported)


Diltiazem HCl 240 Mg Cap.er.24h, 240 MG PO DAILY, (Reported)


Docusate Sodium 100 Mg Capsule, 100 MG PO BID


   Prescribed by: SIXTO BAIRES on 4/24/19 0852


Gabapentin 600 Mg Tablet, 1,200 MG PO TID, (Reported)


   TAKES 2 (600MG) TABLETS 


Oxycodone Hcl 5 Mg Tab, 5 MG PO Q4HR PRN for PAIN-SEVERE


   Prescribed by: SIXTO BAIRES on 4/24/19 0852


Potassium Chloride 10 Meq Tablet.er, 10 MEQ PO DAILY, (Reported)


Quetiapine Fumarate 100 Mg Tablet, 100 MG PO 1300,1700, (Reported)


Quetiapine Fumarate 50 Mg Tablet, 50 MG PO 0900,1700,2100, (Reported)


Rivaroxaban 20 Mg Tablet, 20 MG PO HS, (Reported)





Patient Home Medication List


Home Medication List Reviewed:  Yes











SIXTO BAIRES DO Apr 23, 2019 09:21

## 2019-04-23 NOTE — XMS REPORT
Encounter Summary

 Created on: 2019



Wendy Dunaway

External Reference #: [id number]

: 1960

Sex: Female



Demographics







 Address  [address] [place]  [address]

 

 Home Phone  [phone]

 

 Preferred Language  English

 

 Marital Status  Unknown

 

 Mandaeism Affiliation  REF

 

 Race  White

 

 Ethnic Group  Not  or 





Author







 Author  [facility]

 

 Organization  [facility]

 

 Address  Unknown

 

 Phone  Unavailable







Support







 Name  Relationship  Address  Phone

 

 Kelly Siddiqui  ECON  [address]

[place]  [address]  [phone]

 

 Razia Dunaway  ECON  Unknown  [phone]

 

 Troy Rossi  ECON  Unknown  [phone]







Care Team Providers







 Care Team Member Name  Role  Phone

 

 Aleksander Boothe MD  Unavailable  [phone]

 

 Rah Castro DO  PCP  [phone]

 

 Gillian Chand MD  Unavailable  [phone]

 

 Kimberly Del Cid APRN  Unavailable  Unavailable

 

 Coy Shen MD  Unavailable  [phone]

 

 Uri Chau MD  Unavailable  [phone]

 

 Olga Tiwari RN  Unavailable  [phone]

 

 Yola Morales RN  Unavailable  Unavailable

 

 Gabino Cr PHARMD  Unavailable  Unavailable

 

 Jaelyn Hernandez  Unavailable  Unavailable

 

 Olga Polk APRN  Unavailable  [phone]

 

 Esa Betancourt  Unavailable  Unavailable

 

 Karley Mena  Unavailable  Unavailable

 

 Matt Pruett MD  Unavailable  [phone]

 

 Maura Love  Unavailable  Unavailable

 

 Geraldine Sanderson  Unavailable  Unavailable







Reason for Visit

* 





 



  Reason   Comments

 

 



  Cancer 









Encounter Details







    Care Team  Description



  Date   Type   Department  

 

    



Gillian Chand MD



[address] [place] [address] [phone] (Fax)  CML (chronic myelocytic leukemia) (HCC) (Primary Dx)



  2019   Office Visit   [facility]  



    [address] [place] [address]  



    [phone]  







Social History







     Date



  Tobacco Use   Types   Packs/Day   Years Used 

 

      



  Former Smoker   Cigarettes   1   10 

 

    



  Smokeless Tobacco: Former     Quit: 2003



  User   









   



  Alcohol Use   Drinks/Week   oz/Week   Comments

 

   



  No   0 Standard   0.0 



   drinks or  



   equivalent  









 



  Sex Assigned at Birth   Date Recorded

 

 



  Not on file 









   Industry



  Job Start Date   Occupation 

 

   Not on file



  Not on file   Not on file 









   Travel End



  Travel History   Travel Start 













  No recent travel history available.



documented as of this encounter



Last Filed Vital Signs







   Time Taken



  Vital Sign   Reading 

 

   2019  1:15 PM CST



  Blood Pressure   94/77 

 

   2019  1:15 PM CST



  Pulse   83 

 

   2019  1:15 PM CST



  Temperature   36.4 C (97.5 F) 

 

   2019  1:15 PM CST



  Respiratory Rate   18 

 

   2019  1:15 PM CST



  Oxygen Saturation   100% 

 

   -



  Inhaled Oxygen   - 



  Concentration  

 

   2019  1:15 PM CST



  Weight   74 kg (163 lb 3.2 oz) 

 

   2019  1:15 PM CST



  Height   172.7 cm (5' 8") 

 

   2019  1:15 PM CST



  Body Mass Index   24.81 



documented in this encounter



Functional Status







   Date of Assessment



  Functional Status   Response 

 

   2018



  Does the patient have a hearing impairment:   No 

 

   2018



  Does the patient have a visual impairment:   Yes 

 

   2018



  Does the patient have impaired ambulation:   No 

 

   2018



  Does the patient have an activity of daily living   No 



  (ADL) impairment:  

 

   2018



  Does the patient have an instrumental activity of   No 



  daily living (IADL) impairment:  









   Date of Assessment



  Cognitive Status   Response 

 

   2018



  Does the patient have a cognitive impairment:   No 



documented as of this encounter



Patient Instructions

* Patient Instructions* 



 Yola Morales, RN - 2019  1:20 PM CST



Your care team: 



Dr. Gillian Chand

 Hematologist 

LIO Goncalves

 Nurse Practitioner 

Clinical Nurse Coordinators (CNC's)

 Malou Bledsoe RN, BSN

 Yola Morales RN, BSN 



Phone numbers: 



[place] # [phone]



Héctor Landry # [phone]

Messages left on nurses' line are checked Monday-Friday 8:00 AM - 4:00 PM.



Evening (after 4:00 PM), weekend and holiday on-call # [phone] 

For urgent needs after hours, please ask for the oncologist on-call to be 
paged. 

For urgent needs during business hours, please ask for Malou or Yola, Dr. Chand
's nurses, to be paged.



Notes: 

- Allow one business week for our office to complete any requested paperwork (
FMLA, etc.) 

- Allow two to three business days for all medication refills. Please call your 
pharmacy first to check for available refills. 







Electronically signed by Yola Morales RN at 2019  1:33 PM CST





documented in this encounter



Progress Notes

* Gillian Chand MD - 2019  1:20 PM CST



Formatting of this note might be different from the original.

Date of Service: 3/6/2019



Subjective:        

 

Reason for Visit:

Cancer



Wendy Dunaway is a 59 y.o. female.



History of Present IllnessMsRuby Dunaway is a 57-year-old woman with history of 
hepatitis C virus infection and peripheral neuropathy. She was diagnosed with 
chronic phase CML in 2012 after presenting with leucocytosis and extrema 
thrombocytosis. Molecular testing was positive for p210 BCR-ABL translocation. 
She has been started on Gleevec. She has been on a dose between 400 and 800 
over the past several months due to liver disease and dosing changes were made 
based on her response as well as her abnormal liver function Gleevec was 
discontinued in mid 2013 as reported by the patient that her CML has 
progressed. Her oncologist, Dr. Stewart discontinued Gleevec and started the 
patient on nilotinib. As per the patient, the patient took nilotinib for 1 day 
and had severe exacerbation for her neuropathy, where she had to stop therapy. 
She has been off any TKI for the several weeks. She was started on Dasatinib 
and fist dose administered on March 15. 



She has completed CMR on dasatinib. 



Interval History:

Patient  is here today for follow up of CML.

She was  on dasatinib 20 mg by mouth once a day and has been complian.  She 
unfortunately has had recurrent pleural effusions and more recently had likely 
secondary to atrial fibrillation for which she was started on anticoagulation 
and on rhythm control.  She has had recurrent pleural effusions despite dose 
reduction of Sprycel down to 20 mg by mouth once a day.  



She has discontinued Dastinib in 2018. She had been on monthly laboratory 
surveillance 



She is here for follow-up.  She feels great and has had good clinical 
improvement while off dasatinib.  She has not had any recurrent pleural 
effusions.  

.

I have reviewed the patient's past medical history, social history, and family 
history.  There is no interval changes in regard to her current visit.



 

Review of Systems 

Constitutional: Negative for activity change, appetite change, chills, 
diaphoresis, fatigue, fever and unexpected weight change. 

HENT: Negative for congestion, facial swelling and sinus pressure.  

Respiratory: Positive for shortness of breath. Negative for cough, chest 
tightness and wheezing.  

Cardiovascular: Positive for palpitations. Negative for chest pain and leg 
swelling. 

Gastrointestinal: Negative for abdominal distention, abdominal pain, anal 
bleeding, blood in stool, constipation, diarrhea, nausea and vomiting. 

Genitourinary: Negative for hematuria. 

Musculoskeletal: Negative for arthralgias and myalgias. 

Skin: Negative for rash. 

Neurological: Positive for numbness (chronic,stable). 

Hematological: Negative for adenopathy. Does not bruise/bleed easily. 

Psychiatric/Behavioral: The patient is not nervous/anxious.  



Objective:       

 ascorbic acid(+) (VITAMIN C) 1,000 mg tablet Take 1,000 mg by mouth daily. 

 cholecalciferol (VITAMIN D-3) 1,000 units tablet Take 1,000 Units by mouth 
daily. 

 diltiazem CD (CARDIZEM CD) 240 mg capsule Take 240 mg by mouth daily. 

 gabapentin (NEURONTIN) 600 mg tablet Take 1 tab every morning, 2 tabs in 
the afternoon, 2 tabs in the evening, and 1 tab at bedtime 

 potassium chloride(+) (MICRO-K) 10 mEq capsule Take 10 mEq by mouth daily. 
Take with a meal and a full glass of water. 

 QUEtiapine (SEROQUEL) 100 mg tablet Take 1/2 tab every morning, 1.5 tab at 
1 pm, 1.5 tab at 5 pm and 1/2 tab at bedtime. 

 rivaroxaban (XARELTO) 20 mg tablet Take 20 mg by mouth daily. Take with 
food. 

 warfarin (COUMADIN) 1 mg tablet Take 1 mg by mouth daily. 

 warfarin (COUMADIN) 5 mg tablet Take 5 mg by mouth daily. 



Vitals: 

 19 1315 

BP: 94/77 

Pulse: 83 

Resp: 18 

Temp: 36.4 C (97.5 F) 

TempSrc: Oral 

SpO2: 100% 

Weight: 74 kg (163 lb 3.2 oz) 

Height: 172.7 cm (68") 



Body mass index is 24.81 kg/m. 



Pain Score: Zero

 



Pain Addressed:  N/A



Patient Evaluated for a Clinical Trial: Patient not eligible for a treatment 
trial (including not needing treatment, needs palliative care, in remission). 



[employer] Co[employer] Oncology Group performance status is 0, Fully active, able 
to carry on all pre-disease performance without restriction..



 Physical Exam 

Constitutional: She is oriented to person, place, and time. She appears well-
developed and well-nourished. 

HENT: 

Head: Normocephalic and atraumatic. 

Mouth/Throat: Oropharynx is clear and moist. 

Eyes: Conjunctivae and EOM are normal. 

Neck: Normal range of motion. Neck supple. 

Cardiovascular: Normal rate, regular rhythm and normal heart sounds. 

Pulmonary/Chest: Breath sounds normal. No respiratory distress. 

Decreased air entry, left chest.  

Abdominal: Soft. She exhibits no distension. There is no tenderness. There is 
no guarding. 

Musculoskeletal: Normal range of motion. She exhibits no edema. 

Lymphadenopathy: 

  She has no cervical adenopathy. 

Neurological: She is alert and oriented to person, place, and time. 

Skin: Skin is warm and dry. No rash noted. No erythema. No pallor. 

Psychiatric: She has a normal mood and affect. Her behavior is normal. Judgment 
and thought content normal. 

Vitals reviewed.



 



 

Assessment and Plan:



Problem 

Cml (Chronic Myelocytic Leukemia) ([facility]) 

 Diagnosis: CM chronic phase

Date of Diagnosis: 2012

Treatment:

Imatinib 400-800 mg PO daily  to 2013: Best response not available, 
patient progressed 2013 ( Cytogenetic progression/ relapse) 

Nilotinib 200 MG PO BID: unable to tolerate, exacerbated neuropathy

Dasatinib 100 mg PO daily: started March 15, 2013

Response: CCR, CMR

Dasatinib reduced to 70 mg PO daily: Oct , 2013

7/30/15-Continue Dasatinib 70 mg po daily.

May 2016: Dasatinib 50 mg po daily.

Oct   2016: Dasatinib 40 mg po daily. CMR

Oct   2017: Dasatinib 20 mg po daily.  Reduction due to recurrent pleural 
effusions 

2018:  Holding dasatinb 



Ms. Dunaway is a 57-year-old woman with a history of hep C infection and 
unexplained neuropathy.  She also has significant dysphagia and odynophagia and 
has significant difficulty in observing oral medication tablets.  



She was diagnosed with chronic phase CML in 2012.  She has been under the 
care of Dr. Petersen.  There has not been any outside records available for us 
to review today and most of the information available has been provided by the 
patient verbally.  Her treatment hostory is as above. 

Repeat the bone marrow biopsy did not show clonal evolution of her CML. Remains 
in CP-CML. No Kinase-domain mutations were detected. 



Since diagnoses her hep C therapy will resulted in complete eradication of 
hepatitis C virus infection with sustained molecular remission.  Her dysphagia 
and odynophagia has resolved and she is able to take oral pills.



 



CML (chronic myelocytic leukemia) (HCC)

Mrs. Dunaway is here today for follow up for CML.



She had been on long term therapy with Dasatinib.she has achieved CMR and now 1 
year TFR



I discussed with the patient discontinuation data and NCCN guidelines.  



[place] [facility] [place] next 
[place]



She will return in 6 months for clinical assessment.  



Health care maintenance-She is up to date on Mammogram and Pap Smear. 

Continue to follow up with PCP for health care maintenance.



Continue to follow up with Hepatology for history of Cirrhosis.



Time spent with the patient was 25 minutes, which more than 50% was dedicated 
to direct patient counseling as documented above.



 





Electronically signed by Gillian Chand MD at 2019  2:22 PM CST

documented in this encounter



Plan of Treatment







    Order Schedule



  Name   Priority   Associated Diagnoses 

 

    Every 6 weeks for 10 Occurrences starting 2019 until 2020



  CBC AND DIFF   Routine   CML (chronic myelocytic 



    leukemia) (HCC) 

 

    Every 6 weeks for 10 Occurrences starting 2019 until 2020



  BCR  PCR BLOOD OR BONE MARROW   Routine   CML (chronic myelocytic 



    leukemia) (HCC) 



documented as of this encounter



Visit Diagnoses











  Diagnosis

 





  CML (chronic myelocytic leukemia) (HCC) - Primary



  Chronic myeloid leukemia, without mention of having achieved remission





* Assessment & Plan Note - Gillian Chand MD - 2019  2:17 PM CST



Associated Problem(s): CML (chronic myelocytic leukemia) (HCC)



Mrs. Dunaway is here today for follow up for CML.



She had been on long term therapy with Dasatinib.she has achieved CMR and now 1 
year TFR



I discussed with the patient discontinuation data and NCCN guidelines.  



[place] [facility] [place] the next 
year



She will return in 6 months for clinical assessment.  



Health care maintenance-She is up to date on Mammogram and Pap Smear. 

Continue to follow up with PCP for health care maintenance.



Continue to follow up with Hepatology for history of Cirrhosis.





Electronically signed by Gillian Chand MD at 2019  2:22 PM CST

documented in this encounter

## 2019-04-23 NOTE — XMS REPORT
Continuity of Care Document

 Created on: 2019



DARIN BEE

External Reference #: [id number]

: 1960

Sex: Female



Demographics







 Address  [address] [place]  [address]

 

 Home Phone  [phone] x

 

 Preferred Language  Unknown

 

 Marital Status  Unknown

 

 Synagogue Affiliation  Unknown

 

 Race  Unknown

 

 Ethnic Group  Unknown





Author







 Organization  Unknown

 

 Address  Unknown

 

 Phone  [phone]



              



Allergies

      





 Active            Description            Code            Type            
Severity            Reaction            Onset            Reported/Identified   
         Relationship to Patient            Clinical Status        

 

 Yes            sulfamethoxazole            [id number]            Drug Allergy 
           Severe            RASH                         2018           
                       

 

 Yes            trimethoprim            [id number]            Drug Allergy     
       Severe            RASH                         2018               
                   

 

 Yes            celecoxib            [id number]            Drug Allergy        
    Moderate            hives                         2018               
                   

 

 Yes            Sulfa (Sulfonamide Antibiotics)            [id number]          
  Drug Allergy            Mild            N/A                         2018                                  

 

 Yes            zolpidem            [id number]            Drug Allergy         
   Mild            skin sensation                         2018           
                       



                          



Medications

      



There is no data.                  



Problems

      





 Date Dx Coded            Attending            Type            Code            
Diagnosis            Diagnosed By        

 

 2012                         Ot            724.2            LUMBAGO     
                

 

 2012                         Ot            041.49            OTHER AND 
UNSPECIFIED ESCHERICHIA COLI [                     

 

 2012                         Ot            054.9            HERPES 
SIMPLEX NOS                     

 

 2012                         Ot            070.70            UNSPECIFIED 
VIRAL HEPATITIS C WITHOUT HE                     

 

 2012                         Ot            205.10            CHRONIC 
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                     

 

 2012                         Ot            238.79            OTHER 
LYMPHATIC AND HEMATOPOIETIC TISSUE                     

 

 2012                         Ot            280.9            IRON DEFIC 
ANEMIA NOS                     

 

 2012                         Ot            300.00            ANXIETY 
STATE NOS                     

 

 2012                         Ot            311            DEPRESSIVE 
DISORDER NEC                     

 

 2012                         Ot            536.8            STOMACH 
FUNCTION DIS NEC                     

 

 2012                         Ot            571.8            CHRONIC 
LIVER DIS NEC                     

 

 2012                         Ot            599.0            URIN TRACT 
INFECTION NOS                     

 

 2012                         Ot            784.99            OTHER 
SYMPTOMS INVOLVING HEAD AND NECK                     

 

 10/25/2012                         Ot            285.9            ANEMIA NOS  
                   

 

 10/25/2012                         Ot            V58.69            OTH MED,LT,
CURRENT USE                     

 

 2013                         Ot            070.70            UNSPECIFIED 
VIRAL HEPATITIS C WITHOUT HE                     

 

 2013                         Ot            205.10            CHRONIC 
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                     

 

 2013                         Ot            238.71            ESSENTIAL 
THROMBOCYTHEMIA                     

 

 2013                         Ot            285.9            ANEMIA NOS  
                   

 

 2013                         Ot            288.00            NEUTROPENIA
, UNSPECIFIED                     

 

 2013                         Ot            530.81            ESOPHAGEAL 
REFLUX                     

 

 2013                         Ot            553.3            
DIAPHRAGMATIC HERNIA                     

 

 2013                         Ot            V58.69            OTH MED,LT,
CURRENT USE                     

 

 2013                         Ot            205.10            CHRONIC 
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                     

 

 2013                         Ot            461.9            ACUTE 
SINUSITIS NOS                     

 

 2013                         Ot            784.0            HEADACHE    
                 

 

 2013                         Ot            070.70            UNSPECIFIED 
VIRAL HEPATITIS C WITHOUT HE                     

 

 2013                         Ot            205.10            CHRONIC 
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                     

 

 2013                         Ot            238.71            ESSENTIAL 
THROMBOCYTHEMIA                     

 

 2013                         Ot            288.00            NEUTROPENIA
, UNSPECIFIED                     

 

 2013                         Ot            530.81            ESOPHAGEAL 
REFLUX                     

 

 2013                         Ot            553.3            
DIAPHRAGMATIC HERNIA                     

 

 2013                         Ot            V58.69            OTH MED,LT,
CURRENT USE                     

 

 2013                         Ot            382.9            OTITIS MEDIA 
NOS                     

 

 2013                         Ot            723.1            CERVICALGIA 
                    

 

 2013                         Ot            784.1            THROAT PAIN 
                    

 

 2013                         Ot            205.10            CHRONIC 
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                     

 

 2014            KATHRYN HOLLAND DO            Ot            205.10 
           CHRONIC MYELOID LEUKEMIA, W/O MENTION AC                     

 

 2014            KATHRYN HOLLAND DO            Ot            300.00 
           ANXIETY STATE NOS                     

 

 2014            KATHRYN HOLLAND DO            Ot            477.9  
          ALLERGIC RHINITIS NOS                     

 

 2014            KATHRYN HOLLAND DO            Ot            511.9  
          PLEURAL EFFUSION NOS                     

 

 2014            KATHRYN HOLLAND DO            Ot            530.81 
           ESOPHAGEAL REFLUX                     

 

 2014            KATHRYN HOLLAND DO            Ot            558.9  
          NONINF GASTROENTERIT NEC                     

 

 2014            KATHRYN HOLLAND DO            Ot            577.0  
          ACUTE PANCREATITIS                     

 

 2014            KATHRYN HOLLAND DO            Ot            V12.09 
           PERSONAL HISTORY OTH SPEC INFECT   MAYRA                     

 

 2014            KATHRYN HOLLAND DO            Ot            V15.82 
           HISTORY OF TOBACCO USE                     

 

 2014            KATHRYN HOLLAND DO            Ot            V17.3  
          FAM HX-ISCHEM HEART DIS                     

 

 2014            LUKE MAHAN DO            Ot            511.9       
     PLEURAL EFFUSION NOS                     

 

 10/03/2014            KATHRYN HOLLAND DO            Ot            205.10 
           CHRONIC MYELOID LEUKEMIA, W/O MENTION AC                     

 

 10/03/2014            KATHRYN HOLLAND DO            Ot            300.00 
           ANXIETY STATE NOS                     

 

 10/03/2014            KATHRYN HOLLAND DO            Ot            511.9  
          PLEURAL EFFUSION NOS                     

 

 10/03/2014            KATHRYN HOLLAND DO            Ot            530.81 
           ESOPHAGEAL REFLUX                     

 

 10/03/2014            KATHRYN HOLLAND DO            Ot            564.00 
           UNSPEC CONSTIPATION                     

 

 10/03/2014            KATHRYN HOLLAND DO            Ot            569.9  
          INTESTINAL DISORDER NOS                     

 

 10/03/2014            KATHRYN HOLLAND DO            Ot            789.00 
           ABDOMINAL PAIN, UNSPECIFIED SITE                     

 

 10/03/2014            KATHRYN HOLLAND DO            Ot            V15.82 
           HISTORY OF TOBACCO USE                     

 

 2014            KAELA AVILES DO            Ot            511.9       
     PLEURAL EFFUSION NOS                     

 

 2014            KAELA AVILES DO            Ot            807.05      
      FRACTURE FIVE RIBS-CLOSE                     

 

 2014            KAELA AVILES DO            Ot            860.0       
     TRAUM PNEUMOTHORAX-CLOSE                     

 

 2014            KAELA AVILES DO            Ot            861.21      
      LUNG CONTUSION-CLOSED                     

 

 2014            KAELA AVILES DO            Ot            873.49      
      OPEN WOUND OF FACE NEC                     

 

 2014            KAELA AVILES DO            Ot            E000.8      
      OTHER EXTERNAL CAUSE STATUS                     

 

 2014            KAELA AVILES DO            Ot            E812.1      
      MV COLLISION NOS-PASNGR                     

 

 2015            DEIDRE QUEZADA ARNP            Ot            070.70      
                            

 

 2015            DEIDRE QUEZADA ARNP            Ot            571.5       
                           

 

 2015            DEIDRE QUEZADA ARNP            Ot            070.70      
                            

 

 2015            DEIDRE QUEZADA ARNP            Ot            571.5       
                           

 

 2015            KATHRYN HOLLAND DO            Ot            729.5  
                                

 

 2015            KATHRYN HOLLAND DO            Ot            729.81 
                                 

 

 2015            KATHRYN HOLLAND DO            Ot            729.5  
                                

 

 2015            KATHRYN HOLLAND DO            Ot            729.81 
                                 

 

 2016                         Ot            070.70            UNSPECIFIED 
VIRAL HEPATITIS C WITHOUT HE                     

 

 2016                         Ot            780.60            FEVER, 
UNSPECIFIED                     

 

 2016                         Ot            780.79            OTH MALAISE 
FATIGUE                     

 

 2016                         Ot            782.4            JAUNDICE NOS
                     

 

 2016                         Ot            791.9            ABN URINE 
FINDINGS NEC                     

 

 2016                         Ot            070.70            UNSPECIFIED 
VIRAL HEPATITIS C WITHOUT HE                     

 

 2016                         Ot            205.10            CHRONIC 
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                     

 

 2016                         Ot            238.71            ESSENTIAL 
THROMBOCYTHEMIA                     

 

 2016                         Ot            285.9            ANEMIA NOS  
                   

 

 2016                         Ot            288.00            NEUTROPENIA
, UNSPECIFIED                     

 

 2016                         Ot            530.81            ESOPHAGEAL 
REFLUX                     

 

 2016                         Ot            553.3            
DIAPHRAGMATIC HERNIA                     

 

 2016                         Ot            V58.69            OTH MED,LT,
CURRENT USE                     

 

 2016                         Ot            070.70            UNSPECIFIED 
VIRAL HEPATITIS C WITHOUT HE                     

 

 2016                         Ot            205.10            CHRONIC 
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                     

 

 2016                         Ot            238.71            ESSENTIAL 
THROMBOCYTHEMIA                     

 

 2016                         Ot            285.9            ANEMIA NOS  
                   

 

 2016                         Ot            288.00            NEUTROPENIA
, UNSPECIFIED                     

 

 2016                         Ot            530.81            ESOPHAGEAL 
REFLUX                     

 

 2016                         Ot            553.3            
DIAPHRAGMATIC HERNIA                     

 

 2016                         Ot            V58.69            OTH MED,LT,
CURRENT USE                     

 

 2016                         Ot            070.70            UNSPECIFIED 
VIRAL HEPATITIS C WITHOUT HE                     

 

 2016                         Ot            205.10            CHRONIC 
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                     

 

 2016                         Ot            790.6            ABN BLOOD 
CHEMISTRY NEC                     

 

 2016                         Ot            V58.69            OTH MED,LT,
CURRENT USE                     

 

 2016                         Ot            070.70            UNSPECIFIED 
VIRAL HEPATITIS C WITHOUT HE                     

 

 2016                         Ot            205.10            CHRONIC 
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                     

 

 2016                         Ot            238.71            ESSENTIAL 
THROMBOCYTHEMIA                     

 

 2016                         Ot            288.00            NEUTROPENIA
, UNSPECIFIED                     

 

 2016                         Ot            530.81            ESOPHAGEAL 
REFLUX                     

 

 2016                         Ot            553.3            
DIAPHRAGMATIC HERNIA                     

 

 2016                         Ot            V58.69            OTH MED,LT,
CURRENT USE                     

 

 2016            GELLENDER DO, KATHRYN A            Ot            V76.12 
           OTH SCREEN MAMMO-MALIGN NEOPLASM OF MISTY                     

 

 2016                         Ot            205.10            CHRONIC 
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                     

 

 2016            GELLENDER DO, KATHRYN A            Ot            070.70 
           UNSPECIFIED VIRAL HEPATITIS C WITHOUT HE                     

 

 2016            GELLENDER DO, KATHRYN A            Ot            511.9  
          PLEURAL EFFUSION NOS                     

 

 2016            GELLENDER DO, KATHRYN A            Ot            511.9  
          PLEURAL EFFUSION NOS                     

 

 2016                         Ot            070.70            UNSPECIFIED 
VIRAL HEPATITIS C WITHOUT HE                     

 

 2016                         Ot            208.90            UNSPECIFIED 
LEUKEMIA, W/O MENTION OF HAV                     

 

 2016                         Ot            511.9            PLEURAL 
EFFUSION NOS                     

 

 2016                         Ot            786.2            COUGH       
              

 

 2016                         Ot            V72.84            EXAM PRE-
OPERATIVE NOS                     

 

 2016            KALLI HARE LUKE ESVIN            Ot            511.9       
     PLEURAL EFFUSION NOS                     

 

 2016            KALLI DO LUKE MCALLISTER            Ot            786.2       
     COUGH                     

 

 2016            DEIDRE QUEZADA ARNP            Ot            070.70      
      UNSPECIFIED VIRAL HEPATITIS C WITHOUT HE                     

 

 2016            DEIDRE QUEZADA ARNP            Ot            571.5       
     CIRRHOSIS OF LIVER NOS                     

 

 2016            GELLENDER DO, KATHRYN GIBBONS            Ot            729.5  
          PAIN IN LIMB                     

 

 2016            GELLENDER DO, KATHRYN GIBBONS            Ot            729.81 
           SWELLING OF LIMB                     

 

 2016            GELLENDER DO, KATHRYN GIBBONS            Ot            511.9  
          PLEURAL EFFUSION NOS                     

 

 2016            GELLENDER DO, KATHRYN GIBBONS            Ot            518.0  
          PULMONARY COLLAPSE                     

 

 2016            GELLENDER DO, KATHRYN GIBBONS            Ot            786.05 
           SHORTNESS OF BREATH                     

 

 2016            GELLENDER DO, KATHRYN GIBBONS            Ot            R06.02 
           SHORTNESS OF BREATH                     

 

 2016            GELLENDER DO, KATHRYN GIBBONS            Ot            N19    
        UNSPECIFIED KIDNEY FAILURE                     

 

 2016            GELLENDER DO, KATHRYN GIBBONS            Ot            R06.02 
           SHORTNESS OF BREATH                     

 

 2016            GELLENDER DO, KATHRYN GIBBONS            Ot            N19    
        UNSPECIFIED KIDNEY FAILURE                     

 

 2016            GELLENDER DO, KATHRYN GIBBONS            Ot            R06.02 
           SHORTNESS OF BREATH                     

 

 2016                         Ot            070.70            UNSPECIFIED 
VIRAL HEPATITIS C WITHOUT HE                     

 

 2016                         Ot            205.10            CHRONIC 
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                     

 

 2016                         Ot            238.71            ESSENTIAL 
THROMBOCYTHEMIA                     

 

 2016                         Ot            285.9            ANEMIA NOS  
                   

 

 2016                         Ot            288.00            NEUTROPENIA
, UNSPECIFIED                     

 

 2016                         Ot            530.81            ESOPHAGEAL 
REFLUX                     

 

 2016                         Ot            553.3            
DIAPHRAGMATIC HERNIA                     

 

 2016                         Ot            V58.69            OTH MED,LT,
CURRENT USE                     

 

 2016                         Ot            070.70            UNSPECIFIED 
VIRAL HEPATITIS C WITHOUT HE                     

 

 2016                         Ot            205.10            CHRONIC 
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                     

 

 2016                         Ot            238.71            ESSENTIAL 
THROMBOCYTHEMIA                     

 

 2016                         Ot            285.9            ANEMIA NOS  
                   

 

 2016                         Ot            288.00            NEUTROPENIA
, UNSPECIFIED                     

 

 2016                         Ot            530.81            ESOPHAGEAL 
REFLUX                     

 

 2016                         Ot            553.3            
DIAPHRAGMATIC HERNIA                     

 

 2016                         Ot            V58.69            OTH MED,LT,
CURRENT USE                     

 

 2016                         Ot            070.70            UNSPECIFIED 
VIRAL HEPATITIS C WITHOUT HE                     

 

 2016                         Ot            205.10            CHRONIC 
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                     

 

 2016                         Ot            790.6            ABN BLOOD 
CHEMISTRY NEC                     

 

 2016                         Ot            V58.69            OTH MED,LT,
CURRENT USE                     

 

 2016                         Ot            070.70            UNSPECIFIED 
VIRAL HEPATITIS C WITHOUT HE                     

 

 2016                         Ot            205.10            CHRONIC 
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                     

 

 2016                         Ot            238.71            ESSENTIAL 
THROMBOCYTHEMIA                     

 

 2016                         Ot            288.00            NEUTROPENIA
, UNSPECIFIED                     

 

 2016                         Ot            530.81            ESOPHAGEAL 
REFLUX                     

 

 2016                         Ot            553.3            
DIAPHRAGMATIC HERNIA                     

 

 2016                         Ot            V58.69            OTH MED,LT,
CURRENT USE                     

 

 2016            KATHRYN HOLLAND DO            Ot            V76.12 
           OTH SCREEN MAMMO-MALIGN NEOPLASM OF MISTY                     

 

 2016                         Ot            205.10            CHRONIC 
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                     

 

 2016            KATHRYN HOLLAND DO            Ot            070.70 
           UNSPECIFIED VIRAL HEPATITIS C WITHOUT HE                     

 

 2016            KATHRYN HOLLAND DO            Ot            511.9  
          PLEURAL EFFUSION NOS                     

 

 2016            KATHRYN HOLLAND DO            Ot            511.9  
          PLEURAL EFFUSION NOS                     

 

 2016                         Ot            070.70            UNSPECIFIED 
VIRAL HEPATITIS C WITHOUT HE                     

 

 2016                         Ot            208.90            UNSPECIFIED 
LEUKEMIA, W/O MENTION OF HAV                     

 

 2016                         Ot            511.9            PLEURAL 
EFFUSION NOS                     

 

 2016                         Ot            786.2            COUGH       
              

 

 2016                         Ot            V72.84            EXAM PRE-
OPERATIVE NOS                     

 

 2016            LUKE MAHAN DO            Ot            511.9       
     PLEURAL EFFUSION NOS                     

 

 2016            LUKE MAHAN DO            Ot            786.2       
     COUGH                     

 

 2016            DEIDRE QUEZADA            Ot            070.70      
      UNSPECIFIED VIRAL HEPATITIS C WITHOUT HE                     

 

 2016            DEIDRE QUEZADA            Ot            571.5       
     CIRRHOSIS OF LIVER NOS                     

 

 2016            GELLENDER DO, KATHRYN GIBBONS            Ot            729.5  
          PAIN IN LIMB                     

 

 2016            GELLENDER DO, KATHRYN GIBBONS            Ot            729.81 
           SWELLING OF LIMB                     

 

 2016            LIANGLENDER DO, KATHRYN GIBBONS            Ot            511.9  
          PLEURAL EFFUSION NOS                     

 

 2016            GELLENDER DO, KATHRYN GIBBONS            Ot            518.0  
          PULMONARY COLLAPSE                     

 

 2016            LIANGLENDER DO, KATHRYN GIBBONS            Ot            786.05 
           SHORTNESS OF BREATH                     

 

 2016            GELLENDER DO, KATHRYN GIBBONS            Ot            R06.02 
           SHORTNESS OF BREATH                     

 

 2016            GELLENDER DO, KATHRYN GIBBONS            Ot            R06.02 
           SHORTNESS OF BREATH                     

 

 2016            GELLENDER DO, KATHRYN GIBBONS            Ot            R91.8  
          OTHER NONSPECIFIC ABNORMAL FINDING OF SAMUEL                     

 

 2016            KATHRYN HOLLAND DO            Ot            N19    
        UNSPECIFIED KIDNEY FAILURE                     

 

 2016            GELLENDER DO, KATHRYN GIBBONS            Ot            R06.02 
           SHORTNESS OF BREATH                     

 

 2016                         Ot            070.70            UNSPECIFIED 
VIRAL HEPATITIS C WITHOUT HE                     

 

 2016                         Ot            780.60            FEVER, 
UNSPECIFIED                     

 

 2016                         Ot            780.79            OTH MALAISE 
FATIGUE                     

 

 2016                         Ot            782.4            JAUNDICE NOS
                     

 

 2016                         Ot            791.9            ABN URINE 
FINDINGS NEC                     

 

 2016                         Ot            070.70            UNSPECIFIED 
VIRAL HEPATITIS C WITHOUT HE                     

 

 2016                         Ot            205.10            CHRONIC 
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                     

 

 2016                         Ot            238.71            ESSENTIAL 
THROMBOCYTHEMIA                     

 

 2016                         Ot            285.9            ANEMIA NOS  
                   

 

 2016                         Ot            288.00            NEUTROPENIA
, UNSPECIFIED                     

 

 2016                         Ot            530.81            ESOPHAGEAL 
REFLUX                     

 

 2016                         Ot            553.3            
DIAPHRAGMATIC HERNIA                     

 

 2016                         Ot            V58.69            OTH MED,LT,
CURRENT USE                     

 

 2016                         Ot            070.70            UNSPECIFIED 
VIRAL HEPATITIS C WITHOUT HE                     

 

 2016                         Ot            205.10            CHRONIC 
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                     

 

 2016                         Ot            238.71            ESSENTIAL 
THROMBOCYTHEMIA                     

 

 2016                         Ot            285.9            ANEMIA NOS  
                   

 

 2016                         Ot            288.00            NEUTROPENIA
, UNSPECIFIED                     

 

 2016                         Ot            530.81            ESOPHAGEAL 
REFLUX                     

 

 2016                         Ot            553.3            
DIAPHRAGMATIC HERNIA                     

 

 2016                         Ot            V58.69            OTH MED,LT,
CURRENT USE                     

 

 2016                         Ot            070.70            UNSPECIFIED 
VIRAL HEPATITIS C WITHOUT HE                     

 

 2016                         Ot            205.10            CHRONIC 
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                     

 

 2016                         Ot            790.6            ABN BLOOD 
CHEMISTRY NEC                     

 

 2016                         Ot            V58.69            OTH MED,LT,
CURRENT USE                     

 

 2016                         Ot            070.70            UNSPECIFIED 
VIRAL HEPATITIS C WITHOUT HE                     

 

 2016                         Ot            205.10            CHRONIC 
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                     

 

 2016                         Ot            238.71            ESSENTIAL 
THROMBOCYTHEMIA                     

 

 2016                         Ot            288.00            NEUTROPENIA
, UNSPECIFIED                     

 

 2016                         Ot            530.81            ESOPHAGEAL 
REFLUX                     

 

 2016                         Ot            553.3            
DIAPHRAGMATIC HERNIA                     

 

 2016                         Ot            V58.69            OTH MED,LT,
CURRENT USE                     

 

 2016            KATHRYN HOLLAND DO            Ot            V76.12 
           OTH SCREEN MAMMO-MALIGN NEOPLASM OF MISTY                     

 

 2016                         Ot            205.10            CHRONIC 
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                     

 

 2016            KATHRYN HOLLAND DO            Ot            070.70 
           UNSPECIFIED VIRAL HEPATITIS C WITHOUT HE                     

 

 2016            KATHRYN HOLLAND DO            Ot            511.9  
          PLEURAL EFFUSION NOS                     

 

 2016            KATHRYN HOLLAND DO            Ot            511.9  
          PLEURAL EFFUSION NOS                     

 

 2016                         Ot            070.70            UNSPECIFIED 
VIRAL HEPATITIS C WITHOUT HE                     

 

 2016                         Ot            208.90            UNSPECIFIED 
LEUKEMIA, W/O MENTION OF HAV                     

 

 2016                         Ot            511.9            PLEURAL 
EFFUSION NOS                     

 

 2016                         Ot            786.2            COUGH       
              

 

 2016                         Ot            V72.84            EXAM PRE-
OPERATIVE NOS                     

 

 2016            LUKE MAHAN DO            Ot            511.9       
     PLEURAL EFFUSION NOS                     

 

 2016            LUKE MAHAN DO            Ot            786.2       
     COUGH                     

 

 2016            DEIDRE QUEZADA            Ot            070.70      
      UNSPECIFIED VIRAL HEPATITIS C WITHOUT HE                     

 

 2016            DEIDRE QUEZADA            Ot            571.5       
     CIRRHOSIS OF LIVER NOS                     

 

 2016            KATHRYN HOLLAND DO            Ot            729.5  
          PAIN IN LIMB                     

 

 2016            KATHRYN HOLLAND DO            Ot            729.81 
           SWELLING OF LIMB                     

 

 2016            KATHRYN HOLLAND DO            Ot            511.9  
          PLEURAL EFFUSION NOS                     

 

 2016            KATHRYN HOLLAND DO            Ot            518.0  
          PULMONARY COLLAPSE                     

 

 2016            GELLENDER DO, KATHRYN GIBBONS            Ot            786.05 
           SHORTNESS OF BREATH                     

 

 2016            GELLENDER DO, KATHRYN GIBBONS            Ot            R06.02 
           SHORTNESS OF BREATH                     

 

 2016            GELLENDER DO, KATHRYN GIBBONS            Ot            R06.02 
           SHORTNESS OF BREATH                     

 

 2016            GELLENDER DO, KATHRYN GIBBONS            Ot            R91.8  
          OTHER NONSPECIFIC ABNORMAL FINDING OF SAMUEL                     

 

 2016            GELLENDER DO, KATHRYN GIBBONS            Ot            N19    
        UNSPECIFIED KIDNEY FAILURE                     

 

 2016            GELLENDER DO, KATHRYN GIBBONS            Ot            R06.02 
           SHORTNESS OF BREATH                     

 

 2016            KALLI DOLUKE M            Ot            B19.20      
      UNSPECIFIED VIRAL HEPATITIS C WITHOUT HE                     

 

 2016            KALLI DOLUKE M            Ot            J90         
   PLEURAL EFFUSION, NOT ELSEWHERE CLASSIFI                     

 

 10/03/2016            LUKE MAHAN DO M            Ot            B19.20      
      UNSPECIFIED VIRAL HEPATITIS C WITHOUT HE                     

 

 10/03/2016            LUKE MAHAN DO M            Ot            C95.90      
      LEUKEMIA, UNSPECIFIED NOT HAVING ACHIEVE                     

 

 10/03/2016            KALLI DOLUKE M            Ot            J90         
   PLEURAL EFFUSION, NOT ELSEWHERE CLASSIFI                     

 

 10/03/2016            KALLI DOLUKE M            Ot            R05         
   COUGH                     

 

 10/03/2016            KALLI DOLUKE M            Ot            B19.20      
      UNSPECIFIED VIRAL HEPATITIS C WITHOUT HE                     

 

 10/03/2016            LUKE MAHAN DO M            Ot            C95.90      
      LEUKEMIA, UNSPECIFIED NOT HAVING ACHIEVE                     

 

 10/03/2016            KALLI DOSAIRALUKE M            Ot            J90         
   PLEURAL EFFUSION, NOT ELSEWHERE CLASSIFI                     

 

 10/03/2016            KALLI DOLUKE M            Ot            R05         
   COUGH                     

 

 10/12/2016            GELLENDER DO, KATHRYN GIBBONS            Ot            R06.02 
           SHORTNESS OF BREATH                     

 

 10/12/2016            GELLENDER DO, KATHRYN GIBBONS            Ot            N19    
        UNSPECIFIED KIDNEY FAILURE                     

 

 10/12/2016            GELLENDER DO, KATHRYN GIBBONS            Ot            R06.02 
           SHORTNESS OF BREATH                     

 

 10/18/2016            GELLENDER DO, KATHRYN GIBBONS            Ot            R06.02 
           SHORTNESS OF BREATH                     

 

 10/18/2016            GELLENDER DO, KATHRYN GIBBONS            Ot            R91.8  
          OTHER NONSPECIFIC ABNORMAL FINDING OF SAMUEL                     

 

 10/20/2016            KALLI DOSAIRALUKE M            Ot            B19.20      
      UNSPECIFIED VIRAL HEPATITIS C WITHOUT HE                     

 

 10/20/2016            LUKE MAHAN DO M            Ot            C95.90      
      LEUKEMIA, UNSPECIFIED NOT HAVING ACHIEVE                     

 

 10/20/2016            LUKE MAHAN DO            Ot            J90         
   PLEURAL EFFUSION, NOT ELSEWHERE CLASSIFI                     

 

 10/20/2016            LUKE MAHAN DO            Ot            R05         
   COUGH                     

 

 10/21/2016            LIANGKEVIN HARE KATHRYN SHAI            Ot            R06.02 
           SHORTNESS OF BREATH                     

 

 10/21/2016            GELKATHRYN BROWN DO            Ot            N19    
        UNSPECIFIED KIDNEY FAILURE                     

 

 10/21/2016            GELLENDER DOKATHRYN            Ot            R06.02 
           SHORTNESS OF BREATH                     

 

 10/26/2016            LUKE MAHAN DO            Ot            B19.20      
      UNSPECIFIED VIRAL HEPATITIS C WITHOUT HE                     

 

 10/26/2016            LUKE MAHAN DO            Ot            C95.90      
      LEUKEMIA, UNSPECIFIED NOT HAVING ACHIEVE                     

 

 10/26/2016            LUKE MAHAN DO            Ot            J90         
   PLEURAL EFFUSION, NOT ELSEWHERE CLASSIFI                     

 

 10/26/2016            LUKE MAHAN DO            Ot            R05         
   COUGH                     

 

 10/28/2016            LUKE MAHAN DO            Ot            B19.20      
      UNSPECIFIED VIRAL HEPATITIS C WITHOUT HE                     

 

 10/28/2016            LUKE MAHAN DO            Ot            C95.90      
      LEUKEMIA, UNSPECIFIED NOT HAVING ACHIEVE                     

 

 10/28/2016            LUKE MAHAN DO            Ot            J90         
   PLEURAL EFFUSION, NOT ELSEWHERE CLASSIFI                     

 

 10/28/2016            ULKE MAHAN DO            Ot            R05         
   COUGH                     

 

 10/31/2016            KARELY SALAS            Ot            M48.06     
       SPINAL STENOSIS, LUMBAR REGION                     

 

 10/31/2016            KARELY SALAS            Ot            M51.36     
       OTHER INTERVERTEBRAL DISC DEGENERATION,                      

 

 10/31/2016            KARELY SALAS            Ot            M54.5      
      LOW BACK PAIN                     

 

 10/31/2016            KARELY SALAS            Ot            Z79.899    
        OTHER LONG TERM (CURRENT) DRUG THERAPY                     

 

 2016            KARELY SALAS            Ot            M48.06     
       SPINAL STENOSIS, LUMBAR REGION                     

 

 2016            KARELY SALAS            Ot            M51.36     
       OTHER INTERVERTEBRAL DISC DEGENERATION,                      

 

 2016            KARELY SALAS            Ot            M54.5      
      LOW BACK PAIN                     

 

 2016            KARELY SALAS            Ot            Z79.899    
        OTHER LONG TERM (CURRENT) DRUG THERAPY                     

 

 2016            KATHRYN HOLLAND DO            Ot            R06.02 
           SHORTNESS OF BREATH                     

 

 2016            KATHRYN HOLLAND DO            Ot            R91.8  
          OTHER NONSPECIFIC ABNORMAL FINDING OF SAMUEL                     

 

 2016            LUKE MAHAN DO            Ot            B19.20      
      UNSPECIFIED VIRAL HEPATITIS C WITHOUT HE                     

 

 2016            LUKE MAHAN DO            Ot            C95.90      
      LEUKEMIA, UNSPECIFIED NOT HAVING ACHIEVE                     

 

 2016            LUKE MAHAN DO            Ot            J90         
   PLEURAL EFFUSION, NOT ELSEWHERE CLASSIFI                     

 

 2016            LUKE MAHAN DO            Ot            R05         
   COUGH                     

 

 2016            KATHRYN HOLLAND DO            Ot            M25.531
            PAIN IN RIGHT WRIST                     

 

 2017            DEIDRE QUEZADA            Ot            K74.60      
      UNSPECIFIED CIRRHOSIS OF LIVER                     

 

 2017            AMADA GORDILLO MD, Ot            G62.9   
         POLYNEUROPATHY, UNSPECIFIED                     

 

 2017            AMADA GORDILLO MD            Ot            Z79.899 
           OTHER LONG TERM (CURRENT) DRUG THERAPY                     

 

 2017            AMADA GORDILLO MD, Ot            G62.9   
         POLYNEUROPATHY, UNSPECIFIED                     

 

 2017            AMADA GORDILLO MD, Ot            Z79.899 
           OTHER LONG TERM (CURRENT) DRUG THERAPY                     

 

 2017            KATHRYN HOLLAND DO            Ot            M25.531
            PAIN IN RIGHT WRIST                     

 

 2017            KATHRYN HOLLAND DO            Ot            M25.531
            PAIN IN RIGHT WRIST                     

 

 2017            DEIDRE QUEZADA            Ot            K74.60      
      UNSPECIFIED CIRRHOSIS OF LIVER                     

 

 2017            DEIDRE QUEZADA            Ot            K74.60      
      UNSPECIFIED CIRRHOSIS OF LIVER                     

 

 2017            KATHRYN HOLLAND DO            Ot            J90    
        PLEURAL EFFUSION, NOT ELSEWHERE CLASSIFI                     

 

 2017            KATHRYN HOLLAND DO            Ot            J90    
        PLEURAL EFFUSION, NOT ELSEWHERE CLASSIFI                     

 

 2017                         Ot            070.70            UNSPECIFIED 
VIRAL HEPATITIS C WITHOUT HE                     

 

 2017                         Ot            780.60            FEVER, 
UNSPECIFIED                     

 

 2017                         Ot            780.79            OTH MALAISE 
FATIGUE                     

 

 2017                         Ot            782.4            JAUNDICE NOS
                     

 

 2017                         Ot            791.9            ABN URINE 
FINDINGS NEC                     

 

 2017                         Ot            070.70            UNSPECIFIED 
VIRAL HEPATITIS C WITHOUT HE                     

 

 2017                         Ot            205.10            CHRONIC 
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                     

 

 2017                         Ot            238.71            ESSENTIAL 
THROMBOCYTHEMIA                     

 

 2017                         Ot            285.9            ANEMIA NOS  
                   

 

 2017                         Ot            288.00            NEUTROPENIA
, UNSPECIFIED                     

 

 2017                         Ot            530.81            ESOPHAGEAL 
REFLUX                     

 

 2017                         Ot            553.3            
DIAPHRAGMATIC HERNIA                     

 

 2017                         Ot            V58.69            OTH MED,LT,
CURRENT USE                     

 

 2017                         Ot            070.70            UNSPECIFIED 
VIRAL HEPATITIS C WITHOUT HE                     

 

 2017                         Ot            205.10            CHRONIC 
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                     

 

 2017                         Ot            238.71            ESSENTIAL 
THROMBOCYTHEMIA                     

 

 2017                         Ot            285.9            ANEMIA NOS  
                   

 

 2017                         Ot            288.00            NEUTROPENIA
, UNSPECIFIED                     

 

 2017                         Ot            530.81            ESOPHAGEAL 
REFLUX                     

 

 2017                         Ot            553.3            
DIAPHRAGMATIC HERNIA                     

 

 2017                         Ot            V58.69            OTH MED,LT,
CURRENT USE                     

 

 2017                         Ot            070.70            UNSPECIFIED 
VIRAL HEPATITIS C WITHOUT HE                     

 

 2017                         Ot            205.10            CHRONIC 
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                     

 

 2017                         Ot            790.6            ABN BLOOD 
CHEMISTRY NEC                     

 

 2017                         Ot            V58.69            OTH MED,LT,
CURRENT USE                     

 

 2017                         Ot            070.70            UNSPECIFIED 
VIRAL HEPATITIS C WITHOUT HE                     

 

 2017                         Ot            205.10            CHRONIC 
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                     

 

 2017                         Ot            238.71            ESSENTIAL 
THROMBOCYTHEMIA                     

 

 2017                         Ot            288.00            NEUTROPENIA
, UNSPECIFIED                     

 

 2017                         Ot            530.81            ESOPHAGEAL 
REFLUX                     

 

 2017                         Ot            553.3            
DIAPHRAGMATIC HERNIA                     

 

 2017                         Ot            V58.69            OTH MED,LT,
CURRENT USE                     

 

 2017            GELLENDER DO, KATHRYN GIBOBNS            Ot            V76.12 
           OTH SCREEN MAMMO-MALIGN NEOPLASM OF MISTY                     

 

 2017                         Ot            205.10            CHRONIC 
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                     

 

 2017            GELLENDER DO, KATHRYN A            Ot            070.70 
           UNSPECIFIED VIRAL HEPATITIS C WITHOUT HE                     

 

 2017            KATHRYN HOLLAND DO A            Ot            511.9  
          PLEURAL EFFUSION NOS                     

 

 2017            GELLENDER DO, KATHRYN A            Ot            511.9  
          PLEURAL EFFUSION NOS                     

 

 2017                         Ot            070.70            UNSPECIFIED 
VIRAL HEPATITIS C WITHOUT HE                     

 

 2017                         Ot            208.90            UNSPECIFIED 
LEUKEMIA, W/O MENTION OF HAV                     

 

 2017                         Ot            511.9            PLEURAL 
EFFUSION NOS                     

 

 2017                         Ot            786.2            COUGH       
              

 

 2017                         Ot            V72.84            EXAM PRE-
OPERATIVE NOS                     

 

 2017            LUKE MAHAN DO            Ot            511.9       
     PLEURAL EFFUSION NOS                     

 

 2017            LUKE MAHAN DO            Ot            786.2       
     COUGH                     

 

 2017            DEIDRE QUEZADA ARNP            Ot            070.70      
      UNSPECIFIED VIRAL HEPATITIS C WITHOUT HE                     

 

 2017            DEIDRE QUEZADA ARNP            Ot            571.5       
     CIRRHOSIS OF LIVER NOS                     

 

 2017            GELLENDER DOKATHRYN            Ot            729.5  
          PAIN IN LIMB                     

 

 2017            GELLENDER DO, KATHRYN GIBBONS            Ot            729.81 
           SWELLING OF LIMB                     

 

 2017            GELLENDER DOKATHRYN            Ot            511.9  
          PLEURAL EFFUSION NOS                     

 

 2017            GELLENDER DO, KATHRYN GIBBONS            Ot            518.0  
          PULMONARY COLLAPSE                     

 

 2017            GELLENDER DO, KATHRYN GIBBONS            Ot            786.05 
           SHORTNESS OF BREATH                     

 

 2017            GELLENDER DO, KATHRYN GIBBONS            Ot            R06.02 
           SHORTNESS OF BREATH                     

 

 2017            GELLENDER DO, KATHRYN GIBBONS            Ot            R06.02 
           SHORTNESS OF BREATH                     

 

 2017            GELLENDER DO, KATHRYN GIBBONS            Ot            R91.8  
          OTHER NONSPECIFIC ABNORMAL FINDING OF SAMUEL                     

 

 2017            GELSHERRONDER KATHRYN HARE            Ot            N19    
        UNSPECIFIED KIDNEY FAILURE                     

 

 2017            GELLENDER DOKATHRYN            Ot            R06.02 
           SHORTNESS OF BREATH                     

 

 2017            LUKE MAHAN DO            Ot            B19.20      
      UNSPECIFIED VIRAL HEPATITIS C WITHOUT HE                     

 

 2017            LUKE MAHAN DO            Ot            C95.90      
      LEUKEMIA, UNSPECIFIED NOT HAVING ACHIEVE                     

 

 2017            LUKE MAHAN DO            Ot            J90         
   PLEURAL EFFUSION, NOT ELSEWHERE CLASSIFI                     

 

 2017            LUKE MAHAN DO            Ot            R05         
   COUGH                     

 

 2017            LUKE MAHAN DO            Ot            B19.20      
      UNSPECIFIED VIRAL HEPATITIS C WITHOUT HE                     

 

 2017            LUKE MAHAN DO            Ot            C95.90      
      LEUKEMIA, UNSPECIFIED NOT HAVING ACHIEVE                     

 

 2017            LUKE MAHAN DO            Ot            J90         
   PLEURAL EFFUSION, NOT ELSEWHERE CLASSIFI                     

 

 2017            LUKE MAHAN DO            Ot            R05         
   COUGH                     

 

 2017            GELLENDER KATHRYN HARE            Ot            M25.531
            PAIN IN RIGHT WRIST                     

 

 2017            DEIDRE QUEZADA ARN            Ot            K74.60      
      UNSPECIFIED CIRRHOSIS OF LIVER                     

 

 2017            AMALIA HARE KATHRYN GIBBONS            Ot            J90    
        PLEURAL EFFUSION, NOT ELSEWHERE CLASSIFI                     

 

 09/15/2017            DEIDRE QUEZADA ARNP            Ot            K74.60      
      UNSPECIFIED CIRRHOSIS OF LIVER                     

 

 09/15/2017            DEIDRE QUEZADA ARNP            Ot            Z90.49      
      ACQUIRED ABSENCE OF OTHER SPECIFIED PART                     

 

 09/15/2017            LIANGLENDER , KATHRYN GIBBONS            Ot            C92.91 
           MYELOID LEUKEMIA, UNSPECIFIED IN REMISSI                     

 

 09/15/2017            LIANGLENDARION HARE, KATHRYN GIBBONS            Ot            E87.6  
          HYPOKALEMIA                     

 

 09/15/2017            GELLENDER DO, KATHRYN GIBBONS            Ot            I48.0  
          PAROXYSMAL ATRIAL FIBRILLATION                     

 

 09/15/2017            GELLENDER DO KATHRYN GIBBONS            Ot            K21.9  
          GASTRO-ESOPHAGEAL REFLUX DISEASE WITHOUT                     

 

 09/15/2017            GELLENDER DO KATHRYN GIBBONS            Ot            K59.09 
           OTHER CONSTIPATION                     

 

 09/15/2017            LIANGLENDER DO, KATHRYN GIBBONS            Ot            Z86.19 
           PERSONAL HISTORY OF OTHER INFECTIOUS AND                     

 

 2017                         Ot            070.70            UNSPECIFIED 
VIRAL HEPATITIS C WITHOUT HE                     

 

 2017                         Ot            780.60            FEVER, 
UNSPECIFIED                     

 

 2017                         Ot            780.79            OTH MALAISE 
FATIGUE                     

 

 2017                         Ot            782.4            JAUNDICE NOS
                     

 

 2017                         Ot            791.9            ABN URINE 
FINDINGS NEC                     

 

 2017                         Ot            070.70            UNSPECIFIED 
VIRAL HEPATITIS C WITHOUT HE                     

 

 2017                         Ot            205.10            CHRONIC 
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                     

 

 2017                         Ot            238.71            ESSENTIAL 
THROMBOCYTHEMIA                     

 

 2017                         Ot            285.9            ANEMIA NOS  
                   

 

 2017                         Ot            288.00            NEUTROPENIA
, UNSPECIFIED                     

 

 2017                         Ot            530.81            ESOPHAGEAL 
REFLUX                     

 

 2017                         Ot            553.3            
DIAPHRAGMATIC HERNIA                     

 

 2017                         Ot            V58.69            OTH MED,LT,
CURRENT USE                     

 

 2017                         Ot            070.70            UNSPECIFIED 
VIRAL HEPATITIS C WITHOUT HE                     

 

 2017                         Ot            205.10            CHRONIC 
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                     

 

 2017                         Ot            238.71            ESSENTIAL 
THROMBOCYTHEMIA                     

 

 2017                         Ot            285.9            ANEMIA NOS  
                   

 

 2017                         Ot            288.00            NEUTROPENIA
, UNSPECIFIED                     

 

 2017                         Ot            530.81            ESOPHAGEAL 
REFLUX                     

 

 2017                         Ot            553.3            
DIAPHRAGMATIC HERNIA                     

 

 2017                         Ot            V58.69            OTH MED,LT,
CURRENT USE                     

 

 2017                         Ot            070.70            UNSPECIFIED 
VIRAL HEPATITIS C WITHOUT HE                     

 

 2017                         Ot            205.10            CHRONIC 
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                     

 

 2017                         Ot            790.6            ABN BLOOD 
CHEMISTRY NEC                     

 

 2017                         Ot            V58.69            OTH MED,LT,
CURRENT USE                     

 

 2017                         Ot            070.70            UNSPECIFIED 
VIRAL HEPATITIS C WITHOUT HE                     

 

 2017                         Ot            205.10            CHRONIC 
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                     

 

 2017                         Ot            238.71            ESSENTIAL 
THROMBOCYTHEMIA                     

 

 2017                         Ot            288.00            NEUTROPENIA
, UNSPECIFIED                     

 

 2017                         Ot            530.81            ESOPHAGEAL 
REFLUX                     

 

 2017                         Ot            553.3            
DIAPHRAGMATIC HERNIA                     

 

 2017                         Ot            V58.69            OTH MED,LT,
CURRENT USE                     

 

 2017            KATHRYN HOLLAND DO            Ot            V76.12 
           OTH SCREEN MAMMO-MALIGN NEOPLASM OF MISTY                     

 

 2017                         Ot            205.10            CHRONIC 
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                     

 

 2017            KATHRYN HOLLAND DO            Ot            070.70 
           UNSPECIFIED VIRAL HEPATITIS C WITHOUT HE                     

 

 2017            KATHRYN HOLLAND DO            Ot            511.9  
          PLEURAL EFFUSION NOS                     

 

 2017            KATHRYN HOLLAND DO            Ot            511.9  
          PLEURAL EFFUSION NOS                     

 

 2017                         Ot            070.70            UNSPECIFIED 
VIRAL HEPATITIS C WITHOUT HE                     

 

 2017                         Ot            208.90            UNSPECIFIED 
LEUKEMIA, W/O MENTION OF HAV                     

 

 2017                         Ot            511.9            PLEURAL 
EFFUSION NOS                     

 

 2017                         Ot            786.2            COUGH       
              

 

 2017                         Ot            V72.84            EXAM PRE-
OPERATIVE NOS                     

 

 2017            LUKE MAHAN DO            Ot            511.9       
     PLEURAL EFFUSION NOS                     

 

 2017            LUKE MAHAN DO            Ot            786.2       
     COUGH                     

 

 2017            DEIDRE QUEZADA            Ot            070.70      
      UNSPECIFIED VIRAL HEPATITIS C WITHOUT HE                     

 

 2017            DEIDRE QUEZADA            Ot            571.5       
     CIRRHOSIS OF LIVER NOS                     

 

 2017            KATHRYN HOLLAND DO            Ot            729.5  
          PAIN IN LIMB                     

 

 2017            KATHRYN HOLLAND DO            Ot            729.81 
           SWELLING OF LIMB                     

 

 2017            KATHRYN HOLLAND DO            Ot            511.9  
          PLEURAL EFFUSION NOS                     

 

 2017            KATHRYN HOLLAND DO            Ot            518.0  
          PULMONARY COLLAPSE                     

 

 2017            KATHRYN HOLLAND DO            Ot            786.05 
           SHORTNESS OF BREATH                     

 

 2017            KATHRYN HOLLAND DO            Ot            R06.02 
           SHORTNESS OF BREATH                     

 

 2017            KATHRYN HOLLAND DO            Ot            R06.02 
           SHORTNESS OF BREATH                     

 

 2017            KATHRYN HOLLAND DO            Ot            R91.8  
          OTHER NONSPECIFIC ABNORMAL FINDING OF SAMUEL                     

 

 2017            KATHRYN HOLLAND DO            Ot            N19    
        UNSPECIFIED KIDNEY FAILURE                     

 

 2017            KATHRYN HOLLAND DO            Ot            R06.02 
           SHORTNESS OF BREATH                     

 

 2017            LUKE MAHAN DO            Ot            B19.20      
      UNSPECIFIED VIRAL HEPATITIS C WITHOUT HE                     

 

 2017            LUKE MAHAN DO            Ot            C95.90      
      LEUKEMIA, UNSPECIFIED NOT HAVING ACHIEVE                     

 

 2017            LUKE MAHAN DO            Ot            J90         
   PLEURAL EFFUSION, NOT ELSEWHERE CLASSIFI                     

 

 2017            LUKE MAHAN DO            Ot            R05         
   COUGH                     

 

 2017            LUKE MAHAN DO            Ot            B19.20      
      UNSPECIFIED VIRAL HEPATITIS C WITHOUT HE                     

 

 2017            LUKE MAHAN DO            Ot            C95.90      
      LEUKEMIA, UNSPECIFIED NOT HAVING ACHIEVE                     

 

 2017            LUKE MAHAN DO            Ot            J90         
   PLEURAL EFFUSION, NOT ELSEWHERE CLASSIFI                     

 

 2017            LUKE MAHAN DO            Ot            R05         
   COUGH                     

 

 2017            KATHRYN HOLLAND DO            Ot            M25.531
            PAIN IN RIGHT WRIST                     

 

 2017            DEIDRE QUEZADA            Ot            K74.60      
      UNSPECIFIED CIRRHOSIS OF LIVER                     

 

 2017            KATHRYN HOLLAND DO            Ot            J90    
        PLEURAL EFFUSION, NOT ELSEWHERE CLASSIFI                     

 

 2017            DEIDRE QUEZADA            Ot            K74.60      
      UNSPECIFIED CIRRHOSIS OF LIVER                     

 

 2017            DEIDRE QUEZADAP            Ot            Z90.49      
      ACQUIRED ABSENCE OF OTHER SPECIFIED PART                     

 

 2017            KATHRYN HOLLAND DO            Ot            I48.2  
          CHRONIC ATRIAL FIBRILLATION                     

 

 2017            KATHRYN HOLLAND DO            Ot            I48.0  
          PAROXYSMAL ATRIAL FIBRILLATION                     

 

 2017            DEIDRE QUEZADAP            Ot            K74.60      
      UNSPECIFIED CIRRHOSIS OF LIVER                     

 

 2017            DEIDRE QUEZADA ARNP            Ot            Z90.49      
      ACQUIRED ABSENCE OF OTHER SPECIFIED PART                     

 

 2017            AMALIA HARE, KATHRYN GIBBONS            Ot            I48.0  
          PAROXYSMAL ATRIAL FIBRILLATION                     

 

 2017            YAZMINDER DO, KATHRYN GIBBONS            Ot            I48.0  
          PAROXYSMAL ATRIAL FIBRILLATION                     

 

 2017            AMALIA HARE, KATHRYN GIBBONS            Ot            I48.0  
          PAROXYSMAL ATRIAL FIBRILLATION                     

 

 10/03/2017            AMALIA HARE, KATHRYN GIBBONS            Ot            I48.0  
          PAROXYSMAL ATRIAL FIBRILLATION                     

 

 10/10/2017            AMALIA HARE, KATHRYN GIBBONS            Ot            J90    
        PLEURAL EFFUSION, NOT ELSEWHERE CLASSIFI                     

 

 10/10/2017            AMALIA HARE, KATHRYN GIBBONS            Ot            J98.11 
           ATELECTASIS                     

 

 10/12/2017            AMALIA HARE, KATHRYN GIBBONS            Ot            I48.2  
          CHRONIC ATRIAL FIBRILLATION                     

 

 10/18/2017            TESSY PALACIOS, ALI [facility] CCDS            Ot            
I48.0            PAROXYSMAL ATRIAL FIBRILLATION                     

 

 10/20/2017            AMALIA HARE, KATHRYN GIBBONS            Ot            I48.2  
          CHRONIC ATRIAL FIBRILLATION                     

 

 10/25/2017            TESSY COHEN [facility], ALI [facility] CCDS            Ot            
C92.11            CHRONIC MYELOID LEUKEMIA, BCR/ABL-POSITI                     

 

 10/25/2017            TESSY PALACIOS, ALI [facility] CCDS            Ot            
I48.0            PAROXYSMAL ATRIAL FIBRILLATION                     

 

 10/31/2017            AMALIA HARE, KATHRYN GIBBONS            Ot            J90    
        PLEURAL EFFUSION, NOT ELSEWHERE CLASSIFI                     

 

 10/31/2017            AMALIA HARE, KATHRYN GIBBONS            Ot            J98.11 
           ATELECTASIS                     

 

 2017            TESSY COHEN FACC, ALI [facility] CCDS            Ot            
C92.11            CHRONIC MYELOID LEUKEMIA, BCR/ABL-POSITI                     

 

 2017            TESSY PALACIOS, ALI [facility] CCDS            Ot            
I48.0            PAROXYSMAL ATRIAL FIBRILLATION                     

 

 2017            AMALIA HARE, KATHRYN GIBBONS            Ot            J90    
        PLEURAL EFFUSION, NOT ELSEWHERE CLASSIFI                     

 

 2017            AMALIA HARE, KATHRYN GIBBONS            Ot            J98.11 
           ATELECTASIS                     

 

 2017            TESSY PALACIOS, ALI [facility] CCDS            Ot            
I48.0            PAROXYSMAL ATRIAL FIBRILLATION                     

 

 2017            TESSY COHEN FACC, ALI [facility] CCDS            Ot            
I48.0            PAROXYSMAL ATRIAL FIBRILLATION                     

 

 2017            LUIS LAM MD            Ot            B19.20
            UNSPECIFIED VIRAL HEPATITIS C WITHOUT HE                     

 

 2017            LUIS LAM MD            Ot            F41.9 
           ANXIETY DISORDER, UNSPECIFIED                     

 

 2017            LUIS LAM MD, Ot            G62.9 
           POLYNEUROPATHY, UNSPECIFIED                     

 

 2017            LUIS LAM MD            Ot            I48.0 
           PAROXYSMAL ATRIAL FIBRILLATION                     

 

 2017            LUIS LAM MD            Ot            R00.2 
           PALPITATIONS                     

 

 2017            LUIS LAM MD            Ot            R07.9 
           CHEST PAIN, UNSPECIFIED                     

 

 2017            LUIS LAM MD            Ot            R19.7 
           DIARRHEA, UNSPECIFIED                     

 

 2017            LUIS LAM MD, Ot            Z79.01
            LONG TERM (CURRENT) USE OF ANTICOAGULANT                     

 

 2017            LUIS LAM MD            Ot            Z79.82
            LONG TERM (CURRENT) USE OF ASPIRIN                     

 

 2017            LUIS LAM MD            Ot            Z80.9 
           FAMILY HISTORY OF MALIGNANT NEOPLASM, UN                     

 

 2017            LUIS LAM MD, Ot            Z82.49
            FAMILY HX OF ISCHEM HEART DIS AND OTH DI                     

 

 2017            LUIS LAM MD            Ot            Z85.6 
           PERSONAL HISTORY OF LEUKEMIA                     

 

 2017            LUIS LAM MD            Ot            Z92.21
            PERSONAL HISTORY OF ANTINEOPLASTIC CHEMO                     

 

 2017            NICKI ALMANZAR DO            Ot            C92.11         
   CHRONIC MYELOID LEUKEMIA, BCR/ABL-POSITI                     

 

 2017            NICKI ALMANZAR DO            Ot            F41.9          
  ANXIETY DISORDER, UNSPECIFIED                     

 

 2017            NICKI ALMANZAR DO            Ot            G62.9          
  POLYNEUROPATHY, UNSPECIFIED                     

 

 2017            NICKI ALMANZAR DO            Ot            I48.91         
   UNSPECIFIED ATRIAL FIBRILLATION                     

 

 2017            NICKI ALMANZAR DO            Ot            K52.9          
  NONINFECTIVE GASTROENTERITIS AND COLITIS                     

 

 2017            NICKI ALMANZAR DO            Ot            R11.2          
  NAUSEA WITH VOMITING, UNSPECIFIED                     

 

 2017            NICKI ALMANZAR DO            Ot            Z79.01         
   LONG TERM (CURRENT) USE OF ANTICOAGULANT                     

 

 2017            NICKI ALMANZAR DO            Ot            Z79.82         
   LONG TERM (CURRENT) USE OF ASPIRIN                     

 

 2017            NICKI ALMANZAR DO            Ot            Z87.19         
   PERSONAL HISTORY OF OTHER DISEASES OF TH                     

 

 2017            NICKI ALMANZAR DO            Ot            Z90.710        
    ACQUIRED ABSENCE OF BOTH CERVIX AND UTER                     

 

 2017            NICKI ALMANZAR DO            Ot            Z92.21         
   PERSONAL HISTORY OF ANTINEOPLASTIC CHEMO                     

 

 2017            KATHRYN HOLLAND DO            Ot            J90    
        PLEURAL EFFUSION, NOT ELSEWHERE CLASSIFI                     

 

 2018            JENN NEAL            Ot            B19.20
            UNSPECIFIED VIRAL HEPATITIS C WITHOUT HE                     

 

 2018            EJNN NEAL            Ot            C95.90
            LEUKEMIA, UNSPECIFIED NOT HAVING ACHIEVE                     

 

 2018            JENN NEAL            Ot            J90   
         PLEURAL EFFUSION, NOT ELSEWHERE CLASSIFI                     

 

 2018            JENN NEAL            Ot            Z79.01
            LONG TERM (CURRENT) USE OF ANTICOAGULANT                     

 

 2018            KAELA AVILES DO            Ot            Z01.818     
       ENCOUNTER FOR OTHER PREPROCEDURAL EXAMIN                     

 

 2018            KAELA AVILES DO            Ot            Z86.010     
       PERSONAL HISTORY OF COLONIC POLYPS                     

 

 2018                         Ot            070.70            UNSPECIFIED 
VIRAL HEPATITIS C WITHOUT HE                     

 

 2018                         Ot            205.10            CHRONIC 
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                     

 

 2018                         Ot            238.71            ESSENTIAL 
THROMBOCYTHEMIA                     

 

 2018                         Ot            288.00            NEUTROPENIA
, UNSPECIFIED                     

 

 2018                         Ot            530.81            ESOPHAGEAL 
REFLUX                     

 

 2018                         Ot            553.3            
DIAPHRAGMATIC HERNIA                     

 

 2018                         Ot            V58.69            OTH MED,LT,
CURRENT USE                     

 

 2018                         Ot            205.10            CHRONIC 
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                     

 

 2018            YAZMINDARION DO KATHRYN SHAI            Ot            I48.0  
          PAROXYSMAL ATRIAL FIBRILLATION                     

 

 2018            YAZMINDARION DO KATHRYN GIBBONS            Ot            J90    
        PLEURAL EFFUSION, NOT ELSEWHERE CLASSIFI                     

 

 2018            KAELA AVILES DO            Ot            C95.90      
      LEUKEMIA, UNSPECIFIED NOT HAVING ACHIEVE                     

 

 2018            KAELA AVILES DO            Ot            D12.2       
     BENIGN NEOPLASM OF ASCENDING COLON                     

 

 2018            KAELA AVILES DO            Ot            G62.9       
     POLYNEUROPATHY, UNSPECIFIED                     

 

 2018            KAELA AVILES DO            Ot            I48.91      
      UNSPECIFIED ATRIAL FIBRILLATION                     

 

 2018            KAELA AVILES DO            Ot            K57.30      
      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                     

 

 2018            KAELA AVILES DO            Ot            M79.7       
     FIBROMYALGIA                     

 

 2018            KAELA AVILES DO            Ot            Z12.11      
      ENCOUNTER FOR SCREENING FOR MALIGNANT NE                     

 

 2018            KAELA AVILES DO            Ot            Z79.01      
      LONG TERM (CURRENT) USE OF ANTICOAGULANT                     

 

 2018            KAELA AVILES DO            Ot            Z79.899     
       OTHER LONG TERM (CURRENT) DRUG THERAPY                     

 

 01/15/2018            TESSY COHEN [facility], ALI [facility] CCDS            Ot            
I48.0            PAROXYSMAL ATRIAL FIBRILLATION                     

 

 2018            TESSY COHEN [facility], [facility]            Ot            
I48.0            PAROXYSMAL ATRIAL FIBRILLATION                     

 

 2018            GELLENDER DO, KATHRYN A            Ot            J90    
        PLEURAL EFFUSION, NOT ELSEWHERE CLASSIFI                     

 

 2018            JENN NEAL            Ot            B19.20
            UNSPECIFIED VIRAL HEPATITIS C WITHOUT HE                     

 

 2018            JENN NEAL            Ot            C95.90
            LEUKEMIA, UNSPECIFIED NOT HAVING ACHIEVE                     

 

 2018            JENN NEAL            Ot            J90   
         PLEURAL EFFUSION, NOT ELSEWHERE CLASSIFI                     

 

 2018            JENN NEAL            Ot            Z79.01
            LONG TERM (CURRENT) USE OF ANTICOAGULANT                     

 

 2018            GELLENDER DO, KATHRYN A            Ot            J90    
        PLEURAL EFFUSION, NOT ELSEWHERE CLASSIFI                     

 

 2018            GELLENDER DO, KATHRYN A            Ot            J90    
        PLEURAL EFFUSION, NOT ELSEWHERE CLASSIFI                     

 

 2018            GELLENDER DO, KATHRYN A            Ot            J90    
        PLEURAL EFFUSION, NOT ELSEWHERE CLASSIFI                     

 

 2018            GELLENDER DO, KATHRYN A            Ot            J90    
        PLEURAL EFFUSION, NOT ELSEWHERE CLASSIFI                     

 

 2018            GELLENDER DO, KATHRYN A            Ot            J90    
        PLEURAL EFFUSION, NOT ELSEWHERE CLASSIFI                     

 

 2018                         Ot            070.70            UNSPECIFIED 
VIRAL HEPATITIS C WITHOUT HE                     

 

 2018                         Ot            205.10            CHRONIC 
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                     

 

 2018                         Ot            238.71            ESSENTIAL 
THROMBOCYTHEMIA                     

 

 2018                         Ot            285.9            ANEMIA NOS  
                   

 

 2018                         Ot            288.00            NEUTROPENIA
, UNSPECIFIED                     

 

 2018                         Ot            530.81            ESOPHAGEAL 
REFLUX                     

 

 2018                         Ot            553.3            
DIAPHRAGMATIC HERNIA                     

 

 2018                         Ot            V58.69            OTH MED,LT,
CURRENT USE                     

 

 2018                         Ot            070.70            UNSPECIFIED 
VIRAL HEPATITIS C WITHOUT HE                     

 

 2018                         Ot            205.10            CHRONIC 
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                     

 

 2018                         Ot            238.71            ESSENTIAL 
THROMBOCYTHEMIA                     

 

 2018                         Ot            285.9            ANEMIA NOS  
                   

 

 2018                         Ot            288.00            NEUTROPENIA
, UNSPECIFIED                     

 

 2018                         Ot            530.81            ESOPHAGEAL 
REFLUX                     

 

 2018                         Ot            553.3            
DIAPHRAGMATIC HERNIA                     

 

 2018                         Ot            V58.69            OTH MED,LT,
CURRENT USE                     

 

 2018                         Ot            070.70            UNSPECIFIED 
VIRAL HEPATITIS C WITHOUT HE                     

 

 2018                         Ot            205.10            CHRONIC 
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                     

 

 2018                         Ot            790.6            ABN BLOOD 
CHEMISTRY NEC                     

 

 2018                         Ot            V58.69            OTH MED,LT,
CURRENT USE                     

 

 2018                         Ot            070.70            UNSPECIFIED 
VIRAL HEPATITIS C WITHOUT HE                     

 

 2018                         Ot            205.10            CHRONIC 
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                     

 

 2018                         Ot            238.71            ESSENTIAL 
THROMBOCYTHEMIA                     

 

 2018                         Ot            288.00            NEUTROPENIA
, UNSPECIFIED                     

 

 2018                         Ot            530.81            ESOPHAGEAL 
REFLUX                     

 

 2018                         Ot            553.3            
DIAPHRAGMATIC HERNIA                     

 

 2018                         Ot            V58.69            OTH MED,LT,
CURRENT USE                     

 

 2018            KATHRYN HOLLAND DO            Ot            V76.12 
           OTH SCREEN MAMMO-MALIGN NEOPLASM OF MISTY                     

 

 2018                         Ot            205.10            CHRONIC 
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                     

 

 2018            KATHRYN HOLLAND DO            Ot            070.70 
           UNSPECIFIED VIRAL HEPATITIS C WITHOUT HE                     

 

 2018            KATHRYN HOLLAND DO            Ot            511.9  
          PLEURAL EFFUSION NOS                     

 

 2018            KATHRYN HOLLAND DO            Ot            511.9  
          PLEURAL EFFUSION NOS                     

 

 2018                         Ot            070.70            UNSPECIFIED 
VIRAL HEPATITIS C WITHOUT HE                     

 

 2018                         Ot            208.90            UNSPECIFIED 
LEUKEMIA, W/O MENTION OF HAV                     

 

 2018                         Ot            511.9            PLEURAL 
EFFUSION NOS                     

 

 2018                         Ot            786.2            COUGH       
              

 

 2018                         Ot            V72.84            EXAM PRE-
OPERATIVE NOS                     

 

 2018            LUKE MAHAN DO            Ot            511.9       
     PLEURAL EFFUSION NOS                     

 

 2018            LUKE MAHAN DO            Ot            786.2       
     COUGH                     

 

 2018            DEIDRE QUEZADA            Ot            070.70      
      UNSPECIFIED VIRAL HEPATITIS C WITHOUT HE                     

 

 2018            DEIDRE QUEZADA            Ot            571.5       
     CIRRHOSIS OF LIVER NOS                     

 

 2018            GELLENDER DO, KATHRYN GIBBONS            Ot            729.5  
          PAIN IN LIMB                     

 

 2018            GELLENDER DO, KATHRYN GIBBONS            Ot            729.81 
           SWELLING OF LIMB                     

 

 2018            GELLENDER DO, KATHRYN GIBBONS            Ot            511.9  
          PLEURAL EFFUSION NOS                     

 

 2018            GELLENDER DO, KATHRYN GIBBONS            Ot            518.0  
          PULMONARY COLLAPSE                     

 

 2018            GELLENDER DO, KATHRYN GIBBONS            Ot            786.05 
           SHORTNESS OF BREATH                     

 

 2018            GELLENDER DO, KATHRYN GIBBONS            Ot            R06.02 
           SHORTNESS OF BREATH                     

 

 2018            GELLENDER DO, KATHRYN GIBBONS            Ot            R06.02 
           SHORTNESS OF BREATH                     

 

 2018            GELLENDER DO, KATHRYN GIBBONS            Ot            R91.8  
          OTHER NONSPECIFIC ABNORMAL FINDING OF SAMUEL                     

 

 2018            YAZMINDER , KATHRYN GIBBONS            Ot            N19    
        UNSPECIFIED KIDNEY FAILURE                     

 

 2018            GELLENDER DO, KATHRYN GIBBONS            Ot            R06.02 
           SHORTNESS OF BREATH                     

 

 2018            LUKE MAHAN DO            Ot            B19.20      
      UNSPECIFIED VIRAL HEPATITIS C WITHOUT HE                     

 

 2018            LUKE MAHAN DO            Ot            C95.90      
      LEUKEMIA, UNSPECIFIED NOT HAVING ACHIEVE                     

 

 2018            LUKE MAHAN DO            Ot            J90         
   PLEURAL EFFUSION, NOT ELSEWHERE CLASSIFI                     

 

 2018            LUKE MAHAN DO            Ot            R05         
   COUGH                     

 

 2018            LUKE MAHAN DO            Ot            B19.20      
      UNSPECIFIED VIRAL HEPATITIS C WITHOUT HE                     

 

 2018            LUKE MAHAN DO            Ot            C95.90      
      LEUKEMIA, UNSPECIFIED NOT HAVING ACHIEVE                     

 

 2018            LUKE MAHAN DO            Ot            J90         
   PLEURAL EFFUSION, NOT ELSEWHERE CLASSIFI                     

 

 2018            LUKE MAHAN DO            Ot            R05         
   COUGH                     

 

 2018            GELLENDER DOKATHRYN            Ot            M25.531
            PAIN IN RIGHT WRIST                     

 

 2018            DEIDRE QUEZADA            Ot            K74.60      
      UNSPECIFIED CIRRHOSIS OF LIVER                     

 

 2018            LIANGSHERRONDARION KATHRYN HARE            Ot            J90    
        PLEURAL EFFUSION, NOT ELSEWHERE CLASSIFI                     

 

 2018            DEIDRE QUEZADA ARNP            Ot            K74.60      
      UNSPECIFIED CIRRHOSIS OF LIVER                     

 

 2018            DEIDRE QUEZADA ARNP            Ot            Z90.49      
      ACQUIRED ABSENCE OF OTHER SPECIFIED PART                     

 

 2018            AMALIA HARE, KATHRYN GIBBONS            Ot            I48.2  
          CHRONIC ATRIAL FIBRILLATION                     

 

 2018            LIANGLENDARION HARE, KATHRYN GIBBONS            Ot            I48.0  
          PAROXYSMAL ATRIAL FIBRILLATION                     

 

 2018            TESSY COHEN [facility], [facility] CCDS            Ot            
C92.11            CHRONIC MYELOID LEUKEMIA, BCR/ABL-POSITI                     

 

 2018            TESSY COHEN [facility], [facility] CCDS            Ot            
I48.0            PAROXYSMAL ATRIAL FIBRILLATION                     

 

 2018            AMALIA HARE, KATHRYN GIBBONS            Ot            J90    
        PLEURAL EFFUSION, NOT ELSEWHERE CLASSIFI                     

 

 2018            AMALIA HARE, KATHRYN GIBBONS            Ot            J98.11 
           ATELECTASIS                     

 

 2018            JENN NEAL APRN            Ot            B19.20
            UNSPECIFIED VIRAL HEPATITIS C WITHOUT HE                     

 

 2018            JENN NEAL APRN            Ot            C95.90
            LEUKEMIA, UNSPECIFIED NOT HAVING ACHIEVE                     

 

 2018            JENN NEAL APRN            Ot            J90   
         PLEURAL EFFUSION, NOT ELSEWHERE CLASSIFI                     

 

 2018            JENN NEAL APRN            Ot            Z79.01
            LONG TERM (CURRENT) USE OF ANTICOAGULANT                     

 

 2018            AMALIA HARE, KATHRYN A            Ot            J90    
        PLEURAL EFFUSION, NOT ELSEWHERE CLASSIFI                     

 

 2018            GELLENDER , KATHRYN A            Ot            J90    
        PLEURAL EFFUSION, NOT ELSEWHERE CLASSIFI                     

 

 2018            LIANGLENDARION HARE, KATHRYN A            Ot            J90    
        PLEURAL EFFUSION, NOT ELSEWHERE CLASSIFI                     

 

 2018            TESSY COHEN [facility], [facility] CCDS            Ot            
I48.0            PAROXYSMAL ATRIAL FIBRILLATION                     

 

 2018            MAGUI MCALLISTER MD            Ot            C92.10  
          CHRONIC MYELOID LEUK, BCR/ABL-POSITIVE,                      

 

 2018            DEIDRE QUEZADA ARNP            Ot            J90         
   PLEURAL EFFUSION, NOT ELSEWHERE CLASSIFI                     

 

 2018            DEIDRE QUEZADA ARNP            Ot            K74.60      
      UNSPECIFIED CIRRHOSIS OF LIVER                     

 

 2018            DEIDRE QUEZADA ARNP            Ot            J90         
   PLEURAL EFFUSION, NOT ELSEWHERE CLASSIFI                     

 

 2018            DEIDRE QUEZADA ARNP            Ot            K74.60      
      UNSPECIFIED CIRRHOSIS OF LIVER                     

 

 2018            MAGUI MCALLISTER MD            Ot            C92.10  
          CHRONIC MYELOID LEUK, BCR/ABL-POSITIVE,                      

 

 2018            DEIDRE QUEZADA ARNP            Ot            J90         
   PLEURAL EFFUSION, NOT ELSEWHERE CLASSIFI                     

 

 2018            DEIDRE QUEZADA ARNP            Ot            K74.60      
      UNSPECIFIED CIRRHOSIS OF LIVER                     

 

 2018            DEIDRE QUEZADA ARNP            Ot            J90         
   PLEURAL EFFUSION, NOT ELSEWHERE CLASSIFI                     

 

 2018            DEIDRE QUEZADA GREGORIO ARNP            Ot            K74.60      
      UNSPECIFIED CIRRHOSIS OF LIVER                     

 

 2018            DEIDRE QUEZADA GREGORIO ARNP            Ot            K74.60      
      UNSPECIFIED CIRRHOSIS OF LIVER                     

 

 2018            DEIDRE QUEZADA GREGORIO ARNP            Ot            Z86.19      
      PERSONAL HISTORY OF OTHER INFECTIOUS AND                     

 

 2018            DEIDRE QUEZADA GREGORIO ARNP            Ot            K74.60      
      UNSPECIFIED CIRRHOSIS OF LIVER                     

 

 2018            DEIDRE QUEZADA GREGORIO ARNP            Ot            Z86.19      
      PERSONAL HISTORY OF OTHER INFECTIOUS AND                     

 

 2018            DEIDRE QUEZADA GREGORIO ARNP            Ot            K74.60      
      UNSPECIFIED CIRRHOSIS OF LIVER                     

 

 2018            DEIDRE QUEZADA GREGORIO ARNP            Ot            Z86.19      
      PERSONAL HISTORY OF OTHER INFECTIOUS AND                     

 

 2018            ARY COHEN, ABDEVELIN            Ot            C92.10  
          CHRONIC MYELOID LEUK, BCR/ABL-POSITIVE,                      

 

 2018                         Ot            070.70            UNSPECIFIED 
VIRAL HEPATITIS C WITHOUT HE                     

 

 2018                         Ot            205.10            CHRONIC 
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                     

 

 2018                         Ot            790.6            ABN BLOOD 
CHEMISTRY NEC                     

 

 2018                         Ot            V58.69            OTH MED,LT,
CURRENT USE                     

 

 2018                         Ot            070.70            UNSPECIFIED 
VIRAL HEPATITIS C WITHOUT HE                     

 

 2018                         Ot            205.10            CHRONIC 
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                     

 

 2018                         Ot            238.71            ESSENTIAL 
THROMBOCYTHEMIA                     

 

 2018                         Ot            288.00            NEUTROPENIA
, UNSPECIFIED                     

 

 2018                         Ot            530.81            ESOPHAGEAL 
REFLUX                     

 

 2018                         Ot            553.3            
DIAPHRAGMATIC HERNIA                     

 

 2018                         Ot            V58.69            OTH MED,LT,
CURRENT USE                     

 

 2018            KATHRYN HOLLAND DO            Ot            V76.12 
           OTH SCREEN MAMMO-MALIGN NEOPLASM OF MISTY                     

 

 2018                         Ot            205.10            CHRONIC 
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                     

 

 2018            KATHRYN HOLLAND DO            Ot            070.70 
           UNSPECIFIED VIRAL HEPATITIS C WITHOUT HE                     

 

 2018            YAZMINDER , KATHRYN GIBBONS            Ot            511.9  
          PLEURAL EFFUSION NOS                     

 

 2018            LIANGLENDER , KATHRYN GIBBONS            Ot            511.9  
          PLEURAL EFFUSION NOS                     

 

 2018                         Ot            070.70            UNSPECIFIED 
VIRAL HEPATITIS C WITHOUT HE                     

 

 2018                         Ot            208.90            UNSPECIFIED 
LEUKEMIA, W/O MENTION OF HAV                     

 

 2018                         Ot            511.9            PLEURAL 
EFFUSION NOS                     

 

 2018                         Ot            786.2            COUGH       
              

 

 2018                         Ot            V72.84            EXAM PRE-
OPERATIVE NOS                     

 

 2018            LUKE MAHAN DO            Ot            511.9       
     PLEURAL EFFUSION NOS                     

 

 2018            LUKE MAHAN DO            Ot            786.2       
     COUGH                     

 

 2018            DEIDRE QUEZADA [facility]            Ot            070.70      
      UNSPECIFIED VIRAL HEPATITIS C WITHOUT HE                     

 

 2018            DEIDRE QUEZADA [facility]            Ot            571.5       
     CIRRHOSIS OF LIVER NOS                     

 

 2018            KATHRYN HOLLAND DO            Ot            729.5  
          PAIN IN LIMB                     

 

 2018            AMALIA HARE, KATHRYN SHAI            Ot            729.81 
           SWELLING OF LIMB                     

 

 2018            AMALIA HARE, KATHRYN GIBBONS            Ot            511.9  
          PLEURAL EFFUSION NOS                     

 

 2018            YAZMINDER , KATHRYN GIBBONS            Ot            518.0  
          PULMONARY COLLAPSE                     

 

 2018            AMALIA HARE KATHRYN GIBBONS            Ot            786.05 
           SHORTNESS OF BREATH                     

 

 2018            AMALIA HARE, KATHRYN GIBBONS            Ot            R06.02 
           SHORTNESS OF BREATH                     

 

 2018            YAZMINDER , KATHRYN GIBBONS            Ot            R06.02 
           SHORTNESS OF BREATH                     

 

 2018            AMALIA HARE, KATHRYN GIBBONS            Ot            R91.8  
          OTHER NONSPECIFIC ABNORMAL FINDING OF SAMEUL                     

 

 2018            AMALIA HARE, KATHRYN GIBBONS            Ot            N19    
        UNSPECIFIED KIDNEY FAILURE                     

 

 2018            AMALIA HARE KATHRYN SHAI            Ot            R06.02 
           SHORTNESS OF BREATH                     

 

 2018            LUKE MAHAN DO            Ot            B19.20      
      UNSPECIFIED VIRAL HEPATITIS C WITHOUT HE                     

 

 2018            LUKE MAHAN DO            Ot            C95.90      
      LEUKEMIA, UNSPECIFIED NOT HAVING ACHIEVE                     

 

 2018            LUKE MAHAN DO            Ot            J90         
   PLEURAL EFFUSION, NOT ELSEWHERE CLASSIFI                     

 

 2018            LUKE MAHAN DO            Ot            R05         
   COUGH                     

 

 2018            LUKE MAHAN DO            Ot            B19.20      
      UNSPECIFIED VIRAL HEPATITIS C WITHOUT HE                     

 

 2018            LUKE MAHAN DO            Ot            C95.90      
      LEUKEMIA, UNSPECIFIED NOT HAVING ACHIEVE                     

 

 2018            LUKE MAHAN DO            Ot            J90         
   PLEURAL EFFUSION, NOT ELSEWHERE CLASSIFI                     

 

 2018            LUKE MAHAN DO            Ot            R05         
   COUGH                     

 

 2018            KATHRYN HOLLAND DO            Ot            M25.531
            PAIN IN RIGHT WRIST                     

 

 2018            DEIDRE QUEZADA ARNP            Ot            K74.60      
      UNSPECIFIED CIRRHOSIS OF LIVER                     

 

 2018            AMALIA HARE KATHRYN GIBBONS            Ot            J90    
        PLEURAL EFFUSION, NOT ELSEWHERE CLASSIFI                     

 

 2018            DEIDRE QUEZADA ARNP            Ot            K74.60      
      UNSPECIFIED CIRRHOSIS OF LIVER                     

 

 2018            DEIDRE QUEZADA ARNP            Ot            Z90.49      
      ACQUIRED ABSENCE OF OTHER SPECIFIED PART                     

 

 2018            LIANGSHERRONDARION HAREKATHRYN            Ot            I48.2  
          CHRONIC ATRIAL FIBRILLATION                     

 

 2018            LIANGSHERRONDARION KATHRYN HARE            Ot            I48.0  
          PAROXYSMAL ATRIAL FIBRILLATION                     

 

 2018            TESSY COHEN [facility], [facility]S            Ot            
C92.11            CHRONIC MYELOID LEUKEMIA, BCR/ABL-POSITI                     

 

 2018            TESSY COHEN [facility], [facility]            Ot            
I48.0            PAROXYSMAL ATRIAL FIBRILLATION                     

 

 2018            LIANGKATHRYN BROWN DO            Ot            J90    
        PLEURAL EFFUSION, NOT ELSEWHERE CLASSIFI                     

 

 2018            LIANGSHERRONDARION KATHRYN HARE            Ot            J98.11 
           ATELECTASIS                     

 

 2018            JENN NEAL APRRAUL            Ot            B19.20
            UNSPECIFIED VIRAL HEPATITIS C WITHOUT HE                     

 

 2018            JENN NEAL APRN            Ot            C95.90
            LEUKEMIA, UNSPECIFIED NOT HAVING ACHIEVE                     

 

 2018            JENN NEAL APRN            Ot            J90   
         PLEURAL EFFUSION, NOT ELSEWHERE CLASSIFI                     

 

 2018            JENN NEAL APRN            Ot            Z79.01
            LONG TERM (CURRENT) USE OF ANTICOAGULANT                     

 

 2018            LIANGKEVIN KATHRYN            Ot            J90    
        PLEURAL EFFUSION, NOT ELSEWHERE CLASSIFI                     

 

 2018            LIANGKATHRYN BROWN DO            Ot            J90    
        PLEURAL EFFUSION, NOT ELSEWHERE CLASSIFI                     

 

 2018            LIANGKATHRYN BROWN DO            Ot            J90    
        PLEURAL EFFUSION, NOT ELSEWHERE CLASSIFI                     

 

 2018            [facility] COH[facility]            Ot            
I48.0            PAROXYSMAL ATRIAL FIBRILLATION                     

 

 2018            DEIDRE QUEZADA ARNP            Ot            J90         
   PLEURAL EFFUSION, NOT ELSEWHERE CLASSIFI                     

 

 2018            DEIDRE QUEZADA ARNP            Ot            K74.60      
      UNSPECIFIED CIRRHOSIS OF LIVER                     

 

 2018            MAGUI MCALLISTER MD            Ot            C92.10  
          CHRONIC MYELOID LEUK, BCR/ABL-POSITIVE,                      

 

 2018            MAGUI MCALLISTER MD            Ot            C92.10  
          CHRONIC MYELOID LEUK, BCR/ABL-POSITIVE,                      

 

 2018            DEIDRE QUEZADA ARNP            Ot            K74.60      
      UNSPECIFIED CIRRHOSIS OF LIVER                     

 

 2018            DEIDRE QUEZADA ARNP            Ot            Z86.19      
      PERSONAL HISTORY OF OTHER INFECTIOUS AND                     

 

 2018            [facility] COHEN [facility]            Ot            
I48.0            PAROXYSMAL ATRIAL FIBRILLATION                     

 

 2018            TESS[facility] COHEN [facility]            Ot            
Z79.01            LONG TERM (CURRENT) USE OF ANTICOAGULANT                     

 

 2018            KARELY SALAS APRN            Ot            C92.11     
       CHRONIC MYELOID LEUKEMIA, BCR/ABL-POSITI                     

 

 2018            KARELY SALAS APRN            Ot            F41.9      
      ANXIETY DISORDER, UNSPECIFIED                     

 

 2018            KARELY SALAS APRN            Ot            I48.0      
      PAROXYSMAL ATRIAL FIBRILLATION                     

 

 2018            KARELY SALAS APRN            Ot            R42        
    DIZZINESS AND GIDDINESS                     

 

 2018            KARELY SALAS APRRAUL            Ot            Z79.01     
       LONG TERM (CURRENT) USE OF ANTICOAGULANT                     

 

 2018            KARELY SALAS APRN            Ot            Z87.19     
       PERSONAL HISTORY OF OTHER DISEASES OF TH                     

 

 2018            KARELY SALAS APRN            Ot            Z88.1      
      ALLERGY STATUS TO OTHER ANTIBIOTIC AGENT                     

 

 2018            KARELY SALAS APRN            Ot            Z88.2      
      ALLERGY STATUS TO SULFONAMIDES STATUS                     

 

 2018            KARELY SALAS            Ot            Z88.6      
      ALLERGY STATUS TO ANALGESIC AGENT STATUS                     

 

 2018            KARELY SALAS APRN            Ot            Z92.21     
       PERSONAL HISTORY OF ANTINEOPLASTIC CHEMO                     

 

 2018            MAGUI MCALLISTER MD            Ot            C92.10  
          CHRONIC MYELOID LEUK, BCR/ABL-POSITIVE,                      

 

 2018            KARELY SALAS APRN            Ot            C92.11     
       CHRONIC MYELOID LEUKEMIA, BCR/ABL-POSITI                     

 

 2018            KARELY SALAS APRN            Ot            F41.9      
      ANXIETY DISORDER, UNSPECIFIED                     

 

 2018            KARELY SALAS APRRAUL            Ot            I48.0      
      PAROXYSMAL ATRIAL FIBRILLATION                     

 

 2018            KARELY SALAS APRRAUL            Ot            R42        
    DIZZINESS AND GIDDINESS                     

 

 2018            KARELY SALAS APRRAUL            Ot            Z79.01     
       LONG TERM (CURRENT) USE OF ANTICOAGULANT                     

 

 2018            KARELY SALAS            Ot            Z87.19     
       PERSONAL HISTORY OF OTHER DISEASES OF TH                     

 

 2018            KARELY SALAS            Ot            Z88.1      
      ALLERGY STATUS TO OTHER ANTIBIOTIC AGENT                     

 

 2018            KARELY SALAS            Ot            Z88.2      
      ALLERGY STATUS TO SULFONAMIDES STATUS                     

 

 2018            KARELY SALAS            Ot            Z88.6      
      ALLERGY STATUS TO ANALGESIC AGENT STATUS                     

 

 2018            KARELY SALAS            Ot            Z92.21     
       PERSONAL HISTORY OF ANTINEOPLASTIC CHEMO                     

 

 2018            TESSY COHEN FAC, [facility]S            Ot            
I48.0            PAROXYSMAL ATRIAL FIBRILLATION                     

 

 2018            TESSY COHEN FAC, [facility] CCDS            Ot            
Z79.01            LONG TERM (CURRENT) USE OF ANTICOAGULANT                     

 

 2018            MAGUI MCALLISTER MD            Ot            C92.10  
          CHRONIC MYELOID LEUK, BCR/ABL-POSITIVE,                      

 

 2018            TONY DRUMMOND MD            Ot            I48.0       
     PAROXYSMAL ATRIAL FIBRILLATION                     

 

 2018            TONY DRUMMOND MD            Ot            I48.0       
     PAROXYSMAL ATRIAL FIBRILLATION                     

 

 2018            MAGUI MCALLISTER MD            Ot            C92.10  
          CHRONIC MYELOID LEUK, BCR/ABL-POSITIVE,                      

 

 2018            TONY DRUMMOND MD            Ot            B19.20      
      UNSPECIFIED VIRAL HEPATITIS C WITHOUT HE                     

 

 2018            TONY DRUMMOND MD            Ot            C95.90      
      LEUKEMIA, UNSPECIFIED NOT HAVING ACHIEVE                     

 

 2018            TONY DRUMMOND MD            Ot            I48.0       
     PAROXYSMAL ATRIAL FIBRILLATION                     

 

 2018            TONY DRUMMOND MD            Ot            M79.89      
      OTHER SPECIFIED SOFT TISSUE DISORDERS                     

 

 2018            TONY DRUMMOND MD            Ot            R05         
   COUGH                     

 

 2018            TONY DRUMMOND MD            Ot            R06.00      
      DYSPNEA, UNSPECIFIED                     

 

 2018            TESSY MD FACC, [facility]S            Ot            
I48.0            PAROXYSMAL ATRIAL FIBRILLATION                     

 

 2018            TESSY COHEN FACC, CHEMA HANNA CCDS            Ot            
Z79.01            LONG TERM (CURRENT) USE OF ANTICOAGULANT                     

 

 2018            GELKEVIN HARE, KATHRYN A            Ot            J90    
        PLEURAL EFFUSION, NOT ELSEWHERE CLASSIFI                     

 

 2018            TONY DRUMMOND MD            Ot            B19.20      
      UNSPECIFIED VIRAL HEPATITIS C WITHOUT HE                     

 

 2018            TONY DRUMMOND MD            Ot            C95.90      
      LEUKEMIA, UNSPECIFIED NOT HAVING ACHIEVE                     

 

 2018            TONY DRUMMOND MD J            Ot            I48.0       
     PAROXYSMAL ATRIAL FIBRILLATION                     

 

 2018            TONY DRUMMOND MD            Ot            M79.89      
      OTHER SPECIFIED SOFT TISSUE DISORDERS                     

 

 2018            TONY DRUMMOND MD            Ot            R05         
   COUGH                     

 

 2018            TONY DRUMMOND MD            Ot            R06.00      
      DYSPNEA, UNSPECIFIED                     

 

 2018            TONY DRUMMOND MD            Ot            B19.20      
      UNSPECIFIED VIRAL HEPATITIS C WITHOUT HE                     

 

 2018            TONY DRUMMOND MD            Ot            C95.90      
      LEUKEMIA, UNSPECIFIED NOT HAVING ACHIEVE                     

 

 2018            TONY DRUMMNOD MD            Ot            I48.0       
     PAROXYSMAL ATRIAL FIBRILLATION                     

 

 2018            TONY DRUMMOND MD            Ot            M79.89      
      OTHER SPECIFIED SOFT TISSUE DISORDERS                     

 

 2018            TONY DRUMMOND MD            Ot            R05         
   COUGH                     

 

 2018            TONY DRUMMOND MD            Ot            R06.00      
      DYSPNEA, UNSPECIFIED                     

 

 2018            AMALIA HARE, KATHRYN A            Ot            J90    
        PLEURAL EFFUSION, NOT ELSEWHERE CLASSIFI                     

 

 2018            DEIDRE QUEZADA ARNP            Ot            K74.60      
      UNSPECIFIED CIRRHOSIS OF LIVER                     

 

 2018            DEIDRE QUEZADA ARNP            Ot            Z86.19      
      PERSONAL HISTORY OF OTHER INFECTIOUS AND                     

 

 2018            CHEMA STILL MD, FACCP CCDS            Ot            
I48.0            PAROXYSMAL ATRIAL FIBRILLATION                     

 

 2018            CHEMA STILL MD, FACC, FACP CCDS            Ot            
Z51.81            ENCOUNTER FOR THERAPEUTIC DRUG LEVEL MON                     

 

 2018            CHEMA STILL MD, FACC, FACP CCDS            Ot            
Z79.01            LONG TERM (CURRENT) USE OF ANTICOAGULANT                     

 

 2018            CHEMA STILL MD, FACCP CCDS            Ot            
I48.0            PAROXYSMAL ATRIAL FIBRILLATION                     

 

 2018            CHEMA STILL MD, FACCP CCDS            Ot            
Z51.81            ENCOUNTER FOR THERAPEUTIC DRUG LEVEL MON                     

 

 2018            TESSY COHEN [facility], CHEMA [facility] CCDS            Ot            
Z79.01            LONG TERM (CURRENT) USE OF ANTICOAGULANT                     

 

 2018            DEIDRE QUEZADA ARNP            Ot            J90         
   PLEURAL EFFUSION, NOT ELSEWHERE CLASSIFI                     

 

 2018            DEIDRE QUEZADA ARNP            Ot            K74.60      
      UNSPECIFIED CIRRHOSIS OF LIVER                     

 

 2018            DEIDRE QUEZADA ARNP            Ot            K74.60      
      UNSPECIFIED CIRRHOSIS OF LIVER                     

 

 2018            DEIDRE QUEZADA ARNP            Ot            Z86.19      
      PERSONAL HISTORY OF OTHER INFECTIOUS AND                     

 

 2018            DEIDRE QUEZADA ARNP            Ot            J90         
   PLEURAL EFFUSION, NOT ELSEWHERE CLASSIFI                     

 

 2018            DEIDRE QUEZADA ARNP            Ot            K74.60      
      UNSPECIFIED CIRRHOSIS OF LIVER                     

 

 2018            ARY COHEN, MAGUI            Ot            C92.10  
          CHRONIC MYELOID LEUK, BCR/ABL-POSITIVE,                      

 

 2018            MAGUI MCALLISTER MD            Ot            C92.10  
          CHRONIC MYELOID LEUK, BCR/ABL-POSITIVE,                      

 

 2018            DEIDRE QUEZADA ARNP            Ot            K74.60      
      UNSPECIFIED CIRRHOSIS OF LIVER                     

 

 2018            DEIDRE QUEZADA ARNP            Ot            Z86.19      
      PERSONAL HISTORY OF OTHER INFECTIOUS AND                     

 

 2018            DEIDRE QUEZADA ARNP            Ot            J90         
   PLEURAL EFFUSION, NOT ELSEWHERE CLASSIFI                     

 

 2018            DEIDRE QUEZADA ARNP            Ot            K74.60      
      UNSPECIFIED CIRRHOSIS OF LIVER                     

 

 10/16/2018            MAGUI MCALLISTER MD            Ot            C92.10  
          CHRONIC MYELOID LEUK, BCR/ABL-POSITIVE,                      

 

 2019            NICKI ALMANZAR DO            Ot            B19.20         
   UNSPECIFIED VIRAL HEPATITIS C WITHOUT HE                     

 

 2019            NICKI ALMANZAR DO            Ot            F41.9          
  ANXIETY DISORDER, UNSPECIFIED                     

 

 2019            NICKI ALMANZAR DO            Ot            I48.91         
   UNSPECIFIED ATRIAL FIBRILLATION                     

 

 2019            NICKI ALMANZAR DO            Ot            R00.0          
  TACHYCARDIA, UNSPECIFIED                     

 

 2019            NICKI ALMANZAR DO            Ot            Z79.01         
   LONG TERM (CURRENT) USE OF ANTICOAGULANT                     

 

 2019            CAROLE ALMANZAR DOA SANDHYA            Ot            Z82.49         
   FAMILY HX OF ISCHEM HEART DIS AND OTH DI                     

 

 2019            JENELLE HARENICKI            Ot            Z85.6          
  PERSONAL HISTORY OF LEUKEMIA                     

 

 2019            JENELLE HARENICKI            Ot            Z87.09         
   PERSONAL HISTORY OF OTHER DISEASES OF                      

 

 2019            JENELLE NICKI            Ot            Z87.19         
   PERSONAL HISTORY OF OTHER DISEASES OF                      

 

 2019            JENELLE NICKI            Ot            Z88.1          
  ALLERGY STATUS TO OTHER ANTIBIOTIC AGENT                     

 

 2019            JENELLE NICKI            Ot            Z88.2          
  ALLERGY STATUS TO SULFONAMIDES STATUS                     

 

 2019            JENELLE NICKI            Ot            Z88.8          
  ALLERGY STATUS TO OTH DRUG/MEDS/BIOL SUB                     

 

 2019            JENELLE NICKI            Ot            Z92.21         
   PERSONAL HISTORY OF ANTINEOPLASTIC CHEMO                     

 

 2019            JENELLE NICKI            Ot            Z98.890        
    OTHER SPECIFIED POSTPROCEDURAL STATES                     

 

 2019            NICKI ALMANZAR DO            Ot            B19.20         
   UNSPECIFIED VIRAL HEPATITIS C WITHOUT HE                     

 

 2019            NICKI ALMANZAR DO            Ot            F41.9          
  ANXIETY DISORDER, UNSPECIFIED                     

 

 2019            JENELLE NICKI HARE            Ot            I48.91         
   UNSPECIFIED ATRIAL FIBRILLATION                     

 

 2019            JENELLE NICKI HARE            Ot            R00.0          
  TACHYCARDIA, UNSPECIFIED                     

 

 2019            JENELLE NICKI            Ot            Z79.01         
   LONG TERM (CURRENT) USE OF ANTICOAGULANT                     

 

 2019            JENELLE NICKI HARE            Ot            Z82.49         
   FAMILY HX OF ISCHEM HEART DIS AND OTH DI                     

 

 2019            JENELLE NICKI            Ot            Z85.6          
  PERSONAL HISTORY OF LEUKEMIA                     

 

 2019            JENELLE NICKI HARE            Ot            Z87.09         
   PERSONAL HISTORY OF OTHER DISEASES OF                      

 

 2019            JENELLE NICKI            Ot            Z87.19         
   PERSONAL HISTORY OF OTHER DISEASES OF                      

 

 2019            JENELLE NICKI            Ot            Z88.1          
  ALLERGY STATUS TO OTHER ANTIBIOTIC AGENT                     

 

 2019            JENELLE NICKI            Ot            Z88.2          
  ALLERGY STATUS TO SULFONAMIDES STATUS                     

 

 2019            Blaine NICKI            Ot            Z88.8          
  ALLERGY STATUS TO OTH DRUG/MEDS/BIOL SUB                     

 

 2019            NICKI ALMANZAR DO            Ot            Z92.21         
   PERSONAL HISTORY OF ANTINEOPLASTIC CHEMO                     

 

 2019            NICKI ALMANZAR DO            Ot            Z98.890        
    OTHER SPECIFIED POSTPROCEDURAL STATES                     

 

 2019                         Ot            070.70            UNSPECIFIED 
VIRAL HEPATITIS C WITHOUT HE                     

 

 2019                         Ot            205.10            CHRONIC 
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                     

 

 2019                         Ot            238.71            ESSENTIAL 
THROMBOCYTHEMIA                     

 

 2019                         Ot            288.00            NEUTROPENIA
, UNSPECIFIED                     

 

 2019                         Ot            530.81            ESOPHAGEAL 
REFLUX                     

 

 2019                         Ot            553.3            
DIAPHRAGMATIC HERNIA                     

 

 2019                         Ot            V58.69            OTH MED,LT,
CURRENT USE                     

 

 2019            GELLENDER DO, KATHRYN GIBBONS            Ot            070.70 
           UNSPECIFIED VIRAL HEPATITIS C WITHOUT HE                     

 

 2019            GELLENDER KATHRYN HARE            Ot            511.9  
          PLEURAL EFFUSION NOS                     

 

 2019            GELLENDER DO, KATHRYN GIBBONS            Ot            511.9  
          PLEURAL EFFUSION NOS                     

 

 2019                         Ot            070.70            UNSPECIFIED 
VIRAL HEPATITIS C WITHOUT HE                     

 

 2019                         Ot            208.90            UNSPECIFIED 
LEUKEMIA, W/O MENTION OF HAV                     

 

 2019                         Ot            511.9            PLEURAL 
EFFUSION NOS                     

 

 2019                         Ot            786.2            COUGH       
              

 

 2019                         Ot            V72.84            EXAM PRE-
OPERATIVE NOS                     

 

 2019            LUKE MAHAN DO            Ot            511.9       
     PLEURAL EFFUSION NOS                     

 

 2019            LUKE MAHAN DO            Ot            786.2       
     COUGH                     

 

 2019            DEIDRE QUEZADA ARN            Ot            070.70      
      UNSPECIFIED VIRAL HEPATITIS C WITHOUT HE                     

 

 2019            DEIDRE QUEZADA ARNMAKENZIE            Ot            571.5       
     CIRRHOSIS OF LIVER NOS                     

 

 2019            YAZMINDER KATHRYN HARE            Ot            729.5  
          PAIN IN LIMB                     

 

 2019            GELLENDER KATHRYN HARE            Ot            729.81 
           SWELLING OF LIMB                     

 

 2019            GELLENDER KATHRYN HARE            Ot            511.9  
          PLEURAL EFFUSION NOS                     

 

 2019            GELLENDER KATHRYN HARE            Ot            518.0  
          PULMONARY COLLAPSE                     

 

 2019            GELLENDER KATHRYN HARE            Ot            786.05 
           SHORTNESS OF BREATH                     

 

 2019            GELLENDER KATHRYN HARE            Ot            R06.02 
           SHORTNESS OF BREATH                     

 

 2019            GELLENDER DOKATHRYN            Ot            R06.02 
           SHORTNESS OF BREATH                     

 

 2019            GELLENDER KATHRYN HARE            Ot            R91.8  
          OTHER NONSPECIFIC ABNORMAL FINDING OF SAMUEL                     

 

 2019            KATHRYN HOLLAND DO            Ot            N19    
        UNSPECIFIED KIDNEY FAILURE                     

 

 2019            AMALIA HARE KATHRYN GIBBONS            Ot            R06.02 
           SHORTNESS OF BREATH                     

 

 2019            LUKE MAHAN DO            Ot            B19.20      
      UNSPECIFIED VIRAL HEPATITIS C WITHOUT HE                     

 

 2019            LUKE MAHAN DO            Ot            C95.90      
      LEUKEMIA, UNSPECIFIED NOT HAVING ACHIEVE                     

 

 2019            LUKE MAHAN DO            Ot            J90         
   PLEURAL EFFUSION, NOT ELSEWHERE CLASSIFI                     

 

 2019            LUKE MAHAN DO            Ot            R05         
   COUGH                     

 

 2019            LUKE MAHAN DO            Ot            B19.20      
      UNSPECIFIED VIRAL HEPATITIS C WITHOUT HE                     

 

 2019            LUKE MAHAN DO            Ot            C95.90      
      LEUKEMIA, UNSPECIFIED NOT HAVING ACHIEVE                     

 

 2019            LUKE MAHAN DO            Ot            J90         
   PLEURAL EFFUSION, NOT ELSEWHERE CLASSIFI                     

 

 2019            LUKE MAHAN DO            Ot            R05         
   COUGH                     

 

 2019            LIANGSHERRONDARION DO KATHRYN GIBBONS            Ot            M25.531
            PAIN IN RIGHT WRIST                     

 

 2019            DEIDRE QUEZADA ARNP            Ot            K74.60      
      UNSPECIFIED CIRRHOSIS OF LIVER                     

 

 2019            LIANGSHERRONDARION KATHRYN HARE            Ot            J90    
        PLEURAL EFFUSION, NOT ELSEWHERE CLASSIFI                     

 

 2019            DEIDRE QUEZADA ARNP            Ot            K74.60      
      UNSPECIFIED CIRRHOSIS OF LIVER                     

 

 2019            DEIDRE QUEZADA ARNP            Ot            Z90.49      
      ACQUIRED ABSENCE OF OTHER SPECIFIED PART                     

 

 2019            LIANGKATHRYN BROWN DO            Ot            I48.2  
          CHRONIC ATRIAL FIBRILLATION                     

 

 2019            KATHRYN HOLLAND DO            Ot            I48.0  
          PAROXYSMAL ATRIAL FIBRILLATION                     

 

 2019            TESSY COHEN FAC, ALI FACP CCDS            Ot            
C92.11            CHRONIC MYELOID LEUKEMIA, BCR/ABL-POSITI                     

 

 2019            TESSY COHEN FAC, ALI FACP CCDS            Ot            
I48.0            PAROXYSMAL ATRIAL FIBRILLATION                     

 

 2019            LIANGSHERRONDARION HAREKATHRYN            Ot            J90    
        PLEURAL EFFUSION, NOT ELSEWHERE CLASSIFI                     

 

 2019            LIANGKATHRYN BROWN DO            Ot            J98.11 
           ATELECTASIS                     

 

 2019            JENN NEAL            Ot            B19.20
            UNSPECIFIED VIRAL HEPATITIS C WITHOUT HE                     

 

 2019            JENN NEAL            Ot            C95.90
            LEUKEMIA, UNSPECIFIED NOT HAVING ACHIEVE                     

 

 2019            JENN NEAL APRRAUL            Ot            J90   
         PLEURAL EFFUSION, NOT ELSEWHERE CLASSIFI                     

 

 2019            JENN NEAL            Ot            Z79.01
            LONG TERM (CURRENT) USE OF ANTICOAGULANT                     

 

 2019            GELLENDER DO, KATHRYN A            Ot            J90    
        PLEURAL EFFUSION, NOT ELSEWHERE CLASSIFI                     

 

 2019            GELLENDER DO, KATHRYN A            Ot            J90    
        PLEURAL EFFUSION, NOT ELSEWHERE CLASSIFI                     

 

 2019            GELLENDER DO, KATHRYN A            Ot            J90    
        PLEURAL EFFUSION, NOT ELSEWHERE CLASSIFI                     

 

 2019            TESSY COHEN FAC, CHEMA HANNA CCDS            Ot            
I48.0            PAROXYSMAL ATRIAL FIBRILLATION                     

 

 2019            DEIDRE QUEZADAP            Ot            J90         
   PLEURAL EFFUSION, NOT ELSEWHERE CLASSIFI                     

 

 2019            DEIDRE QUEZADAP            Ot            K74.60      
      UNSPECIFIED CIRRHOSIS OF LIVER                     

 

 2019            ARY COHEN, MAGUI            Ot            C92.10  
          CHRONIC MYELOID LEUK, BCR/ABL-POSITIVE,                      

 

 2019            DEIDRE QUEZADAP            Ot            K74.60      
      UNSPECIFIED CIRRHOSIS OF LIVER                     

 

 2019            DEIDRE QUEZADAP            Ot            Z86.19      
      PERSONAL HISTORY OF OTHER INFECTIOUS AND                     

 

 2019            TONY DRUMMOND MD            Ot            B19.20      
      UNSPECIFIED VIRAL HEPATITIS C WITHOUT HE                     

 

 2019            TONY DRUMMOND MD            Ot            C95.90      
      LEUKEMIA, UNSPECIFIED NOT HAVING ACHIEVE                     

 

 2019            TONY DRUMMOND MD            Ot            I48.0       
     PAROXYSMAL ATRIAL FIBRILLATION                     

 

 2019            TONY DRUMMOND MD            Ot            M79.89      
      OTHER SPECIFIED SOFT TISSUE DISORDERS                     

 

 2019            TONY DRUMMOND MD            Ot            R05         
   COUGH                     

 

 2019            TONY DRUMMOND MD            Ot            R06.00      
      DYSPNEA, UNSPECIFIED                     

 

 2019            GELLENDER DO, KATHRYN A            Ot            J90    
        PLEURAL EFFUSION, NOT ELSEWHERE CLASSIFI                     

 

 2019            DEIDRE QUEZADAP            Ot            K74.60      
      UNSPECIFIED CIRRHOSIS OF LIVER                     

 

 2019            DEIDRE QUEZADAP            Ot            Z86.19      
      PERSONAL HISTORY OF OTHER INFECTIOUS AND                     

 

 2019            DEIDRE QUEZADA ARNP            Ot            J90         
   PLEURAL EFFUSION, NOT ELSEWHERE CLASSIFI                     

 

 2019            DEIDRE QUEZADA ARN            Ot            K74.60      
      UNSPECIFIED CIRRHOSIS OF LIVER                     

 

 2019            TESSY COHEN FAC, ALI [facility] CCDS            Ot            
I48.0            PAROXYSMAL ATRIAL FIBRILLATION                     

 

 2019            TESSY COHEN FAC, ALI FAC CCDS            Ot            
Z79.01            LONG TERM (CURRENT) USE OF ANTICOAGULANT                     

 

 2019            ARY COHEN, MAGUI            Ot            C92.10  
          CHRONIC MYELOID LEUK, BCR/ABL-POSITIVE,                      

 

 2019                         Ot            070.70            UNSPECIFIED 
VIRAL HEPATITIS C WITHOUT HE                     

 

 2019                         Ot            205.10            CHRONIC 
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                     

 

 2019                         Ot            238.71            ESSENTIAL 
THROMBOCYTHEMIA                     

 

 2019                         Ot            288.00            NEUTROPENIA
, UNSPECIFIED                     

 

 2019                         Ot            530.81            ESOPHAGEAL 
REFLUX                     

 

 2019                         Ot            553.3            
DIAPHRAGMATIC HERNIA                     

 

 2019                         Ot            V58.69            OTH MED,LT,
CURRENT USE                     

 

 2019            KATHRYN HOLLAND DO            Ot            070.70 
           UNSPECIFIED VIRAL HEPATITIS C WITHOUT HE                     

 

 2019            KATHRYN HOLLAND DO            Ot            511.9  
          PLEURAL EFFUSION NOS                     

 

 2019            GELLENDER KATHRYN HARE            Ot            511.9  
          PLEURAL EFFUSION NOS                     

 

 2019                         Ot            070.70            UNSPECIFIED 
VIRAL HEPATITIS C WITHOUT HE                     

 

 2019                         Ot            208.90            UNSPECIFIED 
LEUKEMIA, W/O MENTION OF HAV                     

 

 2019                         Ot            511.9            PLEURAL 
EFFUSION NOS                     

 

 2019                         Ot            786.2            COUGH       
              

 

 2019                         Ot            V72.84            EXAM PRE-
OPERATIVE NOS                     

 

 2019            LUKE MAHAN DO            Ot            511.9       
     PLEURAL EFFUSION NOS                     

 

 2019            LUKE MAHAN DO            Ot            786.2       
     COUGH                     

 

 2019            DEIDRE QUEZADA            Ot            070.70      
      UNSPECIFIED VIRAL HEPATITIS C WITHOUT HE                     

 

 2019            DEIDRE QUEZADA            Ot            571.5       
     CIRRHOSIS OF LIVER NOS                     

 

 2019            KATHRYN HOLLAND DO            Ot            729.5  
          PAIN IN LIMB                     

 

 2019            KATHRYN HOLLAND DO            Ot            729.81 
           SWELLING OF LIMB                     

 

 2019            KATHRYN HOLLAND DO            Ot            511.9  
          PLEURAL EFFUSION NOS                     

 

 2019            KATHRYN HOLLAND DO            Ot            518.0  
          PULMONARY COLLAPSE                     

 

 2019            AMALIA HARE, KATHRYN GIBBONS            Ot            786.05 
           SHORTNESS OF BREATH                     

 

 2019            AMALIA HARE, KATHRYN GIBBONS            Ot            R06.02 
           SHORTNESS OF BREATH                     

 

 2019            LIANGLENDER , KATHRYN GIBBONS            Ot            R06.02 
           SHORTNESS OF BREATH                     

 

 2019            KATHRYN HOLLADN DO            Ot            R91.8  
          OTHER NONSPECIFIC ABNORMAL FINDING OF SAMUEL                     

 

 2019            KATHRYN HOLLAND DO            Ot            N19    
        UNSPECIFIED KIDNEY FAILURE                     

 

 2019            AMALIA HARE, KATHRYN GIBBONS            Ot            R06.02 
           SHORTNESS OF BREATH                     

 

 2019            KALLILUKE SANCHEZ DO            Ot            B19.20      
      UNSPECIFIED VIRAL HEPATITIS C WITHOUT HE                     

 

 2019            LUKE MAHAN DO M            Ot            C95.90      
      LEUKEMIA, UNSPECIFIED NOT HAVING ACHIEVE                     

 

 2019            LUKE MAHAN DO M            Ot            J90         
   PLEURAL EFFUSION, NOT ELSEWHERE CLASSIFI                     

 

 2019            LUKE MAHAN DO            Ot            R05         
   COUGH                     

 

 2019            LUKE MAHAN DO            Ot            B19.20      
      UNSPECIFIED VIRAL HEPATITIS C WITHOUT HE                     

 

 2019            LUKE MAAHN DO M            Ot            C95.90      
      LEUKEMIA, UNSPECIFIED NOT HAVING ACHIEVE                     

 

 2019            LUKE MAHAN DO M            Ot            J90         
   PLEURAL EFFUSION, NOT ELSEWHERE CLASSIFI                     

 

 2019            LUKE MAHAN DO            Ot            R05         
   COUGH                     

 

 2019            KATHRYN HOLLAND DO            Ot            M25.531
            PAIN IN RIGHT WRIST                     

 

 2019            DEIDRE QUEZADA ARNP            Ot            K74.60      
      UNSPECIFIED CIRRHOSIS OF LIVER                     

 

 2019            KATHRYN HOLLAND DO            Ot            J90    
        PLEURAL EFFUSION, NOT ELSEWHERE CLASSIFI                     

 

 2019            DEIDRE QUEZADA ARNP            Ot            K74.60      
      UNSPECIFIED CIRRHOSIS OF LIVER                     

 

 2019            DEIDRE QUEZADA ARNP            Ot            Z90.49      
      ACQUIRED ABSENCE OF OTHER SPECIFIED PART                     

 

 2019            KATHRYN HOLLAND DO            Ot            I48.2  
          CHRONIC ATRIAL FIBRILLATION                     

 

 2019            KATHRYN HOLLAND DO            Ot            I48.0  
          PAROXYSMAL ATRIAL FIBRILLATION                     

 

 2019            TESSY COHEN FACC, ALI FACP CCDS            Ot            
C92.11            CHRONIC MYELOID LEUKEMIA, BCR/ABL-POSITI                     

 

 2019            TESSY COHEN FACC, ALI FACP CCDS            Ot            
I48.0            PAROXYSMAL ATRIAL FIBRILLATION                     

 

 2019            GELLENDER DO, KATHRYN A            Ot            J90    
        PLEURAL EFFUSION, NOT ELSEWHERE CLASSIFI                     

 

 2019            GELLENDER DO, KATHRYN A            Ot            J98.11 
           ATELECTASIS                     

 

 2019            JENN NEAL APRN            Ot            B19.20
            UNSPECIFIED VIRAL HEPATITIS C WITHOUT HE                     

 

 2019            JENN NEAL APRN            Ot            C95.90
            LEUKEMIA, UNSPECIFIED NOT HAVING ACHIEVE                     

 

 2019            JENN NEAL APRN            Ot            J90   
         PLEURAL EFFUSION, NOT ELSEWHERE CLASSIFI                     

 

 2019            JENN NEAL APRN            Ot            Z79.01
            LONG TERM (CURRENT) USE OF ANTICOAGULANT                     

 

 2019            GELLENDER DO, KATHRYN A            Ot            J90    
        PLEURAL EFFUSION, NOT ELSEWHERE CLASSIFI                     

 

 2019            GELLENDER DO, KATHRYN A            Ot            J90    
        PLEURAL EFFUSION, NOT ELSEWHERE CLASSIFI                     

 

 2019            GELLENDER DO, KATHRYN A            Ot            J90    
        PLEURAL EFFUSION, NOT ELSEWHERE CLASSIFI                     

 

 2019            TESSY OCHEN [facility], [facility] CCDS            Ot            
I48.0            PAROXYSMAL ATRIAL FIBRILLATION                     

 

 2019            DEIDRE QUEZADA ARNP            Ot            J90         
   PLEURAL EFFUSION, NOT ELSEWHERE CLASSIFI                     

 

 2019            DEIDRE QUEZADA ARNP            Ot            K74.60      
      UNSPECIFIED CIRRHOSIS OF LIVER                     

 

 2019            ARY COHEN, MAGUI            Ot            C92.10  
          CHRONIC MYELOID LEUK, BCR/ABL-POSITIVE,                      

 

 2019            DEIDRE QUEZADA ARNP            Ot            K74.60      
      UNSPECIFIED CIRRHOSIS OF LIVER                     

 

 2019            DEIDRE QUEZADA ARNP            Ot            Z86.19      
      PERSONAL HISTORY OF OTHER INFECTIOUS AND                     

 

 2019            TONY DRUMMOND MD            Ot            B19.20      
      UNSPECIFIED VIRAL HEPATITIS C WITHOUT HE                     

 

 2019            TONY DRUMMOND MD            Ot            C95.90      
      LEUKEMIA, UNSPECIFIED NOT HAVING ACHIEVE                     

 

 2019            TONY DRUMMOND MD            Ot            I48.0       
     PAROXYSMAL ATRIAL FIBRILLATION                     

 

 2019            TONY DRUMMOND MD            Ot            M79.89      
      OTHER SPECIFIED SOFT TISSUE DISORDERS                     

 

 2019            TONY DRUMMOND MD            Ot            R05         
   COUGH                     

 

 2019            TONY DRUMMOND MD            Ot            R06.00      
      DYSPNEA, UNSPECIFIED                     

 

 2019            KATHRYN HOLLAND DO A            Ot            J90    
        PLEURAL EFFUSION, NOT ELSEWHERE CLASSIFI                     

 

 2019            PILARANA LUISAKALPANA PERKINS ARNP            Ot            K74.60      
      UNSPECIFIED CIRRHOSIS OF LIVER                     

 

 2019            DEIDRE QUEZADAP            Ot            Z86.19      
      PERSONAL HISTORY OF OTHER INFECTIOUS AND                     

 

 2019            DEIDRE QUEZADAP            Ot            J90         
   PLEURAL EFFUSION, NOT ELSEWHERE CLASSIFI                     

 

 2019            DEIDRE QUEZADA ARNP            Ot            K74.60      
      UNSPECIFIED CIRRHOSIS OF LIVER                     

 

 2019            TESSY COHEN FAC, ALI FACP CCDS            Ot            
I48.0            PAROXYSMAL ATRIAL FIBRILLATION                     

 

 2019            TESSY COHEN FAC, ALI FACP CCDS            Ot            
Z79.01            LONG TERM (CURRENT) USE OF ANTICOAGULANT                     

 

 2019            MAGUI MCLALISTER MD            Ot            C92.10  
          CHRONIC MYELOID LEUK, BCR/ABL-POSITIVE,                      

 

 2019            TAWIL MD, ELIAS A            Ot            K57.30       
     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                     

 

 2019            TAWIL MD, ELIAS A            Ot            R31.29       
     OTHER MICROSCOPIC HEMATURIA                     

 

 2019            TAWIL MD, ELIAS A            Ot            Z90.49       
     ACQUIRED ABSENCE OF OTHER SPECIFIED PART                     

 

 2019            STEVO RICHARDSON            Ot            
C92.11            CHRONIC MYELOID LEUKEMIA, BCR/ABL-POSITI                     

 

 2019            STEVO RICHARDSON            Ot            
I48.0            PAROXYSMAL ATRIAL FIBRILLATION                     

 

 2019            STEVO RICHARDSON            Ot            
R06.02            SHORTNESS OF BREATH                     

 

 2019            STEVO RICHARDSON            Ot            
R06.09            OTHER FORMS OF DYSPNEA                     

 

 2019            DEIDRE QUEZADAP            Ot            K74.60      
      UNSPECIFIED CIRRHOSIS OF LIVER                     

 

 2019            DEIDRE QUEZADAP            Ot            Z90.49      
      ACQUIRED ABSENCE OF OTHER SPECIFIED PART                     

 

 2019            STEVO RICHARDSON            Ot            
C92.11            CHRONIC MYELOID LEUKEMIA, BCR/ABL-POSITI                     

 

 2019            STEVO RICHARDSON            Ot            
I48.0            PAROXYSMAL ATRIAL FIBRILLATION                     

 

 2019            STEVO RICHARDSON            Ot            
R06.02            SHORTNESS OF BREATH                     

 

 2019            STEVO RICHARDSON            Ot            
R06.09            OTHER FORMS OF DYSPNEA                     

 

 2019            DEIDRE QUEZADAP            Ot            K74.60      
      UNSPECIFIED CIRRHOSIS OF LIVER                     

 

 2019            DEIDRE QUEZADA            Ot            Z90.49      
      ACQUIRED ABSENCE OF OTHER SPECIFIED PART                     

 

 2019            SIXTO BAIRES DO            Ot            Z01.818      
      ENCOUNTER FOR OTHER PREPROCEDURAL EXAMIN                     

 

 2019            BAIRES DOSIXTO            Ot            Z01.818      
      ENCOUNTER FOR OTHER PREPROCEDURAL EXAMIN                     

 

 2019            BAIRES DOSIXTO            Ot            Z01.818      
      ENCOUNTER FOR OTHER PREPROCEDURAL EXAMIN                     

 

 2019            STEVO RICHARDSON            Ot            
C92.11            CHRONIC MYELOID LEUKEMIA, BCR/ABL-POSITI                     

 

 2019            STEVO RICHARDSON Ot            
I48.0            PAROXYSMAL ATRIAL FIBRILLATION                     

 

 2019            STEVO RICHARDSON            Ot            
R06.02            SHORTNESS OF BREATH                     

 

 2019            STEVO RICHARDSON Ot            
R06.09            OTHER FORMS OF DYSPNEA                     

 

 2019            DEIDRE QUEZADA            Ot            K74.60      
      UNSPECIFIED CIRRHOSIS OF LIVER                     

 

 2019            DEIDRE QUEZADA            Ot            Z90.49      
      ACQUIRED ABSENCE OF OTHER SPECIFIED PART                     



                                                                               
                                                                               
                                                                               
                                                                               
                                                                               
                                                                               
                                                                               
                                                                               
                                                                               
                                                                               
                                                                               
                                                                               
                                                                               
                                                                               
                                                                               
                                                                               
                                                                               
                                                                               
                                                                               
                                                                               
                                                                               
                                                                               
                                                                               
                                                                               
                                



Procedures

      





 Code            Description            Performed By            Performed On   
     

 

             41.31                                  BONE MARROW BIOPSY         
                          2012        

 

             34.91                                  THORACENTESIS              
                     2014        

 

             34.91                                  THORACENTESIS              
                     2014        



                      



Results

      





 Test            Result            Range        









 [id number] - 16 12:32         









 Serum or plasma urea nitrogen measurement (mass/volume)            15 mg/dL   
         7-18        

 

 Serum or plasma creatinine measurement (mass/volume)            0.83 mg/dL    
        0.60-1.30        

 

 Serum or plasma urea nitrogen/creatinine mass ratio            18             
NRG        

 

 Serum or plasma creatinine measurement with calculation of estimated 
glomerular filtration rate            >             NRG        









 Body fluid cell count - 16 08:35         









 Specimen source identification of body fluid            THORACEN             
NRG        

 

 Evaluation of color of body fluid            YELLOW             NRG        

 

 Determination of appearance of body fluid            SLT CLDY             NRG 
       

 

 Body fluid leukocytes count (number/volume)            2350 /uL            NRG
        

 

 Body fluid erythrocytes count (number/volume)            800 /uL            
NRG        

 

 Manual body fluid polymorphonuclear cells/100 leukocytes            0 %       
     NRG        

 

 Manual body fluid mononuclear cells/100 leukocytes            0 %            
NRG        

 

 Manual body fluid lymphocytes/100 leukocytes            98 %            NRG   
     

 

 Other cells/100 leukocytes in body fluid by manual count            2 %       
     NRG        









 Body fluid total protein measurement - 16 08:35         









 Body fluid total protein measurement            4.9 g/dL            NRG        









 Gram stain microscopy - 16 08:35         









 GRAM STAIN RESULT            FEW WBC'S, NO BACTERIA OBSERVED             NRG  
      









 Body fluid/serum or plasma lactate dehydrogenase (LDH) ratio - 16 08:35 
        









 Body fluid/serum or plasma lactate dehydrogenase (LDH) ratio            154 U/
L            NRG        









 Body fluid triglyceride measurement (mass/volume) - 16 08:35         









 Body fluid triglyceride measurement (mass/volume)            24 mg/dL         
   NRG        









 Bacterial body fluid culture - 16 08:35         









 Bacterial body fluid culture            NG             NRG        









 Complete urinalysis with reflex to culture - 10/31/16 19:28         









 Urine color determination            YELLOW             NRG        

 

 Urine clarity determination            SLIGHTLY CLOUDY             NRG        

 

 Urine pH measurement by test strip            5             5-9        

 

 Specific gravity of urine by test strip            1.025             1.016-
1.022        

 

 Urine protein assay by test strip, semi-quantitative            2+             
NEGATIVE        

 

 Urine glucose detection by automated test strip            NEGATIVE           
  NEGATIVE        

 

 Erythrocytes detection in urine sediment by light microscopy            1+    
         NEGATIVE        

 

 Urine ketones detection by automated test strip            NEGATIVE           
  NEGATIVE        

 

 Urine nitrite detection by test strip            NEGATIVE             NEGATIVE
        

 

 Urine total bilirubin detection by test strip            NEGATIVE             
NEGATIVE        

 

 Urine urobilinogen measurement by automated test strip (mass/volume)          
  NORMAL             NORMAL        

 

 Urine leukocyte esterase detection by dipstick            NEGATIVE             
NEGATIVE        

 

 Automated urine sediment erythrocyte count by microscopy (number/high power 
field)            RARE             NRG        

 

 Automated urine sediment leukocyte count by microscopy (number/high power field
)             [HPF]            NRG        

 

 Bacteria detection in urine sediment by light microscopy            NEGATIVE  
           NRG        

 

 Crystals detection in urine sediment by light microscopy            NONE      
       NRG        

 

 Casts detection in urine sediment by light microscopy            NONE         
    NRG        

 

 Mucus detection in urine sediment by light microscopy            LARGE        
     NRG        

 

 Complete urinalysis with reflex to culture            NO             NRG      
  









 Serum or plasma troponin i.cardiac measurement (mass/volume) - 17 16:59 
        









 Serum or plasma troponin i.cardiac measurement (mass/volume)            < ng/
mL            <0.30        









 Complete blood count (CBC) with automated white blood cell (WBC) differential 
- 17 11:05         









 Blood leukocytes automated count (number/volume)            3.9 10*3/uL       
     4.3-11.0        

 

 Blood erythrocytes automated count (number/volume)            4.47 10*6/uL    
        4.35-5.85        

 

 Venous blood hemoglobin measurement (mass/volume)            14.2 g/dL        
    11.5-16.0        

 

 Blood hematocrit (volume fraction)            43 %            35-52        

 

 Automated erythrocyte mean corpuscular volume            96 [foz_us]          
  80-99        

 

 Automated erythrocyte mean corpuscular hemoglobin (mass per erythrocyte)      
      32 pg            25-34        

 

 Automated erythrocyte mean corpuscular hemoglobin concentration measurement (
mass/volume)            33 g/dL            32-36        

 

 Automated erythrocyte distribution width ratio            15.5 %            
10.0-14.5        

 

 Automated blood platelet count (count/volume)            200 10*3/uL          
  130-400        

 

 Automated blood platelet mean volume measurement            9.7 [foz_us]      
      7.4-10.4        

 

 Automated blood neutrophils/100 leukocytes            42 %            42-75   
     

 

 Automated blood lymphocytes/100 leukocytes            44 %            12-44   
     

 

 Blood monocytes/100 leukocytes            10 %            0-12        

 

 Automated blood eosinophils/100 leukocytes            3 %            0-10     
   

 

 Automated blood basophils/100 leukocytes            1 %            0-10        

 

 Blood neutrophils automated count (number/volume)            1.6 10*3         
   1.8-7.8        

 

 Blood lymphocytes automated count (number/volume)            1.7 10*3         
   1.0-4.0        

 

 Blood monocytes automated count (number/volume)            0.4 10*3            
0.0-1.0        

 

 Automated eosinophil count            0.1 10*3/uL            0.0-0.3        

 

 Automated blood basophil count (count/volume)            0.1 10*3/uL          
  0.0-0.1        









 PT panel in platelet poor plasma by coagulation assay - 17 11:05         









 Prothrombin time (PT) in platelet poor plasma by coagulation assay            
12.9 s            12.2-14.7        

 

 INR in platelet poor plasma or blood by coagulation assay            1.0      
       0.8-1.4        









 Activated partial thromboplastin time (aPTT) in platelet poor plasma 
bycoagulation assay - 17 11:05         









 Activated partial thromboplastin time (aPTT) in platelet poor plasma 
bycoagulation assay            32 s            24-35        









 Comprehensive metabolic panel - 17 11:05         









 Serum or plasma sodium measurement (moles/volume)            142 mmol/L       
     135-145        

 

 Serum or plasma potassium measurement (moles/volume)            3.1 mmol/L    
        3.6-5.0        

 

 Serum or plasma chloride measurement (moles/volume)            111 mmol/L     
               

 

 Carbon dioxide            20 mmol/L            21-32        

 

 Serum or plasma anion gap determination (moles/volume)            11 mmol/L   
         5-14        

 

 Serum or plasma urea nitrogen measurement (mass/volume)            19 mg/dL   
         7-18        

 

 Serum or plasma creatinine measurement (mass/volume)            0.99 mg/dL    
        0.60-1.30        

 

 Serum or plasma urea nitrogen/creatinine mass ratio            19             
NRG        

 

 Serum or plasma creatinine measurement with calculation of estimated 
glomerular filtration rate            58             NRG        

 

 Serum or plasma glucose measurement (mass/volume)            113 mg/dL        
            

 

 Serum or plasma calcium measurement (mass/volume)            9.1 mg/dL        
    8.5-10.1        

 

 Serum or plasma total bilirubin measurement (mass/volume)            0.8 mg/dL
            0.1-1.0        

 

 Serum or plasma alkaline phosphatase measurement (enzymatic activity/volume)  
          70 U/L                    

 

 Serum or plasma aspartate aminotransferase measurement (enzymatic activity/
volume)            23 U/L            5-34        

 

 Serum or plasma alanine aminotransferase measurement (enzymatic activity/volume
)            18 U/L            0-55        

 

 Serum or plasma protein measurement (mass/volume)            8.0 g/dL         
   6.4-8.2        

 

 Serum or plasma albumin measurement (mass/volume)            4.3 g/dL         
   3.2-4.5        









 Magnesium - 17 11:05         









 Magnesium            2.1 mg/dL            1.8-2.4        









 Serum or plasma troponin i.cardiac measurement (mass/volume) - 17 11:05 
        









 Serum or plasma troponin i.cardiac measurement (mass/volume)            < ng/
mL            <0.30        









 Myoglobin, serum - 17 11:05         









 Myoglobin, serum            63.0 ng/mL            10.0-92.0        









 Serum or plasma lithium measurement (moles/volume) - 17 11:05         









 BNP level            106.8 pg/mL            <100.0        









 Methicillin resistant Staphylococcus aureus (MRSA) screening culture -  17:00         









 Methicillin resistant Staphylococcus aureus (MRSA) screening culture          
  NEG             NRG        









 Serum or plasma troponin i.cardiac measurement (mass/volume) - 17 17:13 
        









 Serum or plasma troponin i.cardiac measurement (mass/volume)            < ng/
mL            <0.30        









 THYROID STIMULATING HORMONE - 17 17:15         









 THYROID STIMULATING HORMONE            2.32 u[iU]/mL            0.35-4.94     
   









 Serum or plasma troponin i.cardiac measurement (mass/volume) - 09/15/17 00:45 
        









 Serum or plasma troponin i.cardiac measurement (mass/volume)            < ng/
mL            <0.30        









 Complete blood count (CBC) with automated white blood cell (WBC) differential 
- 09/15/17 04:13         









 Blood leukocytes automated count (number/volume)            5.2 10*3/uL       
     4.3-11.0        

 

 Blood erythrocytes automated count (number/volume)            3.99 10*6/uL    
        4.35-5.85        

 

 Venous blood hemoglobin measurement (mass/volume)            12.9 g/dL        
    11.5-16.0        

 

 Blood hematocrit (volume fraction)            39 %            35-52        

 

 Automated erythrocyte mean corpuscular volume            98 [foz_us]          
  80-99        

 

 Automated erythrocyte mean corpuscular hemoglobin (mass per erythrocyte)      
      32 pg            25-34        

 

 Automated erythrocyte mean corpuscular hemoglobin concentration measurement (
mass/volume)            33 g/dL            32-36        

 

 Automated erythrocyte distribution width ratio            15.6 %            
10.0-14.5        

 

 Automated blood platelet count (count/volume)            138 10*3/uL          
  130-400        

 

 Automated blood platelet mean volume measurement            9.9 [foz_us]      
      7.4-10.4        

 

 Automated blood neutrophils/100 leukocytes            41 %            42-75   
     

 

 Automated blood lymphocytes/100 leukocytes            48 %            12-44   
     

 

 Blood monocytes/100 leukocytes            8 %            0-12        

 

 Automated blood eosinophils/100 leukocytes            3 %            0-10     
   

 

 Automated blood basophils/100 leukocytes            1 %            0-10        

 

 Blood neutrophils automated count (number/volume)            2.1 10*3         
   1.8-7.8        

 

 Blood lymphocytes automated count (number/volume)            2.5 10*3         
   1.0-4.0        

 

 Blood monocytes automated count (number/volume)            0.4 10*3            
0.0-1.0        

 

 Automated eosinophil count            0.2 10*3/uL            0.0-0.3        

 

 Automated blood basophil count (count/volume)            0.0 10*3/uL          
  0.0-0.1        









 Comprehensive metabolic panel - 09/15/17 04:13         









 Serum or plasma sodium measurement (moles/volume)            143 mmol/L       
     135-145        

 

 Serum or plasma potassium measurement (moles/volume)            3.5 mmol/L    
        3.6-5.0        

 

 Serum or plasma chloride measurement (moles/volume)            120 mmol/L     
               

 

 Carbon dioxide            15 mmol/L            21-32        

 

 Serum or plasma anion gap determination (moles/volume)            8 mmol/L    
        5-14        

 

 Serum or plasma urea nitrogen measurement (mass/volume)            15 mg/dL   
         7-18        

 

 Serum or plasma creatinine measurement (mass/volume)            0.72 mg/dL    
        0.60-1.30        

 

 Serum or plasma urea nitrogen/creatinine mass ratio            21             
NRG        

 

 Serum or plasma creatinine measurement with calculation of estimated 
glomerular filtration rate            >             NRG        

 

 Serum or plasma glucose measurement (mass/volume)            96 mg/dL         
           

 

 Serum or plasma calcium measurement (mass/volume)            7.5 mg/dL        
    8.5-10.1        

 

 Serum or plasma total bilirubin measurement (mass/volume)            0.3 mg/dL
            0.1-1.0        

 

 Serum or plasma alkaline phosphatase measurement (enzymatic activity/volume)  
          54 U/L                    

 

 Serum or plasma aspartate aminotransferase measurement (enzymatic activity/
volume)            13 U/L            5-34        

 

 Serum or plasma alanine aminotransferase measurement (enzymatic activity/volume
)            11 U/L            0-55        

 

 Serum or plasma protein measurement (mass/volume)            5.7 g/dL         
   6.4-8.2        

 

 Serum or plasma albumin measurement (mass/volume)            3.1 g/dL         
   3.2-4.5        









 Serum or plasma phosphate measurement (mass/volume) - 09/15/17 04:13         









 Serum or plasma phosphate measurement (mass/volume)            2.5 mg/dL      
      2.3-4.7        









 Magnesium - 09/15/17 04:13         









 Magnesium            2.0 mg/dL            1.8-2.4        









 Lipid 1996 panel - 09/15/17 04:13         









 Serum or plasma triglyceride measurement (mass/volume)            72 mg/dL    
        <150        

 

 Serum or plasma cholesterol measurement (mass/volume)            111 mg/dL    
        < 200        

 

 Serum or plasma cholesterol in HDL measurement (mass/volume)            34 mg/
dL            40-60        

 

 Cholesterol in LDL [mass/volume] in serum or plasma by direct assay            
66 mg/dL            1-129        

 

 Serum or plasma cholesterol in VLDL measurement (mass/volume)            14 mg/
dL            5-40        









 PT panel in platelet poor plasma by coagulation assay - 17 12:55         









 Prothrombin time (PT) in platelet poor plasma by coagulation assay            
14.9 s            12.2-14.7        

 

 INR in platelet poor plasma or blood by coagulation assay            1.2      
       0.8-1.4        









 PT panel in platelet poor plasma by coagulation assay - 17 10:17         









 Prothrombin time (PT) in platelet poor plasma by coagulation assay            
19.0 s            12.2-14.7        

 

 INR in platelet poor plasma or blood by coagulation assay            1.6      
       0.8-1.4        









 PT panel in platelet poor plasma by coagulation assay - 17 09:54         









 Prothrombin time (PT) in platelet poor plasma by coagulation assay            
22.7 s            12.2-14.7        

 

 INR in platelet poor plasma or blood by coagulation assay            2.0      
       0.8-1.4        









 PT panel in platelet poor plasma by coagulation assay - 10/17/17 11:07         









 Prothrombin time (PT) in platelet poor plasma by coagulation assay            
30.2 s            12.2-14.7        

 

 INR in platelet poor plasma or blood by coagulation assay            2.9      
       0.8-1.4        









 PT panel in platelet poor plasma by coagulation assay - 10/23/17 09:50         









 Prothrombin time (PT) in platelet poor plasma by coagulation assay            
14.2 s            12.2-14.7        

 

 INR in platelet poor plasma or blood by coagulation assay            1.1      
       0.8-1.4        









 Complete blood count (CBC) with automated white blood cell (WBC) differential 
- 17 21:15         









 Blood leukocytes automated count (number/volume)            2.6 10*3/uL       
     4.3-11.0        

 

 Blood erythrocytes automated count (number/volume)            4.38 10*6/uL    
        4.35-5.85        

 

 Venous blood hemoglobin measurement (mass/volume)            13.8 g/dL        
    11.5-16.0        

 

 Blood hematocrit (volume fraction)            41 %            35-52        

 

 Automated erythrocyte mean corpuscular volume            94 [foz_us]          
  80-99        

 

 Automated erythrocyte mean corpuscular hemoglobin (mass per erythrocyte)      
      32 pg            25-34        

 

 Automated erythrocyte mean corpuscular hemoglobin concentration measurement (
mass/volume)            34 g/dL            32-36        

 

 Automated erythrocyte distribution width ratio            15.2 %            
10.0-14.5        

 

 Automated blood platelet count (count/volume)            186 10*3/uL          
  130-400        

 

 Automated blood platelet mean volume measurement            9.4 [foz_us]      
      7.4-10.4        

 

 Automated blood neutrophils/100 leukocytes            37 %            42-75   
     

 

 Automated blood lymphocytes/100 leukocytes            51 %            12-44   
     

 

 Blood monocytes/100 leukocytes            9 %            0-12        

 

 Automated blood eosinophils/100 leukocytes            3 %            0-10     
   

 

 Automated blood basophils/100 leukocytes            0 %            0-10        

 

 Blood neutrophils automated count (number/volume)            1.0 10*3         
   1.8-7.8        

 

 Blood lymphocytes automated count (number/volume)            1.3 10*3         
   1.0-4.0        

 

 Blood monocytes automated count (number/volume)            0.2 10*3            
0.0-1.0        

 

 Automated eosinophil count            0.1 10*3/uL            0.0-0.3        

 

 Automated blood basophil count (count/volume)            0.0 10*3/uL          
  0.0-0.1        









 Comprehensive metabolic panel - 17 21:15         









 Serum or plasma sodium measurement (moles/volume)            143 mmol/L       
     135-145        

 

 Serum or plasma potassium measurement (moles/volume)            3.4 mmol/L    
        3.6-5.0        

 

 Serum or plasma chloride measurement (moles/volume)            111 mmol/L     
               

 

 Carbon dioxide            20 mmol/L            21-32        

 

 Serum or plasma anion gap determination (moles/volume)            12 mmol/L   
         5-14        

 

 Serum or plasma urea nitrogen measurement (mass/volume)            18 mg/dL   
         7-18        

 

 Serum or plasma creatinine measurement (mass/volume)            0.96 mg/dL    
        0.60-1.30        

 

 Serum or plasma urea nitrogen/creatinine mass ratio            19             
NRG        

 

 Serum or plasma creatinine measurement with calculation of estimated 
glomerular filtration rate            60             NRG        

 

 Serum or plasma glucose measurement (mass/volume)            130 mg/dL        
            

 

 Serum or plasma calcium measurement (mass/volume)            9.3 mg/dL        
    8.5-10.1        

 

 Serum or plasma total bilirubin measurement (mass/volume)            0.6 mg/dL
            0.1-1.0        

 

 Serum or plasma alkaline phosphatase measurement (enzymatic activity/volume)  
          62 U/L                    

 

 Serum or plasma aspartate aminotransferase measurement (enzymatic activity/
volume)            19 U/L            5-34        

 

 Serum or plasma alanine aminotransferase measurement (enzymatic activity/volume
)            13 U/L            0-55        

 

 Serum or plasma protein measurement (mass/volume)            7.9 g/dL         
   6.4-8.2        

 

 Serum or plasma albumin measurement (mass/volume)            4.2 g/dL         
   3.2-4.5        









 Magnesium - 17 21:15         









 Magnesium            2.1 mg/dL            1.8-2.4        









 Serum or plasma troponin i.cardiac measurement (mass/volume) - 17 21:15 
        









 Serum or plasma troponin i.cardiac measurement (mass/volume)            < ng/
mL            <0.30        









 Myoglobin, serum - 17 21:15         









 Myoglobin, serum            36.4 ng/mL            10.0-92.0        









 PT panel in platelet poor plasma by coagulation assay - 17 21:15         









 Prothrombin time (PT) in platelet poor plasma by coagulation assay            
21.8 s            12.2-14.7        

 

 INR in platelet poor plasma or blood by coagulation assay            1.9      
       0.8-1.4        









 Activated partial thromboplastin time (aPTT) in platelet poor plasma 
bycoagulation assay - 17 21:15         









 Activated partial thromboplastin time (aPTT) in platelet poor plasma 
bycoagulation assay            47 s            24-35        









 Complete blood count (CBC) with automated white blood cell (WBC) differential 
- 17 05:33         









 Blood leukocytes automated count (number/volume)            4.8 10*3/uL       
     4.3-11.0        

 

 Blood erythrocytes automated count (number/volume)            5.00 10*6/uL    
        4.35-5.85        

 

 Venous blood hemoglobin measurement (mass/volume)            15.8 g/dL        
    11.5-16.0        

 

 Blood hematocrit (volume fraction)            46 %            35-52        

 

 Automated erythrocyte mean corpuscular volume            92 [foz_us]          
  80-99        

 

 Automated erythrocyte mean corpuscular hemoglobin (mass per erythrocyte)      
      32 pg            25-34        

 

 Automated erythrocyte mean corpuscular hemoglobin concentration measurement (
mass/volume)            34 g/dL            32-36        

 

 Automated erythrocyte distribution width ratio            15.7 %            
10.0-14.5        

 

 Automated blood platelet count (count/volume)            196 10*3/uL          
  130-400        

 

 Automated blood platelet mean volume measurement            9.4 [foz_us]      
      7.4-10.4        

 

 Automated blood neutrophils/100 leukocytes            79 %            42-75   
     

 

 Automated blood lymphocytes/100 leukocytes            12 %            12-44   
     

 

 Blood monocytes/100 leukocytes            8 %            0-12        

 

 Automated blood eosinophils/100 leukocytes            1 %            0-10     
   

 

 Automated blood basophils/100 leukocytes            0 %            0-10        

 

 Blood neutrophils automated count (number/volume)            3.8 10*3         
   1.8-7.8        

 

 Blood lymphocytes automated count (number/volume)            0.6 10*3         
   1.0-4.0        

 

 Blood monocytes automated count (number/volume)            0.4 10*3            
0.0-1.0        

 

 Automated eosinophil count            0.0 10*3/uL            0.0-0.3        

 

 Automated blood basophil count (count/volume)            0.0 10*3/uL          
  0.0-0.1        









 Comprehensive metabolic panel - 17 05:33         









 Serum or plasma sodium measurement (moles/volume)            140 mmol/L       
     135-145        

 

 Serum or plasma potassium measurement (moles/volume)            3.7 mmol/L    
        3.6-5.0        

 

 Serum or plasma chloride measurement (moles/volume)            108 mmol/L     
               

 

 Carbon dioxide            21 mmol/L            21-32        

 

 Serum or plasma anion gap determination (moles/volume)            11 mmol/L   
         5-14        

 

 Serum or plasma urea nitrogen measurement (mass/volume)            16 mg/dL   
         7-18        

 

 Serum or plasma creatinine measurement (mass/volume)            0.86 mg/dL    
        0.60-1.30        

 

 Serum or plasma urea nitrogen/creatinine mass ratio            19             
NRG        

 

 Serum or plasma creatinine measurement with calculation of estimated 
glomerular filtration rate            >             NRG        

 

 Serum or plasma glucose measurement (mass/volume)            116 mg/dL        
            

 

 Serum or plasma calcium measurement (mass/volume)            8.8 mg/dL        
    8.5-10.1        

 

 Serum or plasma total bilirubin measurement (mass/volume)            0.6 mg/dL
            0.1-1.0        

 

 Serum or plasma alkaline phosphatase measurement (enzymatic activity/volume)  
          58 U/L                    

 

 Serum or plasma aspartate aminotransferase measurement (enzymatic activity/
volume)            24 U/L            5-34        

 

 Serum or plasma alanine aminotransferase measurement (enzymatic activity/volume
)            15 U/L            0-55        

 

 Serum or plasma protein measurement (mass/volume)            8.3 g/dL         
   6.4-8.2        

 

 Serum or plasma albumin measurement (mass/volume)            4.4 g/dL         
   3.2-4.5        









 Serum or plasma amylase measurement (enzymatic activity/volume) - 17 05:
33         









 Serum or plasma amylase measurement (enzymatic activity/volume)            150 
U/L                    









 Lipase - 17 05:33         









 Lipase            25 U/L            8-78        









 Complete urinalysis with reflex to culture - 17 06:24         









 Urine color determination            YELLOW             NRG        

 

 Urine clarity determination            CLEAR             NRG        

 

 Urine pH measurement by test strip            5             5-9        

 

 Specific gravity of urine by test strip            1.025             1.016-
1.022        

 

 Urine protein assay by test strip, semi-quantitative            2+             
NEGATIVE        

 

 Urine glucose detection by automated test strip            NEGATIVE           
  NEGATIVE        

 

 Erythrocytes detection in urine sediment by light microscopy            4+    
         NEGATIVE        

 

 Urine ketones detection by automated test strip            NEGATIVE           
  NEGATIVE        

 

 Urine nitrite detection by test strip            NEGATIVE             NEGATIVE
        

 

 Urine total bilirubin detection by test strip            NEGATIVE             
NEGATIVE        

 

 Urine urobilinogen measurement by automated test strip (mass/volume)          
  NORMAL             NORMAL        

 

 Urine leukocyte esterase detection by dipstick            2+             
NEGATIVE        

 

 Automated urine sediment erythrocyte count by microscopy (number/high power 
field)             [HPF]            NRG        

 

 Automated urine sediment leukocyte count by microscopy (number/high power field
)             [HPF]            NRG        

 

 Bacteria detection in urine sediment by light microscopy            TRACE     
        NRG        

 

 Squamous epithelial cells detection in urine sediment by light microscopy     
       2-5             NRG        

 

 Crystals detection in urine sediment by light microscopy            NONE      
       NRG        

 

 Casts detection in urine sediment by light microscopy            NONE         
    NRG        

 

 Mucus detection in urine sediment by light microscopy            MODERATE     
        NRG        

 

 Complete urinalysis with reflex to culture            NO             NRG      
  









 PT panel in platelet poor plasma by coagulation assay - 18 07:36         









 Prothrombin time (PT) in platelet poor plasma by coagulation assay            
15.1 s            12.2-14.7        

 

 INR in platelet poor plasma or blood by coagulation assay            1.2      
       0.8-1.4        









 BCR-ABL gene translocation detection - 18 10:45         









 Blood or tissue t(9;22)(q34.1;q11)(ABL1,BCR) b2a2+b3a2 fusion transcript count 
by molecular genetics method (number/volume)            See Report             
NRG        









 Complete blood count (CBC) with automated white blood cell (WBC) differential 
- 18 09:12         









 Blood leukocytes automated count (number/volume)            3.2 10*3/uL       
     4.3-11.0        

 

 Blood erythrocytes automated count (number/volume)            4.58 10*6/uL    
        4.35-5.85        

 

 Venous blood hemoglobin measurement (mass/volume)            14.7 g/dL        
    11.5-16.0        

 

 Blood hematocrit (volume fraction)            43 %            35-52        

 

 Automated erythrocyte mean corpuscular volume            93 [foz_us]          
  80-99        

 

 Automated erythrocyte mean corpuscular hemoglobin (mass per erythrocyte)      
      32 pg            25-34        

 

 Automated erythrocyte mean corpuscular hemoglobin concentration measurement (
mass/volume)            34 g/dL            32-36        

 

 Automated erythrocyte distribution width ratio            15.2 %            
10.0-14.5        

 

 Automated blood platelet count (count/volume)            197 10*3/uL          
  130-400        

 

 Automated blood platelet mean volume measurement            9.2 [foz_us]      
      7.4-10.4        

 

 Automated blood neutrophils/100 leukocytes            51 %            42-75   
     

 

 Automated blood lymphocytes/100 leukocytes            37 %            12-44   
     

 

 Blood monocytes/100 leukocytes            8 %            0-12        

 

 Automated blood eosinophils/100 leukocytes            3 %            0-10     
   

 

 Automated blood basophils/100 leukocytes            1 %            0-10        

 

 Blood neutrophils automated count (number/volume)            1.6 10*3         
   1.8-7.8        

 

 Blood lymphocytes automated count (number/volume)            1.2 10*3         
   1.0-4.0        

 

 Blood monocytes automated count (number/volume)            0.3 10*3            
0.0-1.0        

 

 Automated eosinophil count            0.1 10*3/uL            0.0-0.3        

 

 Automated blood basophil count (count/volume)            0.0 10*3/uL          
  0.0-0.1        









 Comprehensive metabolic panel - 18 09:12         









 Serum or plasma sodium measurement (moles/volume)            138 mmol/L       
     135-145        

 

 Serum or plasma potassium measurement (moles/volume)            4.0 mmol/L    
        3.6-5.0        

 

 Serum or plasma chloride measurement (moles/volume)            109 mmol/L     
               

 

 Carbon dioxide            22 mmol/L            21-32        

 

 Serum or plasma anion gap determination (moles/volume)            7 mmol/L    
        5-14        

 

 Serum or plasma urea nitrogen measurement (mass/volume)            16 mg/dL   
         7-18        

 

 Serum or plasma creatinine measurement (mass/volume)            0.80 mg/dL    
        0.60-1.30        

 

 Serum or plasma urea nitrogen/creatinine mass ratio            20             
NRG        

 

 Serum or plasma creatinine measurement with calculation of estimated 
glomerular filtration rate            >             NRG        

 

 Serum or plasma glucose measurement (mass/volume)            98 mg/dL         
           

 

 Serum or plasma calcium measurement (mass/volume)            9.3 mg/dL        
    8.5-10.1        

 

 Serum or plasma total bilirubin measurement (mass/volume)            0.6 mg/dL
            0.1-1.0        

 

 Serum or plasma alkaline phosphatase measurement (enzymatic activity/volume)  
          69 U/L                    

 

 Serum or plasma aspartate aminotransferase measurement (enzymatic activity/
volume)            20 U/L            5-34        

 

 Serum or plasma alanine aminotransferase measurement (enzymatic activity/volume
)            13 U/L            0-55        

 

 Serum or plasma protein measurement (mass/volume)            7.6 g/dL         
   6.4-8.2        

 

 Serum or plasma albumin measurement (mass/volume)            4.3 g/dL         
   3.2-4.5        









 Lactate dehydrogenase 1 [enzymatic activity/volume] in serum or plasma -  09:12         









 Lactate dehydrogenase 1 [enzymatic activity/volume] in serum or plasma        
    266 U/L            125-220        









 BCR-ABL gene translocation detection - 18 09:12         









 Blood or tissue t(9;22)(q34.1;q11)(ABL1,BCR) b2a2+b3a2 fusion transcript count 
by molecular genetics method (number/volume)            See Report             
NRG        









 Complete blood count (CBC) with automated white blood cell (WBC) differential 
- 18 11:38         









 Blood leukocytes automated count (number/volume)            3.5 10*3/uL       
     4.3-11.0        

 

 Blood erythrocytes automated count (number/volume)            4.43 10*6/uL    
        4.35-5.85        

 

 Venous blood hemoglobin measurement (mass/volume)            14.4 g/dL        
    11.5-16.0        

 

 Blood hematocrit (volume fraction)            42 %            35-52        

 

 Automated erythrocyte mean corpuscular volume            94 [foz_us]          
  80-99        

 

 Automated erythrocyte mean corpuscular hemoglobin (mass per erythrocyte)      
      33 pg            25-34        

 

 Automated erythrocyte mean corpuscular hemoglobin concentration measurement (
mass/volume)            34 g/dL            32-36        

 

 Automated erythrocyte distribution width ratio            14.8 %            
10.0-14.5        

 

 Automated blood platelet count (count/volume)            196 10*3/uL          
  130-400        

 

 Automated blood platelet mean volume measurement            9.3 [foz_us]      
      7.4-10.4        

 

 Automated blood neutrophils/100 leukocytes            53 %            42-75   
     

 

 Automated blood lymphocytes/100 leukocytes            35 %            12-44   
     

 

 Blood monocytes/100 leukocytes            8 %            0-12        

 

 Automated blood eosinophils/100 leukocytes            2 %            0-10     
   

 

 Automated blood basophils/100 leukocytes            1 %            0-10        

 

 Blood neutrophils automated count (number/volume)            1.8 10*3         
   1.8-7.8        

 

 Blood lymphocytes automated count (number/volume)            1.2 10*3         
   1.0-4.0        

 

 Blood monocytes automated count (number/volume)            0.3 10*3            
0.0-1.0        

 

 Automated eosinophil count            0.1 10*3/uL            0.0-0.3        

 

 Automated blood basophil count (count/volume)            0.0 10*3/uL          
  0.0-0.1        









 Comprehensive metabolic panel - 18 11:38         









 Serum or plasma sodium measurement (moles/volume)            139 mmol/L       
     135-145        

 

 Serum or plasma potassium measurement (moles/volume)            3.8 mmol/L    
        3.6-5.0        

 

 Serum or plasma chloride measurement (moles/volume)            110 mmol/L     
               

 

 Carbon dioxide            22 mmol/L            21-32        

 

 Serum or plasma anion gap determination (moles/volume)            7 mmol/L    
        5-14        

 

 Serum or plasma urea nitrogen measurement (mass/volume)            17 mg/dL   
         7-18        

 

 Serum or plasma creatinine measurement (mass/volume)            0.82 mg/dL    
        0.60-1.30        

 

 Serum or plasma urea nitrogen/creatinine mass ratio            21             
NRG        

 

 Serum or plasma creatinine measurement with calculation of estimated 
glomerular filtration rate            >             NRG        

 

 Serum or plasma glucose measurement (mass/volume)            93 mg/dL         
           

 

 Serum or plasma calcium measurement (mass/volume)            9.6 mg/dL        
    8.5-10.1        

 

 Serum or plasma total bilirubin measurement (mass/volume)            0.5 mg/dL
            0.1-1.0        

 

 Serum or plasma alkaline phosphatase measurement (enzymatic activity/volume)  
          65 U/L                    

 

 Serum or plasma aspartate aminotransferase measurement (enzymatic activity/
volume)            20 U/L            5-34        

 

 Serum or plasma alanine aminotransferase measurement (enzymatic activity/volume
)            12 U/L            0-55        

 

 Serum or plasma protein measurement (mass/volume)            7.3 g/dL         
   6.4-8.2        

 

 Serum or plasma albumin measurement (mass/volume)            4.3 g/dL         
   3.2-4.5        









 Lactate dehydrogenase 1 [enzymatic activity/volume] in serum or plasma -  11:38         









 Lactate dehydrogenase 1 [enzymatic activity/volume] in serum or plasma        
    237 U/L            125-220        









 Serum or plasma alpha-1-fetoprotein.tumor marker measurement (mass/volume) -  11:38         









 Serum or plasma alpha-1-fetoprotein.tumor marker measurement (mass/volume)    
        2.0 %            0.0-8.8        









 BCR-ABL gene translocation detection - 18 11:38         









 Blood or tissue t(9;22)(q34.1;q11)(ABL1,BCR) b2a2+b3a2 fusion transcript count 
by molecular genetics method (number/volume)            See Report             
NRG        









 Complete blood count (CBC) with automated white blood cell (WBC) differential 
- 18 11:37         









 Blood leukocytes automated count (number/volume)            3.6 10*3/uL       
     4.3-11.0        

 

 Blood erythrocytes automated count (number/volume)            4.47 10*6/uL    
        4.35-5.85        

 

 Venous blood hemoglobin measurement (mass/volume)            14.7 g/dL        
    11.5-16.0        

 

 Blood hematocrit (volume fraction)            42 %            35-52        

 

 Automated erythrocyte mean corpuscular volume            95 [foz_us]          
  80-99        

 

 Automated erythrocyte mean corpuscular hemoglobin (mass per erythrocyte)      
      33 pg            25-34        

 

 Automated erythrocyte mean corpuscular hemoglobin concentration measurement (
mass/volume)            35 g/dL            32-36        

 

 Automated erythrocyte distribution width ratio            14.5 %            
10.0-14.5        

 

 Automated blood platelet count (count/volume)            186 10*3/uL          
  130-400        

 

 Automated blood platelet mean volume measurement            9.5 [foz_us]      
      7.4-10.4        

 

 Automated blood neutrophils/100 leukocytes            55 %            42-75   
     

 

 Automated blood lymphocytes/100 leukocytes            32 %            12-44   
     

 

 Blood monocytes/100 leukocytes            11 %            0-12        

 

 Automated blood eosinophils/100 leukocytes            2 %            0-10     
   

 

 Automated blood basophils/100 leukocytes            1 %            0-10        

 

 Blood neutrophils automated count (number/volume)            2.0 10*3         
   1.8-7.8        

 

 Blood lymphocytes automated count (number/volume)            1.2 10*3         
   1.0-4.0        

 

 Blood monocytes automated count (number/volume)            0.4 10*3            
0.0-1.0        

 

 Automated eosinophil count            0.1 10*3/uL            0.0-0.3        

 

 Automated blood basophil count (count/volume)            0.0 10*3/uL          
  0.0-0.1        









 Comprehensive metabolic panel - 18 11:37         









 Serum or plasma sodium measurement (moles/volume)            141 mmol/L       
     135-145        

 

 Serum or plasma potassium measurement (moles/volume)            4.0 mmol/L    
        3.6-5.0        

 

 Serum or plasma chloride measurement (moles/volume)            112 mmol/L     
               

 

 Carbon dioxide            22 mmol/L            21-32        

 

 Serum or plasma anion gap determination (moles/volume)            7 mmol/L    
        5-14        

 

 Serum or plasma urea nitrogen measurement (mass/volume)            20 mg/dL   
         7-18        

 

 Serum or plasma creatinine measurement (mass/volume)            0.85 mg/dL    
        0.60-1.30        

 

 Serum or plasma urea nitrogen/creatinine mass ratio            24             
NRG        

 

 Serum or plasma creatinine measurement with calculation of estimated 
glomerular filtration rate            >             NRG        

 

 Serum or plasma glucose measurement (mass/volume)            77 mg/dL         
           

 

 Serum or plasma calcium measurement (mass/volume)            9.4 mg/dL        
    8.5-10.1        

 

 Serum or plasma total bilirubin measurement (mass/volume)            0.6 mg/dL
            0.1-1.0        

 

 Serum or plasma alkaline phosphatase measurement (enzymatic activity/volume)  
          76 U/L                    

 

 Serum or plasma aspartate aminotransferase measurement (enzymatic activity/
volume)            18 U/L            5-34        

 

 Serum or plasma alanine aminotransferase measurement (enzymatic activity/volume
)            16 U/L            0-55        

 

 Serum or plasma protein measurement (mass/volume)            7.6 g/dL         
   6.4-8.2        

 

 Serum or plasma albumin measurement (mass/volume)            4.3 g/dL         
   3.2-4.5        









 Lactate dehydrogenase 1 [enzymatic activity/volume] in serum or plasma -  11:37         









 Lactate dehydrogenase 1 [enzymatic activity/volume] in serum or plasma        
    234 U/L            125-220        









 BCR-ABL gene translocation detection - 18 11:37         









 [id number]            Blood             NRG        

 

 Blood or tissue t(9;22)(q34.1;q11)(ABL1,BCR) b2a2+b3a2 fusion transcript count 
by molecular genetics method (number/volume)            See Report             
NRG        









 Complete blood count (CBC) with automated white blood cell (WBC) differential 
- 18 19:40         









 Blood leukocytes automated count (number/volume)            3.9 10*3/uL       
     4.3-11.0        

 

 Blood erythrocytes automated count (number/volume)            4.45 10*6/uL    
        4.35-5.85        

 

 Venous blood hemoglobin measurement (mass/volume)            14.8 g/dL        
    11.5-16.0        

 

 Blood hematocrit (volume fraction)            42 %            35-52        

 

 Automated erythrocyte mean corpuscular volume            94 [foz_us]          
  80-99        

 

 Automated erythrocyte mean corpuscular hemoglobin (mass per erythrocyte)      
      33 pg            25-34        

 

 Automated erythrocyte mean corpuscular hemoglobin concentration measurement (
mass/volume)            35 g/dL            32-36        

 

 Automated erythrocyte distribution width ratio            14.1 %            
10.0-14.5        

 

 Automated blood platelet count (count/volume)            168 10*3/uL          
  130-400        

 

 Automated blood platelet mean volume measurement            9.7 [foz_us]      
      7.4-10.4        

 

 Automated blood neutrophils/100 leukocytes            56 %            42-75   
     

 

 Automated blood lymphocytes/100 leukocytes            33 %            12-44   
     

 

 Blood monocytes/100 leukocytes            7 %            0-12        

 

 Automated blood eosinophils/100 leukocytes            2 %            0-10     
   

 

 Automated blood basophils/100 leukocytes            1 %            0-10        

 

 Blood neutrophils automated count (number/volume)            2.2 10*3         
   1.8-7.8        

 

 Blood lymphocytes automated count (number/volume)            1.3 10*3         
   1.0-4.0        

 

 Blood monocytes automated count (number/volume)            0.3 10*3            
0.0-1.0        

 

 Automated eosinophil count            0.1 10*3/uL            0.0-0.3        

 

 Automated blood basophil count (count/volume)            0.0 10*3/uL          
  0.0-0.1        









 PT panel in platelet poor plasma by coagulation assay - 18 19:40         









 Prothrombin time (PT) in platelet poor plasma by coagulation assay            
25.0 s            12.2-14.7        

 

 INR in platelet poor plasma or blood by coagulation assay            2.3      
       0.8-1.4        









 Activated partial thromboplastin time (aPTT) in platelet poor plasma 
bycoagulation assay - 18 19:40         









 Activated partial thromboplastin time (aPTT) in platelet poor plasma 
bycoagulation assay            51 s            24-35        









 Comprehensive metabolic panel - 18 19:40         









 Serum or plasma sodium measurement (moles/volume)            140 mmol/L       
     135-145        

 

 Serum or plasma potassium measurement (moles/volume)            3.6 mmol/L    
        3.6-5.0        

 

 Serum or plasma chloride measurement (moles/volume)            108 mmol/L     
               

 

 Carbon dioxide            21 mmol/L            21-32        

 

 Serum or plasma anion gap determination (moles/volume)            11 mmol/L   
         5-14        

 

 Serum or plasma urea nitrogen measurement (mass/volume)            17 mg/dL   
         7-18        

 

 Serum or plasma creatinine measurement (mass/volume)            0.99 mg/dL    
        0.60-1.30        

 

 Serum or plasma urea nitrogen/creatinine mass ratio            17             
NRG        

 

 Serum or plasma creatinine measurement with calculation of estimated 
glomerular filtration rate            57             NRG        

 

 Serum or plasma glucose measurement (mass/volume)            99 mg/dL         
           

 

 Serum or plasma calcium measurement (mass/volume)            9.6 mg/dL        
    8.5-10.1        

 

 Serum or plasma total bilirubin measurement (mass/volume)            0.5 mg/dL
            0.1-1.0        

 

 Serum or plasma alkaline phosphatase measurement (enzymatic activity/volume)  
          73 U/L                    

 

 Serum or plasma aspartate aminotransferase measurement (enzymatic activity/
volume)            20 U/L            5-34        

 

 Serum or plasma alanine aminotransferase measurement (enzymatic activity/volume
)            15 U/L            0-55        

 

 Serum or plasma protein measurement (mass/volume)            7.5 g/dL         
   6.4-8.2        

 

 Serum or plasma albumin measurement (mass/volume)            4.3 g/dL         
   3.2-4.5        









 Magnesium - 18 19:40         









 Magnesium            2.2 mg/dL            1.8-2.4        









 Serum or plasma troponin i.cardiac measurement (mass/volume) - 18 19:40 
        









 Serum or plasma troponin i.cardiac measurement (mass/volume)            < ng/
mL            <0.30        









 Myoglobin, serum - 18 19:40         









 Myoglobin, serum            50.4 ng/mL            10.0-92.0        









 Complete blood count (CBC) with automated white blood cell (WBC) differential 
- 18 07:05         









 Blood leukocytes automated count (number/volume)            3.3 10*3/uL       
     4.3-11.0        

 

 Blood erythrocytes automated count (number/volume)            4.62 10*6/uL    
        4.35-5.85        

 

 Venous blood hemoglobin measurement (mass/volume)            14.8 g/dL        
    11.5-16.0        

 

 Blood hematocrit (volume fraction)            44 %            35-52        

 

 Automated erythrocyte mean corpuscular volume            95 [foz_us]          
  80-99        

 

 Automated erythrocyte mean corpuscular hemoglobin (mass per erythrocyte)      
      32 pg            25-34        

 

 Automated erythrocyte mean corpuscular hemoglobin concentration measurement (
mass/volume)            34 g/dL            32-36        

 

 Automated erythrocyte distribution width ratio            14.4 %            
10.0-14.5        

 

 Automated blood platelet count (count/volume)            179 10*3/uL          
  130-400        

 

 Automated blood platelet mean volume measurement            9.6 [foz_us]      
      7.4-10.4        

 

 Automated blood neutrophils/100 leukocytes            53 %            42-75   
     

 

 Automated blood lymphocytes/100 leukocytes            32 %            12-44   
     

 

 Blood monocytes/100 leukocytes            11 %            0-12        

 

 Automated blood eosinophils/100 leukocytes            3 %            0-10     
   

 

 Automated blood basophils/100 leukocytes            1 %            0-10        

 

 Blood neutrophils automated count (number/volume)            1.8 10*3         
   1.8-7.8        

 

 Blood lymphocytes automated count (number/volume)            1.1 10*3         
   1.0-4.0        

 

 Blood monocytes automated count (number/volume)            0.4 10*3            
0.0-1.0        

 

 Automated eosinophil count            0.1 10*3/uL            0.0-0.3        

 

 Automated blood basophil count (count/volume)            0.0 10*3/uL          
  0.0-0.1        









 Comprehensive metabolic panel - 18 07:05         









 Serum or plasma sodium measurement (moles/volume)            141 mmol/L       
     135-145        

 

 Serum or plasma potassium measurement (moles/volume)            3.7 mmol/L    
        3.6-5.0        

 

 Serum or plasma chloride measurement (moles/volume)            111 mmol/L     
               

 

 Carbon dioxide            20 mmol/L            21-32        

 

 Serum or plasma anion gap determination (moles/volume)            10 mmol/L   
         5-14        

 

 Serum or plasma urea nitrogen measurement (mass/volume)            17 mg/dL   
         7-18        

 

 Serum or plasma creatinine measurement (mass/volume)            0.79 mg/dL    
        0.60-1.30        

 

 Serum or plasma urea nitrogen/creatinine mass ratio            22             
NRG        

 

 Serum or plasma creatinine measurement with calculation of estimated 
glomerular filtration rate            >             NRG        

 

 Serum or plasma glucose measurement (mass/volume)            92 mg/dL         
           

 

 Serum or plasma calcium measurement (mass/volume)            9.2 mg/dL        
    8.5-10.1        

 

 Serum or plasma total bilirubin measurement (mass/volume)            0.5 mg/dL
            0.1-1.0        

 

 Serum or plasma alkaline phosphatase measurement (enzymatic activity/volume)  
          74 U/L                    

 

 Serum or plasma aspartate aminotransferase measurement (enzymatic activity/
volume)            18 U/L            5-34        

 

 Serum or plasma alanine aminotransferase measurement (enzymatic activity/volume
)            13 U/L            0-55        

 

 Serum or plasma protein measurement (mass/volume)            7.4 g/dL         
   6.4-8.2        

 

 Serum or plasma albumin measurement (mass/volume)            4.1 g/dL         
   3.2-4.5        









 Lactate dehydrogenase 1 [enzymatic activity/volume] in serum or plasma -  07:05         









 Lactate dehydrogenase 1 [enzymatic activity/volume] in serum or plasma        
    209 U/L            125-220        









 BCR-ABL gene translocation detection - 18 07:05         









 [id number]            Blood             NRG        

 

 Blood or tissue t(9;22)(q34.1;q11)(ABL1,BCR) b2a2+b3a2 fusion transcript count 
by molecular genetics method (number/volume)            See Report             
NRG        









 Complete blood count (CBC) with automated white blood cell (WBC) differential 
- 19 19:20         









 Blood leukocytes automated count (number/volume)            4.8 10*3/uL       
     4.3-11.0        

 

 Blood erythrocytes automated count (number/volume)            4.49 10*6/uL    
        4.35-5.85        

 

 Venous blood hemoglobin measurement (mass/volume)            14.7 g/dL        
    11.5-16.0        

 

 Blood hematocrit (volume fraction)            43 %            35-52        

 

 Automated erythrocyte mean corpuscular volume            96 [foz_us]          
  80-99        

 

 Automated erythrocyte mean corpuscular hemoglobin (mass per erythrocyte)      
      33 pg            25-34        

 

 Automated erythrocyte mean corpuscular hemoglobin concentration measurement (
mass/volume)            34 g/dL            32-36        

 

 Automated erythrocyte distribution width ratio            14.1 %            
10.0-14.5        

 

 Automated blood platelet count (count/volume)            207 10*3/uL          
  130-400        

 

 Automated blood platelet mean volume measurement            9.2 [foz_us]      
      7.4-10.4        

 

 Automated blood neutrophils/100 leukocytes            63 %            42-75   
     

 

 Automated blood lymphocytes/100 leukocytes            24 %            12-44   
     

 

 Blood monocytes/100 leukocytes            10 %            0-12        

 

 Automated blood eosinophils/100 leukocytes            2 %            0-10     
   

 

 Automated blood basophils/100 leukocytes            1 %            0-10        

 

 Blood neutrophils automated count (number/volume)            3.1 10*3         
   1.8-7.8        

 

 Blood lymphocytes automated count (number/volume)            1.2 10*3         
   1.0-4.0        

 

 Blood monocytes automated count (number/volume)            0.5 10*3            
0.0-1.0        

 

 Automated eosinophil count            0.1 10*3/uL            0.0-0.3        

 

 Automated blood basophil count (count/volume)            0.0 10*3/uL          
  0.0-0.1        









 PT panel in platelet poor plasma by coagulation assay - 19 19:20         









 Prothrombin time (PT) in platelet poor plasma by coagulation assay            
17.6 s            12.2-14.7        

 

 INR in platelet poor plasma or blood by coagulation assay            1.5      
       0.8-1.4        









 Activated partial thromboplastin time (aPTT) in platelet poor plasma 
bycoagulation assay - 19 19:20         









 Activated partial thromboplastin time (aPTT) in platelet poor plasma 
bycoagulation assay            39 s            24-35        









 Comprehensive metabolic panel - 19 19:20         









 Serum or plasma sodium measurement (moles/volume)            141 mmol/L       
     135-145        

 

 Serum or plasma potassium measurement (moles/volume)            3.3 mmol/L    
        3.6-5.0        

 

 Serum or plasma chloride measurement (moles/volume)            109 mmol/L     
               

 

 Carbon dioxide            21 mmol/L            21-32        

 

 Serum or plasma anion gap determination (moles/volume)            11 mmol/L   
         5-14        

 

 Serum or plasma urea nitrogen measurement (mass/volume)            18 mg/dL   
         7-18        

 

 Serum or plasma creatinine measurement (mass/volume)            1.10 mg/dL    
        0.60-1.30        

 

 Serum or plasma urea nitrogen/creatinine mass ratio            16             
NRG        

 

 Serum or plasma creatinine measurement with calculation of estimated 
glomerular filtration rate            51             NRG        

 

 Serum or plasma glucose measurement (mass/volume)            109 mg/dL        
            

 

 Serum or plasma calcium measurement (mass/volume)            9.3 mg/dL        
    8.5-10.1        

 

 Serum or plasma total bilirubin measurement (mass/volume)            0.3 mg/dL
            0.1-1.0        

 

 Serum or plasma alkaline phosphatase measurement (enzymatic activity/volume)  
          82 U/L                    

 

 Serum or plasma aspartate aminotransferase measurement (enzymatic activity/
volume)            24 U/L            5-34        

 

 Serum or plasma alanine aminotransferase measurement (enzymatic activity/volume
)            13 U/L            0-55        

 

 Serum or plasma protein measurement (mass/volume)            7.6 g/dL         
   6.4-8.2        

 

 Serum or plasma albumin measurement (mass/volume)            4.2 g/dL         
   3.2-4.5        

 

 CALCIUM CORRECTED            9.1 mg/dL            8.5-10.1        









 Magnesium - 19 19:20         









 Magnesium            2.5 mg/dL            1.8-2.4        









 Serum or plasma lithium measurement (moles/volume) - 19 19:20         









 BNP level            < pg/mL            <100.0        









 Serum or plasma troponin i.cardiac measurement (mass/volume) - 19 19:20 
        









 Serum or plasma troponin i.cardiac measurement (mass/volume)            < ng/
mL            <0.028        









 Serum or plasma thyrotropin measurement by detection limit <=0.05 miu/l (units/
volume) - 19 19:20         









 Serum or plasma thyrotropin measurement by detection limit <=0.05 miu/l (units/
volume)            3.33 u[iU]/mL            0.35-4.94        









 Complete blood count (CBC) with automated white blood cell (WBC) differential 
- 19 08:19         









 Blood leukocytes automated count (number/volume)            4.5 10*3/uL       
     4.3-11.0        

 

 Blood erythrocytes automated count (number/volume)            4.35 10*6/uL    
        4.35-5.85        

 

 Venous blood hemoglobin measurement (mass/volume)            14.1 g/dL        
    11.5-16.0        

 

 Blood hematocrit (volume fraction)            42 %            35-52        

 

 Automated erythrocyte mean corpuscular volume            97 [foz_us]          
  80-99        

 

 Automated erythrocyte mean corpuscular hemoglobin (mass per erythrocyte)      
      32 pg            25-34        

 

 Automated erythrocyte mean corpuscular hemoglobin concentration measurement (
mass/volume)            33 g/dL            32-36        

 

 Automated erythrocyte distribution width ratio            14.2 %            
10.0-14.5        

 

 Automated blood platelet count (count/volume)            189 10*3/uL          
  130-400        

 

 Automated blood platelet mean volume measurement            9.8 [foz_us]      
      7.4-10.4        

 

 Automated blood neutrophils/100 leukocytes            62 %            42-75   
     

 

 Automated blood lymphocytes/100 leukocytes            25 %            12-44   
     

 

 Blood monocytes/100 leukocytes            9 %            0-12        

 

 Automated blood eosinophils/100 leukocytes            4 %            0-10     
   

 

 Automated blood basophils/100 leukocytes            1 %            0-10        

 

 Blood neutrophils automated count (number/volume)            2.8 10*3         
   1.8-7.8        

 

 Blood lymphocytes automated count (number/volume)            1.1 10*3         
   1.0-4.0        

 

 Blood monocytes automated count (number/volume)            0.4 10*3            
0.0-1.0        

 

 Automated eosinophil count            0.2 10*3/uL            0.0-0.3        

 

 Automated blood basophil count (count/volume)            0.0 10*3/uL          
  0.0-0.1        









 Comprehensive metabolic panel - 19 08:19         









 Serum or plasma sodium measurement (moles/volume)            139 mmol/L       
     135-145        

 

 Serum or plasma potassium measurement (moles/volume)            3.9 mmol/L    
        3.6-5.0        

 

 Serum or plasma chloride measurement (moles/volume)            110 mmol/L     
               

 

 Carbon dioxide            20 mmol/L            21-32        

 

 Serum or plasma anion gap determination (moles/volume)            9 mmol/L    
        5-14        

 

 Serum or plasma urea nitrogen measurement (mass/volume)            12 mg/dL   
         7-18        

 

 Serum or plasma creatinine measurement (mass/volume)            0.84 mg/dL    
        0.60-1.30        

 

 Serum or plasma urea nitrogen/creatinine mass ratio            14             
NRG        

 

 Serum or plasma creatinine measurement with calculation of estimated 
glomerular filtration rate            >             NRG        

 

 Serum or plasma glucose measurement (mass/volume)            95 mg/dL         
           

 

 Serum or plasma calcium measurement (mass/volume)            9.2 mg/dL        
    8.5-10.1        

 

 Serum or plasma total bilirubin measurement (mass/volume)            0.8 mg/dL
            0.1-1.0        

 

 Serum or plasma alkaline phosphatase measurement (enzymatic activity/volume)  
          86 U/L                    

 

 Serum or plasma aspartate aminotransferase measurement (enzymatic activity/
volume)            15 U/L            5-34        

 

 Serum or plasma alanine aminotransferase measurement (enzymatic activity/volume
)            9 U/L            0-55        

 

 Serum or plasma protein measurement (mass/volume)            7.2 g/dL         
   6.4-8.2        

 

 Serum or plasma albumin measurement (mass/volume)            4.1 g/dL         
   3.2-4.5        

 

 CALCIUM CORRECTED            9.1 mg/dL            8.5-10.1        









 Serum or plasma alpha-1-fetoprotein.tumor marker measurement (mass/volume) -  08:19         









 Serum or plasma alpha-1-fetoprotein.tumor marker measurement (mass/volume)    
        2.2 %            0.0-8.8        









 PT panel in platelet poor plasma by coagulation assay - 19 07:40         









 Prothrombin time (PT) in platelet poor plasma by coagulation assay            
20.9 s            12.2-14.7        

 

 INR in platelet poor plasma or blood by coagulation assay            1.7      
       0.8-1.4        



                                                                               
                                                                               
                  



Encounters

      





 ACCT No.            Visit Date/Time            Discharge            Status    
        Pt. Type            Provider            Facility            Loc./Unit  
          Complaint        

 

 [id number]            2019 14:00:00            2019 14:40:00    
        DIS            Outpatient            SIXTO BAIRES DO            [facility]            PREOP            ANTERIOR REPAIR WITH 
SLING        

 

 [id number]            2019 07:40:00            2019 23:59:59    
        CLS            Outpatient            DEIDRE QUEZADA            [facility]            LAB            CIRRHOSIS OF LIVER W/O 
ASCITES        

 

 [id number]            2019 06:46:00            2019 23:59:59    
        CLS            Outpatient            DEIDRE QUEZADA            [facility]            RAD            CIRRHOSIS OF LIVER W/O 
ASCITES        

 

 [id number]            2019 08:05:00            2019 23:59:59    
        CLS            Outpatient            STEVO RICHARDSON    
        [facility]            LAB            R06.09      
  

 

 [id number]            2019 08:32:00            2019 23:59:59    
        CLS            Outpatient            TAWIL MD, ELIAS A            [facility]            RAD            MICROHEMATURIA        

 

 [id number]            2019 18:51:00            2019 20:27:00    
        DIS            Emergency            NICKI ALMANZAR DO            [facility]            ER            TACHYCARDIC;HX A-FIB     
   

 

 [id number]            2018 00:09:00            2018 23:59:59    
        CLS            Preadmit            MAGUI MCALLISTER MD            
[facility]            LAB            C92.10        

 

 [id number]            2018 06:39:00            2018 00:01:00    
        DIS            Outpatient            MAGUI MCALLISTER MD            
[facility]            LAB            C92.10        

 

 [id number]            2018 07:46:00            2018 23:59:59    
        CLS            Outpatient            DEIDRE QUEZADA            [facility]            RAD            K74.60 CIRRHOSIS OF 
LIVER W/O ASCITES        

 

 [id number]            2018 00:10:00            2018 23:59:59    
        CLS            Preadmit            CHEMA STILL MD, FACC, FACP CCDS       
     [facility]            LAB            I48.0        

 

 [id number]            2018 11:14:00            2018 00:01:00    
        DIS            Outpatient            TESSY COHEN FACC, CHEMA HANNA CCDS     
       [facility]            LAB            I48.0        

 

 [id number]            2018 09:47:00            2018 23:59:59    
        CLS            Outpatient            DEIDRE QUEZADA            [facility]            LAB            K74.60,Z86.19        

 

 [id number]            2018 10:30:00            2018 23:59:59    
        CLS            Outpatient            LIANGKATHRYN BROWN DO            
[facility]            RAD            PLUERAL EFFUSION    
    

 

 [id number]            2018 06:42:00            2018 23:59:59    
        CLS            Outpatient            TONY DRUMMOND MD            [facility]            CARD            DYSPNEA        

 

 [id number]            2018 11:27:00            2018 00:01:00    
        DIS            Outpatient            MAGUI MCALLISTER MD            
[facility]            LAB            C92.10        

 

 [id number]            2018 19:05:00            2018 20:51:00    
        DIS            Emergency            KARELY SALAS APRN            [facility]            ER            HEART RACING        

 

 [id number]            2018 11:24:00            2018 23:59:59    
        CLS            Outpatient            DEIDRE QUEZADA            [facility]            LAB            K74.60        

 

 [id number]            2018 06:51:00            2018 23:59:59    
        CLS            Outpatient            DEIDRE QUEZADA            [facility]            RAD            K74.60        

 

 [id number]            2018 10:29:00            2018 23:59:59    
        CLS            Outpatient            MAGUI MCALLISTER MD            
[facility]            LAB            C92.10        

 

 [id number]            2018 00:15:00            2018 23:59:59    
        CLS            Preadmit            CHEMA STILL MD, FACC, FACP CCDS       
     [facility]            LAB            I48.0        

 

 [id number]            2017 10:43:00            01/15/2018 00:01:00    
        DIS            Outpatient            TESSY COHEN FACC, CHEMA HANNA CCDS     
       [facility]            LAB            I48.0        

 

 [id number]            2018 09:50:00            2018 23:59:59    
        CLS            Outpatient            KAELA AVILES DO            [facility]            ENDO            HX OF POLYPS        

 

 [id number]            2018 07:25:00            2018 23:59:59    
        CLS            Outpatient            KATHRYN HOLLAND DO            
[facility]            RAD            SOB        

 

 [id number]            2018 05:39:00            2018 11:00:00    
        DIS            Outpatient            KAELA AVILES DO            [facility]            PREOP            COLONOSCOPY        

 

 [id number]            2018 14:17:00            2018 23:59:59    
        CLS            Outpatient            KATHRYN HOLLAND DO            
[facility]            RAD            SOB, PLUERAL 
EFFUSION        

 

 [id number]            2017 12:44:00            2017 23:59:59    
        CLS            Outpatient            KATHRYN HOLLAND DO            
[facility]            RAD            LEFT PLEURAL 
EFFUSION        

 

 [id number]            2017 04:17:00            2017 07:14:00    
        DIS            Emergency            JENELLE DOCAROLEA K            [facility]            ER            N/V/D        

 

 [id number]            2017 21:10:00            2017 22:50:00    
        DIS            Emergency            GENARO COHEN, LUIS MCKEE            
[facility]            ER            HEART RACING/CP      
  

 

 [id number]            10/24/2017 12:18:00            10/24/2017 23:59:59    
        CLS            Outpatient            JENN NEAL          
  [facility]            RAD            Z51.81 
ANTICOAGULATED ON COUMADIN        

 

 [id number]            10/09/2017 16:43:00            10/09/2017 23:59:59    
        CLS            Outpatient            KATHRYN HOLLAND DO            
[facility]            RAD            PLEURAL PAIN LEFT 
SIDE,SOB        

 

 [id number]            10/04/2017 08:10:00            10/04/2017 23:59:59    
        CLS            Outpatient            TESSY COHEN FACC, CHEMA HANNA CCDS     
       [facility]            LAB            I48.0        

 

 [id number]            10/01/2017 03:08:00            10/01/2017 23:59:59    
        CLS            Preadmit            KATHRYN HOLLAND DO            
[facility]            LAB            A FIB        

 

 [id number]            2017 09:40:00            2017 00:01:00    
        DIS            Outpatient            KATHRYN HOLLAND DO            
[facility]            LAB            A FIB        

 

 [id number]            2017 12:30:00            2017 23:59:59    
        CLS            Outpatient            KATHRYN HOLLAND DO            
[facility]            LAB            A FIB        

 

 [id number]            2017 12:07:00            09/15/2017 16:30:00    
        DIS            Inpatient            KATHRYN HOLLAND DO            
[facility]            ICU            A-FIB WITH RVR      
  

 

 [id number]            2017 07:51:00            2017 23:59:59    
        CLS            Outpatient            DEIDRE QUEZADA            [facility]            RAD            K74.60        

 

 [id number]            2017 12:14:00            2017 23:59:59    
        CLS            Outpatient            KATHRYN HOLLAND DO            
[facility]            RAD            SOB PLEURAL EFFUSION 
DUE TO CANCER MEDICINE        

 

 [id number]            2017 16:27:00            2017 18:17:00    
        DIS            Emergency            AMADA GORDILLO MD            
[facility]            ER            SHOULDER BLADE/ARM 
NERVE BURNING        

 

 [id number]            2017 06:52:00            2017 23:59:59    
        CLS            Outpatient            DEIDRE QUEZADA            [facility]            RAD            CIRRHOSIS OF LIVER      
  

 

 [id number]            2016 09:32:00            2016 23:59:59    
        CLS            Outpatient            KATHRYN HOLLAND DO            
[facility]            RAD            RIGHT WRIST PAIN X4 
WEEKS        

 

 [id number]            10/31/2016 18:04:00            10/31/2016 20:04:00    
        DIS            Emergency            KARELY SALAS            [facility]            ER            BACK PAIN        

 

 [id number]            2016 07:42:00            2016 23:59:59    
        CLS            Outpatient            LUKE MAHAN DO            [facility]            RAD            PLEURAL EFFUSION,LEUKEMIA
,HEPATITIS C,COUGH        

 

 [id number]            2016 10:10:00            2016 23:59:59    
        CLS            Outpatient            LUKE MAHAN DO            [facility]            LAB            PLEURAL EFFUSION,LEUKEMIA
,HEP C,COUGH        

 

 [id number]            2016 09:19:00            2016 23:59:59    
        CLS            Outpatient            AMALIA HAREKATHRYN            
[facility]            RAD            SOB,ABNORMAL DENSITY
        

 

 [id number]            2016 12:20:00            2016 23:59:59    
        CLS            Outpatient            AMALIA HARE KATHRYN SHAI            
[facility]            LAB            KIDNEY FAILURE,SOB  
      

 

 [id number]            2016 11:50:00            2016 23:59:59    
        CLS            Outpatient            AMALIA HAREKATHRYN            
[facility]            RAD            SHORT OF BREATH     
   

 

 [id number]            2015 11:56:00            2015 23:59:59    
        CLS            Outpatient            AMALIA HARE KATHRYN SHAI            
[facility]            RAD            NO SOUNDS L LUNG,SOB
        

 

 [id number]            2015 07:20:00            2015 23:59:59    
        CLS            Outpatient            AMALIA HARE KATHRYN SHAI            
[facility]            RAD            RT LEG AND FOOT 
REDNESS/SWELLING        

 

 [id number]            06/10/2015 06:41:00            06/10/2015 23:59:59    
        CLS            Outpatient            DEIDRE QUEZADA            [facility]            RAD            HEP C,CIRRHOSIS        

 

 [id number]            2014 16:28:00            2014 13:00:00    
        DIS            Inpatient            KAELA AVILES DO            [facility]            4TH            L RIB FRACTURES, 
PULMONARY CONTUSION, PNEUMOTHORAX        

 

 [id number]            10/02/2014 17:40:00            10/03/2014 10:35:00    
        DIS            Inpatient            KATHRYN HOLLAND DO            
[facility]            4TH            RIGHT PLEURAL 
EFFUSION; CONSTIPATION        

 

 [id number]            2014 15:02:00            2014 23:59:59    
        CLS            Outpatient            LUKE MAHAN DO            [facility]            RT            PLEURAL EFFUSION,COUGH,
LEUKEMIA        

 

 [id number]            2014 11:57:00            2014 14:20:00    
        DIS            Outpatient            LUKE MAHAN DO ESVIN            [facility]            PLEURAL EFFUSION        

 

 [id number]            09/15/2014 16:39:00            09/15/2014 23:59:59    
        CLS            Outpatient            KATHRYN HOLLAND DO SHAI            
[facility]            RAD            RT PLEURAL EFFUSION 
       

 

 [id number]            2014 11:44:00            2014 23:59:59    
        CLS            Outpatient            AMALIA HARE KATHRYN SHAI            
[facility]            RAD            COUGH,SOA        

 

 [id number]            2014 21:42:00            2014 11:10:00    
        DIS            Inpatient            LIANGSHERRONDARION HAREKATHRYN SHAI            
[facility]            4TH            NEW RIGHT PLEURAL 
EFFUSION        

 

 [id number]            2014 14:03:00            2014 23:59:59    
        CLS            Outpatient            KATHRYN HOLLAND DO            
[facility]            LAB            LIVER BIOPSY        

 

 [id number]            2013 13:34:00            2013 23:59:59    
        CLS            Outpatient                                              
              

 

 [id number]            06/10/2013 14:45:00            06/10/2013 23:59:59    
        CLS            Outpatient            LIANGSHERRONDARION HARE KATHRYN SHAI            
[facility]            RAD            SCREENING        

 

 [id number]            2019 05:50:00                         ACT       
     Outpatient            SIXTO BAIRES DO            [facility]            CYSTOCELE;INCONTINENCE;COMPLETE PROLAPSE   
     

 

 [id number]            2014 10:57:00                                   
   Document Registration                                                       
     

 

 [id number]            2013 00:00:00                                   
   Document Registration                                                       
     

 

 [id number]            2013 00:00:00                                   
   Document Registration                                                       
     

 

 [id number]            04/15/2013 11:37:00                                   
   Document Registration                                                       
     

 

 [id number]            2013 19:29:00                                   
   Document Registration                                                       
     

 

 [id number]            2013 08:48:00                                   
   Document Registration                                                       
     

 

 [id number]            2013 13:41:00                                   
   Document Registration                                                       
     

 

 [id number]            2013 09:47:00                                   
   Document Registration                                                       
     

 

 [id number]            2012 11:50:00                                   
   Document Registration                                                       
     

 

 [id number]            10/22/2012 08:57:00                                   
   Document Registration                                                       
     

 

 [id number]            10/09/2012 13:23:00                                   
   Document Registration                                                       
     

 

 [id number]            2012 13:16:00                                   
   Document Registration                                                       
     

 

 [id number]            2012 00:56:00                                   
   Document Registration                                                       
     

 

 [id number]            2012 19:56:00                                   
   Document Registration                                                       
     

 

 [id number]            2012 16:59:00                                   
   Document Registration                                                       
     

 

 [id number]            2012 13:48:00                                   
   Document Registration                                                       
     

 

 [id number]            2015 11:58:18                         [place]            
Document Registration                                                          
  

 

 [id number]            2019 13:45:00            2019 23:59:59            
[facility]            Outpatient            VIK GHOTRA LAC                         
[facility]

## 2019-04-23 NOTE — OPERATIVE REPORT
DATE OF SERVICE:  04/23/2019



PREOPERATIVE DIAGNOSIS:

For my part, urinary incontinence.



POSTOPERATIVE DIAGNOSIS:

For my part, urinary incontinence.



OPERATION PERFORMED:

Pubovaginal sling and cystoscopy.



SURGEON:

Elias Tawil, MD



ANESTHESIA:

General.



COMPLICATIONS:

None.



DESCRIPTION OF PROCEDURE:

After Dr. Ashby performed the first part of her surgery that she will dictate, I

went ahead and passed the pubovaginal sling using the Solyx device on both sides

using the described technique.  The sling was sitting nicely under the mid

urethra with no tension, no twist and passage of a curved hemostat easily

between it and the underlying urethra.  I went ahead and removed the Potts

catheter to perform cystoscopy to confirm the integrity of the ureters, bladder

and urethra.  There was no foreign body and presence of the sling under the mid

urethra.  I left the bladder at least half full, removed the cystoscope,

performed manual Valsalva maneuver that was negative.  I reinserted a Potts

catheter, draining again clear fluid and Dr. Ashby performed the rest of the

surgery that she will dictate.  Estimated blood loss for my part is negligible.





Job ID: [id number]

DocumentID: [id number]

Dictated Date:  04/23/2019 09:21:29

Transcription Date: 04/23/2019 13:43:21

Dictated By: ELIAS TAWIL, MD

## 2019-04-23 NOTE — PROGRESS NOTE-PRE OPERATIVE
Pre-Operative Progress Note


H&P Reviewed


The H&P was reviewed, patient examined and no changes noted.


Date Seen by Provider:  Apr 23, 2019


Time Seen by Provider:  07:00


Date H&P Reviewed:  Apr 23, 2019


Time H&P Reviewed:  07:00


Pre-Operative Diagnosis:  INCONTINENCE











TAWIL,ELIAS A MD Apr 23, 2019 07:00

## 2019-04-23 NOTE — XMS REPORT
Encounter Summary

 Created on: 2019



GumeWendy

External Reference #: [id number]

: 1960

Sex: Female



Demographics







 Address  [address] [place]  [address]

 

 Home Phone  [phone]

 

 Preferred Language  English

 

 Marital Status  Unknown

 

 Anabaptist Affiliation  REF

 

 Race  White

 

 Ethnic Group  Not  or 





Author







 Author  [facility]

 

 Organization  [facility]

 

 Address  Unknown

 

 Phone  Unavailable







Support







 Name  Relationship  Address  Phone

 

 Kelly Siddiqui  ECON  [address] [place] [address]  [address]  [phone]

 

 Razia Dunaway  ECON  Unknown  [phone]

 

 Troy Rossi  ECON  Unknown  [phone]







Care Team Providers







 Care Team Member Name  Role  Phone

 

 Aleksander Boothe MD  Unavailable  [phone]

 

 Rah Castro DO  PCP  [phone]

 

 Gillian Chand MD  Unavailable  [phone]

 

 Kimberly Del Cid APRN  Unavailable  Unavailable

 

 Coy Shen MD  Unavailable  [phone]

 

 Uri Chau MD  Unavailable  [phone]

 

 Olga Tiwari RN  Unavailable  [phone]

 

 Yola Morales RN  Unavailable  Unavailable

 

 Gabino Cr PHARMD  Unavailable  Unavailable

 

 Jaelyn Hernandez  Unavailable  Unavailable

 

 Olga Polk  Unavailable  [phone]

 

 Esa Betancourt  Unavailable  Unavailable

 

 Karley Mena  Unavailable  Unavailable

 

 Matt Pruett MD  Unavailable  [phone]

 

 Maura Love  Unavailable  Unavailable

 

 Geraldine Sanderson  Unavailable  Unavailable







Reason for Visit

* 





 



  Reason   Comments

 

 



  Cirrhosis 









Encounter Details







    Care Team  Description



  Date   Type   Department  

 

    



Olga Polk APRN



[address] [address] [phone] (Fax)  Cirrhosis of liver without ascites, unspecified hepatic 
cirrhosis type (HCC) (Primary Dx); 

History of hepatitis C; 

CML (chronic myelocytic leukemia) (HCC)



  2019   Office Visit   [facility]  



    [address]  



    [address] [phone]  







Social History







     Date



  Tobacco Use   Types   Packs/Day   Years Used 

 

      



  Former Smoker   Cigarettes   1   10 

 

    



  Smokeless Tobacco: Former     Quit: 2003



  User   









   



  Alcohol Use   Drinks/Week   oz/Week   Comments

 

   



  No   0 Standard   0.0 



   drinks or  



   equivalent  









 



  Sex Assigned at Birth   Date Recorded

 

 



  Not on file 









   Industry



  Job Start Date   Occupation 

 

   Not on file



  Not on file   Not on file 









   Travel End



  Travel History   Travel Start 













  No recent travel history available.



documented as of this encounter



Last Filed Vital Signs







   Time Taken



  Vital Sign   Reading 

 

   2019 12:59 PM CDT



  Blood Pressure   108/65 

 

   2019 12:59 PM CDT



  Pulse   79 

 

   2019 12:59 PM CDT



  Temperature   36.5 C (97.7 F) 

 

   2019 12:59 PM CDT



  Respiratory Rate   16 

 

   2019 12:59 PM CDT



  Oxygen Saturation   98% 

 

   -



  Inhaled Oxygen   - 



  Concentration  

 

   2019 12:59 PM CDT



  Weight   74.8 kg (165 lb) 

 

   2019 12:59 PM CDT



  Height   167.6 cm (5' 6") 

 

   2019 12:59 PM CDT



  Body Mass Index   26.63 



documented in this encounter



Functional Status







   Date of Assessment



  Functional Status   Response 

 

   2019



  Does the patient have a hearing impairment:   No 

 

   2019



  Does the patient have a visual impairment:   Yes 

 

   2019



  Does the patient have impaired ambulation:   No 

 

   2019



  Does the patient have an activity of daily living   No 



  (ADL) impairment:  

 

   2019



  Does the patient have an instrumental activity of   No 



  daily living (IADL) impairment:  









   Date of Assessment



  Cognitive Status   Response 

 

   2019



  Does the patient have a cognitive impairment:   No 



documented as of this encounter



Patient Instructions

* Patient Instructions* 



 Eric Narayanan, RN - 2019  1:00 PM CDT



Formatting of this note might be different from the original.

1.  Return to clinic in 6 months to see Olga VACA.

2.  Print lab orders.

3.  Print orders for US.



Patient Information:



1.  Please have labs done now with the provided orders.  You need an AFP lab 
done now.  We will call you with your lab results.

2.  You are due for imaging of your liver now schedule this near home.

3.  Please get labs in 6 months.



If you have completed labs or imaging today and have not received results 
within 10 days, please contact our office.



General Instructions:

 How to reach us:   Please send a This[facility] message to the General Hepatology 
clinic or call us at [phone] and ask for Eric Narayanan RN.

 How to get a medication refill:  Please use the ThisNext Refill request or 
contact your pharmacy directly to request medication refills. Please allow 48 
hours.   

 How to receive your test results:  If you have signed up for ThisNext, you 
will receive your test results and messages from us this way.  Otherwise, you 
will get a phone call or letter.   If you are expecting results and have not 
heard from my office within 2 weeks of your testing, please send a ThisNext 
message or call my office.   

 Scheduling:  Our Scheduling phone number is [phone]..

 Appointment Reminders on your cell phone: Make sure we have your cell phone 
number, and Text [facility] to [phone].

Support groups for many chronic illnesses are available through [facility]: 
[employer] or [phone].



BACK OFFICE:

Get records of EGD/ Colon from [facility] in [place], [place].

Upper GI Endoscopy

 

During endoscopy, a long, flexible tube is used to view the inside of your 
upper GI tract.  



Upper GI endoscopy allows your healthcare provider to look directly into the 
beginning of your gastrointestinal (GI) tract. The esophagus, stomach, and 
duodenum (the first part of the small intestine) make up the upper GI tract.

Before the exam

Follow these and any other instructions you are given before your endoscopy. If 
you dont follow the healthcare providers instructions carefully, the test 
may need to be canceled or done over:

 Don't eat or drink anything after midnight the night before your exam. If 
your exam is in the afternoon, drink only clear liquids in the morning. Don't 
eat or drink anything for8 hours before the exam. In some cases, you may be 
able to take medicines with sips of water until 2 hours before the procedure. 
Speak with your healthcare provider about this.

 Bring your X-rays and any other test results you have.

 Because you will be sedated, arrange for an adult to drive you home after 
the exam.

 Tell your healthcare provider before the exam if you are taking any 
medicines or have any medical problems.

The procedure

Here is what to expect:

 You will lie on the endoscopy table. Usually patients lie on the left side.

 You will be monitored and given oxygen.

 Your throat may be numbed with a spray or gargle. You are given medicine 
through an intravenous (IV) line that will help you relax and remain 
comfortable. You may be awake or asleep during the procedure.

 The healthcare provider will put the endoscope in your mouth and down your 
esophagus.Itis thinner than most pieces of food that you swallow. It will 
not affect your breathing. The medicine helps keep you from gagging.

 Air is put into your GI tract to expand it. It can make you burp.

 During the procedure, the healthcare provider can take biopsies (tissue 
samples), remove abnormalities, such as polyps, or treat abnormalities through 
a variety of devices placed through the endoscope. You will not feel this.

 The endoscope carries images of your upper GI tract to a video screen. If 
you are awake, you may be able to look at the images.

 After the procedure is done, you will rest for a time. An adult must drive 
you home.

When to call your healthcare provider

Contact your healthcare provider if you have:

 Black or tarry stools, or blood in your stool

 Fever

 Pain in your belly that does not go away

 Nausea and vomiting, or vomiting blood 

Date Last Reviewed: 2016-2018 [employer]. [address] [place] [address]. All rights reserved. This information is not intended as a substitute 
for professional medical care. Always follow your healthcare professional's 
instructions.







Electronically signed by Eric Narayanan RN at 2019  1:44 PM CDT





documented in this encounter



Progress Notes

* Olga Polk APRN - 2019  1:00 PM CDT



Formatting of this note might be different from the original.

Date of Service: 3/18/2019 



Subjective:        

Wendy Dunaway is a 59 y.o. female presenting for follow up of liver disease.



History of Present Illness

Wendy Dunaway is a 59 y.o. female

Ms Dunaway presents to hepatology clinic for continued management of cirrhosis 
due to HCV.  Last seen in clinic 2018.  PMH includes HCV, cirrhosis, CML. 
History of  genotype 1a, completed 12 weeks of therapy with Harvoni 7/14/15 and 
achieved SVR.  She has been very stable from liver perspective with low MELD 
score.  AFP within normal limits. Imaging negative for liver lesion. She 
reports overall doing well since last seen. She reports CML is in remission and 
no longer on chemo.  A fib on xaralto, followed by local cardiologist. She 
recently had eye brow lift due to ptosis which has improved her visual field. 
She had the flu several months ago. She has gained weight since last seen. She 
has a future appt with urology for bladder issues.  Otherwise she denies N/V, 
abdominal pain, change in  Bowel habits, Gi bleed, ascites, edema, chest pain, 
shortness of breath. No other changes or new symptoms.    



Past Medical History:

Past Medical History: 

Diagnosis Date 

 Carpal tunnel syndrome on both sides  

 CML (chronic myelocytic leukemia) (HCC)  

 Hepatitis C  

 2' IV drug use 

 Rib fracture 2014 

 multiple 

 Thrombocytosis (HCC)  



Surgical History:

Past Surgical History: 

Procedure Laterality Date 

 CARPAL TUNNEL RELEASE   

 CHOLECYSTECTOMY   

 HEMORRHOIDECTOMY   

 HERNIA REPAIR   

 OH ESOPHAGOSCOPY FLEXIBLE TRANSORAL DIAGNOSTIC   



Social History:

Nonsmoker, no ETOH use, no substance abuse.  Daughter is primary care giver.



 



Review of Systems

A complete 10 point review of systems was asked and answered in the negative 
unless specifically commented upon in the HPI.



Objective:       

 ascorbic acid(+) (VITAMIN C) 1,000 mg tablet Take 1,000 mg by mouth daily. 

 cholecalciferol (VITAMIN D-3) 1,000 units tablet Take 1,000 Units by mouth 
daily. 

 diltiazem CD (CARDIZEM CD) 240 mg capsule Take 240 mg by mouth daily. 

 gabapentin (NEURONTIN) 600 mg tablet Take 1 tab every morning, 2 tabs in 
the afternoon, 2 tabs in the evening, and 1 tab at bedtime 

 potassium chloride(+) (MICRO-K) 10 mEq capsule Take 10 mEq by mouth daily. 
Take with a meal and a full glass of water. 

 QUEtiapine (SEROQUEL) 100 mg tablet Take 1/2 tab every morning, 1.5 tab at 
1 pm, 1.5 tab at 5 pm and 1/2 tab at bedtime. 

 rivaroxaban (XARELTO) 20 mg tablet Take 20 mg by mouth daily. Take with 
food. 



Vitals: 

 19 1259 

BP: 108/65 

Pulse: 79 

Resp: 16 

Temp: 36.5 C (97.7 F) 

TempSrc: Oral 

SpO2: 98% 

Weight: 74.8 kg (165 lb) 

Height: 167.6 cm (66") 



Body mass index is 26.63 kg/m. 



Physical Exam



Vitals reviewed. 

Constitutional: Well-developed, well-nourished, in no apparent distress.  

HEENT: Normocephalic. no scleral icterus. Dentition intact.

Cardiac:  Regular rate and rhythm.  

Respiratory: Clear to auscultation bilaterally.  No wheezes or rales.

GI:  Abdomen soft, non-distended, non-tender. BS present. no shifting dullness. 
No overt hepatosplenomegaly.   

Skin:  Skin is warm and dry. No rash noted.  No jaundice. no spider nevi noted.
  + palmar erythema.

Peripheral Vascular: no lower extremity edema.

Musculoskeletal:  ROM intact, no muscle wasting. 

Psychiatric: Normal mood and affect. Behavior is normal.

Neuro:  no asterixis, no tremor.



 

Labs and Diagnostic tests:

CBC w diff  

Lab Results 

Component Value Date/Time 

 WBC 6.2 2019 12:29 PM 

 RBC 4.36 2019 12:29 PM 

 HGB 14.4 2019 12:29 PM 

 HCT 42.5 2019 12:29 PM 

 MCV 97.6 2019 12:29 PM 

 MCH 33.1 2019 12:29 PM 

 MCHC 34.0 2019 12:29 PM 

 RDW 14.0 2019 12:29 PM 

 PLTCT 211 2019 12:29 PM 

 MPV 7.5 2019 12:29 PM 

 Lab Results 

Component Value Date/Time 

 NEUT 63 2019 12:29 PM 

 ANC 3.90 2019 12:29 PM 

 LYMA 25 2019 12:29 PM 

 ALC 1.50 2019 12:29 PM 

 MANSI 9 2019 12:29 PM 

 AMC 0.60 2019 12:29 PM 

 EOSA 3 2019 12:29 PM 

 AEC 0.20 2019 12:29 PM 

 BASA 0 2019 12:29 PM 

 ABC 0.00 2019 12:29 PM 

 



Comprehensive Metabolic Profile  

Lab Results 

Component Value Date/Time 

  2019 12:29 PM 

 K 4.0 2019 12:29 PM 

  2019 12:29 PM 

 CO2 27 2019 12:29 PM 

 GAP 5 2019 12:29 PM 

 BUN 17 2019 12:29 PM 

 CR 0.86 2019 12:29 PM 

 GLU 93 2019 12:29 PM 

 GLU 71 10/23/2017 10:13 AM 

 Lab Results 

Component Value Date/Time 

 CA 9.5 2019 12:29 PM 

 ALBUMIN 4.4 2019 12:29 PM 

 TOTPROT 7.6 2019 12:29 PM 

 ALKPHOS 80 2019 12:29 PM 

 AST 12 2019 12:29 PM 

 ALT 8 2019 12:29 PM 

 TOTBILI 0.5 2019 12:29 PM 

 GFR >60 2019 12:29 PM 

 GFRAA >60 2019 12:29 PM 

 



Imaging:

OSH abdominal US 2018 negative for liver lesion. No splenomegaly or 
ascites. 



Endoscopy:

Per pt report she had OSH EGD/Colonoscopy Oct 2017 at [facility] in [place], [place].  I do not have report available to me today.  Will request.

Previous EGD 10/2014 showed normal esophagus and no gastric varices seen. 



Assessment and Plan:



Cirrhosis: 

- liver biopsy stage 5/6 fibrosis. 

The patient has noted compensated cirrhosis evidenced by PE and low MELD score.
  

- Discussed importance of monitoring every 6 months for HCC and any changes. 

- We will continue to monitor MELD labs every 6 months for any changes.

- unable to calculate MELD score as INR result not available. 



Liver Transplant Candidacy: 

Not an OLT candidate due to low MELD score. Consideration of other 
comorbidities such as CML would need to be taken into consideration. 



History of Hepatitis C: 

- The patient had hepatitis C genotype 1a and completed treatment with Harvoni 
x 12 weeks 7/14/15.  

- The patient achieved sustained viral response (SVR) with a not detected viral 
load.



Hepatocellular Cancer Screening: 

- Due to cirrhosis recommend screening for HCC every 6 months with either 
abdominal ultrasound or by alternating abdominal ultrasound with EITHER a triple
/quad phase CT Liver with IV contrast OR a Quad phase MRI Liver with IV 
contrast.  AFP levels should be checked every 6 months at time of imaging 
screen.  AFP with upcoming labs.

- The patient's most recent imaging was abdominal US 2018 and it noted no 
liver lesion.  OSH orders given to obtain abdominal US locally.  Discussed 
importance of HCC screening every 6 months.  

Lab Results 

Component Value Date/Time 

 AFP 1.9 10/23/2017 10:13 AM 



Esophageal Varices: 

- Due to cirrhosis recommend screening for esophageal varices with EGD.

- Most recent EGD noted Oct 2014 negative for esophageal and gastric varices.  
She reports repeat EGD locally in Oct 2017 at [facility] but I do not 
have report for review and will request. 

- Recommend repeating an upper GI endoscopy pending review of OSH report as 
above. 



Bone health: 

- All patients with liver disease, particularly those with cholestatic liver 
disease, are at an increased risk for osteoporosis. 

- We strongly recommend screening for Vitamin D deficiency at least twice 
yearly with aggressive supplementation/replacement as indicated. 

Lab Results 

Component Value Date/Time 

 VITD25 25.1 (L) 2014 12:38 PM 

 

- Last DEXA scan 10/1/2014 showed normal bone density.  She is due for 
reassessment.  She may schedule locally or obtain at . 

- Recommend a screening DEXA scan to evaluate for osteoporosis.  If present, 
should treat with calcium, Vitamin D supplementation, and recommend 
consideration of bisphosphonate therapy.  Also recommend follow up DEXA scans 
to evaluate for improvement of bone density on therapy.



Adult Health Maintenance: 

- The patient's last colonoscopy was Oct 2014  and it noted four adenomatous 
polypectomy.

- The patient will be due for a repeat colonoscopy per pt report she had 
colonoscopy locally in Oct 2017. Will request report for review.



Immunizations: 

We strongly recommend vaccinations for hepatitis A and B if the patient not 
immune.  

Lab Results 

Component Value Date/Time 

 HEPBSAB 33.67 2017 12:16 PM 

 

Lab Results 

Component Value Date/Time 

 HEPAIGG  2014 11:11 AM 

  NEG

Note: Test type and methodology has changed.  The [facility] lab has discontinued the 

test for Total Hep A Immunoglobulin.  This test has been replaced with more 

specific testing.  The tests for Hep A IgG and Hep A IgM are both now available 

and can be ordered. 



History of CML:

Currently off chemo. Followed by Dr. Chand. 



Nutrition:

-As with most patients with chronic liver disease, there is a significant 
degree of protein malnutrition.  Dicussed need to change dietary habits to 
improve overall protein balance.  Discussed the importance of eating between 1.2
-1.5g/kg/day lean protein like eggs, fish, chicken, nuts, and legumes, in 
addition to a diet rich in fresh fruits and vegetables.  Continue to follow a 
sodium restricted (<2g sodium diet) and discussed the need to minimize the 
intake of carbohydrates and sugars, to avoid obesity. 

- Recommend a bedtime snack with protein and complex carbohydrate to minimize 
risk of muscle catabolism overnight.



Routine Health Care in Patient with Chronic Liver Disease:

- All patients with liver disease should avoid the use of Non-steroidal Anti-
Inflammatory (NSAID) medications as they can cause significant injury to the 
kidneys in this population.

- Recommended the patient avoid herbal supplements and over the counter herbal 
remedies due to potential hepatic toxicities.  



Follow Up: 

6 months or sooner if needed. The patient is to call our office with any 
questions. 



Thank you very much for the opportunity to participate in the care of this 
patient.  If you have any further questions, please don't hesitate to contact 
our office.



_____________________________

Olga VACA 

[facility]

Hepatology

Office Number: [phone]





Electronically signed by Olga Polk APRN at 2019  8:54 PM CDT

documented in this encounter



Plan of Treatment







    Order Schedule



  Name   Priority   Associated Diagnoses 

 

    Expected: 2019 (Approximate), Expires: 2020



  ALPHA FETO PROTEIN (AFP)   Routine   Cirrhosis of liver 



    without ascites, 



    unspecified hepatic 



    cirrhosis type (HCC) 

 

    Expected: 2019 (Approximate), Expires: 2020



  US ABDOMEN COMPLETE   Routine   Cirrhosis of liver 



    without ascites, 



    unspecified hepatic 



    cirrhosis type (HCC) 

 

    Expected: 2019 (Approximate), Expires: 2020



  CBC AND DIFF   Routine   Cirrhosis of liver 



    without ascites, 



    unspecified hepatic 



    cirrhosis type (HCC) 

 

    Expected: 2019 (Approximate), Expires: 2020



  COMPREHENSIVE METABOLIC PANEL   Routine   Cirrhosis of liver 



    without ascites, 



    unspecified hepatic 



    cirrhosis type (HCC) 

 

    Expected: 2019 (Approximate), Expires: 2020



  PROTIME INR (PT)   Routine   Cirrhosis of liver 



    without ascites, 



    unspecified hepatic 



    cirrhosis type (HCC) 

 

    Expected: 2019 (Approximate), Expires: 2020



  ALPHA FETO PROTEIN (AFP)   Routine   Cirrhosis of liver 



    without ascites, 



    unspecified hepatic 



    cirrhosis type (HCC) 



documented as of this encounter



Visit Diagnoses











  Diagnosis

 





  Cirrhosis of liver without ascites, unspecified hepatic cirrhosis type (HCC) 
- Primary

 





  History of hepatitis C



  Personal history of other infectious and parasitic disease

 





  CML (chronic myelocytic leukemia) (HCC)



  Chronic myeloid leukemia, without mention of having achieved remission



documented in this encounter

## 2019-04-23 NOTE — XMS REPORT
Clinical Summary

 Created on: 2019



Wendy Dunaway

External Reference #: [id number]

: 1960

Sex: Female



Demographics







 Address  [address] [place]  [address]

 

 Home Phone  [phone]

 

 Preferred Language  English

 

 Marital Status  Unknown

 

 Bahai Affiliation  REF

 

 Race  White

 

 Ethnic Group  Not  or 





Author







 Author  [facility]

 

 Organization  [facility]

 

 Address  Unknown

 

 Phone  Unavailable







Support







 Name  Relationship  Address  Phone

 

 Kelly Siddiqui  ECON  [address]

[place]  [address]  [phone]

 

 Razia Dunaway  ECON  Unknown  [phone]

 

 Troy Rossi  ECON  Unknown  [phone]







Care Team Providers







 Care Team Member Name  Role  Phone

 

 Aleksander Boothe MD  Unavailable  [phone]

 

 Rah Castro DO  PCP  [phone]

 

 Gillian Chand MD  Unavailable  [phone]

 

 Kimberly Del Cid APRN  Unavailable  Unavailable

 

 Coy Shen MD  Unavailable  [phone]

 

 Uri Chau MD  Unavailable  [phone]

 

 Olga Tiwari RN  Unavailable  [phone]

 

 Yola Morales RN  Unavailable  Unavailable

 

 Gabino Cr PHARMD  Unavailable  Unavailable

 

 Jaelyn Hernandez  Unavailable  Unavailable

 

 Olga Polk APRN  Unavailable  [phone]

 

 Esa Betancourt  Unavailable  Unavailable

 

 Karley Mena  Unavailable  Unavailable

 

 Matt Pruett MD  Unavailable  [phone]

 

 Maura Love  Unavailable  Unavailable

 

 Geraldine Sanderson  Unavailable  Unavailable







Source Comments

Some departments are not documenting in the electronic medical record.  If you 
do not see the information that you expected, contact Release of Information in 
the Health Information Management department at [phone] for further 
assistance in locating additional records.[facility]



Allergies







     Comments



  Active Allergy   Reactions   Severity   Noted Date 

 

     



Throat swells. 



  Celecoxib   HIVES,   Medium   2013 



   SHORTNESS OF   



   BREATH   

 

      



  Sulfa (Sulfonamide   HIVES   Medium   2012 



  Antibiotics)    

 

      



  Trimethoprim   UNKNOWN   Low  

 

      



  Zolpidem   UNKNOWN   Low  







Medications







      End Date  Status



  Medication   Sig   Dispensed   Refills   Start  



      Date  

 

         Active



  gabapentin (NEURONTIN)   Take 1 tab    0     



  600 mg tablet   every     6  



   morning, 2     



   tabs in the     



   afternoon, 2     



   tabs in the     



   evening, and     



   1 tab at     



   bedtime     

 

         Active



  QUEtiapine (SEROQUEL) 100   50 mg three    0   06/15/201  



  mg tablet   times daily.     6  

 

         Active



  ascorbic acid(+) (VITAMIN   Take 1,000 mg    0   



  C) 1,000 mg tablet   by mouth     



   daily.     

 

         Active



  diltiazem CD (CARDIZEM   Take 240 mg    0   



  CD) 240 mg capsule   by mouth     



   daily.     

 

         Active



  potassium chloride(+)   Take 10 mEq    0   



  (MICRO-K) 10 mEq capsule   by mouth     



   daily. Take     



   with a meal     



   and a full     



   glass of     



   water.     

 

         Active



  rivaroxaban (XARELTO) 20   Take 20 mg by    0   



  mg tablet   mouth daily.     



   Take with     



   food.     

 

         Active



  cholecalciferol (VITAMIN   Take 1,000    0   



  D-3) 1,000 units tablet   Units by     



   mouth daily.     







Active Problems







 



  Problem   Noted Date

 

 



  History of hepatitis C   2016

 

 



  Pleural effusion   2015

 

 



  Overview:



  Traumatic left hemothorax.



  Confirmed by diagnostic / therapeutic thoracentesis





  Last Assessment & Plan:



  Evaluated by  cardiothoracic surgery, no interventions, deferred to



  pulmonary.

 

 



  Colon polyps   2015

 

 



  Overview:



  Tubular adenomas

 

 



  Cirrhosis   2014

 

 



  CML (chronic myelocytic leukemia)   2012

 

 



  Overview:



  Diagnosis: CM chronic phase



  Date of Diagnosis: 2012



  Treatment:



  Imatinib 400-800 mg PO daily  to 2013: Best response not available,



  patient progressed 2013 ( Cytogenetic progression/ relapse)



  Nilotinib 200 MG PO BID: unable to tolerate, exacerbated neuropathy



  Dasatinib 100 mg PO daily: started March 15, 2013



  Response: CCR, CMR



  Dasatinib reduced to 70 mg PO daily: Oct , 2013



  7/30/15-Continue Dasatinib 70 mg po daily.



  May 2016: Dasatinib 50 mg po daily.



  Oct   2016: Dasatinib 40 mg po daily. CMR



  Oct   2017: Dasatinib 20 mg po daily.  Reduction due to recurrent pleural



  effusions



  2018:  Holding dasatinb





  Ms. Dunaway is a 57-year-old woman with a history of hep C infection and



  unexplained neuropathy.  She also has significant dysphagia and odynophagia



  and has significant difficulty in observing oral medication tablets.





  She was diagnosed with chronic phase CML in 2012.  She has been under



  the care of Dr. Petersen.  There has not been any outside records available



  for us to review today and most of the information available has been



  provided by the patient verbally.  Her treatment hostory is as above.



  Repeat the bone marrow biopsy did not show clonal evolution of her CML.



  Remains in CP-CML. No Kinase-domain mutations were detected.





  Since diagnoses her hep C therapy will resulted in complete eradication of



  hepatitis C virus infection with sustained molecular remission.  Her



  dysphagia and odynophagia has resolved and she is able to take oral pills.





  





  L



  ast Assessment & Plan:



  Mrs. Dunaway is here today for follow up for CML.





  She had been on long term therapy with Dasatinib.she has achieved CMR and



  now 1 year TFR





  I discussed with the patient discontinuation data and NCCN guidelines.





  We will continue laboratory screening at [facility] for PCR q 6 weeks for the



  next year





  She will return in 6 months for clinical assessment.





  Health care maintenance-She is up to date on Mammogram and Pap Smear.



  Continue to follow up with PCP for health care maintenance.





  Continue to follow up with Hepatology for history of Cirrhosis.

 

 



  Iron deficiency   2012

 

 



  Overview:



  Absent iron stains on bone marrow biopsy.





  L



  ast Assessment & Plan:



  Ferritin is > 500 , rules out iron def.

 

 



  Hiatal hernia   2012

 

 



  Overview:



  Per pt, limits her ability to take po pills.





  L



  ast Assessment & Plan:



  Pt reports that her trouble swallowing is secondary to GERD symptoms and



  that her GERD has resolved. She denies any current trouble with dysphagia



  and is able to eat and drink.

 

 



  Normocytic anemia   2012

 

 



  Overview:



  Last transfusion 





  L



  ast Assessment & Plan:



  Remains well above transfusion needs. Labs will hopefully continue to



  improve with treatment. She should have a work up for iron deficiency in



  the future however.







Resolved Problems







  



  Problem   Noted Date   Resolved Date

 

  



  Total bilirubin, elevated   2012

 

 



  Overview:



  Likely related to imatinib.





  L



  ast Assessment & Plan:



  Likely related to imatinib as it increased with the increased dosing. We



  will decrease imatinib back to 400 mg daily and have her labs checked with



  Dr. Petersen next week. Should her jaundice worsen over the weekend or she



  has diffuse itching she should present to her local ER.

 

  



  Thrombocythemia   2012

 

 



  Overview:



  CHANEL-2 negative.





  L



  ast Assessment & Plan:



  Improved. Now WNL.  She has stopped hydrea.

 

  



  Hepatitis C   2012

 

 



  Overview:



  Due to prior IV drug use, untreated





  L



  ast Assessment & Plan:



  Completed HCV therapy.



  Patient has an appointment with Hepatology today.









Encounters







    Care Team  Description



  Date   Type   Specialty  

 

    



Olga Polk APRN  Cirrhosis of liver without ascites, unspecified hepatic 
cirrhosis type (HCC) (Primary Dx); 

History of hepatitis C; 

CML (chronic myelocytic leukemia) (HCC)



  2019   Office Visit   Hepatology  

 

    



Olga Polk APRN  Patient Reminder Call



  2019   Telephone   Hepatology  

 

    



Gillian Chand MD  CML (chronic myelocytic leukemia) (HCC) (Primary Dx)



  2019   Office Visit   Oncology  

 

    



Gillian Chand MD  CML (chronic myeloid leukemia) (HCC) (Primary Dx)



  2019   Orders Only   Oncology  

 

    



Gillian Chand MD  Chronic myeloid leukemia, BCR/ABL-positive, not having 
achieved remission (HCC)



  2019   Hospital   Lab  



   Encounter   



from Last 3 Months



Immunizations







  



  Name   Dates Previously Given   Next Due

 

  



  HEP A/HEP B Combined   2015, 2015, 2015 



  Vaccine  







Family History







   



  Medical History   Relation   Name   Comments

 

   



  Coronary Artery Disease   Father    MI

 

   



  Cancer   Mother    Gallbladder cancer

 

   



  Liver Disease   Sister  









   



  Relation   Name   Status   Comments

 

   



  Brother    Alive 

 

   



  Father     

 

   



  Mother     

 

   



  Sister     

 

   



  Sister   







Social History







     Date



  Tobacco Use   Types   Packs/Day   Years Used 

 

      



  Former Smoker   Cigarettes   1   10 

 

    



  Smokeless Tobacco: Former     Quit: 2003



  User   









   



  Alcohol Use   Drinks/Week   oz/Week   Comments

 

   



  No   0 Standard   0.0 



   drinks or  



   equivalent  









 



  Sex Assigned at Birth   Date Recorded

 

 



  Not on file 









   Industry



  Job Start Date   Occupation 

 

   Not on file



  Not on file   Not on file 









   Travel End



  Travel History   Travel Start 













  No recent travel history available.







Last Filed Vital Signs







   Time Taken



  Vital Sign   Reading 

 

   2019 12:59 PM CDT



  Blood Pressure   108/65 

 

   2019 12:59 PM CDT



  Pulse   79 

 

   2019 12:59 PM CDT



  Temperature   36.5 C (97.7 F) 

 

   2019 12:59 PM CDT



  Respiratory Rate   16 

 

   2019 12:59 PM CDT



  Oxygen Saturation   98% 

 

   -



  Inhaled Oxygen   - 



  Concentration  

 

   2019 12:59 PM CDT



  Weight   74.8 kg (165 lb) 

 

   2019 12:59 PM CDT



  Height   167.6 cm (5' 6") 

 

   2019 12:59 PM CDT



  Body Mass Index   26.63 







Plan of Treatment







   



  Health Maintenance   Due Date   Last Done   Comments

 

   



  PHYSICAL (COMPREHENSIVE)   1966  



  EXAM   

 

   



  DTAP/TDAP VACCINES (1 -   1977  



  Tdap)   

 

   



  CERVICAL CANCER SCREENING   1989  

 

   



  SHINGLES RECOMBINANT   2009  



  VACCINE (1 of 2)   

 

   



  BREAST CANCER SCREENING   2017 

 

   



  INFLUENZA VACCINE   2019  

 

   



  COLORECTAL CANCER   10/21/2024   10/21/2014 



  SCREENING   

 

   



  HIV SCREENING   Completed   2014 







Procedures







     Comments



  Procedure Name   Priority   Date/Time   Associated Diagnosis 

 

     



Results for this procedure are in the results section.



  LDH-LACTATE DEHYDROGENASE   Routine   2019   CML (chronic myeloid 



    12:29 PM CST   leukemia) (HCC) 

 

     



Results for this procedure are in the results section.



  COMPREHENSIVE METABOLIC   Routine   2019   CML (chronic myeloid 



  PANEL    12:29 PM CST   leukemia) (HCC) 

 

     



Results for this procedure are in the results section.



  CBC AND DIFF   Routine   2019   CML (chronic myeloid 



    12:29 PM CST   leukemia) ([facility]) 

 

     



Results for this procedure are in the results section.



  TSH WITH FREE T4 REFLEX   Routine   2019   CML (chronic myelocytic 



    11:01 AM CST   leukemia) (HCC) 



     Hypothyroidism, 



     unspecified type 

 

     



Results for this procedure are in the results section.



  COMPREHENSIVE METABOLIC   Routine   2019   CML (chronic myelocytic 



  PANEL    11:01 AM CST   leukemia) (HCC) 

 

     



Results for this procedure are in the results section.



  CBC AND DIFF   Routine   2019   CML (chronic myelocytic 



    11:01 AM CST   leukemia) ([facility]) 

 

     



Results for this procedure are in the results section.



  BCR  PCR BLOOD OR   Routine   2019   CML (chronic myelocytic 



  BONE MARROW    11:01 AM CST   leukemia) ([facility]) 



from Last 3 Months



Results

* CBC AND DIFF (2019 12:29 PM CST)



Only the most recent of 2 results within the time period is included.





    



  Component   Value   Ref Range   Performed At   Pathologist



      Signature

 

    



  White Blood   6.2   4.5 - 11.0 K/UL   [facility] 



  Cells    

 

    



  RBC   4.36   4.0 - 5.0 M/UL   [facility] 

 

    



  Hemoglobin   14.4   12.0 - 15.0 GM/DL   [facility] 

 

    



  Hematocrit   42.5   36 - 45 %   [facility] 

 

    



  MCV   97.6   80 - 100 FL   [facility] 

 

    



  MCH   33.1   26 - 34 PG   [facility] 

 

    



  MCHC   34.0   32.0 - 36.0 G/DL   [facility] 

 

    



  RDW   14.0   11 - 15 %   [facility] 

 

    



  Platelet Count   211   150 - 400 K/UL   [facility] 

 

    



  MPV   7.5   7 - 11 FL   [facility] 

 

    



  Neutrophils   63   41 - 77 %   [facility] 

 

    



  Lymphocytes   25   24 - 44 %   [facility] 

 

    



  Monocytes   9   4 - 12 %   [facility] 

 

    



  Eosinophils   3   0 - 5 %   [facility] 

 

    



  Basophils   0   0 - 2 %   [facility] 

 

    



  Absolute   3.90   1.8 - 7.0 K/UL   [facility] 



  Neutrophil    



  Count    

 

    



  Absolute Lymph   1.50   1.0 - 4.8 K/UL   [facility] 



  Count    

 

    



  Absolute   0.60   0 - 0.80 K/UL   [facility] 



  Monocyte Count    

 

    



  Absolute   0.20   0 - 0.45 K/UL   [facility] 



  Eosinophil    



  Count    

 

    



  Absolute   0.00   0 - 0.20 K/UL   [facility] 



  Basophil Count    













  Specimen

 





  Blood









   



  Performing Organization   Address   City/[facility]   Phone Number

 

   



  [facility]   [address] [id number] 





* LDH-LACTATE DEHYDROGENASE (2019 12:29 PM CST)





    



  Component   Value   Ref Range   Performed At   [facility]

 

    



  Lactate   175   100 - 210 U/L   [facility] 



  Dehydrogenase    













  Specimen

 





  Blood









   



  Performing Organization   Address   City/[facility]   Phone Number

 

   



  [facility]   [address] [place] [address] 





* COMPREHENSIVE METABOLIC PANEL (2019 12:29 PM CST)



Only the most recent of 2 results within the time period is included.





    



  Component   Value   Ref Range   Performed At   [facility]

 

    



  Sodium   137   137 - 147 MMOL/L   [facility] 

 

    



  Potassium   4.0   3.5 - 5.1 MMOL/L   [facility] 

 

    



  Chloride   105   98 - 110 MMOL/L   [facility] 

 

    



  Glucose   93   70 - 100 MG/DL   [facility] 

 

    



  Blood Urea   17   7 - 25 MG/DL   [facility] 



  Nitrogen    

 

    



  Creatinine   0.86   0.4 - 1.00 MG/DL   [facility] 

 

    



  Calcium   9.5   8.5 - 10.6 MG/DL   [facility] 

 

    



  Total Protein   7.6   6.0 - 8.0 G/DL   [facility] 

 

    



  Total Bilirubin   0.5   0.3 - 1.2 MG/DL   [facility] 

 

    



  Albumin   4.4   3.5 - 5.0 G/DL   [facility] 

 

    



  Alk Phosphatase   80   25 - 110 U/L   [facility] 

 

    



  AST (SGOT)   12   7 - 40 U/L   [facility] 

 

    



  CO2   27   21 - 30 MMOL/L   [facility] 

 

    



  ALT (SGPT)   8   7 - 56 U/L   [facility] 

 

    



  Anion Gap   5   3 - 12   [facility] 

 

    



  eGFR Non   >60   >60 mL/min   [facility] 



     Comment:   



  American   The eGFR is not validated for   



   use in drug dosing   



   adjustments.Continue to   



   use   



   estimated creatinine   



   clearance per dosing reference   



   text.Please contact the   



   Clinical Pharmacist for   



   questions.   

 

    



  eGFR    >60   >60 mL/min   [facility] 



  [employer]   Comment:   



   The eGFR is not validated for   



   use in drug dosing   



   adjustments.Continue to   



   use   



   estimated creatinine   



   clearance per dosing reference   



   text.Please contact the   



   Clinical Pharmacist for   



   questions.   













  Specimen

 





  Blood









   



  Performing Organization   Address   City/State/Zipcode   Phone Number

 

   



  [facility]   [address] [place] [address] 





* TSH WITH FREE T4 REFLEX (2019 11:01 AM CST)





    



  Component   Value   Ref Range   Performed At   Pathologist



      Signature

 

    



  TSH   2.820   0.35 - 5.00 MCU/ML   [facility] 













  Specimen

 





  Blood









   



  Performing Organization   Address   City/[facility]/Zipcode   Phone Number

 

   



  [facility]   [address] [place] [address] 





* BCR  PCR BLOOD OR BONE MARROW (2019 11:01 AM CST)





    



  Component   Value   Ref Range   Performed At   Pathologist



      Signature

 

    



  Specimen   BLOOD    [facility] 

 

    



  Final   Patient Name: Wendy Dunaway    [facility] 



  Diagnosis,BCR   Physician(s):Dr. Gillian Chand   



   Ordering Facility: The   



   [facility]   



   Medical Record #:[id number]   



   Date of Birth,   



   Sex:1960, F   



   Collection Date:2019   



   Received Date:2019   



   Report Date:2019   



   CMOL Reference #:[id number]   



   Accession #:[id number]   



   Test Performed   



   BCR-ABL p210 fusion gene   



   transcript analysis   



   Specimen Type   



   Blood   



   Analytical Results   



   The BCR-ABL Mbcr b2a2 and/or   



   b3a2 fusion gene transcripts   



   were NOT   



   DETECTED in this specimen.   



   Interpretation   



   The BCR-ABL Mbcr b2a2 and b3a2   



   fusion gene transcripts were   



   NOT   



   DETECTED in this specimen. The   



   limit of detection for this   



   assay is   



   an   



   Mbcr copy number of at least   



   10 and an NCN of 0.001%.   



   Analysis of   



   BCR-ABL transcript levels   



   during and/or after therapy   



   reflects the   



   level of   



   disease suppression in   



   patients with CML and some   



   patients with AML,   



   and is effective for   



   monitoring treatment   



   efficiency or, in some   



   cases,   



   the need to reassess therapy.   



   These results should be   



   interpreted   



   only in the context of total   



   clinical information.   



   Therapeutic action   



   should not   



   be taken based solely on these   



   results.   



   Methodology   



   Total RNA from peripheral   



   blood or bone marrow samples   



   was isolated   



   and reverse transcribed with   



   random primers, and   



   thecDNA products   



   were quantitated by real time   



   quantitative PCR (RQ-PCR).   



   Primers and   



   probes for bcr exons b2 and b3   



   and abl exon 2 are used in the   



   RQ-   



   PCR to detect the Mbcr   



   transcripts b2a2 and b3a2. A   



   set of plasmid   



   standards containing 10^1 to   



   10^6 copies each of BCR-ABL   



   and ABL were   



   used to generate standard   



   curves for the quantitation of   



   BCR-ABL and   



   ABL. ABL copy numbers are used   



   as control for the quality and

   



   quantity   



   of sample RNA and to normalize   



   the BCR-ABL copy numbers. The   



   test   



   result is expressed as   



   BCR-ABL/ABL ratio, then   



   converted to an   



   international scale using the   



   IS-Lwo Calibrator.   



   Intended Use   



   The BCR-ABL Mbcr RQ-PCR assay   



   detects and quantitates the   



   BCR-ABL   



   Mbcr b2a2 and b3a2 (p210)   



   fusion gene transcripts which   



   present in   



   95% of chronic myelogenous   



   leukemia (CML) patients and   



   approximately   



   35% of Ph-positive adult acute   



   lymphocytic leukemia (ALL)   



   patients.   



   This test does not detect   



   other BCR-ABL fusions,   



   including e1a2   



   (p190), so should not be used   



   in the diagnostic setting.   



   Serial   



   analysis of the   



   Mbcr b2a2 & b3a2 transcripts   



   can be used for monitoring   



   patient   



   response to CML treatment   



   including tyrosine kinase   



   inhibitors   



   imatinib and   



   dasatinib.   



   References   



   1. Leni et   



   al.Desirable performance   



   characteristics for BCR-ABL   



   measurement on   



   aninternational   



   reporting scale to allow   



   consistent   



   interpretation of individual   



   patient response and   



   comparison of   



   response rates between   



   clinical trials.Blood 2008   



   112:6909-0970.   



   2. Katina Rosa et al.   



   Guidelines for the measurement   



   of BCR-ABL 1   



   transscripts in chronic   



   myeloid leukaemia. [place]   



   Journal of   



   Haematology, 153, 179-190.   



   3. Nichole Cooper et al.   



   Establishment of the 1st World   



   Health   



   Organization International   



   Genetic Reference Panel for   



   quantification of BCR-ABL   



   Mrna. Blood 2010.   



   4. Daniel.Molecular   



   Monitoring of BCR-ABL as a   



   guide   



   toclinical management   



   of chronic myeloid   



   leukaemia.Blood. 2006:   



   20 29-41.   



   5. Sage et al. Monitoring of   



   CML patients responding to   



   treatment   



   with tyrosine kinase   



   inhibitors: review and   



   recommendations for   



   harmonizing current   



   methodology for detecting   



   BCR-aBL transcripts and   



   kinase domain mutations for   



   expressing results. Blood   



   2006:108 (3)   



   2837.   



   6. Syed et   



   al.Quantitative Monitoring   



   of BCR-ABL   



   Transcript-Suggestion of a   



   Simplified Approach   



   Considering Inaccuracy   



   of   



   Measurement and Calibration.   



   Leukemia & Lymphoma 2004:45   



   (8)   



   5580-8588.   



   Disclaimer   



   This test was performed by the   



   [facility]   



   Laboratory. This test was   



   developed and its performance   



   characteristics   



   determined by the Clinical   



   [facility] Laboratory.   



   It has not   



   been cleared nor approved by   



   the U.S. Food and Drug   



   Administration.   



   The FDA has determined that   



   such clearance is not   



   necessary. This   



   test is used for clinical   



   purposes. It should not be   



   regarded as   



   investigational or for   



   research. This laboratory is   



   certified under   



   the Clinical Laboratory   



   Improvement Amendments of 1988   



   (CLIA-88) as   



   qualified to perform high   



   complexity clinical laboratory   



   testing.   



   [facility]   



   Laboratory, Department of   



   Pathology and   



   Laboratory Medicine,   



   [facility], [address], [address], [address] [place], [address].   



   Tel: [phone]; Fax:   



   [phone].   













  Specimen

 





  Blood









   



  Performing Organization   Address   City/State/Zipcode   Phone Number

 

   



  [facility]   

 

   



  [facility]   See results for address.  





from Last 3 Months



Insurance







       Type



  Payer   Benefit   Subscriber ID   Effective   Phone   Address 



   Plan /    Dates   



   Group     

 

       Medicare



  MEDICARE   MEDICARE   xxxxxxxxxxx   2011-P   



   PART A AND    resent   



   B     

 

       Medicaid



  [place]   xxxxxxxxxxx   10/28/2010    [place]    [place], [place] 









     



  Guarantor Name   Account   Relation to   Date of   Phone   Billing Address



   Type   Patient   Birth  

 

     



  Wendy Dunaway   Personal/F   Self   1960   [phone] [address]     (Home)   [place] [address]

 

     



  Fina Dunawayy ALE   Third   Self   1960   [phone] [address]     (Home)   [place] [address]



   Liability    







Advance Directives





Patient has advance care planning documents on file. For more information, 
please contact:



[address] [address]

## 2019-04-23 NOTE — XMS REPORT
Encounter Summary

 Created on: 2019



GumeFinay ALE

External Reference #: [id number]

: 1960

Sex: Female



Demographics







 Address  [address] [place]  [address]

 

 Home Phone  [phone]

 

 Preferred Language  English

 

 Marital Status  Unknown

 

 Evangelical Affiliation  REF

 

 Race  White

 

 Ethnic Group  Not  or 





Author







 Author  [facility]

 

 Organization  [facility]

 

 Address  Unknown

 

 Phone  Unavailable







Support







 Name  Relationship  Address  Phone

 

 Kelly Siddiqui  ECON  [address]

[place]  [address]  [phone]

 

 Razia Dunaway  ECON  Unknown  [phone]

 

 Troy Rossi  ECON  Unknown  [phone]







Care Team Providers







 Care Team Member Name  Role  Phone

 

 Aleksander Boothe MD  Unavailable  [phone]

 

 Rah Castro DO  PCP  [phone]

 

 Gillian Chand MD  Unavailable  [phone]

 

 Kimberly Del Cid APRN  Unavailable  Unavailable

 

 Coy Shen MD  Unavailable  [phone]

 

 Uri Chau MD  Unavailable  [phone]

 

 Olga Tiwari RN  Unavailable  [phone]

 

 Yola Morales RN  Unavailable  Unavailable

 

 Gabino Cr PHARMD  Unavailable  Unavailable

 

 Jaelyn Hernandez  Unavailable  Unavailable

 

 Olga Polk  Unavailable  [phone]

 

 Esa Betancourt  Unavailable  Unavailable

 

 Karley Mena  Unavailable  Unavailable

 

 Matt Pruett MD  Unavailable  [phone]

 

 Maura Love  Unavailable  Unavailable

 

 Geraldine Sanderson  Unavailable  Unavailable







Reason for Visit

* 





 



  Reason   Comments

 

 



  Patient Reminder Call 









Encounter Details







    Care Team  Description



  Date   Type   Department  

 

    



Olga Polk APRN



[address] [address] [phone] (Fax)  Patient Reminder Call



  2019   Telephone   [facility]  



    [address]  



    [address] [phone]  







Social History







     Date



  Tobacco Use   Types   Packs/Day   Years Used 

 

      



  Former Smoker   Cigarettes   1   10 

 

    



  Smokeless Tobacco: Former     Quit: 2003



  User   









   



  Alcohol Use   Drinks/Week   oz/Week   Comments

 

   



  No   0 Standard   0.0 



   drinks or  



   equivalent  









 



  Sex Assigned at Birth   Date Recorded

 

 



  Not on file 









   Industry



  Job Start Date   Occupation 

 

   Not on file



  Not on file   Not on file 









   Travel End



  Travel History   Travel Start 













  No recent travel history available.



documented as of this encounter



Functional Status







   Date of Assessment



  Functional Status   Response 

 

   2018



  Does the patient have a hearing impairment:   No 

 

   2018



  Does the patient have a visual impairment:   Yes 

 

   2018



  Does the patient have impaired ambulation:   No 

 

   2018



  Does the patient have an activity of daily living   No 



  (ADL) impairment:  

 

   2018



  Does the patient have an instrumental activity of   No 



  daily living (IADL) impairment:  









   Date of Assessment



  Cognitive Status   Response 

 

   2018



  Does the patient have a cognitive impairment:   No 



documented as of this encounter



Miscellaneous Notes

* Telephone Encounter - Maura Viera - 2019 12:52 PM CDT



Pt confirmed appt. 



Electronically signed by Maura Viera at 2019 12:52 PM CDT

documented in this encounter



Plan of Treatment





Not on filedocumented as of this encounter



Visit Diagnoses

Not on filedocumented in this encounter

## 2019-04-23 NOTE — PROGRESS NOTE-PRE OPERATIVE
Pre-Operative Progress Note


H&P Reviewed


The H&P was reviewed, patient examined and no changes noted.


Date Seen by Provider:  Apr 23, 2019


Time Seen by Provider:  07:30


Date H&P Reviewed:  Apr 23, 2019


Time H&P Reviewed:  07:00


Pre-Operative Diagnosis:  incomplete vaginal prolapse, stress incontinence











SIXTO BAIRES DO Apr 23, 2019 07:44

## 2019-04-23 NOTE — PROGRESS NOTE-POST OPERATIVE
Post-Operative Progess Note


Surgeon (s)/Assistant (s)


Surgeon


ELIAS TAWIL MD


Assistant:  NONE





Pre-Operative Diagnosis


INCONTINENCE





Post-Operative Diagnosis





SAME





Procedure & Operative Findings


Date of Procedure


4/23/19


Procedure Performed/Findings


PVS AND CYSTO


Anesthesia Type


GENERAL





Estimated Blood Loss


Estimated blood loss (mL):  NEGLIGIBLE





Specimens/Packing


Specimens Removed


NONE


Packing:  


ESTRACE VAGINAL PACK











TAWIL,ELIAS A MD Apr 23, 2019 07:08

## 2019-04-23 NOTE — XMS REPORT
Encounter Summary

 Created on: 2019



GumeWendy

External Reference #: [id number]

: 1960

Sex: Female



Demographics







 Address  [address] [place]  [address]

 

 Home Phone  [phone]

 

 Preferred Language  English

 

 Marital Status  Unknown

 

 Yarsani Affiliation  REF

 

 Race  White

 

 Ethnic Group  Not  or 





Author







 Author  [facility]

 

 Organization  [facility]

 

 Address  Unknown

 

 Phone  Unavailable







Support







 Name  Relationship  Address  Phone

 

 Kelly Siddiqui  ECON  [address]

[place]  [address]  [phone]

 

 Razia Dunaway  ECON  Unknown  [phone]

 

 Troy Rossi  ECON  Unknown  [phone]







Care Team Providers







 Care Team Member Name  Role  Phone

 

 Aleksander Boothe MD  Unavailable  [phone]

 

 Rah Castro DO  PCP  [phone]

 

 Gillian Chand MD  Unavailable  [phone]

 

 Kimberly Del Cid APRN  Unavailable  Unavailable

 

 Coy Shen MD  Unavailable  [phone]

 

 Uri Chau MD  Unavailable  [phone]

 

 Olga Tiwari RN  Unavailable  [phone]

 

 Yola Morales RN  Unavailable  Unavailable

 

 Gabino Cr PHARMD  Unavailable  Unavailable

 

 Jaelyn Hernandez  Unavailable  Unavailable

 

 Olga Polk APRN  Unavailable  [phone]

 

 Esa Betancourt  Unavailable  Unavailable

 

 Karley Mena  Unavailable  Unavailable

 

 Matt Pruett MD  Unavailable  [phone]

 

 Maura Love  Unavailable  Unavailable

 

 Geraldine Sanderson  Unavailable  Unavailable







Encounter Details







    Care Team  Description



  Date   Type   Department  

 

    



Gillian Chand MD



[address] [facility]



[place] [address] [phone] (Fax)  Chronic myeloid leukemia, BCR/ABL-positive, not having 
achieved remission (HCC)



  2019   [facility]  



    [address] [address]  







Social History







     Date



  Tobacco Use   Types   Packs/Day   Years Used 

 

      



  Former Smoker   Cigarettes   1   10 

 

    



  Smokeless Tobacco: Former     Quit: 2003



  User   









   



  Alcohol Use   Drinks/Week   oz/Week   Comments

 

   



  No   0 Standard   0.0 



   drinks or  



   equivalent  









 



  Sex Assigned at Birth   Date Recorded

 

 



  Not on file 









   Industry



  Job Start Date   Occupation 

 

   Not on file



  Not on file   Not on file 









   Travel End



  Travel History   Travel Start 













  No recent travel history available.



documented as of this encounter



Functional Status







   Date of Assessment



  Functional Status   Response 

 

   2018



  Does the patient have a hearing impairment:   No 

 

   2018



  Does the patient have a visual impairment:   Yes 

 

   2018



  Does the patient have impaired ambulation:   No 

 

   2018



  Does the patient have an activity of daily living   No 



  (ADL) impairment:  

 

   2018



  Does the patient have an instrumental activity of   No 



  daily living (IADL) impairment:  









   Date of Assessment



  Cognitive Status   Response 

 

   2018



  Does the patient have a cognitive impairment:   No 



documented as of this encounter



Medications at Time of Discharge







     Start Date  End Date



  Medication   Sig   Dispensed   Refills  

 

         



  ascorbic acid(+) (VITAMIN   Take 1,000 mg    0  



  C) 1,000 mg tablet   by mouth    



   daily.    

 

         



  cholecalciferol (VITAMIN   Take 1,000    0  



  D-3) 1,000 units tablet   Units by    



   mouth daily.    

 

         



  diltiazem CD (CARDIZEM   Take 240 mg    0  



  CD) 240 mg capsule   by mouth    



   daily.    

 

     2016   



  gabapentin (NEURONTIN)   Take 1 tab    0  



  600 mg tablet   every    



   morning, 2    



   tabs in the    



   afternoon, 2    



   tabs in the    



   evening, and    



   1 tab at    



   bedtime    

 

         



  potassium chloride(+)   Take 10 mEq    0  



  (MICRO-K) 10 mEq capsule   by mouth    



   daily. Take    



   with a meal    



   and a full    



   glass of    



   water.    

 

     06/15/2016   



  QUEtiapine (SEROQUEL) 100   50 mg three    0  



  mg tablet   times daily.    

 

         



  rivaroxaban (XARELTO) 20   Take 20 mg by    0  



  mg tablet   mouth daily.    



   Take with    



   food.    

 

        2019



  warfarin (COUMADIN) 1 mg   Take 1 mg by    0  



  tablet   mouth daily.    

 

        2019



  warfarin (COUMADIN) 5 mg   Take 5 mg by    0  



  tablet   mouth daily.    



documented as of this encounter



Plan of Treatment





Not on filedocumented as of this encounter



Visit Diagnoses

Not on filedocumented in this encounter

## 2019-04-23 NOTE — XMS REPORT
Encounter Summary

 Created on: 2019



Wendy Dunaway

External Reference #: [id number]

: 1960

Sex: Female



Demographics







 Address  [address] [place]  [address]

 

 Home Phone  [phone]

 

 Preferred Language  English

 

 Marital Status  Unknown

 

 Samaritan Affiliation  REF

 

 Race  White

 

 Ethnic Group  Not  or 





Author







 Author  [facility]

 

 Organization  [facility]

 

 Address  Unknown

 

 Phone  Unavailable







Support







 Name  Relationship  Address  Phone

 

 Kelly Siddiqui  ECON  [address]

[place]  [address]  [phone]

 

 Razia Dunaway  ECON  Unknown  [phone]

 

 Troy Rossi  ECON  Unknown  [phone]







Care Team Providers







 Care Team Member Name  Role  Phone

 

 Aleksander Boothe MD  Unavailable  [phone]

 

 Rah Castro DO  PCP  [phone]

 

 Gillina Chand MD  Unavailable  [phone]

 

 Kimberly Del Cid APRN  Unavailable  Unavailable

 

 Coy Shen MD  Unavailable  [phone]

 

 Uri Chau MD  Unavailable  [phone]

 

 Olga Tiwari RN  Unavailable  [phone]

 

 Yola Morales RN  Unavailable  Unavailable

 

 Gabino Cr PHARMD  Unavailable  Unavailable

 

 Jaelyn Hernandez  Unavailable  Unavailable

 

 Olga Polk APRN  Unavailable  [phone]

 

 Esa Betancourt  Unavailable  Unavailable

 

 Karley Mena  Unavailable  Unavailable

 

 Matt Pruett MD  Unavailable  [phone]

 

 Maura Love  Unavailable  Unavailable

 

 Geraldine Sanderson  Unavailable  Unavailable







Encounter Details







    Care Team  Description



  Date   Type   Department  

 

    



Gillian Chand MD



[address] [place] [address] [phone] (Fax)  CML (chronic myeloid leukemia) (HCC) (Primary Dx)



  2019   Orders Only   [facility]  



    [address] [place] [address]  



    [phone]  







Social History







     Date



  Tobacco Use   Types   Packs/Day   Years Used 

 

      



  Former Smoker   Cigarettes   1   10 

 

    



  Smokeless Tobacco: Former     Quit: 2003



  User   









   



  Alcohol Use   Drinks/Week   oz/Week   Comments

 

   



  No   0 Standard   0.0 



   drinks or  



   equivalent  









 



  Sex Assigned at Birth   Date Recorded

 

 



  Not on file 









   Industry



  Job Start Date   Occupation 

 

   Not on file



  Not on file   Not on file 









   Travel End



  Travel History   Travel Start 













  No recent travel history available.



documented as of this encounter



Functional Status







   Date of Assessment



  Functional Status   Response 

 

   2018



  Does the patient have a hearing impairment:   No 

 

   2018



  Does the patient have a visual impairment:   Yes 

 

   2018



  Does the patient have impaired ambulation:   No 

 

   2018



  Does the patient have an activity of daily living   No 



  (ADL) impairment:  

 

   2018



  Does the patient have an instrumental activity of   No 



  daily living (IADL) impairment:  









   Date of Assessment



  Cognitive Status   Response 

 

   2018



  Does the patient have a cognitive impairment:   No 



documented as of this encounter



Plan of Treatment







    Order Schedule



  Name   Priority   Associated Diagnoses 

 

    Expected: 2019 (Approximate), Expires: 2020



  BCR  PCR BLOOD OR BONE MARROW   Routine   CML (chronic myeloid 



    leukemia) (HCC) 



documented as of this encounter



Results

* LDH-LACTATE DEHYDROGENASE (2019 12:29 PM CST)





    



  Component   Value   Ref Range   Performed At   Pathologist



      [facility]

 

    



  Lactate   175   100 - 210 U/L   [facility] 



  Dehydrogenase    













  Specimen

 





  Blood









   



  Performing Organization   Address   City/State/Zipcode   Phone Number

 

   



  [facility]   [address] [place] [address] 





* COMPREHENSIVE METABOLIC PANEL (2019 12:29 PM CST)





    



  Component   Value   Ref Range   Performed At   Pathologist



      Signature

 

    



  Sodium   137   137 - 147 MMOL/L   [facility] 

 

    



  Potassium   4.0   3.5 - 5.1 MMOL/L   [facility] 

 

    



  Chloride   105   98 - 110 MMOL/L   [facility] 

 

    



  Glucose   93   70 - 100 MG/DL   [facility] 

 

    



  Blood Urea   17   7 - 25 MG/DL   [facility] 



  Nitrogen    

 

    



  Creatinine   0.86   0.4 - 1.00 MG/DL   [facility] 

 

    



  Calcium   9.5   8.5 - 10.6 MG/DL   [facility] 

 

    



  Total Protein   7.6   6.0 - 8.0 G/DL   [facility] 

 

    



  Total Bilirubin   0.5   0.3 - 1.2 MG/DL   [facility] 

 

    



  Albumin   4.4   3.5 - 5.0 G/DL   [facility] 

 

    



  Alk Phosphatase   80   25 - 110 U/L   [facility] 

 

    



  AST (SGOT)   12   7 - 40 U/L   [facility] 

 

    



  CO2   27   21 - 30 MMOL/L   [facility] 

 

    



  ALT (SGPT)   8   7 - 56 U/L   [facility] 

 

    



  Anion Gap   5   3 - 12   [facility] 

 

    



  eGFR Non   >60   >60 mL/min   [facility] 



     Comment:   



  American   The eGFR is not validated for   



   use in drug dosing   



   adjustments.Continue to   



   use   



   estimated creatinine   



   clearance per dosing reference   



   text.Please contact the   



   Clinical Pharmacist for   



   questions.   

 

    



  eGFR    >60   >60 mL/min   [facility] 



  American   Comment:   



   The eGFR is not validated for   



   use in drug dosing   



   adjustments.Continue to   



   use   



   estimated creatinine   



   clearance per dosing reference   



   text.Please contact the   



   Clinical Pharmacist for   



   questions.   













  Specimen

 





  Blood









   



  Performing Organization   Address   City/[facility]/Zipcode   Phone Number

 

   



  [facility]   [address] [place] [address] 





* CBC AND DIFF (2019 12:29 PM CST)





    



  Component   Value   Ref Range   Performed At   Pathologist



      Signature

 

    



  White Blood   6.2   4.5 - 11.0 K/UL   [facility] 



  Cells    

 

    



  RBC   4.36   4.0 - 5.0 M/UL   [facility] 

 

    



  Hemoglobin   14.4   12.0 - 15.0 GM/DL   [facility] 

 

    



  Hematocrit   42.5   36 - 45 %   [facility] 

 

    



  MCV   97.6   80 - 100 FL   [facility] 

 

    



  MCH   33.1   26 - 34 PG   [facility] 

 

    



  MCHC   34.0   32.0 - 36.0 G/DL   [facility] 

 

    



  RDW   14.0   11 - 15 %   [facility] 

 

    



  Platelet Count   211   150 - 400 K/UL   [facility] 

 

    



  MPV   7.5   7 - 11 FL   [facility] 

 

    



  Neutrophils   63   41 - 77 %   [facility] 

 

    



  Lymphocytes   25   24 - 44 %   [facility] 

 

    



  Monocytes   9   4 - 12 %   [facility] 

 

    



  Eosinophils   3   0 - 5 %   [facility] 

 

    



  Basophils   0   0 - 2 %   [facility] 

 

    



  Absolute   3.90   1.8 - 7.0 K/UL   [facility] 



  Neutrophil    



  Count    

 

    



  Absolute Lymph   1.50   1.0 - 4.8 K/UL   [facility] 



  Count    

 

    



  Absolute   0.60   0 - 0.80 K/UL   [facility] 



  Monocyte Count    

 

    



  Absolute   0.20   0 - 0.45 K/UL   [facility] 



  Eosinophil    



  Count    

 

    



  Absolute   0.00   0 - 0.20 K/UL   [facility] 



  Basophil Count    













  Specimen

 





  Blood









   



  Performing Organization   Address   City/State/Zipcode   Phone Number

 

   



  [facility]   [address] [place] [address] 





documented in this encounter



Visit Diagnoses











  Diagnosis

 





  CML (chronic myeloid leukemia) (HCC) - Primary



  Chronic myeloid leukemia, without mention of having achieved remission



documented in this encounter

## 2019-04-24 VITALS — DIASTOLIC BLOOD PRESSURE: 80 MMHG | SYSTOLIC BLOOD PRESSURE: 115 MMHG

## 2019-04-24 VITALS — SYSTOLIC BLOOD PRESSURE: 119 MMHG | DIASTOLIC BLOOD PRESSURE: 70 MMHG

## 2019-04-24 RX ADMIN — BUSPIRONE HYDROCHLORIDE SCH MG: 15 TABLET ORAL at 08:19

## 2019-04-24 RX ADMIN — ACETAMINOPHEN SCH MG: 500 TABLET ORAL at 06:24

## 2019-04-24 RX ADMIN — KETOROLAC TROMETHAMINE SCH MG: 30 INJECTION, SOLUTION INTRAMUSCULAR; INTRAVENOUS at 03:53

## 2019-04-24 RX ADMIN — DOCUSATE SODIUM SCH MG: 100 CAPSULE ORAL at 08:30

## 2019-04-24 RX ADMIN — CARVEDILOL SCH MG: 25 TABLET, FILM COATED ORAL at 08:23

## 2019-04-24 RX ADMIN — SODIUM CHLORIDE, SODIUM LACTATE, POTASSIUM CHLORIDE, AND CALCIUM CHLORIDE SCH MLS/HR: 600; 310; 30; 20 INJECTION, SOLUTION INTRAVENOUS at 00:35

## 2019-04-24 RX ADMIN — GABAPENTIN SCH MG: 600 TABLET, FILM COATED ORAL at 08:16

## 2019-04-24 NOTE — DISCHARGE INST-WOMEN'S SERVICE
Discharge Inst-Women's Serv


Depart Medication/Instructions


New, Converted or Re-Newed RX:  RX on Chart


Final Diagnosis


cystocele 


stress incontinenc





Consults/Follow Up


Additional Follow Up:  Yes (1 week and 6 weeks with Isma; Follow up with Dr. Tawil as scheduled)





Activity


Activity:  Activity as Tolerated


NO SMOKING:  NO SMOKING


Nothing Inside Vagina:  No Douching, No [place], No Tampons





Diet


Discharge Diet:  No Restrictions


Symptoms to Report to :  Bleeding Excessive, Eyesight Changes, Fever Over 

101 Degrees F, Vaginal Bleeding Increase, Cramps in Feet or Legs, Vaginal 

Discharge Foul


For Any Problems or Questions:  Contact Your Physician





Skin/Wound Care


Bathing Instructions:  SIXTO Samuels DO Apr 24, 2019 09:06

## 2019-04-24 NOTE — ANESTHESIA-GENERAL POST-OP
General


Patient Condition


Mental Status/LOC:  Same as Preop


Cardiovascular:  Satisfactory


Nausea/Vomiting:  Absent


Respiratory:  Satisfactory


Pain:  Controlled


Complications:  Absent





Post Op Complications


Complications


None





Follow Up Care/Instructions


Patient Instructions


None needed.





Anesthesia/Patient Condition


Patient Condition


Patient is doing well, no complaints, stable vital signs, no apparent adverse 

anesthesia problems.   


No complications reported per nursing.











LUCIA PANTOJA CRNA Apr 24, 2019 09:39

## 2019-04-24 NOTE — PROGRESS NOTE-UROLOGY
Progress Note-Urology


Progress Notes/Assess & Plan


Progress/Assessment & Plan


VOIDING WELL. DRY. PVR 12CC. HOME WITH INSTRUCTIONS











TAWIL,ELIAS A MD Apr 24, 2019 11:28

## 2019-04-24 NOTE — PHYSICIAN PROGRESS NOTE
Progress Note


Assessment/Plan


Date Seen by Provider:  Apr 24, 2019


Time Seen by Provider:  08:40


Events since last exam


Potts is  out but she has not yet voided.  She is tolerating diet and has 

ambulated minimally.  She reports no pain.  Minimal bleeding


Assessment/Plan


POD #1 s/p anterior colporrhaphy and Solyx PV sling


Continue IVF until she has voided and then DC to home





Vitals


Last set of Vitals Signs





Vital Signs








  Date Time  Temp Pulse Resp B/P (MAP) Pulse Ox O2 Delivery O2 Flow Rate FiO2


 


4/24/19 03:53 98.3 88 18 115/80 (92) 96 Room Air  


 


4/23/19 10:00       3 











I&O


I&O











Intake and Output 


 


 4/24/19





 00:00


 


Intake Total 2510 ml


 


Output Total 1575 ml


 


Balance 935 ml


 


 


 


Intake Oral 500 ml


 


IV Total 2010 ml


 


Output Urine Total 1425 ml


 


Estimated Blood Loss 150 ml

















SIXTO BAIRES DO Apr 24, 2019 08:50

## 2019-10-17 ENCOUNTER — HOSPITAL ENCOUNTER (OUTPATIENT)
Dept: HOSPITAL 75 - CARD | Age: 59
LOS: 90 days | Discharge: HOME | End: 2020-01-15
Attending: INTERNAL MEDICINE
Payer: MEDICARE

## 2019-10-17 DIAGNOSIS — I48.0: Primary | ICD-10-CM

## 2019-10-17 DIAGNOSIS — J90: ICD-10-CM

## 2019-10-17 PROCEDURE — 93306 TTE W/DOPPLER COMPLETE: CPT

## 2020-01-07 ENCOUNTER — HOSPITAL ENCOUNTER (OUTPATIENT)
Dept: HOSPITAL 75 - LAB | Age: 60
End: 2020-01-07
Attending: NURSE PRACTITIONER
Payer: MEDICARE

## 2020-01-07 DIAGNOSIS — K74.60: Primary | ICD-10-CM

## 2020-01-07 DIAGNOSIS — Z86.19: ICD-10-CM

## 2020-01-07 LAB
ALBUMIN SERPL-MCNC: 4.4 GM/DL (ref 3.2–4.5)
ALP SERPL-CCNC: 109 U/L (ref 40–136)
ALT SERPL-CCNC: 16 U/L (ref 0–55)
BASOPHILS # BLD AUTO: 0 10^3/UL (ref 0–0.1)
BASOPHILS NFR BLD AUTO: 1 % (ref 0–10)
BILIRUB SERPL-MCNC: 0.4 MG/DL (ref 0.1–1)
BUN/CREAT SERPL: 19
CALCIUM SERPL-MCNC: 9.3 MG/DL (ref 8.5–10.1)
CHLORIDE SERPL-SCNC: 108 MMOL/L (ref 98–107)
CO2 SERPL-SCNC: 20 MMOL/L (ref 21–32)
CREAT SERPL-MCNC: 0.91 MG/DL (ref 0.6–1.3)
EOSINOPHIL # BLD AUTO: 0.1 10^3/UL (ref 0–0.3)
EOSINOPHIL NFR BLD AUTO: 3 % (ref 0–10)
ERYTHROCYTE [DISTWIDTH] IN BLOOD BY AUTOMATED COUNT: 13.5 % (ref 10–14.5)
GFR SERPLBLD BASED ON 1.73 SQ M-ARVRAT: > 60 ML/MIN
GLUCOSE SERPL-MCNC: 96 MG/DL (ref 70–105)
HCT VFR BLD CALC: 44 % (ref 35–52)
HGB BLD-MCNC: 14.4 G/DL (ref 11.5–16)
INR PPP: 1 (ref 0.8–1.4)
LYMPHOCYTES # BLD AUTO: 0.9 X 10^3 (ref 1–4)
LYMPHOCYTES NFR BLD AUTO: 22 % (ref 12–44)
MANUAL DIFFERENTIAL PERFORMED BLD QL: NO
MCH RBC QN AUTO: 32 PG (ref 25–34)
MCHC RBC AUTO-ENTMCNC: 33 G/DL (ref 32–36)
MCV RBC AUTO: 98 FL (ref 80–99)
MONOCYTES # BLD AUTO: 0.3 X 10^3 (ref 0–1)
MONOCYTES NFR BLD AUTO: 8 % (ref 0–12)
NEUTROPHILS # BLD AUTO: 2.9 X 10^3 (ref 1.8–7.8)
NEUTROPHILS NFR BLD AUTO: 67 % (ref 42–75)
PLATELET # BLD: 224 10^3/UL (ref 130–400)
PMV BLD AUTO: 9.8 FL (ref 7.4–10.4)
POTASSIUM SERPL-SCNC: 3.5 MMOL/L (ref 3.6–5)
PROT SERPL-MCNC: 7.7 GM/DL (ref 6.4–8.2)
PROTHROMBIN TIME: 13.8 SEC (ref 12.2–14.7)
SODIUM SERPL-SCNC: 141 MMOL/L (ref 135–145)
WBC # BLD AUTO: 4.3 10^3/UL (ref 4.3–11)

## 2020-01-07 PROCEDURE — 36415 COLL VENOUS BLD VENIPUNCTURE: CPT

## 2020-01-07 PROCEDURE — 82105 ALPHA-FETOPROTEIN SERUM: CPT

## 2020-01-07 PROCEDURE — 85025 COMPLETE CBC W/AUTO DIFF WBC: CPT

## 2020-01-07 PROCEDURE — 80053 COMPREHEN METABOLIC PANEL: CPT

## 2020-01-07 PROCEDURE — 85610 PROTHROMBIN TIME: CPT

## 2020-01-21 ENCOUNTER — HOSPITAL ENCOUNTER (OUTPATIENT)
Dept: HOSPITAL 75 - RAD | Age: 60
End: 2020-01-21
Attending: NURSE PRACTITIONER
Payer: MEDICARE

## 2020-01-21 DIAGNOSIS — K74.60: ICD-10-CM

## 2020-01-21 DIAGNOSIS — Z86.19: ICD-10-CM

## 2020-01-21 DIAGNOSIS — M81.0: Primary | ICD-10-CM

## 2020-01-21 PROCEDURE — 77080 DXA BONE DENSITY AXIAL: CPT

## 2020-01-21 PROCEDURE — 76700 US EXAM ABDOM COMPLETE: CPT

## 2020-01-21 NOTE — DIAGNOSTIC IMAGING REPORT
INDICATION: Postmenopausal state, history of hepatitis C.



COMPARISON: None available.



FINDINGS:



AP Spine L1-L4:  

[BMD (g/cm2): 1.303] [T-Score: 0.9] [Z-Score: 1.8]

[BMD Previous: NA] [BMD % Change: NA]



LT Hip Neck:       

[BMD (g/cm2): 1.059] [T-Score: 0.1] [Z-Score: 1.2]



LT Hip Total:       

[BMD (g/cm2):1.071] [T-Score:0.5] [Z-Score: 1.2]

[BMD Previous: NA] [BMD % Change: NA]



RT Hip Neck:      

[BMD (g/cm2):1.067] [T-Score:0.2] [Z-Score:1.3]



RT Hip Total:      

[BMD (g/cm2):1.050] [T-score:0.3] [Z-Score:1.1]

[BMD Previous:NA] [BMD % Change:NA]



*Indicates significant change from prior examination based on 95%

confidence level.



World Health Organization criteria for BMD interpretation

classify patients as Normal (T-score at or above -1.0),

Osteopenic (T-score between -1.0 and -2.5) or Osteoporotic

(T-score at or below -2.5).



LIMITATIONS AND MODIFICATION:  None.



IMPRESSION:

1. Normal bone mineral density. 

2. Baseline examination.

3. See below National Osteoporosis Foundation guidelines on when

to potentially initiate pharmacologic therapy. 



Based on the National Osteoporosis Foundation Guidelines,

pharmacologic treatment should be initiated in any of the

following, unless clinical conditions suggest otherwise:



*  Any patient with prior fragility fracture of the hip or

vertebrae. A spine fracture indicates 5X risk for subsequent

spine fracture and 2X risk for subsequent hip fracture.



*  Osteoporosis (T-score <-2.5).



*  Postmenopausal women and men age 50 and older with low bone

mass/osteopenia (T-score between -1.0 and -2.5) by DXA and

10-year major osteoporotic fracture greater than 20% or a 10-year

probability of hip fracture greater than 3%. These fracture risks

are supplied above in the FRAX score, if applicable.



*  Clinician judgement and/or patient preferences may indicate

treatment for people with 10-year fracture probabilities above or

below these levels.



Dictated by: 



  Dictated on workstation # [id number]

## 2020-01-21 NOTE — DIAGNOSTIC IMAGING REPORT
INDICATION: Hepatitis C.



FINDINGS: Liver is normal in size at 16.6 cm. Liver shows

homogeneous echotexture. No discrete liver mass is identified.

Portal vein is patent and shows normal direction of flow.

Gallbladder is surgically absent. No biliary ductal dilatation is

identified. The visualized pancreas is unremarkable. The spleen

is normal in size at 11.9 cm. Aorta is nonaneurysmal. IVC is

patent. Right and left kidneys are without evidence of calculi or

hydronephrosis. There is no ascites.



IMPRESSION: Unremarkable abdominal ultrasound.



Dictated by: 



  Dictated on workstation # [id number]

## 2020-06-18 ENCOUNTER — HOSPITAL ENCOUNTER (OUTPATIENT)
Dept: HOSPITAL 75 - LAB | Age: 60
End: 2020-06-18
Attending: FAMILY MEDICINE
Payer: MEDICARE

## 2020-06-18 DIAGNOSIS — N89.8: Primary | ICD-10-CM

## 2020-06-18 DIAGNOSIS — Z72.51: ICD-10-CM

## 2020-06-18 PROCEDURE — 86780 TREPONEMA PALLIDUM: CPT

## 2020-06-18 PROCEDURE — 36415 COLL VENOUS BLD VENIPUNCTURE: CPT

## 2020-06-18 PROCEDURE — 86703 HIV-1/HIV-2 1 RESULT ANTBDY: CPT

## 2020-06-18 PROCEDURE — 87591 N.GONORRHOEAE DNA AMP PROB: CPT

## 2020-06-18 PROCEDURE — 87491 CHLMYD TRACH DNA AMP PROBE: CPT

## 2020-12-23 NOTE — XMS REPORT
Encounter Summary

 Created on: 2017



Wendy Dunaway

External Reference #: [id number]

: 1960

Sex: Female



Demographics







 Address  [address] [place]  [address]

 

 Home Phone  [phone]

 

 Preferred Language  English

 

 Marital Status  Unknown

 

 Sikh Affiliation  NON

 

 Race  White

 

 Ethnic Group  Not  or 





Author







 Author  [facility]

 

 Organization  [facility]

 

 Address  Unknown

 

 Phone  Unavailable







Support







 Name  Relationship  Address  Phone

 

 , Kelly Siddiqui  EC[employer]  [address]

[place]  [address]  [phone]

 

 , Razia Dunaway  [employer]  Unknown  [phone]







Care Team Providers







 Care Team Member Name  Role  Phone

 

  PCP  Unavailable







Encounter Details







    



  Date   Type   Department   Care Team   Description

 

    



  10/16/2017   Prep for Case   [facility] for   Olga Polk APRN 



    Transplantation-Hepatolog   [address] [facility]   [address] 



    [address] [place] [address] 



    [facility]   [phone] 



    TRANSPLANTATION   [phone] (Fax) 



    [place]  



    [address] [phone]  







Social History







    



  Tobacco Use   Types   Packs/Day   Years Used   Date

 

    



  Former Smoker   Cigarettes   1   10 

 

    



  Smokeless Tobacco: Former     Quit:



  User     2003









   



  Alcohol Use   Drinks/Week   oz/Week   Comments

 

   



  No   0 Standard   0.0 



   drinks or  



   equivalent  









 



  Sex Assigned at Birth   Date Recorded

 

 



  Not on file 



as of this encounter



Functional Status







  



  Functional Status   Response   Date of Assessment

 

  



  Does the patient have a hearing impairment:   No   2017

 

  



  Does the patient have a visual impairment:   Yes   2017

 

  



  Does the patient have impaired ambulation:   No   2017

 

  



  Does the patient have an activity of daily living   No   2017



  (ADL) impairment:  

 

  



  Does the patient have an instrumental activity of   No   2017



  daily living (IADL) impairment:  









  



  Cognitive Status   Response   Date of Assessment

 

  



  Does the patient have a cognitive impairment:   No   2017



as of this encounter



Plan of Treatment





Not on fileas of this encounter



Visit Diagnoses

Not on filein this encounter Admission Reconciliation is Completed  Discharge Reconciliation is Not Complete Admission Reconciliation is Completed  Discharge Reconciliation is Completed

## 2021-03-17 ENCOUNTER — HOSPITAL ENCOUNTER (OUTPATIENT)
Dept: HOSPITAL 75 - RAD | Age: 61
End: 2021-03-17
Attending: NURSE PRACTITIONER
Payer: MEDICARE

## 2021-03-17 DIAGNOSIS — Z86.19: ICD-10-CM

## 2021-03-17 DIAGNOSIS — K74.60: Primary | ICD-10-CM

## 2021-03-17 LAB
ALBUMIN SERPL-MCNC: 4.4 GM/DL (ref 3.2–4.5)
ALP SERPL-CCNC: 91 U/L (ref 40–136)
ALT SERPL-CCNC: 17 U/L (ref 0–55)
BASOPHILS # BLD AUTO: 0 10^3/UL (ref 0–0.1)
BASOPHILS NFR BLD AUTO: 1 % (ref 0–10)
BILIRUB SERPL-MCNC: 0.5 MG/DL (ref 0.1–1)
BUN/CREAT SERPL: 25
CALCIUM SERPL-MCNC: 9.2 MG/DL (ref 8.5–10.1)
CHLORIDE SERPL-SCNC: 106 MMOL/L (ref 98–107)
CO2 SERPL-SCNC: 24 MMOL/L (ref 21–32)
CREAT SERPL-MCNC: 0.88 MG/DL (ref 0.6–1.3)
EOSINOPHIL # BLD AUTO: 0.1 10^3/UL (ref 0–0.3)
EOSINOPHIL NFR BLD AUTO: 4 % (ref 0–10)
GFR SERPLBLD BASED ON 1.73 SQ M-ARVRAT: > 60 ML/MIN
GLUCOSE SERPL-MCNC: 100 MG/DL (ref 70–105)
HCT VFR BLD CALC: 45 % (ref 35–52)
HGB BLD-MCNC: 14.7 G/DL (ref 11.5–16)
INR PPP: 1 (ref 0.8–1.4)
LYMPHOCYTES # BLD AUTO: 1.4 10^3/UL (ref 1–4)
LYMPHOCYTES NFR BLD AUTO: 34 % (ref 12–44)
MANUAL DIFFERENTIAL PERFORMED BLD QL: NO
MCH RBC QN AUTO: 33 PG (ref 25–34)
MCHC RBC AUTO-ENTMCNC: 33 G/DL (ref 32–36)
MCV RBC AUTO: 99 FL (ref 80–99)
MONOCYTES # BLD AUTO: 0.4 10^3/UL (ref 0–1)
MONOCYTES NFR BLD AUTO: 10 % (ref 0–12)
NEUTROPHILS # BLD AUTO: 2.1 10^3/UL (ref 1.8–7.8)
NEUTROPHILS NFR BLD AUTO: 52 % (ref 42–75)
PLATELET # BLD: 220 10^3/UL (ref 130–400)
PMV BLD AUTO: 9.7 FL (ref 9–12.2)
POTASSIUM SERPL-SCNC: 3.1 MMOL/L (ref 3.6–5)
PROT SERPL-MCNC: 7.8 GM/DL (ref 6.4–8.2)
PROTHROMBIN TIME: 13.5 SEC (ref 12.2–14.7)
SODIUM SERPL-SCNC: 142 MMOL/L (ref 135–145)
WBC # BLD AUTO: 4 10^3/UL (ref 4.3–11)

## 2021-03-17 PROCEDURE — 82306 VITAMIN D 25 HYDROXY: CPT

## 2021-03-17 PROCEDURE — 76700 US EXAM ABDOM COMPLETE: CPT

## 2021-03-17 PROCEDURE — 80053 COMPREHEN METABOLIC PANEL: CPT

## 2021-03-17 PROCEDURE — 85610 PROTHROMBIN TIME: CPT

## 2021-03-17 PROCEDURE — 85025 COMPLETE CBC W/AUTO DIFF WBC: CPT

## 2021-03-17 PROCEDURE — 82105 ALPHA-FETOPROTEIN SERUM: CPT

## 2021-03-17 PROCEDURE — 36415 COLL VENOUS BLD VENIPUNCTURE: CPT

## 2021-03-17 NOTE — DIAGNOSTIC IMAGING REPORT
INDICATION:  CIRRHOSIS OF LIVER WITHOUT ASCITES



TECHNIQUE:  Multiple real-time gray scale sonographic images of

the abdomen.



CORRELATION STUDY:  01/21/2020



FINDINGS:

LIVER:  Normal echotexture within the visualized portions of the

liver.  There is normal, hepatopedal direction of flow within the

main portal vein.  Liver length 14.6 cm.

GALLBLADDER: Cholecystectomy.

COMMON BILE DUCT: Nondilated at  0.5 cm.

PANCREAS: Limited in visualization.  The visualized portions

appearing unremarkable.

SPLEEN:  Unremarkable at 11.5 x 4.9 x 4.5 cm.

ABDOMINAL AORTA: Unremarkable.

INFERIOR VENA CAVA: Limited in visualization.

RIGHT KIDNEY: 10.3 x 4.8 x 4.1 cm. Unremarkable.

LEFT KIDNEY: 10.1 x 5.1 x 5.3  cm. Unremarkable.

OTHER: None.





IMPRESSION:

1. Unremarkable-appearing abdominal ultrasound evaluation.



Dictated by: 



  Dictated on workstation # [id number]

## 2021-08-13 NOTE — NUR
CALLED THE PATIENT AND WENT OVER THE LIST ENTERED BY PREOP NURSE. SHE VERIFIED HOW SHE TAKES 
EACH MEDICATION.



[facility] FILLED:

GABAPENTIN 600MG 2 TID

POTASSIUM 10 DAILY

SEROQUEL 50MG AM,5PM,HS

DILTIAZEM ER 240MG CAP DAILY

XARELTO 20MG DAILY (STATES SHE TAKES AT HS)

2-9-19 SEROQUEL 100MG 1PM AND 5PM #180

DECEMBER - BUSPAR 5MG #5

PATIENT STATES SHE IS NOT OUT OF HER BUSPAR 5MG DOSE HOWEVER [facility] HAS NOT FILLED IT 
SINCE DECEMBER #5 TABS BUT IT WAS UNAVAILABLE FOR THEM TO GET IN FOR A TIME SO IT IS 
POSSIBLE SHE FILLED ELSEWHERE.



[facility] FILLED:

3-22-19 BUSPAR 15MG BID #60



SHE TAKES VITAMIN D OTC DAILY.
Dr Tawil called for discharge orders. 1115 Pt voided 200ml with 12ml of residual per bladder 
scanner. Phoned Dr Tawil back with results and was given discharge.  1145 Home instructions 
given and stressed importance of rest, no lifting, straining with bowel movements, or to 
over fill bladder. Appointment for 1& 6 weeks with Dr Ashby, 2 weeks with Dr Tawil. 
Prescriptions given and home meds returned. To exit via w/c. Accompained by this nurse and 
SO.
RT notified of IS order.
SOB
initial assessment completed, see interventions for further.  lee catheter to DD with 
clear, yellow urine noted in chamber. + vaginal packing in place. no active bleeding noted. 
c/o pressure.  call light within reach.  s/o @ side.
pt transferred to room 306 via bed with PACU staff @ side. bedside report received from 
CELINA Muniz.  care assumed of pt.
report given to CELINA Galvan.
Evaluation of pleural effusions
pericardial effusion
pericardial drain with increased output
pericardiocentesis

## 2021-10-11 ENCOUNTER — HOSPITAL ENCOUNTER (OUTPATIENT)
Dept: HOSPITAL 75 - RAD | Age: 61
End: 2021-10-11
Attending: NURSE PRACTITIONER
Payer: MEDICARE

## 2021-10-11 DIAGNOSIS — K74.60: Primary | ICD-10-CM

## 2021-10-11 DIAGNOSIS — Z90.49: ICD-10-CM

## 2021-10-11 PROCEDURE — 76700 US EXAM ABDOM COMPLETE: CPT

## 2021-10-11 NOTE — DIAGNOSTIC IMAGING REPORT
INDICATION:



PROCEDURE: Ultrasound abdomen complete.



TECHNIQUE: Multiple real-time grayscale images were obtained of

the abdomen in various projections.



INDICATION: Cirrhosis of the liver.



FINDINGS: Liver appears homogeneous. There is mild increased

echogenicity noted throughout. It measures 15 cm in long axis.

Bile ducts are not dilated. Common duct measures 6 mm.

Gallbladder is absent. Pancreas appears normal. Spleen is not

enlarged at 11 cm. Aorta is not dilated. Inferior vena cava and

portal vein appear normal with the hepatopetal flow. Right kidney

measures 10 x 5 x 4.5 cm. Left kidney measures 10 x 5 x 5 cm.

Kidneys appear normal bilaterally without hydronephrosis. There

is no ascites. Negative Martinez's sign.



IMPRESSION: Normal abdominal ultrasound with gallbladder absent.



Dictated by: 



  Dictated on workstation # [id number]

## 2022-08-22 ENCOUNTER — HOSPITAL ENCOUNTER (OUTPATIENT)
Dept: HOSPITAL 75 - CARD | Age: 62
End: 2022-08-22
Attending: PHYSICIAN ASSISTANT
Payer: MEDICARE

## 2022-08-22 DIAGNOSIS — I11.9: ICD-10-CM

## 2022-08-22 DIAGNOSIS — I25.10: Primary | ICD-10-CM

## 2022-08-22 PROCEDURE — 93306 TTE W/DOPPLER COMPLETE: CPT

## 2023-01-19 NOTE — DISCHARGE INST-SIMPLE/STANDARD
Discharge Inst-Standard


Discharge Medications


New, Converted or Re-Newed RX:  Transmitted to Pharmacy ([id number])





Patient Instructions/Follow Up


Plan of Care/Instructions/FU:  


Continue Eliquis 5mg BID 


continue diltiazem 240mg


resume andry medications


 follow up in 1 week with Dr. Reeves


 follow up in 1 week with Dr. Castro


Activity as Tolerated:  Yes


Discharge Diet:  Regular Diet


Return to [facility] For:  


reoccurence of atrial fibrillation with RVR


Any new life threatening concerns











DUNIA MORRISON MD Sep 15, 2017 15:37 Rituxan Pregnancy And Lactation Text: This medication is Pregnancy Category C and it isn't know if it is safe during pregnancy. It is unknown if this medication is excreted in breast milk but similar antibodies are known to be excreted.

## 2023-07-17 ENCOUNTER — HOSPITAL ENCOUNTER (OUTPATIENT)
Dept: HOSPITAL 75 - RAD | Age: 63
End: 2023-07-17
Attending: FAMILY MEDICINE
Payer: MEDICARE

## 2023-07-17 DIAGNOSIS — Z12.31: Primary | ICD-10-CM

## 2023-07-17 PROCEDURE — 77063 BREAST TOMOSYNTHESIS BI: CPT

## 2023-07-17 PROCEDURE — 77067 SCR MAMMO BI INCL CAD: CPT

## 2023-07-17 NOTE — DIAGNOSTIC IMAGING REPORT
Indication: Routine screening.



No prior mammograms are available for comparison.



2-D and 3-D bilateral screening mammography was performed with

CAD.



The current study was also evaluated with a Computer Aided

Detection (CAD) system.



Both breasts are heterogeneously dense, limiting the sensitivity

of mammography. No mass or malignant-appearing

microcalcifications are seen. There are occasional benign

calcifications Axillae are unremarkable.



IMPRESSION: BI-RADS Category 2



No mammographic features suspicious for malignancy are

identified.



ACR BI-RADS Category 2: Benign findings.

Result letter will be mailed to the patient.

Note: At least 10% of breast cancer is not imaged by mammography.



Dictated by: 



  Dictated on workstation # [id number]